# Patient Record
Sex: FEMALE | Race: WHITE | NOT HISPANIC OR LATINO | Employment: OTHER | URBAN - METROPOLITAN AREA
[De-identification: names, ages, dates, MRNs, and addresses within clinical notes are randomized per-mention and may not be internally consistent; named-entity substitution may affect disease eponyms.]

---

## 2017-01-12 ENCOUNTER — OFFICE VISIT - HEALTHEAST (OUTPATIENT)
Dept: FAMILY MEDICINE | Facility: CLINIC | Age: 72
End: 2017-01-12

## 2017-01-12 DIAGNOSIS — E55.9 VITAMIN D DEFICIENCY: ICD-10-CM

## 2017-01-12 DIAGNOSIS — G89.29 CHRONIC BILATERAL LOW BACK PAIN WITHOUT SCIATICA: ICD-10-CM

## 2017-01-12 DIAGNOSIS — M54.50 CHRONIC BILATERAL LOW BACK PAIN WITHOUT SCIATICA: ICD-10-CM

## 2017-01-12 DIAGNOSIS — E11.9 TYPE 2 DIABETES MELLITUS WITHOUT COMPLICATION (H): ICD-10-CM

## 2017-01-12 DIAGNOSIS — I10 ESSENTIAL HYPERTENSION WITH GOAL BLOOD PRESSURE LESS THAN 140/90: ICD-10-CM

## 2017-01-12 LAB — HBA1C MFR BLD: 7.4 % (ref 3.5–6)

## 2017-01-12 ASSESSMENT — MIFFLIN-ST. JEOR: SCORE: 1552.99

## 2017-02-06 ENCOUNTER — OFFICE VISIT - HEALTHEAST (OUTPATIENT)
Dept: FAMILY MEDICINE | Facility: CLINIC | Age: 72
End: 2017-02-06

## 2017-02-06 DIAGNOSIS — J01.00 ACUTE MAXILLARY SINUSITIS, RECURRENCE NOT SPECIFIED: ICD-10-CM

## 2017-02-06 DIAGNOSIS — M81.0 OSTEOPOROSIS: ICD-10-CM

## 2017-02-13 ENCOUNTER — COMMUNICATION - HEALTHEAST (OUTPATIENT)
Dept: ADMINISTRATIVE | Facility: CLINIC | Age: 72
End: 2017-02-13

## 2017-03-04 ENCOUNTER — COMMUNICATION - HEALTHEAST (OUTPATIENT)
Dept: FAMILY MEDICINE | Facility: CLINIC | Age: 72
End: 2017-03-04

## 2017-03-04 DIAGNOSIS — R19.7 DIARRHEA: ICD-10-CM

## 2017-03-21 ENCOUNTER — AMBULATORY - HEALTHEAST (OUTPATIENT)
Dept: ENDOCRINOLOGY | Facility: CLINIC | Age: 72
End: 2017-03-21

## 2017-03-21 DIAGNOSIS — M81.0 OSTEOPOROSIS: ICD-10-CM

## 2017-05-03 ENCOUNTER — COMMUNICATION - HEALTHEAST (OUTPATIENT)
Dept: FAMILY MEDICINE | Facility: CLINIC | Age: 72
End: 2017-05-03

## 2017-05-03 DIAGNOSIS — G60.9 HEREDITARY AND IDIOPATHIC PERIPHERAL NEUROPATHY: ICD-10-CM

## 2017-05-03 DIAGNOSIS — E78.5 HYPERLIPIDEMIA, UNSPECIFIED HYPERLIPIDEMIA TYPE: ICD-10-CM

## 2017-05-03 DIAGNOSIS — E11.9 DIABETES MELLITUS TYPE II, CONTROLLED, WITH NO COMPLICATIONS (H): ICD-10-CM

## 2017-05-23 ENCOUNTER — COMMUNICATION - HEALTHEAST (OUTPATIENT)
Dept: FAMILY MEDICINE | Facility: CLINIC | Age: 72
End: 2017-05-23

## 2017-05-23 DIAGNOSIS — I10 ESSENTIAL HYPERTENSION: ICD-10-CM

## 2017-07-13 ENCOUNTER — OFFICE VISIT - HEALTHEAST (OUTPATIENT)
Dept: FAMILY MEDICINE | Facility: CLINIC | Age: 72
End: 2017-07-13

## 2017-07-13 DIAGNOSIS — C18.9 MALIGNANT NEOPLASM OF COLON, UNSPECIFIED PART OF COLON (H): ICD-10-CM

## 2017-07-13 DIAGNOSIS — R30.0 BURNING WITH URINATION: ICD-10-CM

## 2017-07-13 DIAGNOSIS — N30.00 ACUTE CYSTITIS WITHOUT HEMATURIA: ICD-10-CM

## 2017-07-13 DIAGNOSIS — E11.9 TYPE 2 DIABETES MELLITUS (H): ICD-10-CM

## 2017-07-13 DIAGNOSIS — E11.9 DIABETES MELLITUS TYPE II, CONTROLLED, WITH NO COMPLICATIONS (H): ICD-10-CM

## 2017-07-13 DIAGNOSIS — E11.9 TYPE 2 DIABETES MELLITUS WITHOUT COMPLICATION (H): ICD-10-CM

## 2017-07-13 LAB
CEA SERPL-MCNC: 1.5 NG/ML (ref 0–3)
HBA1C MFR BLD: 9.2 % (ref 3.5–6)

## 2017-07-13 ASSESSMENT — MIFFLIN-ST. JEOR: SCORE: 1533.93

## 2017-08-04 ENCOUNTER — COMMUNICATION - HEALTHEAST (OUTPATIENT)
Dept: FAMILY MEDICINE | Facility: CLINIC | Age: 72
End: 2017-08-04

## 2017-08-04 DIAGNOSIS — E11.65 TYPE 2 DIABETES MELLITUS WITH HYPERGLYCEMIA, WITHOUT LONG-TERM CURRENT USE OF INSULIN (H): ICD-10-CM

## 2017-08-04 DIAGNOSIS — E11.9 DIABETES MELLITUS TYPE II, CONTROLLED, WITH NO COMPLICATIONS (H): ICD-10-CM

## 2017-09-15 ENCOUNTER — AMBULATORY - HEALTHEAST (OUTPATIENT)
Dept: ENDOCRINOLOGY | Facility: CLINIC | Age: 72
End: 2017-09-15

## 2017-09-15 DIAGNOSIS — M81.0 OSTEOPOROSIS: ICD-10-CM

## 2017-09-20 ENCOUNTER — RECORDS - HEALTHEAST (OUTPATIENT)
Dept: ADMINISTRATIVE | Facility: OTHER | Age: 72
End: 2017-09-20

## 2017-09-22 ENCOUNTER — RECORDS - HEALTHEAST (OUTPATIENT)
Dept: ADMINISTRATIVE | Facility: OTHER | Age: 72
End: 2017-09-22

## 2017-10-13 ENCOUNTER — OFFICE VISIT - HEALTHEAST (OUTPATIENT)
Dept: FAMILY MEDICINE | Facility: CLINIC | Age: 72
End: 2017-10-13

## 2017-10-13 DIAGNOSIS — E11.65 TYPE 2 DIABETES MELLITUS WITH HYPERGLYCEMIA, WITHOUT LONG-TERM CURRENT USE OF INSULIN (H): ICD-10-CM

## 2017-10-13 DIAGNOSIS — Z12.11 SCREEN FOR COLON CANCER: ICD-10-CM

## 2017-10-13 LAB — HBA1C MFR BLD: 7 % (ref 3.5–6)

## 2017-10-13 ASSESSMENT — MIFFLIN-ST. JEOR: SCORE: 1490.84

## 2017-11-22 ENCOUNTER — AMBULATORY - HEALTHEAST (OUTPATIENT)
Dept: SCHEDULING | Facility: CLINIC | Age: 72
End: 2017-11-22

## 2017-11-22 DIAGNOSIS — M81.0 OSTEOPOROSIS: ICD-10-CM

## 2017-11-30 ENCOUNTER — COMMUNICATION - HEALTHEAST (OUTPATIENT)
Dept: FAMILY MEDICINE | Facility: CLINIC | Age: 72
End: 2017-11-30

## 2017-11-30 DIAGNOSIS — E11.65 TYPE 2 DIABETES MELLITUS WITH HYPERGLYCEMIA, WITHOUT LONG-TERM CURRENT USE OF INSULIN (H): ICD-10-CM

## 2017-12-13 ENCOUNTER — COMMUNICATION - HEALTHEAST (OUTPATIENT)
Dept: FAMILY MEDICINE | Facility: CLINIC | Age: 72
End: 2017-12-13

## 2017-12-13 DIAGNOSIS — E11.65 TYPE 2 DIABETES MELLITUS WITH HYPERGLYCEMIA, WITHOUT LONG-TERM CURRENT USE OF INSULIN (H): ICD-10-CM

## 2017-12-18 ENCOUNTER — HOSPITAL ENCOUNTER (OUTPATIENT)
Dept: MAMMOGRAPHY | Facility: HOSPITAL | Age: 72
Discharge: HOME | End: 2017-12-18
Attending: FAMILY MEDICINE

## 2017-12-18 DIAGNOSIS — Z12.31 VISIT FOR SCREENING MAMMOGRAM: ICD-10-CM

## 2018-01-24 ENCOUNTER — COMMUNICATION - HEALTHEAST (OUTPATIENT)
Dept: FAMILY MEDICINE | Facility: CLINIC | Age: 73
End: 2018-01-24

## 2018-01-24 DIAGNOSIS — E78.5 HYPERLIPIDEMIA, UNSPECIFIED HYPERLIPIDEMIA TYPE: ICD-10-CM

## 2018-01-24 DIAGNOSIS — E11.9 DIABETES MELLITUS TYPE II, CONTROLLED, WITH NO COMPLICATIONS (H): ICD-10-CM

## 2018-01-25 ENCOUNTER — AMBULATORY - HEALTHEAST (OUTPATIENT)
Dept: FAMILY MEDICINE | Facility: CLINIC | Age: 73
End: 2018-01-25

## 2018-01-25 ENCOUNTER — AMBULATORY - HEALTHEAST (OUTPATIENT)
Dept: LAB | Facility: CLINIC | Age: 73
End: 2018-01-25

## 2018-01-25 ENCOUNTER — COMMUNICATION - HEALTHEAST (OUTPATIENT)
Dept: FAMILY MEDICINE | Facility: CLINIC | Age: 73
End: 2018-01-25

## 2018-01-25 ENCOUNTER — COMMUNICATION - HEALTHEAST (OUTPATIENT)
Dept: TELEHEALTH | Facility: CLINIC | Age: 73
End: 2018-01-25

## 2018-01-25 DIAGNOSIS — E11.65 TYPE 2 DIABETES MELLITUS WITH HYPERGLYCEMIA, WITHOUT LONG-TERM CURRENT USE OF INSULIN (H): ICD-10-CM

## 2018-01-25 DIAGNOSIS — C18.9 MALIGNANT NEOPLASM OF COLON, UNSPECIFIED PART OF COLON (H): ICD-10-CM

## 2018-01-25 LAB
CEA SERPL-MCNC: 1.6 NG/ML (ref 0–3)
HBA1C MFR BLD: 6.7 % (ref 3.5–6)

## 2018-02-12 ENCOUNTER — RECORDS - HEALTHEAST (OUTPATIENT)
Dept: ADMINISTRATIVE | Facility: OTHER | Age: 73
End: 2018-02-12

## 2018-02-20 ENCOUNTER — COMMUNICATION - HEALTHEAST (OUTPATIENT)
Dept: FAMILY MEDICINE | Facility: CLINIC | Age: 73
End: 2018-02-20

## 2018-02-21 ENCOUNTER — COMMUNICATION - HEALTHEAST (OUTPATIENT)
Dept: FAMILY MEDICINE | Facility: CLINIC | Age: 73
End: 2018-02-21

## 2018-02-21 DIAGNOSIS — R19.7 DIARRHEA: ICD-10-CM

## 2018-02-21 DIAGNOSIS — I10 ESSENTIAL HYPERTENSION: ICD-10-CM

## 2018-03-02 ENCOUNTER — RECORDS - HEALTHEAST (OUTPATIENT)
Dept: ADMINISTRATIVE | Facility: OTHER | Age: 73
End: 2018-03-02

## 2018-05-18 ENCOUNTER — AMBULATORY - HEALTHEAST (OUTPATIENT)
Dept: ENDOCRINOLOGY | Facility: CLINIC | Age: 73
End: 2018-05-18

## 2018-05-18 DIAGNOSIS — M81.0 OSTEOPOROSIS: ICD-10-CM

## 2018-07-09 ENCOUNTER — OFFICE VISIT - HEALTHEAST (OUTPATIENT)
Dept: FAMILY MEDICINE | Facility: CLINIC | Age: 73
End: 2018-07-09

## 2018-07-09 DIAGNOSIS — M70.62 TROCHANTERIC BURSITIS OF BOTH HIPS: ICD-10-CM

## 2018-07-09 DIAGNOSIS — M70.61 TROCHANTERIC BURSITIS OF BOTH HIPS: ICD-10-CM

## 2018-07-09 DIAGNOSIS — G60.9 HEREDITARY AND IDIOPATHIC PERIPHERAL NEUROPATHY: ICD-10-CM

## 2018-07-09 DIAGNOSIS — M81.0 OSTEOPOROSIS: ICD-10-CM

## 2018-07-09 DIAGNOSIS — E11.65 TYPE 2 DIABETES MELLITUS WITH HYPERGLYCEMIA, WITHOUT LONG-TERM CURRENT USE OF INSULIN (H): ICD-10-CM

## 2018-07-09 DIAGNOSIS — C18.9 MALIGNANT NEOPLASM OF COLON, UNSPECIFIED PART OF COLON (H): ICD-10-CM

## 2018-07-09 LAB
25(OH)D3 SERPL-MCNC: 26.2 NG/ML (ref 30–80)
25(OH)D3 SERPL-MCNC: 26.2 NG/ML (ref 30–80)
CALCIUM SERPL-MCNC: 9.9 MG/DL (ref 8.5–10.5)
CEA SERPL-MCNC: 1.3 NG/ML (ref 0–3)
HBA1C MFR BLD: 7.1 % (ref 3.5–6)

## 2018-07-09 ASSESSMENT — MIFFLIN-ST. JEOR: SCORE: 1542.55

## 2018-07-11 ENCOUNTER — COMMUNICATION - HEALTHEAST (OUTPATIENT)
Dept: FAMILY MEDICINE | Facility: CLINIC | Age: 73
End: 2018-07-11

## 2018-07-17 ENCOUNTER — OFFICE VISIT - HEALTHEAST (OUTPATIENT)
Dept: ENDOCRINOLOGY | Facility: CLINIC | Age: 73
End: 2018-07-17

## 2018-07-17 ENCOUNTER — RECORDS - HEALTHEAST (OUTPATIENT)
Dept: ADMINISTRATIVE | Facility: OTHER | Age: 73
End: 2018-07-17

## 2018-07-17 DIAGNOSIS — M81.0 OSTEOPOROSIS: ICD-10-CM

## 2018-07-17 ASSESSMENT — MIFFLIN-ST. JEOR: SCORE: 1540.97

## 2018-07-26 ENCOUNTER — COMMUNICATION - HEALTHEAST (OUTPATIENT)
Dept: FAMILY MEDICINE | Facility: CLINIC | Age: 73
End: 2018-07-26

## 2018-07-26 DIAGNOSIS — E11.9 DIABETES MELLITUS TYPE II, CONTROLLED, WITH NO COMPLICATIONS (H): ICD-10-CM

## 2018-08-13 ENCOUNTER — COMMUNICATION - HEALTHEAST (OUTPATIENT)
Dept: FAMILY MEDICINE | Facility: CLINIC | Age: 73
End: 2018-08-13

## 2018-08-15 ENCOUNTER — OFFICE VISIT - HEALTHEAST (OUTPATIENT)
Dept: FAMILY MEDICINE | Facility: CLINIC | Age: 73
End: 2018-08-15

## 2018-08-15 DIAGNOSIS — E66.01 SEVERE OBESITY (BMI 35.0-35.9 WITH COMORBIDITY) (H): ICD-10-CM

## 2018-08-15 DIAGNOSIS — C18.9 MALIGNANT NEOPLASM OF COLON, UNSPECIFIED PART OF COLON (H): ICD-10-CM

## 2018-08-15 DIAGNOSIS — R19.7 DIARRHEA OF PRESUMED INFECTIOUS ORIGIN: ICD-10-CM

## 2018-08-15 LAB
C DIFF TOX B STL QL: NEGATIVE
RIBOTYPE 027/NAP1/BI: NORMAL

## 2018-08-15 ASSESSMENT — MIFFLIN-ST. JEOR: SCORE: 1537.34

## 2018-08-16 ENCOUNTER — COMMUNICATION - HEALTHEAST (OUTPATIENT)
Dept: FAMILY MEDICINE | Facility: CLINIC | Age: 73
End: 2018-08-16

## 2018-08-16 LAB
O+P STL MICRO: NORMAL
SHIGA TOXIN 1: NEGATIVE
SHIGA TOXIN 2: NEGATIVE

## 2018-08-18 LAB — BACTERIA SPEC CULT: NORMAL

## 2018-08-23 ENCOUNTER — COMMUNICATION - HEALTHEAST (OUTPATIENT)
Dept: FAMILY MEDICINE | Facility: CLINIC | Age: 73
End: 2018-08-23

## 2018-08-23 DIAGNOSIS — K57.32 DIVERTICULITIS OF COLON: ICD-10-CM

## 2018-08-23 DIAGNOSIS — C18.9 MALIGNANT NEOPLASM OF COLON, UNSPECIFIED PART OF COLON (H): ICD-10-CM

## 2018-08-24 ENCOUNTER — COMMUNICATION - HEALTHEAST (OUTPATIENT)
Dept: FAMILY MEDICINE | Facility: CLINIC | Age: 73
End: 2018-08-24

## 2018-08-25 ENCOUNTER — COMMUNICATION - HEALTHEAST (OUTPATIENT)
Dept: FAMILY MEDICINE | Facility: CLINIC | Age: 73
End: 2018-08-25

## 2018-08-25 DIAGNOSIS — R19.7 DIARRHEA: ICD-10-CM

## 2018-08-27 ENCOUNTER — HOSPITAL ENCOUNTER (OUTPATIENT)
Dept: CT IMAGING | Facility: HOSPITAL | Age: 73
Discharge: HOME | End: 2018-08-27
Attending: FAMILY MEDICINE

## 2018-08-27 DIAGNOSIS — K57.32 DIVERTICULITIS OF COLON: ICD-10-CM

## 2018-08-27 DIAGNOSIS — C18.9 MALIGNANT NEOPLASM OF COLON, UNSPECIFIED PART OF COLON (H): ICD-10-CM

## 2018-08-27 LAB
CREAT BLD-MCNC: 0.7 MG/DL
POC GFR AMER AF HE - HISTORICAL: >60 ML/MIN/1.73M2
POC GFR NON AMER AF HE - HISTORICAL: >60 ML/MIN/1.73M2

## 2018-08-28 ENCOUNTER — COMMUNICATION - HEALTHEAST (OUTPATIENT)
Dept: FAMILY MEDICINE | Facility: CLINIC | Age: 73
End: 2018-08-28

## 2018-08-31 ENCOUNTER — OFFICE VISIT - HEALTHEAST (OUTPATIENT)
Dept: FAMILY MEDICINE | Facility: CLINIC | Age: 73
End: 2018-08-31

## 2018-08-31 DIAGNOSIS — K80.20 GALLSTONES: ICD-10-CM

## 2018-08-31 DIAGNOSIS — E11.65 TYPE 2 DIABETES MELLITUS WITH HYPERGLYCEMIA, WITHOUT LONG-TERM CURRENT USE OF INSULIN (H): ICD-10-CM

## 2018-08-31 DIAGNOSIS — C18.9 MALIGNANT NEOPLASM OF COLON, UNSPECIFIED PART OF COLON (H): ICD-10-CM

## 2018-08-31 ASSESSMENT — MIFFLIN-ST. JEOR: SCORE: 1523.73

## 2018-09-04 ENCOUNTER — RECORDS - HEALTHEAST (OUTPATIENT)
Dept: ADMINISTRATIVE | Facility: OTHER | Age: 73
End: 2018-09-04

## 2018-09-06 ENCOUNTER — OFFICE VISIT - HEALTHEAST (OUTPATIENT)
Dept: SURGERY | Facility: CLINIC | Age: 73
End: 2018-09-06

## 2018-09-06 DIAGNOSIS — C18.9 MALIGNANT NEOPLASM OF COLON (H): ICD-10-CM

## 2018-09-06 DIAGNOSIS — K81.9 CHOLECYSTITIS: ICD-10-CM

## 2018-09-06 ASSESSMENT — MIFFLIN-ST. JEOR: SCORE: 1535.07

## 2018-09-07 ENCOUNTER — COMMUNICATION - HEALTHEAST (OUTPATIENT)
Dept: FAMILY MEDICINE | Facility: CLINIC | Age: 73
End: 2018-09-07

## 2018-09-24 ENCOUNTER — OFFICE VISIT - HEALTHEAST (OUTPATIENT)
Dept: FAMILY MEDICINE | Facility: CLINIC | Age: 73
End: 2018-09-24

## 2018-09-24 DIAGNOSIS — E11.65 TYPE 2 DIABETES MELLITUS WITH HYPERGLYCEMIA, WITHOUT LONG-TERM CURRENT USE OF INSULIN (H): ICD-10-CM

## 2018-09-24 DIAGNOSIS — K80.20 GALLSTONES: ICD-10-CM

## 2018-09-24 DIAGNOSIS — Z01.818 PREOPERATIVE EXAMINATION: ICD-10-CM

## 2018-09-24 DIAGNOSIS — M81.0 OSTEOPOROSIS: ICD-10-CM

## 2018-09-24 LAB
ANION GAP SERPL CALCULATED.3IONS-SCNC: 11 MMOL/L (ref 5–18)
ATRIAL RATE - MUSE: 68 BPM
BUN SERPL-MCNC: 15 MG/DL (ref 8–28)
CALCIUM SERPL-MCNC: 10.6 MG/DL (ref 8.5–10.5)
CHLORIDE BLD-SCNC: 103 MMOL/L (ref 98–107)
CO2 SERPL-SCNC: 25 MMOL/L (ref 22–31)
CREAT SERPL-MCNC: 0.73 MG/DL (ref 0.6–1.1)
DIASTOLIC BLOOD PRESSURE - MUSE: 68 MMHG
GFR SERPL CREATININE-BSD FRML MDRD: >60 ML/MIN/1.73M2
GLUCOSE BLD-MCNC: 153 MG/DL (ref 70–125)
HGB BLD-MCNC: 13.6 G/DL (ref 12–16)
INTERPRETATION ECG - MUSE: NORMAL
P AXIS - MUSE: 46 DEGREES
POTASSIUM BLD-SCNC: 4.8 MMOL/L (ref 3.5–5)
PR INTERVAL - MUSE: 204 MS
QRS DURATION - MUSE: 84 MS
QT - MUSE: 396 MS
QTC - MUSE: 421 MS
R AXIS - MUSE: -16 DEGREES
SODIUM SERPL-SCNC: 139 MMOL/L (ref 136–145)
SYSTOLIC BLOOD PRESSURE - MUSE: 106 MMHG
T AXIS - MUSE: 46 DEGREES
VENTRICULAR RATE- MUSE: 68 BPM

## 2018-09-24 ASSESSMENT — MIFFLIN-ST. JEOR: SCORE: 1502.64

## 2018-09-25 LAB
25(OH)D3 SERPL-MCNC: 31 NG/ML (ref 30–80)
25(OH)D3 SERPL-MCNC: 31 NG/ML (ref 30–80)

## 2018-10-09 ASSESSMENT — MIFFLIN-ST. JEOR: SCORE: 1499.46

## 2018-10-10 ENCOUNTER — SURGERY - HEALTHEAST (OUTPATIENT)
Dept: SURGERY | Facility: HOSPITAL | Age: 73
End: 2018-10-10

## 2018-10-10 ENCOUNTER — ANESTHESIA - HEALTHEAST (OUTPATIENT)
Dept: SURGERY | Facility: HOSPITAL | Age: 73
End: 2018-10-10

## 2018-10-12 ENCOUNTER — COMMUNICATION - HEALTHEAST (OUTPATIENT)
Dept: ADMINISTRATIVE | Facility: CLINIC | Age: 73
End: 2018-10-12

## 2018-10-29 ENCOUNTER — COMMUNICATION - HEALTHEAST (OUTPATIENT)
Dept: FAMILY MEDICINE | Facility: CLINIC | Age: 73
End: 2018-10-29

## 2018-11-19 ENCOUNTER — COMMUNICATION - HEALTHEAST (OUTPATIENT)
Dept: FAMILY MEDICINE | Facility: CLINIC | Age: 73
End: 2018-11-19

## 2018-11-19 DIAGNOSIS — G60.9 HEREDITARY AND IDIOPATHIC PERIPHERAL NEUROPATHY: ICD-10-CM

## 2018-11-20 ENCOUNTER — AMBULATORY - HEALTHEAST (OUTPATIENT)
Dept: ENDOCRINOLOGY | Facility: CLINIC | Age: 73
End: 2018-11-20

## 2018-12-04 ENCOUNTER — COMMUNICATION - HEALTHEAST (OUTPATIENT)
Dept: FAMILY MEDICINE | Facility: CLINIC | Age: 73
End: 2018-12-04

## 2018-12-04 DIAGNOSIS — E11.65 TYPE 2 DIABETES MELLITUS WITH HYPERGLYCEMIA, WITHOUT LONG-TERM CURRENT USE OF INSULIN (H): ICD-10-CM

## 2018-12-07 ENCOUNTER — COMMUNICATION - HEALTHEAST (OUTPATIENT)
Dept: FAMILY MEDICINE | Facility: CLINIC | Age: 73
End: 2018-12-07

## 2018-12-07 DIAGNOSIS — E11.65 TYPE 2 DIABETES MELLITUS WITH HYPERGLYCEMIA, WITHOUT LONG-TERM CURRENT USE OF INSULIN (H): ICD-10-CM

## 2018-12-10 ENCOUNTER — COMMUNICATION - HEALTHEAST (OUTPATIENT)
Dept: FAMILY MEDICINE | Facility: CLINIC | Age: 73
End: 2018-12-10

## 2018-12-10 DIAGNOSIS — E11.65 TYPE 2 DIABETES MELLITUS WITH HYPERGLYCEMIA, WITHOUT LONG-TERM CURRENT USE OF INSULIN (H): ICD-10-CM

## 2019-01-04 ENCOUNTER — OFFICE VISIT - HEALTHEAST (OUTPATIENT)
Dept: FAMILY MEDICINE | Facility: CLINIC | Age: 74
End: 2019-01-04

## 2019-01-04 DIAGNOSIS — H25.9 AGE-RELATED CATARACT OF BOTH EYES, UNSPECIFIED AGE-RELATED CATARACT TYPE: ICD-10-CM

## 2019-01-04 DIAGNOSIS — I10 ESSENTIAL HYPERTENSION: ICD-10-CM

## 2019-01-04 DIAGNOSIS — Z01.818 PREOP EXAMINATION: ICD-10-CM

## 2019-01-04 DIAGNOSIS — C18.9 MALIGNANT NEOPLASM OF COLON, UNSPECIFIED PART OF COLON (H): ICD-10-CM

## 2019-01-04 DIAGNOSIS — E11.65 TYPE 2 DIABETES MELLITUS WITH HYPERGLYCEMIA, WITHOUT LONG-TERM CURRENT USE OF INSULIN (H): ICD-10-CM

## 2019-01-04 DIAGNOSIS — E66.01 SEVERE OBESITY (BMI 35.0-35.9 WITH COMORBIDITY) (H): ICD-10-CM

## 2019-01-04 LAB
ANION GAP SERPL CALCULATED.3IONS-SCNC: 10 MMOL/L (ref 5–18)
BUN SERPL-MCNC: 19 MG/DL (ref 8–28)
CALCIUM SERPL-MCNC: 9.6 MG/DL (ref 8.5–10.5)
CEA SERPL-MCNC: 1.3 NG/ML (ref 0–3)
CHLORIDE BLD-SCNC: 106 MMOL/L (ref 98–107)
CO2 SERPL-SCNC: 25 MMOL/L (ref 22–31)
CREAT SERPL-MCNC: 0.77 MG/DL (ref 0.6–1.1)
GFR SERPL CREATININE-BSD FRML MDRD: >60 ML/MIN/1.73M2
GLUCOSE BLD-MCNC: 203 MG/DL (ref 70–125)
HBA1C MFR BLD: 8.4 % (ref 3.5–6)
HGB BLD-MCNC: 13.7 G/DL (ref 12–16)
POTASSIUM BLD-SCNC: 4.7 MMOL/L (ref 3.5–5)
SODIUM SERPL-SCNC: 141 MMOL/L (ref 136–145)

## 2019-01-04 ASSESSMENT — MIFFLIN-ST. JEOR: SCORE: 1488.23

## 2019-01-21 ENCOUNTER — COMMUNICATION - HEALTHEAST (OUTPATIENT)
Dept: FAMILY MEDICINE | Facility: CLINIC | Age: 74
End: 2019-01-21

## 2019-01-21 DIAGNOSIS — E11.65 TYPE 2 DIABETES MELLITUS WITH HYPERGLYCEMIA, WITHOUT LONG-TERM CURRENT USE OF INSULIN (H): ICD-10-CM

## 2019-01-21 DIAGNOSIS — E78.5 HYPERLIPIDEMIA, UNSPECIFIED HYPERLIPIDEMIA TYPE: ICD-10-CM

## 2019-02-01 ENCOUNTER — AMBULATORY - HEALTHEAST (OUTPATIENT)
Dept: SURGERY | Facility: CLINIC | Age: 74
End: 2019-02-01

## 2019-02-01 ENCOUNTER — OFFICE VISIT - HEALTHEAST (OUTPATIENT)
Dept: SURGERY | Facility: CLINIC | Age: 74
End: 2019-02-01

## 2019-02-01 DIAGNOSIS — C18.9 COLON CANCER (H): ICD-10-CM

## 2019-02-01 DIAGNOSIS — Z86.0100 HISTORY OF COLONIC POLYPS: ICD-10-CM

## 2019-02-01 DIAGNOSIS — C18.4 MALIGNANT NEOPLASM OF TRANSVERSE COLON (H): ICD-10-CM

## 2019-02-01 ASSESSMENT — MIFFLIN-ST. JEOR: SCORE: 1516.81

## 2019-03-04 ENCOUNTER — COMMUNICATION - HEALTHEAST (OUTPATIENT)
Dept: FAMILY MEDICINE | Facility: CLINIC | Age: 74
End: 2019-03-04

## 2019-03-04 DIAGNOSIS — I10 ESSENTIAL HYPERTENSION: ICD-10-CM

## 2019-03-06 ENCOUNTER — AMBULATORY - HEALTHEAST (OUTPATIENT)
Dept: SURGERY | Facility: CLINIC | Age: 74
End: 2019-03-06

## 2019-03-06 ENCOUNTER — COMMUNICATION - HEALTHEAST (OUTPATIENT)
Dept: SURGERY | Facility: CLINIC | Age: 74
End: 2019-03-06

## 2019-03-13 ENCOUNTER — AMBULATORY - HEALTHEAST (OUTPATIENT)
Dept: ENDOCRINOLOGY | Facility: CLINIC | Age: 74
End: 2019-03-13

## 2019-03-13 DIAGNOSIS — M81.0 AGE-RELATED OSTEOPOROSIS WITHOUT CURRENT PATHOLOGICAL FRACTURE: ICD-10-CM

## 2019-03-13 DIAGNOSIS — E11.65 TYPE 2 DIABETES MELLITUS WITH HYPERGLYCEMIA, WITHOUT LONG-TERM CURRENT USE OF INSULIN (H): ICD-10-CM

## 2019-03-21 ASSESSMENT — MIFFLIN-ST. JEOR: SCORE: 1513.64

## 2019-03-25 ENCOUNTER — ANESTHESIA - HEALTHEAST (OUTPATIENT)
Dept: SURGERY | Facility: AMBULATORY SURGERY CENTER | Age: 74
End: 2019-03-25

## 2019-03-26 ENCOUNTER — SURGERY - HEALTHEAST (OUTPATIENT)
Dept: SURGERY | Facility: AMBULATORY SURGERY CENTER | Age: 74
End: 2019-03-26

## 2019-03-26 ASSESSMENT — MIFFLIN-ST. JEOR: SCORE: 1513.64

## 2019-04-01 ENCOUNTER — OFFICE VISIT - HEALTHEAST (OUTPATIENT)
Dept: SURGERY | Facility: CLINIC | Age: 74
End: 2019-04-01

## 2019-04-01 DIAGNOSIS — Z86.0100 HISTORY OF COLONIC POLYPS: ICD-10-CM

## 2019-04-05 ENCOUNTER — OFFICE VISIT - HEALTHEAST (OUTPATIENT)
Dept: FAMILY MEDICINE | Facility: CLINIC | Age: 74
End: 2019-04-05

## 2019-04-05 DIAGNOSIS — R35.0 URINARY FREQUENCY: ICD-10-CM

## 2019-04-05 DIAGNOSIS — M81.0 AGE-RELATED OSTEOPOROSIS WITHOUT CURRENT PATHOLOGICAL FRACTURE: ICD-10-CM

## 2019-04-05 DIAGNOSIS — E11.65 TYPE 2 DIABETES MELLITUS WITH HYPERGLYCEMIA, WITHOUT LONG-TERM CURRENT USE OF INSULIN (H): ICD-10-CM

## 2019-04-05 DIAGNOSIS — E55.9 VITAMIN D DEFICIENCY: ICD-10-CM

## 2019-04-05 DIAGNOSIS — E78.5 HYPERLIPIDEMIA, UNSPECIFIED HYPERLIPIDEMIA TYPE: ICD-10-CM

## 2019-04-05 DIAGNOSIS — I10 ESSENTIAL HYPERTENSION: ICD-10-CM

## 2019-04-05 LAB
ALBUMIN UR-MCNC: NEGATIVE MG/DL
APPEARANCE UR: CLEAR
BILIRUB UR QL STRIP: NEGATIVE
CALCIUM SERPL-MCNC: 10.1 MG/DL (ref 8.5–10.5)
COLOR UR AUTO: YELLOW
CREAT UR-MCNC: 99.2 MG/DL
GLUCOSE UR STRIP-MCNC: NEGATIVE MG/DL
HBA1C MFR BLD: 8.9 % (ref 3.5–6)
HGB UR QL STRIP: NEGATIVE
KETONES UR STRIP-MCNC: NEGATIVE MG/DL
LEUKOCYTE ESTERASE UR QL STRIP: ABNORMAL
MICROALBUMIN UR-MCNC: 1.25 MG/DL (ref 0–1.99)
MICROALBUMIN/CREAT UR: 12.6 MG/G
NITRATE UR QL: NEGATIVE
PH UR STRIP: 5.5 [PH] (ref 5–8)
SP GR UR STRIP: 1.02 (ref 1–1.03)
UROBILINOGEN UR STRIP-ACNC: ABNORMAL

## 2019-04-05 ASSESSMENT — MIFFLIN-ST. JEOR: SCORE: 1499.57

## 2019-04-07 LAB — BACTERIA SPEC CULT: ABNORMAL

## 2019-04-08 LAB
25(OH)D3 SERPL-MCNC: 26 NG/ML (ref 30–80)
25(OH)D3 SERPL-MCNC: 26 NG/ML (ref 30–80)
ANION GAP SERPL CALCULATED.3IONS-SCNC: 14 MMOL/L (ref 5–18)
BUN SERPL-MCNC: 15 MG/DL (ref 8–28)
CALCIUM SERPL-MCNC: 10.3 MG/DL (ref 8.5–10.5)
CHLORIDE BLD-SCNC: 104 MMOL/L (ref 98–107)
CHOLEST SERPL-MCNC: 194 MG/DL
CO2 SERPL-SCNC: 21 MMOL/L (ref 22–31)
CREAT SERPL-MCNC: 0.85 MG/DL (ref 0.6–1.1)
GFR SERPL CREATININE-BSD FRML MDRD: >60 ML/MIN/1.73M2
GLUCOSE BLD-MCNC: 236 MG/DL (ref 70–125)
HDLC SERPL-MCNC: 75 MG/DL
LDLC SERPL CALC-MCNC: 94 MG/DL
POTASSIUM BLD-SCNC: 4.7 MMOL/L (ref 3.5–5)
SODIUM SERPL-SCNC: 139 MMOL/L (ref 136–145)
TRIGL SERPL-MCNC: 124 MG/DL

## 2019-04-09 ENCOUNTER — AMBULATORY - HEALTHEAST (OUTPATIENT)
Dept: ENDOCRINOLOGY | Facility: CLINIC | Age: 74
End: 2019-04-09

## 2019-04-09 DIAGNOSIS — M81.0 OSTEOPOROSIS, UNSPECIFIED OSTEOPOROSIS TYPE, UNSPECIFIED PATHOLOGICAL FRACTURE PRESENCE: ICD-10-CM

## 2019-04-24 ENCOUNTER — COMMUNICATION - HEALTHEAST (OUTPATIENT)
Dept: SCHEDULING | Facility: CLINIC | Age: 74
End: 2019-04-24

## 2019-04-24 ENCOUNTER — OFFICE VISIT - HEALTHEAST (OUTPATIENT)
Dept: FAMILY MEDICINE | Facility: CLINIC | Age: 74
End: 2019-04-24

## 2019-04-24 DIAGNOSIS — N30.00 ACUTE CYSTITIS WITHOUT HEMATURIA: ICD-10-CM

## 2019-04-24 LAB
ALBUMIN UR-MCNC: NEGATIVE MG/DL
APPEARANCE UR: CLEAR
BACTERIA #/AREA URNS HPF: ABNORMAL HPF
BILIRUB UR QL STRIP: NEGATIVE
COLOR UR AUTO: YELLOW
GLUCOSE UR STRIP-MCNC: NEGATIVE MG/DL
HGB UR QL STRIP: NEGATIVE
KETONES UR STRIP-MCNC: NEGATIVE MG/DL
LEUKOCYTE ESTERASE UR QL STRIP: ABNORMAL
NITRATE UR QL: NEGATIVE
PH UR STRIP: 5.5 [PH] (ref 5–8)
RBC #/AREA URNS AUTO: ABNORMAL HPF
SP GR UR STRIP: <=1.005 (ref 1–1.03)
SQUAMOUS #/AREA URNS AUTO: ABNORMAL LPF
UROBILINOGEN UR STRIP-ACNC: ABNORMAL
WBC #/AREA URNS AUTO: ABNORMAL HPF

## 2019-04-25 LAB — BACTERIA SPEC CULT: NO GROWTH

## 2019-05-09 ENCOUNTER — COMMUNICATION - HEALTHEAST (OUTPATIENT)
Dept: FAMILY MEDICINE | Facility: CLINIC | Age: 74
End: 2019-05-09

## 2019-05-09 ENCOUNTER — OFFICE VISIT - HEALTHEAST (OUTPATIENT)
Dept: AUDIOLOGY | Facility: CLINIC | Age: 74
End: 2019-05-09

## 2019-05-09 DIAGNOSIS — H90.3 SENSORINEURAL HEARING LOSS, BILATERAL: ICD-10-CM

## 2019-05-09 DIAGNOSIS — E11.65 TYPE 2 DIABETES MELLITUS WITH HYPERGLYCEMIA, WITHOUT LONG-TERM CURRENT USE OF INSULIN (H): ICD-10-CM

## 2019-05-20 ENCOUNTER — AMBULATORY - HEALTHEAST (OUTPATIENT)
Dept: LAB | Facility: CLINIC | Age: 74
End: 2019-05-20

## 2019-05-20 DIAGNOSIS — M81.0 OSTEOPOROSIS, UNSPECIFIED OSTEOPOROSIS TYPE, UNSPECIFIED PATHOLOGICAL FRACTURE PRESENCE: ICD-10-CM

## 2019-05-21 LAB
25(OH)D3 SERPL-MCNC: 28.9 NG/ML (ref 30–80)
25(OH)D3 SERPL-MCNC: 28.9 NG/ML (ref 30–80)

## 2019-05-28 ENCOUNTER — OFFICE VISIT - HEALTHEAST (OUTPATIENT)
Dept: ENDOCRINOLOGY | Facility: CLINIC | Age: 74
End: 2019-05-28

## 2019-05-28 ENCOUNTER — COMMUNICATION - HEALTHEAST (OUTPATIENT)
Dept: TELEHEALTH | Facility: CLINIC | Age: 74
End: 2019-05-28

## 2019-05-28 ENCOUNTER — AMBULATORY - HEALTHEAST (OUTPATIENT)
Dept: ENDOCRINOLOGY | Facility: CLINIC | Age: 74
End: 2019-05-28

## 2019-05-28 ENCOUNTER — COMMUNICATION - HEALTHEAST (OUTPATIENT)
Dept: ENDOCRINOLOGY | Facility: CLINIC | Age: 74
End: 2019-05-28

## 2019-05-28 DIAGNOSIS — M81.0 OSTEOPOROSIS: ICD-10-CM

## 2019-05-28 DIAGNOSIS — E11.65 TYPE 2 DIABETES MELLITUS WITH HYPERGLYCEMIA, WITHOUT LONG-TERM CURRENT USE OF INSULIN (H): ICD-10-CM

## 2019-05-28 ASSESSMENT — MIFFLIN-ST. JEOR: SCORE: 1517.26

## 2019-06-06 ENCOUNTER — AMBULATORY - HEALTHEAST (OUTPATIENT)
Dept: ENDOCRINOLOGY | Facility: CLINIC | Age: 74
End: 2019-06-06

## 2019-06-27 ENCOUNTER — RECORDS - HEALTHEAST (OUTPATIENT)
Dept: ADMINISTRATIVE | Facility: OTHER | Age: 74
End: 2019-06-27

## 2019-07-08 ENCOUNTER — OFFICE VISIT - HEALTHEAST (OUTPATIENT)
Dept: FAMILY MEDICINE | Facility: CLINIC | Age: 74
End: 2019-07-08

## 2019-07-08 DIAGNOSIS — C18.4 MALIGNANT NEOPLASM OF TRANSVERSE COLON (H): ICD-10-CM

## 2019-07-08 DIAGNOSIS — G60.9 HEREDITARY AND IDIOPATHIC PERIPHERAL NEUROPATHY: ICD-10-CM

## 2019-07-08 DIAGNOSIS — E11.65 TYPE 2 DIABETES MELLITUS WITH HYPERGLYCEMIA, WITHOUT LONG-TERM CURRENT USE OF INSULIN (H): ICD-10-CM

## 2019-07-08 LAB
CEA SERPL-MCNC: 1.5 NG/ML (ref 0–3)
HBA1C MFR BLD: 7.1 % (ref 3.5–6)

## 2019-07-08 ASSESSMENT — MIFFLIN-ST. JEOR: SCORE: 1508.19

## 2019-07-10 ENCOUNTER — RECORDS - HEALTHEAST (OUTPATIENT)
Dept: HEALTH INFORMATION MANAGEMENT | Facility: CLINIC | Age: 74
End: 2019-07-10

## 2019-08-20 ENCOUNTER — COMMUNICATION - HEALTHEAST (OUTPATIENT)
Dept: FAMILY MEDICINE | Facility: CLINIC | Age: 74
End: 2019-08-20

## 2019-08-20 DIAGNOSIS — R19.7 DIARRHEA: ICD-10-CM

## 2019-09-10 ENCOUNTER — AMBULATORY - HEALTHEAST (OUTPATIENT)
Dept: SCHEDULING | Facility: CLINIC | Age: 74
End: 2019-09-10

## 2019-09-10 DIAGNOSIS — M81.0 OSTEOPOROSIS: ICD-10-CM

## 2019-10-07 ENCOUNTER — OFFICE VISIT - HEALTHEAST (OUTPATIENT)
Dept: FAMILY MEDICINE | Facility: CLINIC | Age: 74
End: 2019-10-07

## 2019-10-07 DIAGNOSIS — Z11.59 ENCOUNTER FOR HEPATITIS C SCREENING TEST FOR LOW RISK PATIENT: ICD-10-CM

## 2019-10-07 DIAGNOSIS — E11.65 TYPE 2 DIABETES MELLITUS WITH HYPERGLYCEMIA, WITHOUT LONG-TERM CURRENT USE OF INSULIN (H): ICD-10-CM

## 2019-10-07 DIAGNOSIS — Z00.00 ROUTINE GENERAL MEDICAL EXAMINATION AT A HEALTH CARE FACILITY: ICD-10-CM

## 2019-10-07 DIAGNOSIS — I10 ESSENTIAL HYPERTENSION: ICD-10-CM

## 2019-10-07 DIAGNOSIS — C18.4 MALIGNANT NEOPLASM OF TRANSVERSE COLON (H): ICD-10-CM

## 2019-10-07 DIAGNOSIS — Z12.31 VISIT FOR SCREENING MAMMOGRAM: ICD-10-CM

## 2019-10-07 LAB — HBA1C MFR BLD: 7.1 % (ref 3.5–6)

## 2019-10-07 ASSESSMENT — MIFFLIN-ST. JEOR: SCORE: 1521.8

## 2019-10-08 ENCOUNTER — COMMUNICATION - HEALTHEAST (OUTPATIENT)
Dept: FAMILY MEDICINE | Facility: CLINIC | Age: 74
End: 2019-10-08

## 2019-10-08 LAB — HCV AB SERPL QL IA: NEGATIVE

## 2019-10-10 ENCOUNTER — COMMUNICATION - HEALTHEAST (OUTPATIENT)
Dept: ENDOCRINOLOGY | Facility: CLINIC | Age: 74
End: 2019-10-10

## 2019-10-10 DIAGNOSIS — M81.0 OSTEOPOROSIS: ICD-10-CM

## 2019-10-13 ENCOUNTER — AMBULATORY - HEALTHEAST (OUTPATIENT)
Dept: ENDOCRINOLOGY | Facility: CLINIC | Age: 74
End: 2019-10-13

## 2019-10-13 DIAGNOSIS — M81.0 AGE-RELATED OSTEOPOROSIS WITHOUT CURRENT PATHOLOGICAL FRACTURE: ICD-10-CM

## 2019-11-18 ENCOUNTER — OFFICE VISIT - HEALTHEAST (OUTPATIENT)
Dept: FAMILY MEDICINE | Facility: CLINIC | Age: 74
End: 2019-11-18

## 2019-11-18 DIAGNOSIS — J40 SINOBRONCHITIS: ICD-10-CM

## 2019-11-18 DIAGNOSIS — I10 ESSENTIAL HYPERTENSION: ICD-10-CM

## 2019-11-18 DIAGNOSIS — J32.9 SINOBRONCHITIS: ICD-10-CM

## 2019-11-18 DIAGNOSIS — E11.65 TYPE 2 DIABETES MELLITUS WITH HYPERGLYCEMIA, WITHOUT LONG-TERM CURRENT USE OF INSULIN (H): ICD-10-CM

## 2019-11-20 ENCOUNTER — AMBULATORY - HEALTHEAST (OUTPATIENT)
Dept: ENDOCRINOLOGY | Facility: CLINIC | Age: 74
End: 2019-11-20

## 2019-11-20 DIAGNOSIS — M81.0 OSTEOPOROSIS: ICD-10-CM

## 2019-12-09 ENCOUNTER — HOSPITAL ENCOUNTER (OUTPATIENT)
Dept: MAMMOGRAPHY | Facility: CLINIC | Age: 74
Discharge: HOME | End: 2019-12-09
Attending: FAMILY MEDICINE

## 2019-12-09 DIAGNOSIS — Z12.31 VISIT FOR SCREENING MAMMOGRAM: ICD-10-CM

## 2019-12-11 ENCOUNTER — COMMUNICATION - HEALTHEAST (OUTPATIENT)
Dept: ENDOCRINOLOGY | Facility: CLINIC | Age: 74
End: 2019-12-11

## 2019-12-12 ENCOUNTER — AMBULATORY - HEALTHEAST (OUTPATIENT)
Dept: ENDOCRINOLOGY | Facility: CLINIC | Age: 74
End: 2019-12-12

## 2019-12-12 ENCOUNTER — COMMUNICATION - HEALTHEAST (OUTPATIENT)
Dept: ENDOCRINOLOGY | Facility: CLINIC | Age: 74
End: 2019-12-12

## 2019-12-21 ENCOUNTER — OFFICE VISIT - HEALTHEAST (OUTPATIENT)
Dept: FAMILY MEDICINE | Facility: CLINIC | Age: 74
End: 2019-12-21

## 2019-12-21 DIAGNOSIS — I10 ESSENTIAL HYPERTENSION: ICD-10-CM

## 2019-12-21 DIAGNOSIS — J06.9 VIRAL URI WITH COUGH: ICD-10-CM

## 2020-01-17 ENCOUNTER — COMMUNICATION - HEALTHEAST (OUTPATIENT)
Dept: FAMILY MEDICINE | Facility: CLINIC | Age: 75
End: 2020-01-17

## 2020-01-17 DIAGNOSIS — E78.5 HYPERLIPIDEMIA, UNSPECIFIED HYPERLIPIDEMIA TYPE: ICD-10-CM

## 2020-01-20 ENCOUNTER — OFFICE VISIT - HEALTHEAST (OUTPATIENT)
Dept: FAMILY MEDICINE | Facility: CLINIC | Age: 75
End: 2020-01-20

## 2020-01-20 DIAGNOSIS — M81.0 OSTEOPOROSIS: ICD-10-CM

## 2020-01-20 DIAGNOSIS — G60.9 HEREDITARY AND IDIOPATHIC PERIPHERAL NEUROPATHY: ICD-10-CM

## 2020-01-20 DIAGNOSIS — E78.5 HYPERLIPIDEMIA, UNSPECIFIED HYPERLIPIDEMIA TYPE: ICD-10-CM

## 2020-01-20 DIAGNOSIS — E11.65 TYPE 2 DIABETES MELLITUS WITH HYPERGLYCEMIA, WITHOUT LONG-TERM CURRENT USE OF INSULIN (H): ICD-10-CM

## 2020-01-20 DIAGNOSIS — E66.01 SEVERE OBESITY (BMI 35.0-35.9 WITH COMORBIDITY) (H): ICD-10-CM

## 2020-01-20 DIAGNOSIS — I10 ESSENTIAL HYPERTENSION: ICD-10-CM

## 2020-01-20 DIAGNOSIS — C18.9 MALIGNANT NEOPLASM OF COLON (H): ICD-10-CM

## 2020-01-20 LAB
ALT SERPL W P-5'-P-CCNC: 29 U/L (ref 0–45)
ANION GAP SERPL CALCULATED.3IONS-SCNC: 11 MMOL/L (ref 5–18)
BUN SERPL-MCNC: 13 MG/DL (ref 8–28)
CALCIUM SERPL-MCNC: 9.9 MG/DL (ref 8.5–10.5)
CEA SERPL-MCNC: 1.7 NG/ML (ref 0–3)
CHLORIDE BLD-SCNC: 103 MMOL/L (ref 98–107)
CHOLEST SERPL-MCNC: 168 MG/DL
CO2 SERPL-SCNC: 25 MMOL/L (ref 22–31)
CREAT SERPL-MCNC: 0.81 MG/DL (ref 0.6–1.1)
CREAT UR-MCNC: 123 MG/DL
ERYTHROCYTE [DISTWIDTH] IN BLOOD BY AUTOMATED COUNT: 11.3 % (ref 11–14.5)
FASTING STATUS PATIENT QL REPORTED: YES
GFR SERPL CREATININE-BSD FRML MDRD: >60 ML/MIN/1.73M2
GLUCOSE BLD-MCNC: 161 MG/DL (ref 70–125)
HBA1C MFR BLD: 7.1 % (ref 3.5–6)
HCT VFR BLD AUTO: 43.9 % (ref 35–47)
HDLC SERPL-MCNC: 67 MG/DL
HGB BLD-MCNC: 14.5 G/DL (ref 12–16)
LDLC SERPL CALC-MCNC: 79 MG/DL
MCH RBC QN AUTO: 29.5 PG (ref 27–34)
MCHC RBC AUTO-ENTMCNC: 32.9 G/DL (ref 32–36)
MCV RBC AUTO: 90 FL (ref 80–100)
MICROALBUMIN UR-MCNC: 4.6 MG/DL (ref 0–1.99)
MICROALBUMIN/CREAT UR: 37.4 MG/G
PLATELET # BLD AUTO: 208 THOU/UL (ref 140–440)
PMV BLD AUTO: 7.7 FL (ref 7–10)
POTASSIUM BLD-SCNC: 5 MMOL/L (ref 3.5–5)
RBC # BLD AUTO: 4.89 MILL/UL (ref 3.8–5.4)
SODIUM SERPL-SCNC: 139 MMOL/L (ref 136–145)
TRIGL SERPL-MCNC: 111 MG/DL
WBC: 5.4 THOU/UL (ref 4–11)

## 2020-01-20 ASSESSMENT — MIFFLIN-ST. JEOR: SCORE: 1510.01

## 2020-01-22 ENCOUNTER — COMMUNICATION - HEALTHEAST (OUTPATIENT)
Dept: FAMILY MEDICINE | Facility: CLINIC | Age: 75
End: 2020-01-22

## 2020-01-22 LAB — CALCIUM SERPL-MCNC: 9.7 MG/DL (ref 8.5–10.5)

## 2020-01-23 LAB
25(OH)D3 SERPL-MCNC: 36 NG/ML (ref 30–80)
25(OH)D3 SERPL-MCNC: 36 NG/ML (ref 30–80)

## 2020-01-24 ENCOUNTER — COMMUNICATION - HEALTHEAST (OUTPATIENT)
Dept: FAMILY MEDICINE | Facility: CLINIC | Age: 75
End: 2020-01-24

## 2020-01-24 DIAGNOSIS — C18.4 MALIGNANT NEOPLASM OF TRANSVERSE COLON (H): ICD-10-CM

## 2020-01-24 DIAGNOSIS — R30.0 DYSURIA: ICD-10-CM

## 2020-01-27 ENCOUNTER — COMMUNICATION - HEALTHEAST (OUTPATIENT)
Dept: FAMILY MEDICINE | Facility: CLINIC | Age: 75
End: 2020-01-27

## 2020-02-03 ENCOUNTER — COMMUNICATION - HEALTHEAST (OUTPATIENT)
Dept: FAMILY MEDICINE | Facility: CLINIC | Age: 75
End: 2020-02-03

## 2020-02-03 DIAGNOSIS — C18.4 MALIGNANT NEOPLASM OF TRANSVERSE COLON (H): ICD-10-CM

## 2020-02-17 ENCOUNTER — COMMUNICATION - HEALTHEAST (OUTPATIENT)
Dept: FAMILY MEDICINE | Facility: CLINIC | Age: 75
End: 2020-02-17

## 2020-02-17 DIAGNOSIS — I10 ESSENTIAL HYPERTENSION: ICD-10-CM

## 2020-02-25 ENCOUNTER — OFFICE VISIT - HEALTHEAST (OUTPATIENT)
Dept: ENDOCRINOLOGY | Facility: CLINIC | Age: 75
End: 2020-02-25

## 2020-02-25 DIAGNOSIS — M81.0 AGE-RELATED OSTEOPOROSIS WITHOUT CURRENT PATHOLOGICAL FRACTURE: ICD-10-CM

## 2020-02-25 DIAGNOSIS — E11.65 TYPE 2 DIABETES MELLITUS WITH HYPERGLYCEMIA, WITHOUT LONG-TERM CURRENT USE OF INSULIN (H): ICD-10-CM

## 2020-02-25 ASSESSMENT — MIFFLIN-ST. JEOR: SCORE: 1531.33

## 2020-03-09 ENCOUNTER — HOSPITAL ENCOUNTER (OUTPATIENT)
Dept: CARDIOLOGY | Facility: CLINIC | Age: 75
Discharge: HOME | End: 2020-03-09
Attending: INTERNAL MEDICINE

## 2020-03-09 DIAGNOSIS — R55 NEAR SYNCOPE: ICD-10-CM

## 2020-03-11 ENCOUNTER — OFFICE VISIT - HEALTHEAST (OUTPATIENT)
Dept: FAMILY MEDICINE | Facility: CLINIC | Age: 75
End: 2020-03-11

## 2020-03-11 DIAGNOSIS — M81.0 AGE-RELATED OSTEOPOROSIS WITHOUT CURRENT PATHOLOGICAL FRACTURE: ICD-10-CM

## 2020-03-11 DIAGNOSIS — R55 NEAR SYNCOPE: ICD-10-CM

## 2020-03-11 ASSESSMENT — MIFFLIN-ST. JEOR: SCORE: 1511.48

## 2020-04-01 ENCOUNTER — COMMUNICATION - HEALTHEAST (OUTPATIENT)
Dept: CARDIOLOGY | Facility: CLINIC | Age: 75
End: 2020-04-01

## 2020-05-03 ENCOUNTER — COMMUNICATION - HEALTHEAST (OUTPATIENT)
Dept: FAMILY MEDICINE | Facility: CLINIC | Age: 75
End: 2020-05-03

## 2020-05-03 DIAGNOSIS — E11.65 TYPE 2 DIABETES MELLITUS WITH HYPERGLYCEMIA, WITHOUT LONG-TERM CURRENT USE OF INSULIN (H): ICD-10-CM

## 2020-05-08 ENCOUNTER — COMMUNICATION - HEALTHEAST (OUTPATIENT)
Dept: FAMILY MEDICINE | Facility: CLINIC | Age: 75
End: 2020-05-08

## 2020-05-08 DIAGNOSIS — E11.65 TYPE 2 DIABETES MELLITUS WITH HYPERGLYCEMIA, WITHOUT LONG-TERM CURRENT USE OF INSULIN (H): ICD-10-CM

## 2020-07-06 ENCOUNTER — OFFICE VISIT - HEALTHEAST (OUTPATIENT)
Dept: FAMILY MEDICINE | Facility: CLINIC | Age: 75
End: 2020-07-06

## 2020-07-06 ENCOUNTER — COMMUNICATION - HEALTHEAST (OUTPATIENT)
Dept: FAMILY MEDICINE | Facility: CLINIC | Age: 75
End: 2020-07-06

## 2020-07-06 DIAGNOSIS — R55 NEAR SYNCOPE: ICD-10-CM

## 2020-07-06 DIAGNOSIS — E11.65 TYPE 2 DIABETES MELLITUS WITH HYPERGLYCEMIA, WITHOUT LONG-TERM CURRENT USE OF INSULIN (H): ICD-10-CM

## 2020-07-06 DIAGNOSIS — E11.42 TYPE 2 DIABETES MELLITUS WITH DIABETIC POLYNEUROPATHY, WITHOUT LONG-TERM CURRENT USE OF INSULIN (H): ICD-10-CM

## 2020-07-06 DIAGNOSIS — C18.4 MALIGNANT NEOPLASM OF TRANSVERSE COLON (H): ICD-10-CM

## 2020-07-06 LAB
CEA SERPL-MCNC: 1.6 NG/ML (ref 0–3)
HBA1C MFR BLD: 6.7 % (ref 3.5–6)

## 2020-07-06 ASSESSMENT — MIFFLIN-ST. JEOR: SCORE: 1501.5

## 2020-07-09 ENCOUNTER — AMBULATORY - HEALTHEAST (OUTPATIENT)
Dept: LAB | Facility: CLINIC | Age: 75
End: 2020-07-09

## 2020-07-09 DIAGNOSIS — R55 NEAR SYNCOPE: ICD-10-CM

## 2020-07-13 ENCOUNTER — COMMUNICATION - HEALTHEAST (OUTPATIENT)
Dept: FAMILY MEDICINE | Facility: CLINIC | Age: 75
End: 2020-07-13

## 2020-08-10 ENCOUNTER — COMMUNICATION - HEALTHEAST (OUTPATIENT)
Dept: FAMILY MEDICINE | Facility: CLINIC | Age: 75
End: 2020-08-10

## 2020-08-10 DIAGNOSIS — R19.7 DIARRHEA: ICD-10-CM

## 2020-08-11 ENCOUNTER — RECORDS - HEALTHEAST (OUTPATIENT)
Dept: ADMINISTRATIVE | Facility: OTHER | Age: 75
End: 2020-08-11

## 2020-08-11 LAB — RETINOPATHY: NEGATIVE

## 2020-08-22 ENCOUNTER — OFFICE VISIT - HEALTHEAST (OUTPATIENT)
Dept: FAMILY MEDICINE | Facility: CLINIC | Age: 75
End: 2020-08-22

## 2020-08-22 DIAGNOSIS — N30.01 ACUTE CYSTITIS WITH HEMATURIA: ICD-10-CM

## 2020-08-22 LAB
ALBUMIN UR-MCNC: NEGATIVE MG/DL
APPEARANCE UR: ABNORMAL
BACTERIA #/AREA URNS HPF: ABNORMAL HPF
BILIRUB UR QL STRIP: NEGATIVE
COLOR UR AUTO: YELLOW
GLUCOSE UR STRIP-MCNC: NEGATIVE MG/DL
HGB UR QL STRIP: ABNORMAL
KETONES UR STRIP-MCNC: NEGATIVE MG/DL
LEUKOCYTE ESTERASE UR QL STRIP: ABNORMAL
NITRATE UR QL: NEGATIVE
PH UR STRIP: 6 [PH] (ref 5–8)
RBC #/AREA URNS AUTO: ABNORMAL HPF
SP GR UR STRIP: 1.02 (ref 1–1.03)
SQUAMOUS #/AREA URNS AUTO: ABNORMAL LPF
UROBILINOGEN UR STRIP-ACNC: ABNORMAL
WBC #/AREA URNS AUTO: ABNORMAL HPF
WBC CLUMPS #/AREA URNS HPF: PRESENT /[HPF]

## 2020-08-24 LAB — BACTERIA SPEC CULT: ABNORMAL

## 2020-09-17 ENCOUNTER — COMMUNICATION - HEALTHEAST (OUTPATIENT)
Dept: FAMILY MEDICINE | Facility: CLINIC | Age: 75
End: 2020-09-17

## 2020-09-17 DIAGNOSIS — R30.0 DYSURIA: ICD-10-CM

## 2020-10-14 ENCOUNTER — COMMUNICATION - HEALTHEAST (OUTPATIENT)
Dept: FAMILY MEDICINE | Facility: CLINIC | Age: 75
End: 2020-10-14

## 2020-10-14 DIAGNOSIS — E78.5 HYPERLIPIDEMIA, UNSPECIFIED HYPERLIPIDEMIA TYPE: ICD-10-CM

## 2020-10-28 ENCOUNTER — RECORDS - HEALTHEAST (OUTPATIENT)
Dept: ADMINISTRATIVE | Facility: OTHER | Age: 75
End: 2020-10-28

## 2020-11-04 ENCOUNTER — RECORDS - HEALTHEAST (OUTPATIENT)
Dept: ADMINISTRATIVE | Facility: OTHER | Age: 75
End: 2020-11-04

## 2020-11-10 ENCOUNTER — RECORDS - HEALTHEAST (OUTPATIENT)
Dept: ADMINISTRATIVE | Facility: OTHER | Age: 75
End: 2020-11-10

## 2020-11-27 ENCOUNTER — AMBULATORY - HEALTHEAST (OUTPATIENT)
Dept: SCHEDULING | Facility: CLINIC | Age: 75
End: 2020-11-27

## 2020-11-27 DIAGNOSIS — M81.0 AGE-RELATED OSTEOPOROSIS WITHOUT CURRENT PATHOLOGICAL FRACTURE: ICD-10-CM

## 2020-12-22 ENCOUNTER — RECORDS - HEALTHEAST (OUTPATIENT)
Dept: ADMINISTRATIVE | Facility: OTHER | Age: 75
End: 2020-12-22

## 2020-12-31 ENCOUNTER — RECORDS - HEALTHEAST (OUTPATIENT)
Dept: ADMINISTRATIVE | Facility: OTHER | Age: 75
End: 2020-12-31

## 2021-01-08 ENCOUNTER — OFFICE VISIT - HEALTHEAST (OUTPATIENT)
Dept: FAMILY MEDICINE | Facility: CLINIC | Age: 76
End: 2021-01-08

## 2021-01-08 DIAGNOSIS — M54.41 CHRONIC BILATERAL LOW BACK PAIN WITH BILATERAL SCIATICA: ICD-10-CM

## 2021-01-08 DIAGNOSIS — G89.29 CHRONIC BILATERAL LOW BACK PAIN WITH BILATERAL SCIATICA: ICD-10-CM

## 2021-01-08 DIAGNOSIS — E11.42 TYPE 2 DIABETES MELLITUS WITH DIABETIC POLYNEUROPATHY, WITHOUT LONG-TERM CURRENT USE OF INSULIN (H): ICD-10-CM

## 2021-01-08 DIAGNOSIS — E78.5 HYPERLIPIDEMIA, UNSPECIFIED HYPERLIPIDEMIA TYPE: ICD-10-CM

## 2021-01-08 DIAGNOSIS — N30.00 ACUTE CYSTITIS WITHOUT HEMATURIA: ICD-10-CM

## 2021-01-08 DIAGNOSIS — I10 ESSENTIAL HYPERTENSION: ICD-10-CM

## 2021-01-08 DIAGNOSIS — C18.4 MALIGNANT NEOPLASM OF TRANSVERSE COLON (H): ICD-10-CM

## 2021-01-08 DIAGNOSIS — G60.9 HEREDITARY AND IDIOPATHIC PERIPHERAL NEUROPATHY: ICD-10-CM

## 2021-01-08 DIAGNOSIS — R35.0 FREQUENT URINATION: ICD-10-CM

## 2021-01-08 DIAGNOSIS — E66.01 SEVERE OBESITY (BMI 35.0-35.9 WITH COMORBIDITY) (H): ICD-10-CM

## 2021-01-08 DIAGNOSIS — M81.0 AGE-RELATED OSTEOPOROSIS WITHOUT CURRENT PATHOLOGICAL FRACTURE: ICD-10-CM

## 2021-01-08 DIAGNOSIS — M54.42 CHRONIC BILATERAL LOW BACK PAIN WITH BILATERAL SCIATICA: ICD-10-CM

## 2021-01-08 DIAGNOSIS — E55.9 VITAMIN D DEFICIENCY: ICD-10-CM

## 2021-01-08 DIAGNOSIS — Z13.220 LIPID SCREENING: ICD-10-CM

## 2021-01-08 DIAGNOSIS — Z00.00 ROUTINE GENERAL MEDICAL EXAMINATION AT A HEALTH CARE FACILITY: ICD-10-CM

## 2021-01-08 LAB
ALBUMIN UR-MCNC: NEGATIVE MG/DL
ALT SERPL W P-5'-P-CCNC: 28 U/L (ref 0–45)
ANION GAP SERPL CALCULATED.3IONS-SCNC: 12 MMOL/L (ref 5–18)
APPEARANCE UR: CLEAR
BACTERIA #/AREA URNS HPF: ABNORMAL HPF
BILIRUB UR QL STRIP: NEGATIVE
BUN SERPL-MCNC: 14 MG/DL (ref 8–28)
CALCIUM SERPL-MCNC: 10.1 MG/DL (ref 8.5–10.5)
CEA SERPL-MCNC: 1.6 NG/ML (ref 0–3)
CHLORIDE BLD-SCNC: 103 MMOL/L (ref 98–107)
CHOLEST SERPL-MCNC: 159 MG/DL
CO2 SERPL-SCNC: 24 MMOL/L (ref 22–31)
COLOR UR AUTO: YELLOW
CREAT SERPL-MCNC: 0.73 MG/DL (ref 0.6–1.1)
CREAT UR-MCNC: 125.6 MG/DL
ERYTHROCYTE [DISTWIDTH] IN BLOOD BY AUTOMATED COUNT: 11.8 % (ref 11–14.5)
FASTING STATUS PATIENT QL REPORTED: YES
GFR SERPL CREATININE-BSD FRML MDRD: >60 ML/MIN/1.73M2
GLUCOSE BLD-MCNC: 173 MG/DL (ref 70–125)
GLUCOSE UR STRIP-MCNC: NEGATIVE MG/DL
HBA1C MFR BLD: 7.2 %
HCT VFR BLD AUTO: 45.2 % (ref 35–47)
HDLC SERPL-MCNC: 71 MG/DL
HGB BLD-MCNC: 15.1 G/DL (ref 12–16)
HGB UR QL STRIP: NEGATIVE
HYALINE CASTS #/AREA URNS LPF: ABNORMAL LPF
KETONES UR STRIP-MCNC: NEGATIVE MG/DL
LDLC SERPL CALC-MCNC: 69 MG/DL
LEUKOCYTE ESTERASE UR QL STRIP: ABNORMAL
MCH RBC QN AUTO: 29.6 PG (ref 27–34)
MCHC RBC AUTO-ENTMCNC: 33.5 G/DL (ref 32–36)
MCV RBC AUTO: 88 FL (ref 80–100)
MICROALBUMIN UR-MCNC: 3.18 MG/DL (ref 0–1.99)
MICROALBUMIN/CREAT UR: 25.3 MG/G
NITRATE UR QL: NEGATIVE
PH UR STRIP: 5.5 [PH] (ref 5–8)
PLATELET # BLD AUTO: 248 THOU/UL (ref 140–440)
PMV BLD AUTO: 7.7 FL (ref 7–10)
POTASSIUM BLD-SCNC: 4.7 MMOL/L (ref 3.5–5)
RBC # BLD AUTO: 5.12 MILL/UL (ref 3.8–5.4)
RBC #/AREA URNS AUTO: ABNORMAL HPF
SODIUM SERPL-SCNC: 139 MMOL/L (ref 136–145)
SP GR UR STRIP: 1.02 (ref 1–1.03)
SQUAMOUS #/AREA URNS AUTO: ABNORMAL LPF
TRIGL SERPL-MCNC: 93 MG/DL
UROBILINOGEN UR STRIP-ACNC: ABNORMAL
WBC #/AREA URNS AUTO: ABNORMAL HPF
WBC: 5.9 THOU/UL (ref 4–11)

## 2021-01-08 ASSESSMENT — MIFFLIN-ST. JEOR: SCORE: 1485.62

## 2021-01-09 LAB — BACTERIA SPEC CULT: NO GROWTH

## 2021-01-11 ENCOUNTER — COMMUNICATION - HEALTHEAST (OUTPATIENT)
Dept: FAMILY MEDICINE | Facility: CLINIC | Age: 76
End: 2021-01-11

## 2021-01-11 DIAGNOSIS — M54.41 CHRONIC BILATERAL LOW BACK PAIN WITH BILATERAL SCIATICA: ICD-10-CM

## 2021-01-11 DIAGNOSIS — M54.42 CHRONIC BILATERAL LOW BACK PAIN WITH BILATERAL SCIATICA: ICD-10-CM

## 2021-01-11 DIAGNOSIS — G89.29 CHRONIC BILATERAL LOW BACK PAIN WITH BILATERAL SCIATICA: ICD-10-CM

## 2021-01-11 LAB — 25(OH)D3 SERPL-MCNC: 35.1 NG/ML (ref 30–80)

## 2021-01-20 ENCOUNTER — HOSPITAL ENCOUNTER (OUTPATIENT)
Dept: MRI IMAGING | Facility: HOSPITAL | Age: 76
Discharge: HOME | End: 2021-01-20
Attending: FAMILY MEDICINE

## 2021-01-20 ENCOUNTER — COMMUNICATION - HEALTHEAST (OUTPATIENT)
Dept: ENDOCRINOLOGY | Facility: CLINIC | Age: 76
End: 2021-01-20

## 2021-01-20 DIAGNOSIS — G89.29 CHRONIC BILATERAL LOW BACK PAIN WITH BILATERAL SCIATICA: ICD-10-CM

## 2021-01-20 DIAGNOSIS — M54.41 CHRONIC BILATERAL LOW BACK PAIN WITH BILATERAL SCIATICA: ICD-10-CM

## 2021-01-20 DIAGNOSIS — M54.42 CHRONIC BILATERAL LOW BACK PAIN WITH BILATERAL SCIATICA: ICD-10-CM

## 2021-01-22 ENCOUNTER — COMMUNICATION - HEALTHEAST (OUTPATIENT)
Dept: FAMILY MEDICINE | Facility: CLINIC | Age: 76
End: 2021-01-22

## 2021-01-22 DIAGNOSIS — G89.29 CHRONIC BILATERAL LOW BACK PAIN WITH BILATERAL SCIATICA: ICD-10-CM

## 2021-01-22 DIAGNOSIS — M54.41 CHRONIC BILATERAL LOW BACK PAIN WITH BILATERAL SCIATICA: ICD-10-CM

## 2021-01-22 DIAGNOSIS — M54.42 CHRONIC BILATERAL LOW BACK PAIN WITH BILATERAL SCIATICA: ICD-10-CM

## 2021-01-28 ENCOUNTER — HOSPITAL ENCOUNTER (OUTPATIENT)
Dept: MAMMOGRAPHY | Facility: CLINIC | Age: 76
Discharge: HOME | End: 2021-01-28
Attending: FAMILY MEDICINE

## 2021-01-28 DIAGNOSIS — Z12.31 VISIT FOR SCREENING MAMMOGRAM: ICD-10-CM

## 2021-01-29 ENCOUNTER — AMBULATORY - HEALTHEAST (OUTPATIENT)
Dept: FAMILY MEDICINE | Facility: CLINIC | Age: 76
End: 2021-01-29

## 2021-01-29 ENCOUNTER — HOSPITAL ENCOUNTER (OUTPATIENT)
Dept: MAMMOGRAPHY | Facility: CLINIC | Age: 76
Discharge: HOME | End: 2021-01-29
Attending: FAMILY MEDICINE

## 2021-01-29 DIAGNOSIS — R92.0 BREAST MICROCALCIFICATIONS: ICD-10-CM

## 2021-01-29 DIAGNOSIS — Z11.59 ENCOUNTER FOR SCREENING FOR OTHER VIRAL DISEASES: ICD-10-CM

## 2021-02-02 ENCOUNTER — HOSPITAL ENCOUNTER (OUTPATIENT)
Dept: RADIOLOGY | Facility: HOSPITAL | Age: 76
Discharge: HOME | End: 2021-02-02
Attending: PHYSICAL MEDICINE & REHABILITATION

## 2021-02-02 ENCOUNTER — HOSPITAL ENCOUNTER (OUTPATIENT)
Dept: PHYSICAL MEDICINE AND REHAB | Facility: CLINIC | Age: 76
Discharge: HOME | End: 2021-02-02
Attending: FAMILY MEDICINE

## 2021-02-02 DIAGNOSIS — R29.898 LEFT LEG WEAKNESS: ICD-10-CM

## 2021-02-02 DIAGNOSIS — M79.18 MYOFASCIAL PAIN: ICD-10-CM

## 2021-02-02 DIAGNOSIS — G47.9 SLEEP DIFFICULTIES: ICD-10-CM

## 2021-02-02 DIAGNOSIS — M54.16 LUMBAR RADICULAR PAIN: ICD-10-CM

## 2021-02-02 DIAGNOSIS — M48.062 SPINAL STENOSIS OF LUMBAR REGION WITH NEUROGENIC CLAUDICATION: ICD-10-CM

## 2021-02-02 DIAGNOSIS — M43.16 SPONDYLOLISTHESIS OF LUMBAR REGION: ICD-10-CM

## 2021-02-02 ASSESSMENT — MIFFLIN-ST. JEOR: SCORE: 1491.52

## 2021-02-03 ENCOUNTER — COMMUNICATION - HEALTHEAST (OUTPATIENT)
Dept: PHYSICAL MEDICINE AND REHAB | Facility: CLINIC | Age: 76
End: 2021-02-03

## 2021-02-04 DIAGNOSIS — Z11.59 SPECIAL SCREENING EXAMINATION FOR VIRAL DISEASE: Primary | ICD-10-CM

## 2021-02-06 ENCOUNTER — COMMUNICATION - HEALTHEAST (OUTPATIENT)
Dept: FAMILY MEDICINE | Facility: CLINIC | Age: 76
End: 2021-02-06

## 2021-02-06 DIAGNOSIS — I10 ESSENTIAL HYPERTENSION: ICD-10-CM

## 2021-02-08 ENCOUNTER — HOSPITAL ENCOUNTER (OUTPATIENT)
Dept: PHYSICAL MEDICINE AND REHAB | Facility: CLINIC | Age: 76
Discharge: HOME | End: 2021-02-08
Attending: PHYSICAL MEDICINE & REHABILITATION

## 2021-02-08 DIAGNOSIS — M48.062 SPINAL STENOSIS OF LUMBAR REGION WITH NEUROGENIC CLAUDICATION: ICD-10-CM

## 2021-02-08 DIAGNOSIS — R29.898 LEFT LEG WEAKNESS: ICD-10-CM

## 2021-02-08 DIAGNOSIS — Z11.59 SPECIAL SCREENING EXAMINATION FOR VIRAL DISEASE: ICD-10-CM

## 2021-02-08 DIAGNOSIS — M54.16 LUMBAR RADICULAR PAIN: ICD-10-CM

## 2021-02-08 LAB
SARS-COV-2 RNA RESP QL NAA+PROBE: NORMAL
SPECIMEN SOURCE: NORMAL

## 2021-02-08 PROCEDURE — U0005 INFEC AGEN DETEC AMPLI PROBE: HCPCS | Performed by: FAMILY MEDICINE

## 2021-02-08 PROCEDURE — U0003 INFECTIOUS AGENT DETECTION BY NUCLEIC ACID (DNA OR RNA); SEVERE ACUTE RESPIRATORY SYNDROME CORONAVIRUS 2 (SARS-COV-2) (CORONAVIRUS DISEASE [COVID-19]), AMPLIFIED PROBE TECHNIQUE, MAKING USE OF HIGH THROUGHPUT TECHNOLOGIES AS DESCRIBED BY CMS-2020-01-R: HCPCS | Performed by: FAMILY MEDICINE

## 2021-02-08 ASSESSMENT — MIFFLIN-ST. JEOR: SCORE: 1491.52

## 2021-02-09 ENCOUNTER — COMMUNICATION - HEALTHEAST (OUTPATIENT)
Dept: FAMILY MEDICINE | Facility: CLINIC | Age: 76
End: 2021-02-09

## 2021-02-09 LAB
LABORATORY COMMENT REPORT: NORMAL
SARS-COV-2 RNA RESP QL NAA+PROBE: NEGATIVE
SPECIMEN SOURCE: NORMAL

## 2021-02-12 ENCOUNTER — HOSPITAL ENCOUNTER (OUTPATIENT)
Dept: MAMMOGRAPHY | Facility: CLINIC | Age: 76
Discharge: HOME | End: 2021-02-12
Attending: FAMILY MEDICINE

## 2021-02-12 DIAGNOSIS — R92.0 BREAST MICROCALCIFICATIONS: ICD-10-CM

## 2021-02-15 ENCOUNTER — COMMUNICATION - HEALTHEAST (OUTPATIENT)
Dept: FAMILY MEDICINE | Facility: CLINIC | Age: 76
End: 2021-02-15

## 2021-02-15 ENCOUNTER — COMMUNICATION - HEALTHEAST (OUTPATIENT)
Dept: SURGERY | Facility: CLINIC | Age: 76
End: 2021-02-15

## 2021-02-15 DIAGNOSIS — G47.9 SLEEP DIFFICULTIES: ICD-10-CM

## 2021-02-15 DIAGNOSIS — M79.18 MYOFASCIAL PAIN: ICD-10-CM

## 2021-02-15 LAB
LAB AP CHARGES (HE HISTORICAL CONVERSION): NORMAL
PATH REPORT.COMMENTS IMP SPEC: NORMAL
PATH REPORT.COMMENTS IMP SPEC: NORMAL
PATH REPORT.FINAL DX SPEC: NORMAL
PATH REPORT.GROSS SPEC: NORMAL
PATH REPORT.MICROSCOPIC SPEC OTHER STN: NORMAL
PATH REPORT.RELEVANT HX SPEC: NORMAL
RESULT FLAG (HE HISTORICAL CONVERSION): NORMAL

## 2021-02-16 ENCOUNTER — HOSPITAL ENCOUNTER (OUTPATIENT)
Dept: PHYSICAL MEDICINE AND REHAB | Facility: CLINIC | Age: 76
Discharge: HOME | End: 2021-02-16
Attending: PHYSICAL MEDICINE & REHABILITATION

## 2021-02-16 DIAGNOSIS — E11.42 TYPE 2 DIABETES MELLITUS WITH DIABETIC POLYNEUROPATHY, WITHOUT LONG-TERM CURRENT USE OF INSULIN (H): ICD-10-CM

## 2021-02-16 DIAGNOSIS — M43.16 SPONDYLOLISTHESIS OF LUMBAR REGION: ICD-10-CM

## 2021-02-16 DIAGNOSIS — M54.16 LUMBAR RADICULAR PAIN: ICD-10-CM

## 2021-02-16 DIAGNOSIS — M48.062 SPINAL STENOSIS OF LUMBAR REGION WITH NEUROGENIC CLAUDICATION: ICD-10-CM

## 2021-02-24 ENCOUNTER — OFFICE VISIT - HEALTHEAST (OUTPATIENT)
Dept: ENDOCRINOLOGY | Facility: CLINIC | Age: 76
End: 2021-02-24

## 2021-02-24 ENCOUNTER — COMMUNICATION - HEALTHEAST (OUTPATIENT)
Dept: LAB | Facility: CLINIC | Age: 76
End: 2021-02-24

## 2021-02-24 DIAGNOSIS — M81.0 AGE-RELATED OSTEOPOROSIS WITHOUT CURRENT PATHOLOGICAL FRACTURE: ICD-10-CM

## 2021-02-24 DIAGNOSIS — E11.42 TYPE 2 DIABETES MELLITUS WITH DIABETIC POLYNEUROPATHY, WITHOUT LONG-TERM CURRENT USE OF INSULIN (H): ICD-10-CM

## 2021-02-25 ENCOUNTER — COMMUNICATION - HEALTHEAST (OUTPATIENT)
Dept: ENDOCRINOLOGY | Facility: CLINIC | Age: 76
End: 2021-02-25

## 2021-03-02 ENCOUNTER — OFFICE VISIT - HEALTHEAST (OUTPATIENT)
Dept: FAMILY MEDICINE | Facility: CLINIC | Age: 76
End: 2021-03-02

## 2021-03-02 ENCOUNTER — COMMUNICATION - HEALTHEAST (OUTPATIENT)
Dept: FAMILY MEDICINE | Facility: CLINIC | Age: 76
End: 2021-03-02

## 2021-03-02 DIAGNOSIS — R35.0 URINARY FREQUENCY: ICD-10-CM

## 2021-03-02 DIAGNOSIS — R30.0 DYSURIA: ICD-10-CM

## 2021-03-02 LAB
ALBUMIN UR-MCNC: NEGATIVE MG/DL
APPEARANCE UR: CLEAR
BACTERIA #/AREA URNS HPF: ABNORMAL HPF
BILIRUB UR QL STRIP: NEGATIVE
COLOR UR AUTO: YELLOW
GLUCOSE UR STRIP-MCNC: NEGATIVE MG/DL
HGB UR QL STRIP: NEGATIVE
KETONES UR STRIP-MCNC: NEGATIVE MG/DL
LEUKOCYTE ESTERASE UR QL STRIP: ABNORMAL
NITRATE UR QL: NEGATIVE
PH UR STRIP: 5.5 [PH] (ref 5–8)
RBC #/AREA URNS AUTO: ABNORMAL HPF
SP GR UR STRIP: 1.01 (ref 1–1.03)
SQUAMOUS #/AREA URNS AUTO: ABNORMAL LPF
UROBILINOGEN UR STRIP-ACNC: ABNORMAL
WBC #/AREA URNS AUTO: ABNORMAL HPF

## 2021-03-02 ASSESSMENT — MIFFLIN-ST. JEOR: SCORE: 1479.61

## 2021-03-03 ENCOUNTER — OFFICE VISIT - HEALTHEAST (OUTPATIENT)
Dept: NEUROSURGERY | Facility: CLINIC | Age: 76
End: 2021-03-03

## 2021-03-03 ENCOUNTER — HOSPITAL ENCOUNTER (OUTPATIENT)
Dept: ULTRASOUND IMAGING | Facility: HOSPITAL | Age: 76
Discharge: HOME | End: 2021-03-03
Attending: FAMILY MEDICINE

## 2021-03-03 ENCOUNTER — HOSPITAL ENCOUNTER (OUTPATIENT)
Dept: PHYSICAL MEDICINE AND REHAB | Facility: CLINIC | Age: 76
Discharge: HOME | End: 2021-03-03
Attending: PHYSICAL MEDICINE & REHABILITATION

## 2021-03-03 DIAGNOSIS — R35.0 URINARY FREQUENCY: ICD-10-CM

## 2021-03-03 DIAGNOSIS — M43.16 SPONDYLOLISTHESIS OF LUMBAR REGION: ICD-10-CM

## 2021-03-03 DIAGNOSIS — M79.18 MYOFASCIAL PAIN: ICD-10-CM

## 2021-03-03 DIAGNOSIS — M47.816 ARTHROPATHY OF LUMBAR FACET JOINT: ICD-10-CM

## 2021-03-03 DIAGNOSIS — M48.062 SPINAL STENOSIS OF LUMBAR REGION WITH NEUROGENIC CLAUDICATION: ICD-10-CM

## 2021-03-03 LAB — BACTERIA SPEC CULT: NO GROWTH

## 2021-03-03 ASSESSMENT — MIFFLIN-ST. JEOR: SCORE: 1477.91

## 2021-03-04 ENCOUNTER — COMMUNICATION - HEALTHEAST (OUTPATIENT)
Dept: NEUROSURGERY | Facility: CLINIC | Age: 76
End: 2021-03-04

## 2021-03-04 ENCOUNTER — SURGERY - HEALTHEAST (OUTPATIENT)
Dept: NEUROSURGERY | Facility: CLINIC | Age: 76
End: 2021-03-04

## 2021-03-04 DIAGNOSIS — R39.198 URINARY DYSFUNCTION: ICD-10-CM

## 2021-03-04 DIAGNOSIS — M48.062 SPINAL STENOSIS OF LUMBAR REGION WITH NEUROGENIC CLAUDICATION: ICD-10-CM

## 2021-03-05 ENCOUNTER — AMBULATORY - HEALTHEAST (OUTPATIENT)
Dept: NEUROSURGERY | Facility: CLINIC | Age: 76
End: 2021-03-05

## 2021-03-05 ENCOUNTER — RECORDS - HEALTHEAST (OUTPATIENT)
Dept: ADMINISTRATIVE | Facility: OTHER | Age: 76
End: 2021-03-05

## 2021-03-05 ENCOUNTER — COMMUNICATION - HEALTHEAST (OUTPATIENT)
Dept: NEUROSURGERY | Facility: CLINIC | Age: 76
End: 2021-03-05

## 2021-03-05 DIAGNOSIS — Z01.818 PRE-OP EXAM: ICD-10-CM

## 2021-03-06 ENCOUNTER — AMBULATORY - HEALTHEAST (OUTPATIENT)
Dept: FAMILY MEDICINE | Facility: CLINIC | Age: 76
End: 2021-03-06

## 2021-03-06 DIAGNOSIS — Z11.59 ENCOUNTER FOR SCREENING FOR OTHER VIRAL DISEASES: ICD-10-CM

## 2021-03-07 LAB
SARS-COV-2 PCR COMMENT: NORMAL
SARS-COV-2 RNA SPEC QL NAA+PROBE: NEGATIVE
SARS-COV-2 VIRUS SPECIMEN SOURCE: NORMAL

## 2021-03-08 ENCOUNTER — OFFICE VISIT - HEALTHEAST (OUTPATIENT)
Dept: FAMILY MEDICINE | Facility: CLINIC | Age: 76
End: 2021-03-08

## 2021-03-08 ENCOUNTER — COMMUNICATION - HEALTHEAST (OUTPATIENT)
Dept: SCHEDULING | Facility: CLINIC | Age: 76
End: 2021-03-08

## 2021-03-08 DIAGNOSIS — M54.42 CHRONIC BILATERAL LOW BACK PAIN WITH BILATERAL SCIATICA: ICD-10-CM

## 2021-03-08 DIAGNOSIS — R39.198 URINARY DYSFUNCTION: ICD-10-CM

## 2021-03-08 DIAGNOSIS — Z01.818 PREOPERATIVE EXAMINATION: ICD-10-CM

## 2021-03-08 DIAGNOSIS — E66.01 SEVERE OBESITY (BMI 35.0-35.9 WITH COMORBIDITY) (H): ICD-10-CM

## 2021-03-08 DIAGNOSIS — M54.41 CHRONIC BILATERAL LOW BACK PAIN WITH BILATERAL SCIATICA: ICD-10-CM

## 2021-03-08 DIAGNOSIS — Z01.818 PRE-OP EXAM: ICD-10-CM

## 2021-03-08 DIAGNOSIS — I10 ESSENTIAL HYPERTENSION: ICD-10-CM

## 2021-03-08 DIAGNOSIS — G89.29 CHRONIC BILATERAL LOW BACK PAIN WITH BILATERAL SCIATICA: ICD-10-CM

## 2021-03-08 DIAGNOSIS — Z23 NEED FOR VACCINATION: ICD-10-CM

## 2021-03-08 DIAGNOSIS — E11.42 TYPE 2 DIABETES MELLITUS WITH DIABETIC POLYNEUROPATHY, WITHOUT LONG-TERM CURRENT USE OF INSULIN (H): ICD-10-CM

## 2021-03-08 LAB
ANION GAP SERPL CALCULATED.3IONS-SCNC: 13 MMOL/L (ref 5–18)
APTT PPP: 29 SECONDS (ref 24–37)
ATRIAL RATE - MUSE: 77 BPM
BUN SERPL-MCNC: 15 MG/DL (ref 8–28)
CALCIUM SERPL-MCNC: 10.3 MG/DL (ref 8.5–10.5)
CHLORIDE BLD-SCNC: 105 MMOL/L (ref 98–107)
CLOSURE TME COLL+EPINEP BLD: 95 SEC (ref 1–180)
CO2 SERPL-SCNC: 23 MMOL/L (ref 22–31)
CREAT SERPL-MCNC: 0.71 MG/DL (ref 0.6–1.1)
DIASTOLIC BLOOD PRESSURE - MUSE: 66 MMHG
ERYTHROCYTE [DISTWIDTH] IN BLOOD BY AUTOMATED COUNT: 13.5 % (ref 11–14.5)
GFR SERPL CREATININE-BSD FRML MDRD: >60 ML/MIN/1.73M2
GLUCOSE BLD-MCNC: 102 MG/DL (ref 70–125)
HCT VFR BLD AUTO: 42.7 % (ref 35–47)
HGB BLD-MCNC: 14.2 G/DL (ref 12–16)
INR PPP: 0.94 (ref 0.9–1.1)
INTERPRETATION ECG - MUSE: NORMAL
MCH RBC QN AUTO: 28.7 PG (ref 27–34)
MCHC RBC AUTO-ENTMCNC: 33.3 G/DL (ref 32–36)
MCV RBC AUTO: 86 FL (ref 80–100)
P AXIS - MUSE: 39 DEGREES
PLATELET # BLD AUTO: 231 THOU/UL (ref 140–440)
PMV BLD AUTO: 9.4 FL (ref 7–10)
POTASSIUM BLD-SCNC: 4.8 MMOL/L (ref 3.5–5)
PR INTERVAL - MUSE: 198 MS
QRS DURATION - MUSE: 88 MS
QT - MUSE: 368 MS
QTC - MUSE: 416 MS
R AXIS - MUSE: -27 DEGREES
RBC # BLD AUTO: 4.95 MILL/UL (ref 3.8–5.4)
SODIUM SERPL-SCNC: 141 MMOL/L (ref 136–145)
SYSTOLIC BLOOD PRESSURE - MUSE: 116 MMHG
T AXIS - MUSE: 36 DEGREES
VENTRICULAR RATE- MUSE: 77 BPM
WBC: 7.9 THOU/UL (ref 4–11)

## 2021-03-08 ASSESSMENT — MIFFLIN-ST. JEOR: SCORE: 1475.42

## 2021-03-09 ENCOUNTER — COMMUNICATION - HEALTHEAST (OUTPATIENT)
Dept: NEUROSURGERY | Facility: CLINIC | Age: 76
End: 2021-03-09

## 2021-03-09 ENCOUNTER — ANESTHESIA - HEALTHEAST (OUTPATIENT)
Dept: SURGERY | Facility: HOSPITAL | Age: 76
End: 2021-03-09

## 2021-03-10 ENCOUNTER — SURGERY - HEALTHEAST (OUTPATIENT)
Dept: SURGERY | Facility: HOSPITAL | Age: 76
End: 2021-03-10

## 2021-03-10 ASSESSMENT — MIFFLIN-ST. JEOR
SCORE: 1474.51
SCORE: 1474.51

## 2021-03-11 ENCOUNTER — AMBULATORY - HEALTHEAST (OUTPATIENT)
Dept: OTHER | Facility: CLINIC | Age: 76
End: 2021-03-11

## 2021-03-12 ENCOUNTER — RECORDS - HEALTHEAST (OUTPATIENT)
Dept: HEALTH INFORMATION MANAGEMENT | Facility: CLINIC | Age: 76
End: 2021-03-12

## 2021-03-15 ENCOUNTER — COMMUNICATION - HEALTHEAST (OUTPATIENT)
Dept: NEUROSURGERY | Facility: CLINIC | Age: 76
End: 2021-03-15

## 2021-03-15 DIAGNOSIS — Z98.1 S/P LUMBAR FUSION: ICD-10-CM

## 2021-03-16 ENCOUNTER — COMMUNICATION - HEALTHEAST (OUTPATIENT)
Dept: NEUROSURGERY | Facility: CLINIC | Age: 76
End: 2021-03-16

## 2021-03-19 ENCOUNTER — AMBULATORY - HEALTHEAST (OUTPATIENT)
Dept: NEUROSURGERY | Facility: CLINIC | Age: 76
End: 2021-03-19

## 2021-03-19 ENCOUNTER — COMMUNICATION - HEALTHEAST (OUTPATIENT)
Dept: NEUROSURGERY | Facility: CLINIC | Age: 76
End: 2021-03-19

## 2021-03-22 ENCOUNTER — COMMUNICATION - HEALTHEAST (OUTPATIENT)
Dept: NEUROSURGERY | Facility: CLINIC | Age: 76
End: 2021-03-22

## 2021-03-22 ENCOUNTER — OFFICE VISIT - HEALTHEAST (OUTPATIENT)
Dept: FAMILY MEDICINE | Facility: CLINIC | Age: 76
End: 2021-03-22

## 2021-03-22 DIAGNOSIS — M48.062 SPINAL STENOSIS, LUMBAR REGION WITH NEUROGENIC CLAUDICATION: ICD-10-CM

## 2021-03-22 DIAGNOSIS — R39.9 UTI SYMPTOMS: ICD-10-CM

## 2021-03-22 LAB
ALBUMIN UR-MCNC: NEGATIVE G/DL
APPEARANCE UR: ABNORMAL
BACTERIA #/AREA URNS HPF: ABNORMAL /[HPF]
BILIRUB UR QL STRIP: NEGATIVE
COLOR UR AUTO: YELLOW
GLUCOSE UR STRIP-MCNC: NEGATIVE MG/DL
HGB UR QL STRIP: ABNORMAL
KETONES UR STRIP-MCNC: NEGATIVE MG/DL
LEUKOCYTE ESTERASE UR QL STRIP: ABNORMAL
NITRATE UR QL: POSITIVE
PH UR STRIP: 5 [PH] (ref 5–8)
RBC #/AREA URNS AUTO: ABNORMAL HPF
SP GR UR STRIP: 1.02 (ref 1–1.03)
SQUAMOUS #/AREA URNS AUTO: ABNORMAL LPF
UROBILINOGEN UR STRIP-ACNC: ABNORMAL
WBC #/AREA URNS AUTO: >100 HPF
WBC CLUMPS #/AREA URNS HPF: PRESENT /[HPF]

## 2021-03-22 ASSESSMENT — MIFFLIN-ST. JEOR: SCORE: 1493.56

## 2021-03-24 LAB — BACTERIA SPEC CULT: ABNORMAL

## 2021-04-21 ENCOUNTER — OFFICE VISIT - HEALTHEAST (OUTPATIENT)
Dept: NEUROSURGERY | Facility: CLINIC | Age: 76
End: 2021-04-21

## 2021-04-21 ENCOUNTER — HOSPITAL ENCOUNTER (OUTPATIENT)
Dept: RADIOLOGY | Facility: HOSPITAL | Age: 76
Discharge: HOME | End: 2021-04-21
Attending: SURGERY

## 2021-04-21 DIAGNOSIS — Z98.1 S/P LUMBAR FUSION: ICD-10-CM

## 2021-04-21 DIAGNOSIS — M48.062 SPINAL STENOSIS OF LUMBAR REGION WITH NEUROGENIC CLAUDICATION: ICD-10-CM

## 2021-04-21 ASSESSMENT — MIFFLIN-ST. JEOR: SCORE: 1492.65

## 2021-04-22 ENCOUNTER — OFFICE VISIT - HEALTHEAST (OUTPATIENT)
Dept: VASCULAR SURGERY | Facility: CLINIC | Age: 76
End: 2021-04-22

## 2021-04-22 DIAGNOSIS — E55.9 VITAMIN D DEFICIENCY: ICD-10-CM

## 2021-04-22 DIAGNOSIS — S21.209A WOUND OF BACK, UNSPECIFIED LATERALITY, INITIAL ENCOUNTER: ICD-10-CM

## 2021-04-22 DIAGNOSIS — T81.30XA SUPERFICIAL DEHISCENCE OF WOUND: ICD-10-CM

## 2021-04-22 DIAGNOSIS — E11.622 TYPE 2 DIABETES MELLITUS WITH OTHER SKIN ULCER, WITHOUT LONG-TERM CURRENT USE OF INSULIN (H): ICD-10-CM

## 2021-04-22 DIAGNOSIS — S21.209A BACK WOUND: ICD-10-CM

## 2021-04-23 ENCOUNTER — COMMUNICATION - HEALTHEAST (OUTPATIENT)
Dept: VASCULAR SURGERY | Facility: CLINIC | Age: 76
End: 2021-04-23

## 2021-04-24 ENCOUNTER — COMMUNICATION - HEALTHEAST (OUTPATIENT)
Dept: FAMILY MEDICINE | Facility: CLINIC | Age: 76
End: 2021-04-24

## 2021-04-24 DIAGNOSIS — E11.42 TYPE 2 DIABETES MELLITUS WITH DIABETIC POLYNEUROPATHY, WITHOUT LONG-TERM CURRENT USE OF INSULIN (H): ICD-10-CM

## 2021-04-28 ENCOUNTER — AMBULATORY - HEALTHEAST (OUTPATIENT)
Dept: NEUROSURGERY | Facility: CLINIC | Age: 76
End: 2021-04-28

## 2021-05-04 ENCOUNTER — COMMUNICATION - HEALTHEAST (OUTPATIENT)
Dept: FAMILY MEDICINE | Facility: CLINIC | Age: 76
End: 2021-05-04

## 2021-05-04 DIAGNOSIS — E11.65 TYPE 2 DIABETES MELLITUS WITH HYPERGLYCEMIA, WITHOUT LONG-TERM CURRENT USE OF INSULIN (H): ICD-10-CM

## 2021-05-04 DIAGNOSIS — E78.5 HYPERLIPIDEMIA, UNSPECIFIED HYPERLIPIDEMIA TYPE: ICD-10-CM

## 2021-05-10 ENCOUNTER — RECORDS - HEALTHEAST (OUTPATIENT)
Dept: ADMINISTRATIVE | Facility: OTHER | Age: 76
End: 2021-05-10

## 2021-05-13 ENCOUNTER — OFFICE VISIT - HEALTHEAST (OUTPATIENT)
Dept: VASCULAR SURGERY | Facility: CLINIC | Age: 76
End: 2021-05-13

## 2021-05-13 DIAGNOSIS — E55.9 VITAMIN D DEFICIENCY: ICD-10-CM

## 2021-05-13 DIAGNOSIS — T81.30XA SUPERFICIAL DEHISCENCE OF WOUND: ICD-10-CM

## 2021-05-13 DIAGNOSIS — S21.209D WOUND OF BACK, UNSPECIFIED LATERALITY, SUBSEQUENT ENCOUNTER: ICD-10-CM

## 2021-05-13 DIAGNOSIS — E11.622 TYPE 2 DIABETES MELLITUS WITH OTHER SKIN ULCER, WITHOUT LONG-TERM CURRENT USE OF INSULIN (H): ICD-10-CM

## 2021-05-19 ENCOUNTER — COMMUNICATION - HEALTHEAST (OUTPATIENT)
Dept: FAMILY MEDICINE | Facility: CLINIC | Age: 76
End: 2021-05-19
Payer: COMMERCIAL

## 2021-05-20 ENCOUNTER — RECORDS - HEALTHEAST (OUTPATIENT)
Dept: ADMINISTRATIVE | Facility: OTHER | Age: 76
End: 2021-05-20

## 2021-05-20 ENCOUNTER — COMMUNICATION - HEALTHEAST (OUTPATIENT)
Dept: FAMILY MEDICINE | Facility: CLINIC | Age: 76
End: 2021-05-20
Payer: COMMERCIAL

## 2021-05-20 ENCOUNTER — OFFICE VISIT - HEALTHEAST (OUTPATIENT)
Dept: FAMILY MEDICINE | Facility: CLINIC | Age: 76
End: 2021-05-20

## 2021-05-20 DIAGNOSIS — R19.7 DIARRHEA OF PRESUMED INFECTIOUS ORIGIN: ICD-10-CM

## 2021-05-20 LAB
ALBUMIN SERPL-MCNC: 3.8 G/DL (ref 3.5–5)
ALP SERPL-CCNC: 80 U/L (ref 45–120)
ALT SERPL W P-5'-P-CCNC: 27 U/L (ref 0–45)
ANION GAP SERPL CALCULATED.3IONS-SCNC: 13 MMOL/L (ref 5–18)
AST SERPL W P-5'-P-CCNC: 21 U/L (ref 0–40)
BASOPHILS # BLD AUTO: 0 THOU/UL (ref 0–0.2)
BASOPHILS NFR BLD AUTO: 1 % (ref 0–2)
BILIRUB SERPL-MCNC: 0.5 MG/DL (ref 0–1)
BUN SERPL-MCNC: 10 MG/DL (ref 8–28)
C DIFF TOX B STL QL: NEGATIVE
CALCIUM SERPL-MCNC: 10.1 MG/DL (ref 8.5–10.5)
CHLORIDE BLD-SCNC: 103 MMOL/L (ref 98–107)
CO2 SERPL-SCNC: 22 MMOL/L (ref 22–31)
CREAT SERPL-MCNC: 0.69 MG/DL (ref 0.6–1.1)
EOSINOPHIL # BLD AUTO: 0 THOU/UL (ref 0–0.4)
EOSINOPHIL NFR BLD AUTO: 1 % (ref 0–6)
ERYTHROCYTE [DISTWIDTH] IN BLOOD BY AUTOMATED COUNT: 13.4 % (ref 11–14.5)
GFR SERPL CREATININE-BSD FRML MDRD: >60 ML/MIN/1.73M2
GLUCOSE BLD-MCNC: 119 MG/DL (ref 70–125)
HCT VFR BLD AUTO: 38.3 % (ref 35–47)
HGB BLD-MCNC: 12.9 G/DL (ref 12–16)
IMM GRANULOCYTES # BLD: 0 THOU/UL
IMM GRANULOCYTES NFR BLD: 0 %
LYMPHOCYTES # BLD AUTO: 2.2 THOU/UL (ref 0.8–4.4)
LYMPHOCYTES NFR BLD AUTO: 35 % (ref 20–40)
MCH RBC QN AUTO: 29 PG (ref 27–34)
MCHC RBC AUTO-ENTMCNC: 33.7 G/DL (ref 32–36)
MCV RBC AUTO: 86 FL (ref 80–100)
MONOCYTES # BLD AUTO: 0.4 THOU/UL (ref 0–0.9)
MONOCYTES NFR BLD AUTO: 6 % (ref 2–10)
NEUTROPHILS # BLD AUTO: 3.6 THOU/UL (ref 2–7.7)
NEUTROPHILS NFR BLD AUTO: 58 % (ref 50–70)
PLATELET # BLD AUTO: 260 THOU/UL (ref 140–440)
PMV BLD AUTO: 9.4 FL (ref 7–10)
POTASSIUM BLD-SCNC: 3.9 MMOL/L (ref 3.5–5)
PROT SERPL-MCNC: 7.4 G/DL (ref 6–8)
RBC # BLD AUTO: 4.45 MILL/UL (ref 3.8–5.4)
RIBOTYPE 027/NAP1/BI: NORMAL
SARS-COV-2 PCR COMMENT: NORMAL
SARS-COV-2 RNA SPEC QL NAA+PROBE: NEGATIVE
SARS-COV-2 VIRUS SPECIMEN SOURCE: NORMAL
SODIUM SERPL-SCNC: 138 MMOL/L (ref 136–145)
WBC: 6.2 THOU/UL (ref 4–11)

## 2021-05-20 ASSESSMENT — MIFFLIN-ST. JEOR: SCORE: 1474.51

## 2021-05-21 ENCOUNTER — COMMUNICATION - HEALTHEAST (OUTPATIENT)
Dept: SCHEDULING | Facility: CLINIC | Age: 76
End: 2021-05-21

## 2021-05-24 ENCOUNTER — COMMUNICATION - HEALTHEAST (OUTPATIENT)
Dept: FAMILY MEDICINE | Facility: CLINIC | Age: 76
End: 2021-05-24

## 2021-05-24 DIAGNOSIS — R19.7 DIARRHEA, UNSPECIFIED TYPE: ICD-10-CM

## 2021-05-26 VITALS
HEIGHT: 64 IN | SYSTOLIC BLOOD PRESSURE: 118 MMHG | WEIGHT: 226.2 LBS | HEART RATE: 72 BPM | DIASTOLIC BLOOD PRESSURE: 64 MMHG | RESPIRATION RATE: 12 BRPM | BODY MASS INDEX: 38.62 KG/M2 | TEMPERATURE: 98.3 F

## 2021-05-27 ENCOUNTER — COMMUNICATION - HEALTHEAST (OUTPATIENT)
Dept: FAMILY MEDICINE | Facility: CLINIC | Age: 76
End: 2021-05-27

## 2021-05-27 VITALS
BODY MASS INDEX: 38.82 KG/M2 | TEMPERATURE: 98.4 F | DIASTOLIC BLOOD PRESSURE: 74 MMHG | WEIGHT: 227.4 LBS | RESPIRATION RATE: 12 BRPM | SYSTOLIC BLOOD PRESSURE: 126 MMHG | HEIGHT: 64 IN | HEART RATE: 68 BPM

## 2021-05-27 VITALS — HEART RATE: 92 BPM | DIASTOLIC BLOOD PRESSURE: 80 MMHG | SYSTOLIC BLOOD PRESSURE: 138 MMHG | OXYGEN SATURATION: 96 %

## 2021-05-27 VITALS
BODY MASS INDEX: 37.76 KG/M2 | RESPIRATION RATE: 12 BRPM | DIASTOLIC BLOOD PRESSURE: 66 MMHG | HEART RATE: 72 BPM | TEMPERATURE: 98.4 F | WEIGHT: 221.2 LBS | SYSTOLIC BLOOD PRESSURE: 116 MMHG | HEIGHT: 64 IN

## 2021-05-27 VITALS
RESPIRATION RATE: 16 BRPM | SYSTOLIC BLOOD PRESSURE: 135 MMHG | HEIGHT: 64 IN | WEIGHT: 225.2 LBS | TEMPERATURE: 97.1 F | HEART RATE: 87 BPM | DIASTOLIC BLOOD PRESSURE: 80 MMHG | BODY MASS INDEX: 38.45 KG/M2

## 2021-05-27 VITALS — WEIGHT: 221 LBS | BODY MASS INDEX: 38.53 KG/M2

## 2021-05-27 VITALS — HEIGHT: 64 IN

## 2021-05-27 NOTE — PROGRESS NOTES
Karol Becerra is feeling fine after her last colonoscopy.  I did remove 4 small polyps but all of those polyps came back as either hyperplastic polyps or lymphoid aggregate tissue.  I did not appreciate any development of the sessile polyp in the transverse colon.  Still given the patient's prior colon cancer and the dysplasia noted in a prior polyp I think we need to follow-up carefully.  I think that annual colonoscopies are not necessary at this point but I would recommend her next colonoscopy to be in 3 years or the year 2022.    Note after reviewing the pathology report I did recommend the patient could wait 5 years but have not seen the patient and reviewed the pertinent issues of this patient's care again I think it is important that we practice closer follow-up and I would like to do her next colonoscopy in 3 years the year 2022.    Thank you for consulting me on this very pleasant patient    [facility]  [phone]

## 2021-05-27 NOTE — PROGRESS NOTES
Assessment/Plan:     Problem List Items Addressed This Visit     Type 2 diabetes mellitus with hyperglycemia, without long-term current use of insulin (H) - Primary     Not fully controlled.  Goal of less than 8, and currently not below that goal.  Will reinstitute metformin.  Patient actually has a large supply at home and did tolerate a couple of days.  If blood sugars are not below 200 in 2 weeks, call and will set up with MTM to discuss other options.  Continue glipizide with metformin.         Relevant Medications    metFORMIN (GLUCOPHAGE) 500 MG tablet    glipiZIDE (GLUCOTROL) 10 MG tablet    Other Relevant Orders    Glycosylated Hemoglobin A1c (Completed)    Microalbumin, Random Urine    Hyperlipidemia     Tolerating Zocor 40 mg and taking every day.  Will check labs as per orders as per guidance.         Relevant Orders    Lipid Cascade    Essential hypertension     Controlled on losartan 50 mg daily and below goal of 140/90.  Continue current dosing.  Diet and exercise reviewed.         Relevant Orders    Basic Metabolic Panel    Osteoporosis     Continue to follow with nurse practitioner at endocrinology.         Vitamin D deficiency     Recheck levels and adjust supplementation as needed.         Relevant Orders    Vitamin D, Total (25-Hydroxy)      Other Visit Diagnoses     Urinary frequency        Treatment as per orders.  Infection precautions discussed.    Relevant Medications    ciprofloxacin HCl (CIPRO) 500 MG tablet    Other Relevant Orders    Urinalysis-UC if Indicated (Completed)    Culture, Urine        Return in about 3 months (around 7/5/2019) for Diabetes.    Subjective:   74 y.o. female presents for diabetes follow-up as well as pain with urination for the past 2 days.  Denies any fevers or chills, back pain nausea or vomiting.  Her blood sugars have continually been above 180 and even once or twice into the 300s despite dietary changes and glipizide.  She did have some metformin at home and  "actually tried for a couple of days, she did not get any of the GI side effects or diarrhea, and her blood sugars actually did come down below 160.       I have had an Advance Directives discussion with the patient.  The following high BMI interventions were performed this visit: encouragement to exercise and weight monitoring      Urinary Frequency    This is a new problem. The current episode started yesterday. The problem occurs every urination. The problem has been unchanged. The quality of the pain is described as burning. There has been no fever. There is no history of pyelonephritis. Associated symptoms include frequency and urgency. Pertinent negatives include no chills, discharge, flank pain, hematuria, hesitancy, nausea or vomiting. She has tried nothing for the symptoms.       Review of Systems   Constitutional: Negative for chills, fatigue and fever.   Eyes: Negative for visual disturbance.   Respiratory: Negative for chest tightness and shortness of breath.    Cardiovascular: Negative for chest pain, palpitations and leg swelling.   Gastrointestinal: Negative for constipation, diarrhea, nausea and vomiting.   Genitourinary: Positive for frequency and urgency. Negative for difficulty urinating, flank pain, hematuria and hesitancy.        History     Reviewed By Date/Time Sections Reviewed    Saul Zamora,  4/5/2019  8:24 AM Medical, Surgical, Tobacco, Alcohol, Drug Use, Sexual Activity, Family    Kalyan Chin Jr., [facility] 4/5/2019  8:11 AM Tobacco           Objective:     Vitals:    04/05/19 0807   BP: 114/68   Pulse: 68   Resp: 12   Temp: 98.3  F (36.8  C)   TempSrc: Oral   Weight: (!) 223 lb 14.4 oz (101.6 kg)   Height: 5' 4.25\" (1.632 m)     Physical Exam   Constitutional: She is oriented to person, place, and time. She appears well-developed and well-nourished.   HENT:   Head: Normocephalic and atraumatic.   Cardiovascular: Normal rate, regular rhythm, normal heart sounds and intact " distal pulses.   Pulmonary/Chest: Effort normal and breath sounds normal.   Neurological: She is alert and oriented to person, place, and time.   Skin: Capillary refill takes less than 2 seconds.   Psychiatric: She has a normal mood and affect.   Vitals reviewed.      This note has been dictated using voice recognition software. Any grammatical or context distortions are unintentional and inherent to the software

## 2021-05-27 NOTE — ANESTHESIA POSTPROCEDURE EVALUATION
Patient: Karol Mujica  COLONOSCOPY WITH BIOPSY  Anesthesia type: MAC    Patient location: Phase II Recovery  Last vitals:   Vitals:    03/26/19 0910   BP: (P) 134/62   Pulse:    Resp: (P) 16   Temp:    SpO2:      Post vital signs: stable  Level of consciousness: awake and responds to simple questions  Post-anesthesia pain: pain controlled  Post-anesthesia nausea and vomiting: no  Pulmonary: unassisted, return to baseline  Cardiovascular: stable and blood pressure at baseline  Hydration: adequate  Anesthetic events: no    QCDR Measures:  ASA# 11 - Mirian-op Cardiac Arrest: ASA11B - Patient did NOT experience unanticipated cardiac arrest  ASA# 12 - Mirian-op Mortality Rate: ASA12B - Patient did NOT die  ASA# 13 - PACU Re-Intubation Rate: NA - No ETT / LMA used for case  ASA# 10 - Composite Anes Safety: ASA10A - No serious adverse event    Additional Notes:

## 2021-05-27 NOTE — ANESTHESIA CARE TRANSFER NOTE
Last vitals:   Vitals:    03/26/19 0820   BP: (P) 126/66   Pulse: (P) 66   Resp: (P) 16   Temp: (P) 36.7  C (98  F)   SpO2: (P) 97%     Patient's level of consciousness is drowsy  Spontaneous respirations: yes  Maintains airway independently: yes  Dentition unchanged: yes  Oropharynx: oropharynx clear of all foreign objects    QCDR Measures:  ASA# 20 - Surgical Safety Checklist: WHO surgical safety checklist completed prior to induction    PQRS# 430 - Adult PONV Prevention: 4558F - Pt received => 2 anti-emetic agents (different classes) preop & intraop  ASA# 8 - Peds PONV Prevention: NA - Not pediatric patient, not GA or 2 or more risk factors NOT present  PQRS# 424 - Mirian-op Temp Management: 4559F - At least one body temp DOCUMENTED => 35.5C or 95.9F within required timeframe  PQRS# 426 - PACU Transfer Protocol: - Transfer of care checklist used  ASA# 14 - Acute Post-op Pain: ASA14B - Patient did NOT experience pain >= 7 out of 10

## 2021-05-27 NOTE — ANESTHESIA PREPROCEDURE EVALUATION
Anesthesia Evaluation      History of anesthetic complications (slow emergence)     Airway   Mallampati: III  Neck ROM: full   Pulmonary     breath sounds clear to auscultation                         Cardiovascular   Exercise tolerance: > or = 4 METS  (+) hypertension, , hypercholesterolemia,     Rhythm: regular  Rate: normal,         Neuro/Psych    (+) neuromuscular disease (peripheral neuropathy, bilateral feet),      Endo/Other    (+) diabetes mellitus type 2 poorly controlled, arthritis, obesity (BMI 38.6),      GI/Hepatic/Renal    (+)   bowel prep  (-) GERD, esophageal disease    Comments: Hx of diverticulosis and colon CA, s/f colonoscopy     Other findings: Hgb 13.4      Dental    (+) poor dentition                       Anesthesia Plan  Planned anesthetic: MAC  Plan for propofol gtt only and AVOID midazolam, fentanyl, ketamine to enhance recovery.  Discussed potential need to convert to GA w/ ETT vs LMA if not tolerating.  Explained that it is possible to remember certain aspects of the OR experience during MAC dependent on the depth of sedation and patient understands this.  Dexamethasone 4 and zofran 4 for PONV.    ASA 3     Anesthetic plan and risks discussed with: patient  Anesthesia plan special considerations: antiemetics,   Post-op plan: routine recovery

## 2021-05-28 NOTE — TELEPHONE ENCOUNTER
rx has been altered to reflect current does.  Did you want to switch to 1000mg pills to take 1 tab 2 x daily?

## 2021-05-28 NOTE — TELEPHONE ENCOUNTER
Medication Question or Clarification  Who is calling: Patient  What medication are you calling about? (include dose and sig)    Disp Refills Start End    metFORMIN (GLUCOPHAGE) 500 MG tablet 30 tablet 0 4/5/2019     Sig: Take 1 tab daily for 5 days, then 1 tab twice a day for 5 days, then 2 tabs morning, 1 tab evening for 5 days, then 2 tabs twice a day    Sent to pharmacy as: metFORMIN (GLUCOPHAGE) 500 MG tablet    E-Prescribing Status: Receipt confirmed by pharmacy (4/5/2019  8:41 AM CDT)      Who prescribed the medication?: Saul Zamora, DO   What is your question/concern?: Patient stated her blood sugars are mostly in the 160s now since starting this new dose of metformin. Patient stated she needs a new Rx for the 500 mg dose - take 2 tabs by mouth twice a day.  Pharmacy: [facility]  Okay to leave a detailed message?: Yes  [phone]  Site CMT - Please call the pharmacy to obtain any additional needed information.

## 2021-05-28 NOTE — TELEPHONE ENCOUNTER
RN triage   Call from pt   Pt states seen and treated for bladder infection 4/5 -- got better   Since yesterday having symptoms   Urgency - frequency - burning w/ U.O. -   Has back pain and bladder pressure --    no fever no blood   Reviewed home care advice   Per protocol = should be seen   Transferred to    Saira Hendrickson RN [facility] RN triage      Reason for Disposition    Side (flank) or lower back pain present    Protocols used: URINATION PAIN - FEMALE-A-OH

## 2021-05-28 NOTE — PROGRESS NOTES
Chief Complaint   Patient presents with     Urinary Tract Infection         HPI:   Karol Mujica is a 74 y.o. female c/o burning with urination for the last day. No blood in urine.  Has had right sided back pain since last night.  Had fever yesterday 99..9.  Poor appetite, but no nausea or vomiting.  No diarrhea.    Had UTI on 4/5/19.  Treated with cipro. Symptoms resolved.      ROS:  A 10 point comprehensive review of systems was negative except as noted.     Medications:  Current Outpatient Medications on File Prior to Visit   Medication Sig Dispense Refill     acetaminophen (TYLENOL) 500 MG tablet Take 1,000 mg by mouth every 6 (six) hours as needed for pain.       aspirin 81 MG EC tablet Take 81 mg by mouth daily.       blood glucose test (ONETOUCH VERIO) strips Use 1 each As Directed 2 (two) times a day. Dispense brand per patient's insurance at pharmacy discretion. 200 strip 3     blood-glucose meter Misc Use 1 Device As Directed daily. Type 2 DM, without long term insulin use 1 each 0     cholecalciferol, vitamin D3, 2,000 unit cap Take 2 tablets by mouth daily.       cyclobenzaprine (FLEXERIL) 10 MG tablet TAKE 1 TABLET BY MOUTH DAILY AT BEDTIME 90 tablet 1     gabapentin (NEURONTIN) 300 MG capsule Take 1 capsule (300 mg total) by mouth every evening. 90 capsule 3     glipiZIDE (GLUCOTROL) 10 MG tablet Take 1 tablet (10 mg total) by mouth 2 (two) times a day before meals. 180 tablet 3     loperamide (IMODIUM) 2 mg capsule Take 2 mg by mouth 4 (four) times a day as needed for diarrhea. Taking per Dr. Zamora's instructions       losartan (COZAAR) 50 MG tablet Take 1 tablet (50 mg total) by mouth daily. 90 tablet 3     metFORMIN (GLUCOPHAGE) 500 MG tablet Take 1 tab daily for 5 days, then 1 tab twice a day for 5 days, then 2 tabs morning, 1 tab evening for 5 days, then 2 tabs twice a day 30 tablet 0     multivitamin (MULTIVITAMIN) per tablet Take 1 tablet by mouth daily.       simvastatin (ZOCOR) 40 MG  tablet Take 1 tablet (40 mg total) by mouth at bedtime. 90 tablet 3     Current Facility-Administered Medications on File Prior to Visit   Medication Dose Route Frequency Provider Last Rate Last Dose     denosumab 60 mg (PROLIA 60 mg/ml)  60 mg Subcutaneous Q6 Months Kalia Hector MD   60 mg at 11/20/18 1242         Social History:  Social History     Tobacco Use     Smoking status: Never Smoker     Smokeless tobacco: Never Used   Substance Use Topics     Alcohol use: No         Physical Exam:   Vitals:    04/24/19 1146 04/24/19 1223   BP: 144/70 138/72   Pulse: 88    Resp: 12    Temp: 98.1  F (36.7  C)    TempSrc: Oral    Weight: (!) 226 lb (102.5 kg)        GEN:  NAD  LUNGS:  Clear to auscultation without wheezing.  Normal effort.  HEART:  RRR without murmur, rub or gallop   BACK:  No CVA tenderness    LABS:  Results for orders placed or performed in visit on 04/24/19   Urinalysis-UC if Indicated   Result Value Ref Range    Color, UA Yellow Colorless, Yellow, Straw, Light Yellow    Clarity, UA Clear Clear    Glucose, UA Negative Negative    Bilirubin, UA Negative Negative    Ketones, UA Negative Negative    Specific Gravity, UA <=1.005 1.005 - 1.030    Blood, UA Negative Negative    pH, UA 5.5 5.0 - 8.0    Protein, UA Negative Negative mg/dL    Urobilinogen, UA 0.2 E.U./dL 0.2 E.U./dL, 1.0 E.U./dL    Nitrite, UA Negative Negative    Leukocytes, UA Trace (!) Negative    Bacteria, UA None Seen None Seen hpf    RBC, UA None Seen None Seen, 0-2 hpf    WBC, UA 0-5 None Seen, 0-5 hpf    Squam Epithel, UA 0-5 None Seen, 0-5 lpf        Assessment/Plan:    1. Acute cystitis without hematuria  Urinalysis-UC if Indicated    Culture, Urine    sulfamethoxazole-trimethoprim (SEPTRA DS) 800-160 mg per tablet      Antibiotics as directed.  Good fluid intake.  Cranberry juice.  F/U if no improvement or worsening.           Yandel Case MD      4/24/2019    The following portions of the patient's history were reviewed  and updated as appropriate: allergies, current medications, past family history, past medical history, past social history, past surgical history and problem list.

## 2021-05-29 NOTE — PROGRESS NOTES
[facility]  ENDOCRINOLOGY    Osteoporosis Follow Up 5/29/2019    Karol Mujica, 1945, [id number]          Reason for visit      1. Osteoporosis    2. Type 2 diabetes mellitus with hyperglycemia, without long-term current use of insulin (H)        History     Karol Mujica is a very pleasant 74 y.o. old female who presents for follow up.   SUMMARY:  Massiel returns today in f/u for Osteoporosis. She reports that she has had no difficulties with the Prolia injections that she has been getting for 3 years. She is due for one today. She does have some concerns regarding cost, as she has about $300 out of pocket and she and her  are on a fixed income. She continues to walk regularly, and is looking forward to spending time in her yard this summer.  She is due for another Dexa Scan this year. Her last one of 12/17 indicated improvement in her T-scores 2/2 to the Prolia injections. Her current Calcium level is 10.3 and her Vit D level is slightly low at 28.0. She reports that she is taking 4000 IU daily.     Pt reports that she is taking Glipizide and Metformin for her DM control. She is taking the Glipizide with breakfast and before bed. She takes the Metformin with meals.       Risk Factors     The following high- risk conditions have been ruled out: celiac disease, eating disorders, gastric bypass, hyperparathyroidism, inflammatory bowel disease, hyperthyroidism, rheumatoid arthritis, lupus, chronic kidney disease.     Karol Mujica has the following risk factors: Age, Female gender and      She is not on high risk medications such as glucocorticoids, anti-coagulants, anti-convulsants, chemotherapy or levothyroxine.    Patient deniesHysterectomy, Oophrectomy and Breast cancer.        Past Medical History     Patient Active Problem List   Diagnosis     Type 2 diabetes mellitus with hyperglycemia, without long-term current use of insulin (H)     Hyperlipidemia     Peripheral Neuropathy      "Sciatica     Microalbuminuria     Colon Cancer     Tachycardia     Leukocytosis     Right-sided low back pain with right-sided sciatica     Essential hypertension     Osteoporosis     Vitamin D deficiency     Trochanteric bursitis of both hips     Severe obesity (BMI 35.0-35.9 with comorbidity) (H)     Gallstones     Postoperative pain     Age-related cataract of both eyes, unspecified age-related cataract type     History of colonic polyps       Family History       family history includes Breast cancer (age of onset: 81) in her mother; Cancer in her sister; Coronary artery disease in her father; Diabetes in her sister; Heart disease in her brother, sister, and sister; Kidney disease in her sister; Osteoporosis in her sister.    Social History      reports that she has never smoked. She has never used smokeless tobacco. She reports that she does not drink alcohol or use drugs.      Review of Systems     Patient denies current pain, limited mobility, fractures.   Remainder per HPI.      Vital Signs     /64 (Patient Site: Right Arm, Patient Position: Sitting, Cuff Size: Adult Regular)   Pulse 74   Ht 5' 4.25\" (1.632 m)   Wt (!) 227 lb 12.8 oz (103.3 kg)   LMP  (LMP Unknown)   BMI 38.80 kg/m      Physical Exam     GENERAL:  Normal, NIRD  EYES:  Pupils equal, round and reactive to light; no proptosis, lid lag or  periorbital edema.  THYROID:  Thyroid is normal.  No tenderness or bruit  NECK: No lymph nodes  MUSCULOSKELETAL: No joint abnormalities, FROM in all four extremities. No kyphosis. Muscle strength grossly normal without evidence of wasting.  HEART:  Regular rate and rhythm without murmur.  LUNGS:  Clear to auscultation.  ABDOMEN:Soft, non-tender, no masses or organomegaly  NEURO:  Patella Reflexes were normal.No tremors  SKIN:  No acanthosis nigricans or vitiligo        Assessment     1. Osteoporosis    2. Type 2 diabetes mellitus with hyperglycemia, without long-term current use of insulin (H)  "       Plan     We will work on the PA for her Prolia injections, which unfortunately, was not obtained before her appointment today.  I have also given her over to Meredith to help her get assistance with her co-pay. She will f/u 6 months after her next injection. Dexa Scan in December. I will see her in a year.    Instructed her to take her Glipizide before breakfast and dinner.      Total visit minutes:25  Time spent counseling and coordination of care:23    Lizeth Vazquez   Endocrinology  5/29/2019  6:46 AM      Current Medications     Outpatient Medications Prior to Visit   Medication Sig Dispense Refill     acetaminophen (TYLENOL) 500 MG tablet Take 1,000 mg by mouth every 6 (six) hours as needed for pain.       aspirin 81 MG EC tablet Take 81 mg by mouth daily.       blood glucose test (ONETOUCH VERIO) strips Use 1 each As Directed 2 (two) times a day. Dispense brand per patient's insurance at pharmacy discretion. 200 strip 3     blood-glucose meter Misc Use 1 Device As Directed daily. Type 2 DM, without long term insulin use 1 each 0     cholecalciferol, vitamin D3, 2,000 unit cap Take 2 tablets by mouth daily.       cyclobenzaprine (FLEXERIL) 10 MG tablet TAKE 1 TABLET BY MOUTH DAILY AT BEDTIME 90 tablet 1     gabapentin (NEURONTIN) 300 MG capsule Take 1 capsule (300 mg total) by mouth every evening. 90 capsule 3     glipiZIDE (GLUCOTROL) 10 MG tablet Take 1 tablet (10 mg total) by mouth 2 (two) times a day before meals. 180 tablet 3     loperamide (IMODIUM) 2 mg capsule Take 2 mg by mouth 4 (four) times a day as needed for diarrhea. Taking per Dr. Zamora's instructions       losartan (COZAAR) 50 MG tablet Take 1 tablet (50 mg total) by mouth daily. 90 tablet 3     metFORMIN (GLUCOPHAGE) 1000 MG tablet Take 1 tablet (1,000 mg total) by mouth 2 (two) times a day with meals. Take 2 tabs twice daily (Patient taking differently: Take 1,000 mg by mouth 2 (two) times a day with meals. Take 1 tab twice daily      )  180 tablet 3     multivitamin (MULTIVITAMIN) per tablet Take 1 tablet by mouth daily.       simvastatin (ZOCOR) 40 MG tablet Take 1 tablet (40 mg total) by mouth at bedtime. 90 tablet 3     Facility-Administered Medications Prior to Visit   Medication Dose Route Frequency Provider Last Rate Last Dose     denosumab 60 mg (PROLIA 60 mg/ml)  60 mg Subcutaneous Q6 Months Kalia Hector MD   60 mg at 11/20/18 1242         Lab Results     TSH   Date Value Ref Range Status   02/23/2016 1.88 0.30 - 5.00 uIU/mL Final     PTH   Date Value Ref Range Status   02/23/2016 41 10 - 86 pg/mL Final     Calcium   Date Value Ref Range Status   04/05/2019 10.1 8.5 - 10.5 mg/dL Final   04/05/2019 10.3 8.5 - 10.5 mg/dL Final           Imaging Results   Last DEXA scan:  Results for orders placed in visit on 11/22/17   DXA Bone Density Scan    Narrative 12/8/2017      RE: Karol Mujica  YOB: 1945        Dear Ty Melendrez,    Patient Profile:  72 y.o. female, postmenopausal, is here for the follow up bone density   test.   History of fractures - Yes;  Femur. Family history of osteoporosis - Yes;    mother.  Family history of hip fracture: None. Smoking history - No.   Osteoporosis treatment past -  No. Osteoporosis treatment current - Yes;    Prolia.  Chronic medical problems - Chronic low back problems, Diabetes   Mellitus, Hysterectomy and Oophorectomy. High risk medications -    Anti-seizure;  Yes, Currently( On Gabapentin).      Assessment:    1. The spine bone density L1-L2 with T-score -1.3.  2. Femoral bone density shows right total hip T-score -0.9.  3. Trabecular bone score indicates good trabecular bone architecture.      72 y.o. female with LOW BONE DENSITY as determined by bone density, but   with a diagnosis of osteoporosis based on history of femur fracture.  She   is currently receiving Prolia therapy.    Since the previous bone density dated  November 9, 2015, there has been a     16.1 % increase in  the bone density of the spine.  Additionally there has   been a 9.9 % increase in the right total hip.  This represents a positive   response to her current medications.    Recommendations:  Appropriate ongoing assessment of treatment is recommended with follow up   bone density scan in 2 years.      Bone densitometry was performed on your patient using our [employer] iDXA   densitometer. The results are summarized and a copy of the actual scans   are included for your review. In conformity with the International Society   of Clinical Densitometry's most recent position statement for DXA   interpretation (2015), the diagnosis will be made on the lowest measured   T-score of the lumbar spine, femoral neck, total proximal femur or 33%   radius. Note the change in terminology for diagnostic classification from   OSTEOPENIA to LOW BONE MASS. All trending for sequential exams will be   done using multiple vertebrae or the total proximal femur. Fracture risk   is based on the WHO Fracture Risk Assessment Tool (FRAX). If additional   information is needed or if you would like to discuss the results, please   do not hesitate to call me.       Thank you for referring this patient to [facility].   We are happy to be of service in support of you and your practice. If you   have any questions or suggestions to improve our service, please call me   at [phone].     Sincerely,     Elvia Pearce M.D. C.C.EDDIE.  Osteoporosis Services, [facility]

## 2021-05-29 NOTE — TELEPHONE ENCOUNTER
Patient returning called. In regards of the kevin, [employer]. They need a call from us, they need to confirm the DX in order to receive th kevin.    Please call: 1 [phone]    Patient's ID# [id number]

## 2021-05-29 NOTE — PROGRESS NOTES
"Prolia Injection Phone Screen      Screening questions have been asked 2-3 days prior to administration visit for Prolia. If any questions are answered with \"Yes,\" this phone encounter were will routed to ordering provider for further evaluation.     1.  When was the last injection?  11/20/18    2.  Has insurance for this injection been verified?  Yes    3.  Did you experience any new onset achiness or rashes that lasted for over a month with your previous Prolia injection?   No    4.  Do you have a fever over 101?F or a new deep cough that is unusual for you today? No    5.  Have you started any new medications in the last 6 months that you were told could affect your immune system? These may have been prescribed by oncologist, transplant, rheumatology, or dermatology.   No    6.  In the last 6 months have you have gastric bypass or parathyroid surgery?   No    7.  Do you plan dental work requiring drilling into the bone such as implants/extractions or oral surgery in the next 2-3 months?   No    8. Do you have new insurance since the last injection?    Patient informed if symptoms discussed above present prior to their administration appointment, they are to notify clinic immediately.     Ruby Helton        The following steps were completed to comply with the REMS program for Prolia:  1. Ordering provider has previously reviewed information in the Medication Guide and Patient Counseling Chart, including the serious risks of Prolia  and the symptoms of each risk and have been advised to seek prompt medical attention if they have signs or symptoms of any of the serious risks.  2. Provided each patient a copy of the Medication Guide and Patient Brochure.  See MAR for administration details.   Indication: Prolia  (denosumab) is a prescription medicine used to treat osteoporosis in patients who:   Are at high risk for fracture, meaning patients who have had a fracture related to osteoporosis, or who have multiple " risk factors for fracture; Cannot use another osteoporosis medicine or other osteoporosis medicines did not work well.   The timeline for early/late injections would be 4 weeks early and any time after the 6 month rogelio. If a patient receives their injection late, then the subsequent injection would be 6 months from the date that they actually received the injection    Have the screening questions been asked prior to this administration? Yes      After obtaining consent, and per orders of Lizeth Vazquez NP, injection of Prolia 60 mg given by Ruby Helton. Patient instructed to remain in clinic for 20 minutes afterwards, and to report any adverse reaction to me immediately.

## 2021-05-29 NOTE — TELEPHONE ENCOUNTER
PA needed for Prolia per [facility] as [employer] will not disclose information to a 3rd party.    PA submitted via CoverMyMeds.  Await response.

## 2021-05-30 VITALS — WEIGHT: 234.8 LBS | BODY MASS INDEX: 39.12 KG/M2 | HEIGHT: 65 IN

## 2021-05-30 NOTE — PROGRESS NOTES
Assessment/Plan:     Problem List Items Addressed This Visit     Type 2 diabetes mellitus with hyperglycemia, without long-term current use of insulin (H) - Primary     Improved and down to 7.1 for A1c.  Optimal goal is below 7.  Continue current diet and exercise regimen follow-up in 3 months for recheck.         Relevant Orders    Glycosylated Hemoglobin A1c (Completed)    Peripheral Neuropathy     Stable with occasional use of Flexeril at bedtime.  Will continue current treatment.         Relevant Medications    cyclobenzaprine (FLEXERIL) 10 MG tablet    Colon Cancer     Due for CEA check will get with current labs.         Relevant Orders    CEA (Carcinoembryonic Antigen)        Return in about 3 months (around 10/8/2019) for Diabetes.    Subjective:   74 y.o. female presents for diabetes, hypertension.  Overall doing well.  Her glucoses at home have been staying under 150.  Her energy is improved.  Her toe improved slightly with the injection but now switching her that help out with the arthritis of the toe. Denies any chest pain, shortness of breath, dyspnea exertion, palpitations, nausea or vomiting.  Denies any changes in vision or hearing, or urinary or bowel habits.       The following high BMI interventions were performed this visit: encouragement to exercise and weight monitoring      Review of Systems   Constitutional: Negative for chills, fatigue and fever.   Eyes: Negative for visual disturbance.   Respiratory: Negative for chest tightness and shortness of breath.    Cardiovascular: Negative for chest pain, palpitations and leg swelling.   Gastrointestinal: Negative for constipation, diarrhea, nausea and vomiting.   Genitourinary: Negative for difficulty urinating.        History     Reviewed By Date/Time Sections Reviewed    Kalyan Chin Jr., [facility] 7/8/2019  9:09 AM Tobacco    Noah, Saul Hare,  7/8/2019  9:07 AM Medical, Surgical, Tobacco, Alcohol, Drug Use, Sexual Activity, Family     "       Objective:     Vitals:    07/08/19 0907   BP: 110/64   Pulse: 64   Resp: 12   Temp: 98.4  F (36.9  C)   TempSrc: Oral   Weight: (!) 225 lb 12.8 oz (102.4 kg)   Height: 5' 4.25\" (1.632 m)     Physical Exam   Constitutional: She is oriented to person, place, and time. She appears well-developed and well-nourished.   HENT:   Head: Normocephalic and atraumatic.   Cardiovascular: Normal rate, regular rhythm, normal heart sounds and intact distal pulses.   Pulmonary/Chest: Effort normal and breath sounds normal.   Musculoskeletal: She exhibits no edema.   Neurological: She is alert and oriented to person, place, and time.   Skin: Capillary refill takes less than 2 seconds.   Psychiatric: She has a normal mood and affect.   Vitals reviewed.      This note has been dictated using voice recognition software. Any grammatical or context distortions are unintentional and inherent to the software      "

## 2021-05-31 ENCOUNTER — COMMUNICATION - HEALTHEAST (OUTPATIENT)
Dept: FAMILY MEDICINE | Facility: CLINIC | Age: 76
End: 2021-05-31
Payer: COMMERCIAL

## 2021-05-31 VITALS — BODY MASS INDEX: 36.84 KG/M2 | WEIGHT: 221.1 LBS | HEIGHT: 65 IN

## 2021-05-31 VITALS — HEIGHT: 65 IN | WEIGHT: 230.6 LBS | BODY MASS INDEX: 38.42 KG/M2

## 2021-05-31 NOTE — TELEPHONE ENCOUNTER
Refill Approved    Rx renewed per Medication Renewal Policy. Medication was last renewed on 8/25/18.    Nikia Zamudio, [facility] Triage/Med Refill 8/21/2019     Requested Prescriptions   Pending Prescriptions Disp Refills     gabapentin (NEURONTIN) 300 MG capsule [Pharmacy Med Name: GABAPENTIN 300 MG CAPSULE] 90 capsule 1     Sig: TAKE ONE CAPSULE BY MOUTH EVERY EVENING       Gabapentin/Levetiracetam/Tiagabine Refill Protocol  Passed - 8/20/2019  1:36 AM        Passed - PCP or prescribing provider visit in past 12 months or next 3 months     Last office visit with prescriber/PCP: 7/8/2019 Saul Zamora DO OR same dept: 7/8/2019 Saul Zamora DO OR same specialty: 7/8/2019 Saul Zamora DO  Last physical: 1/4/2019 Last MTM visit: Visit date not found   Next visit within 3 mo: Visit date not found  Next physical within 3 mo: Visit date not found  Prescriber OR PCP: Saul Zamora DO  Last diagnosis associated with med order: 1. Diarrhea  - gabapentin (NEURONTIN) 300 MG capsule [Pharmacy Med Name: GABAPENTIN 300 MG CAPSULE]; TAKE ONE CAPSULE BY MOUTH EVERY EVENING  Dispense: 90 capsule; Refill: 1    If protocol passes may refill for 12 months if within 3 months of last provider visit (or a total of 15 months).

## 2021-06-01 ENCOUNTER — COMMUNICATION - HEALTHEAST (OUTPATIENT)
Dept: FAMILY MEDICINE | Facility: CLINIC | Age: 76
End: 2021-06-01
Payer: COMMERCIAL

## 2021-06-01 VITALS — HEIGHT: 65 IN | BODY MASS INDEX: 38.39 KG/M2 | WEIGHT: 230.4 LBS

## 2021-06-01 VITALS — WEIGHT: 226.6 LBS | HEIGHT: 65 IN | BODY MASS INDEX: 37.75 KG/M2

## 2021-06-01 VITALS — WEIGHT: 229.1 LBS | HEIGHT: 65 IN | BODY MASS INDEX: 38.17 KG/M2

## 2021-06-01 VITALS — WEIGHT: 232.5 LBS | BODY MASS INDEX: 38.74 KG/M2 | HEIGHT: 65 IN

## 2021-06-01 VITALS — WEIGHT: 223.7 LBS | HEIGHT: 65 IN | BODY MASS INDEX: 37.27 KG/M2

## 2021-06-01 VITALS — HEIGHT: 65 IN | WEIGHT: 223 LBS | BODY MASS INDEX: 37.15 KG/M2

## 2021-06-01 VITALS — HEIGHT: 65 IN | BODY MASS INDEX: 38.25 KG/M2 | WEIGHT: 229.6 LBS

## 2021-06-02 ENCOUNTER — OFFICE VISIT - HEALTHEAST (OUTPATIENT)
Dept: NEUROSURGERY | Facility: CLINIC | Age: 76
End: 2021-06-02

## 2021-06-02 ENCOUNTER — COMMUNICATION - HEALTHEAST (OUTPATIENT)
Dept: FAMILY MEDICINE | Facility: CLINIC | Age: 76
End: 2021-06-02

## 2021-06-02 ENCOUNTER — OFFICE VISIT - HEALTHEAST (OUTPATIENT)
Dept: VASCULAR SURGERY | Facility: CLINIC | Age: 76
End: 2021-06-02

## 2021-06-02 ENCOUNTER — HOSPITAL ENCOUNTER (OUTPATIENT)
Dept: RADIOLOGY | Facility: HOSPITAL | Age: 76
Discharge: HOME | End: 2021-06-02
Attending: PHYSICIAN ASSISTANT
Payer: COMMERCIAL

## 2021-06-02 VITALS — WEIGHT: 226 LBS | BODY MASS INDEX: 38.49 KG/M2

## 2021-06-02 VITALS — WEIGHT: 227 LBS | HEIGHT: 64 IN | BODY MASS INDEX: 38.76 KG/M2

## 2021-06-02 VITALS — HEIGHT: 64 IN | BODY MASS INDEX: 38.89 KG/M2 | WEIGHT: 227.8 LBS

## 2021-06-02 VITALS — BODY MASS INDEX: 37.8 KG/M2 | HEIGHT: 64 IN | WEIGHT: 221.4 LBS

## 2021-06-02 VITALS — WEIGHT: 223.9 LBS | BODY MASS INDEX: 38.22 KG/M2 | HEIGHT: 64 IN

## 2021-06-02 VITALS — BODY MASS INDEX: 38.87 KG/M2 | WEIGHT: 227.7 LBS | HEIGHT: 64 IN

## 2021-06-02 DIAGNOSIS — T81.30XA SUPERFICIAL DEHISCENCE OF WOUND: ICD-10-CM

## 2021-06-02 DIAGNOSIS — Z98.1 S/P LUMBAR FUSION: ICD-10-CM

## 2021-06-02 DIAGNOSIS — M54.16 LUMBAR RADICULAR PAIN: ICD-10-CM

## 2021-06-02 DIAGNOSIS — M43.16 SPONDYLOLISTHESIS OF LUMBAR REGION: ICD-10-CM

## 2021-06-02 DIAGNOSIS — M48.062 SPINAL STENOSIS OF LUMBAR REGION WITH NEUROGENIC CLAUDICATION: ICD-10-CM

## 2021-06-02 DIAGNOSIS — S21.209D WOUND OF BACK, UNSPECIFIED LATERALITY, SUBSEQUENT ENCOUNTER: ICD-10-CM

## 2021-06-02 DIAGNOSIS — E11.622 TYPE 2 DIABETES MELLITUS WITH OTHER SKIN ULCER, WITHOUT LONG-TERM CURRENT USE OF INSULIN (H): ICD-10-CM

## 2021-06-02 ASSESSMENT — MIFFLIN-ST. JEOR: SCORE: 1469.97

## 2021-06-02 NOTE — TELEPHONE ENCOUNTER
Central PA team  [phone]  Pool: HE PA MED ([id number])          PA has been initiated.       PA form completed and faxed insurance via Cover My Meds     Key:   [id number] - PA Case ID: [id number]     Medication:    Cyclobenzaprine HCl 10MG tablets    Insurance:  [facility]        Response will be received via fax and may take up to 5-10 business days depending on plan

## 2021-06-02 NOTE — TELEPHONE ENCOUNTER
Prior Authorization Request  Who s requesting:  Pharmacy  Pharmacy Name and Location: [facility][id number]  Medication Name: cyclobenzaprine   Insurance Plan: [employer]  Insurance Member ID Number:  [id number]  Informed patient that prior authorizations can take up to 10 business days for response:   No  Okay to leave a detailed message: Ring/tagWALLETautortal code: [email or web address]

## 2021-06-02 NOTE — PROGRESS NOTES
Assessment and Plan:     Problem List Items Addressed This Visit     Type 2 diabetes mellitus with hyperglycemia, without long-term current use of insulin (H)     Stable. Given h/o colon cancer and through shared decision making, goal of 7.5 and optimal goal of less than 7.0. Patient holding below this shared primary goal but not below optimal goal.    Lab Results   Component Value Date    HGBA1C 7.1 (H) 10/07/2019      Continue current plan.         Relevant Orders    Glycosylated Hemoglobin A1c (Completed)    Colon Cancer     Last CEA 3 months ago. Currently at every 6 months as per oncology plan         Essential hypertension     Below goal of 130/80 on current regimen. Continue current plan           Other Visit Diagnoses     Visit for screening mammogram    -  Primary    Relevant Orders    Mammo Screening Bilateral    Encounter for hepatitis C screening test for low risk patient        Relevant Orders    Hepatitis C Antibody (Anti-HCV)    Routine general medical examination at a health care facility                The patient's current medical problems were reviewed.    I have had an Advance Directives discussion with the patient.  The following high BMI interventions were performed this visit: encouragement to exercise and weight monitoring  The following health maintenance schedule was reviewed with the patient and provided in printed form in the after visit summary:   Health Maintenance   Topic Date Due     HEPATITIS C SCREENING  1945     MEDICARE ANNUAL WELLNESS VISIT  10/28/2016     MAMMOGRAM  12/18/2019     PNEUMOCOCCAL IMMUNIZATION 65+ LOW/MEDIUM RISK (1 of 2 - PCV13) 11/07/2019 (Originally 3/9/2011)     DIABETES OPHTHALMOLOGY EXAM  11/07/2019 (Originally 9/4/2019)     INFLUENZA VACCINE RULE BASED (1) 11/07/2019 (Originally 8/1/2019)     ZOSTER VACCINES (1 of 2) 11/07/2019 (Originally 3/9/1996)     DXA SCAN  12/08/2019     DIABETES HEMOGLOBIN A1C  01/08/2020     DIABETES URINE MICROALBUMIN   04/05/2020     DIABETES FOLLOW-UP  04/07/2020     HYPERTENSION FOLLOW-UP  04/07/2020     FALL RISK ASSESSMENT  07/08/2020     DIABETES FOOT EXAM  10/07/2020     COLONOSCOPY  03/26/2022     ADVANCE CARE PLANNING  04/05/2024     TD 18+ HE  06/22/2025        Subjective:   Chief Complaint: Karol Mujica is an 74 y.o. female here for an Annual Wellness visit.   HPI:  Denies any chest pain, shortness of breath, dyspnea exertion, palpitations, nausea or vomiting.  Denies any changes in vision or hearing, or urinary or bowel habits.     Review of Systems:  Please see above.  The rest of the review of systems are negative for all systems.    Patient Care Team:  Saul Zamora DO as PCP - General  Demarcus Barriga MD as Physician (General Surgery)  Lizeth Vazquez NP as Nurse Practitioner (Nurse Practitioner)  Saul Zamora DO as Assigned PCP     Patient Active Problem List   Diagnosis     Type 2 diabetes mellitus with hyperglycemia, without long-term current use of insulin (H)     Hyperlipidemia     Peripheral Neuropathy     Sciatica     Microalbuminuria     Colon Cancer     Tachycardia     Leukocytosis     Right-sided low back pain with right-sided sciatica     Essential hypertension     Osteoporosis     Vitamin D deficiency     Trochanteric bursitis of both hips     Severe obesity (BMI 35.0-35.9 with comorbidity) (H)     Gallstones     Postoperative pain     Age-related cataract of both eyes, unspecified age-related cataract type     History of colonic polyps     Past Medical History:   Diagnosis Date     Anemia      Arthritis      Cataract      Cholelithiasis      Colon cancer (H) 2014     Diabetes mellitus (H)      Diverticular disease      GI bleed 2/15/2014     History of anesthesia complications     slow to wake up     History of transfusion      Hyperlipidemia      Hypertension      Osteoporosis      Peripheral neuropathy     feet     Red blood cell antibody positive      h/o blood  transfusion . known red cell antibodies to C and K per preop H&P      Past Surgical History:   Procedure Laterality Date     BREAST BIOPSY Left     Years ago     BREAST EXCISIONAL BIOPSY Left     Years ago      SECTION       COLONOSCOPY N/A 3/26/2019    Procedure: COLONOSCOPY WITH BIOPSY;  Surgeon: Barry Del Castillo MD;  Location: [facility];  Service: General     COLONOSCOPY W/ BIOPSIES  2014    3 polyps removed     EYE SURGERY       FEMUR SURGERY Left     for fracture     HYSTERECTOMY       LAPAROSCOPIC CHOLECYSTECTOMY N/A 10/10/2018    Procedure: CHOLECYSTECTOMY, LAPAROSCOPIC;  Surgeon: Demarcus Barriga MD;  Location: [facility] [place];  Service:      OOPHORECTOMY Bilateral      GA PART REMOVAL COLON W ANASTOMOSIS      Description: Right Hemicolectomy;  Proc Date: 2014;     GA REMOVE TONSILS/ADENOIDS,<13 Y/O  age 22    Description: Tonsillectomy With Adenoidectomy;  Recorded: 2013;     GA TOTAL KNEE ARTHROPLASTY Bilateral     Description: Total Knee Arthroplasty;  Recorded: 2014;  Comments: Bilateral     TONSILLECTOMY        Family History   Problem Relation Age of Onset     Coronary artery disease Father      Breast cancer Mother 81     Heart disease Sister      Heart disease Brother      Osteoporosis Sister      Cancer Sister         Bladder     Kidney disease Sister      Diabetes Sister      Heart disease Sister       Social History     Socioeconomic History     Marital status:      Spouse name: Vinay     Number of children: 2     Years of education: 16     Highest education level: Some college, no degree   Occupational History     Employer: RETIRED   Social Needs     Financial resource strain: Not hard at all     Food insecurity:     Worry: Never true     Inability: Never true     Transportation needs:     Medical: No     Non-medical: No   Tobacco Use     Smoking status: Never Smoker     Smokeless tobacco: Never Used   Substance and Sexual  Activity     Alcohol use: No     Drug use: No     Sexual activity: Not on file   Lifestyle     Physical activity:     Days per week: Not on file     Minutes per session: Not on file     Stress: Not at all   Relationships     Social connections:     Talks on phone: Not on file     Gets together: Not on file     Attends [employer] service: Not on file     Active member of club or organization: Not on file     Attends meetings of clubs or organizations: Not on file     Relationship status: Not on file     Intimate partner violence:     Fear of current or ex partner: No     Emotionally abused: No     Physically abused: No     Forced sexual activity: No   Other Topics Concern     Not on file   Social History Narrative     Not on file      Current Outpatient Medications   Medication Sig Dispense Refill     aspirin 81 MG EC tablet Take 81 mg by mouth daily.       blood glucose test (ONETOUCH VERIO) strips Use 1 each As Directed 2 (two) times a day. Dispense brand per patient's insurance at pharmacy discretion. 200 strip 3     blood-glucose meter Misc Use 1 Device As Directed daily. Type 2 DM, without long term insulin use 1 each 0     cholecalciferol, vitamin D3, 2,000 unit cap Take 2 tablets by mouth daily.       cyclobenzaprine (FLEXERIL) 10 MG tablet Take 1 tablet (10 mg total) by mouth at bedtime. 90 tablet 1     gabapentin (NEURONTIN) 300 MG capsule TAKE ONE CAPSULE BY MOUTH EVERY EVENING 90 capsule 3     glipiZIDE (GLUCOTROL) 10 MG tablet Take 1 tablet (10 mg total) by mouth 2 (two) times a day before meals. 180 tablet 3     losartan (COZAAR) 50 MG tablet Take 1 tablet (50 mg total) by mouth daily. 90 tablet 3     metFORMIN (GLUCOPHAGE) 1000 MG tablet Take 1 tablet (1,000 mg total) by mouth 2 (two) times a day with meals. Take 2 tabs twice daily 180 tablet 3     multivitamin (MULTIVITAMIN) per tablet Take 1 tablet by mouth daily.       simvastatin (ZOCOR) 40 MG tablet Take 1 tablet (40 mg total) by mouth at bedtime.  "90 tablet 3     Current Facility-Administered Medications   Medication Dose Route Frequency Provider Last Rate Last Dose     denosumab 60 mg (PROLIA 60 mg/ml)  60 mg Subcutaneous Q6 Months Kalia Hector MD   60 mg at 06/06/19 1443      Objective:   Vital Signs:   Visit Vitals  /64 (Patient Site: Left Arm, Patient Position: Sitting, Cuff Size: Adult Large)   Pulse 72   Temp 97.5  F (36.4  C) (Oral)   Resp 12   Ht 5' 4.25\" (1.632 m)   Wt (!) 228 lb 12.8 oz (103.8 kg)   LMP  (LMP Unknown)   Breastfeeding? No   BMI 38.97 kg/m         VisionScreening:  No exam data present     PHYSICAL EXAM  Physical Exam  Vitals signs and nursing note reviewed.   Constitutional:       Appearance: She is well-developed.   HENT:      Head: Normocephalic and atraumatic.      Right Ear: External ear normal.      Left Ear: External ear normal.      Nose: Nose normal.   Eyes:      Conjunctiva/sclera: Conjunctivae normal.      Pupils: Pupils are equal, round, and reactive to light.   Neck:      Musculoskeletal: Normal range of motion and neck supple.      Thyroid: No thyromegaly.   Cardiovascular:      Rate and Rhythm: Normal rate and regular rhythm.      Pulses:           Dorsalis pedis pulses are 3+ on the right side and 3+ on the left side.        Posterior tibial pulses are 3+ on the right side and 3+ on the left side.      Heart sounds: Normal heart sounds.   Pulmonary:      Effort: Pulmonary effort is normal.      Breath sounds: Normal breath sounds.   Abdominal:      General: Bowel sounds are normal.      Palpations: There is no mass.      Tenderness: There is no tenderness.   Musculoskeletal:      Right foot: Normal range of motion. No deformity.      Left foot: Normal range of motion. No deformity.   Feet:      Right foot:      Protective Sensation: 8 sites tested. 5 sites sensed.      Skin integrity: Skin integrity normal.      Toenail Condition: Right toenails are normal.      Left foot:      Protective Sensation: 8 " sites tested. 6 sites sensed.      Skin integrity: Skin integrity normal.      Toenail Condition: Left toenails are normal.   Skin:     General: Skin is warm and dry.   Neurological:      Mental Status: She is alert and oriented to person, place, and time.      Deep Tendon Reflexes: Reflexes are normal and symmetric.      Reflex Scores:       Bicep reflexes are 2+ on the right side and 2+ on the left side.       Patellar reflexes are 2+ on the right side and 2+ on the left side.       Achilles reflexes are 2+ on the right side and 2+ on the left side.         Assessment Results 10/7/2019   Activities of Daily Living No help needed   Instrumental Activities of Daily Living No help needed   Mini Cog Total Score 4   Some recent data might be hidden     A Mini-Cog score of 0-2 suggests the possibility of dementia, score of 3-5 suggests no dementia    Identified Health Risks:     The patient was provided with written information regarding signs of hearing loss.  She is at risk for falling and has been provided with information to reduce the risk of falling at home.  Patient's advanced directive (dated 3/7/2014)  was discussed and I am comfortable with the patient's wishes.

## 2021-06-03 VITALS — HEIGHT: 64 IN | BODY MASS INDEX: 38.55 KG/M2 | WEIGHT: 225.8 LBS

## 2021-06-03 VITALS
SYSTOLIC BLOOD PRESSURE: 134 MMHG | RESPIRATION RATE: 16 BRPM | TEMPERATURE: 98.6 F | DIASTOLIC BLOOD PRESSURE: 77 MMHG | OXYGEN SATURATION: 97 % | HEART RATE: 101 BPM

## 2021-06-03 VITALS
RESPIRATION RATE: 14 BRPM | SYSTOLIC BLOOD PRESSURE: 176 MMHG | TEMPERATURE: 98.9 F | BODY MASS INDEX: 38.92 KG/M2 | DIASTOLIC BLOOD PRESSURE: 81 MMHG | OXYGEN SATURATION: 96 % | WEIGHT: 228.5 LBS | HEART RATE: 102 BPM

## 2021-06-03 VITALS
DIASTOLIC BLOOD PRESSURE: 64 MMHG | TEMPERATURE: 97.5 F | HEIGHT: 64 IN | HEART RATE: 72 BPM | RESPIRATION RATE: 12 BRPM | SYSTOLIC BLOOD PRESSURE: 122 MMHG | WEIGHT: 228.8 LBS | BODY MASS INDEX: 39.06 KG/M2

## 2021-06-03 NOTE — PROGRESS NOTES
Chief Complaint   Patient presents with     Cough     x10d, R side of face hurt     Fever     x1d       HPI:  Karol Mujica is a 74 y.o. female with hx DM who complains of moderate cough, nasal congestion, and R sided facial pressure onset 10 days ago. She also noes subjective fever x 1 day. Symptoms are constant in duration. She is taking Robitussin for diabetics. Denies sore throat, HA, CP, SOB, abd pain, N/V/D, rash, or any other symptoms. Patient denies sick contacts.    Last A1c 7.1% on 10/7/19    Problem List:  2019-01: Age-related cataract of both eyes, unspecified age-related   cataract type  2018-10: Postoperative pain  2018-08: Gallstones  2018-08: Severe obesity (BMI 35.0-35.9 with comorbidity) (H)  2018-07: Trochanteric bursitis of both hips  2016-02: Vitamin D deficiency  2016-01: Essential hypertension  2016-01: Osteoporosis  2015-10: Right-sided low back pain with right-sided sciatica  2014-07: Anemia associated with acute blood loss  2014-07: Urinary retention  2014-07: Intertrochanteric fracture of left femur (H)  2014-07: Tachycardia  2014-07: Leukocytosis  Type 2 diabetes mellitus with hyperglycemia, without long-term   current use of insulin (H)  Hyperlipidemia  Peripheral Neuropathy  Sciatica  Microalbuminuria  Pain During Urination (Dysuria)  Colon Cancer  Urinary Tract Infection  Acute bronchitis  Diarrhea  Abdomen Tenderness Direct RUQ  History of colonic polyps      Past Medical History:   Diagnosis Date     Anemia      Arthritis      Cataract      Cholelithiasis      Colon cancer (H) 2014     Diabetes mellitus (H)      Diverticular disease      GI bleed 2/15/2014     History of anesthesia complications     slow to wake up     History of transfusion      Hyperlipidemia      Hypertension      Osteoporosis      Peripheral neuropathy     feet     Red blood cell antibody positive      h/o blood transfusion 2015. known red cell antibodies to C and K per preop H&P       Social History      Tobacco Use     Smoking status: Never Smoker     Smokeless tobacco: Never Used   Substance Use Topics     Alcohol use: No       Review of Systems   Constitutional: Positive for fever. Negative for chills.   HENT: Positive for congestion and sinus pressure.    Respiratory: Positive for cough. Negative for shortness of breath.    Cardiovascular: Negative for chest pain.   Gastrointestinal: Negative for abdominal pain, diarrhea, nausea and vomiting.   Skin: Negative for rash.   All other systems reviewed and are negative.      Vitals:    11/18/19 0933   BP: 134/77   Patient Site: Left Arm   Patient Position: Sitting   Cuff Size: Adult Large   Pulse: (!) 101   Resp: 16   Temp: 98.6  F (37  C)   TempSrc: Oral   SpO2: 97%       Physical Exam   Constitutional: She appears well-developed and well-nourished.  Non-toxic appearance.   HENT:   Head: Normocephalic and atraumatic.   Right Ear: Tympanic membrane, external ear and ear canal normal.   Left Ear: Tympanic membrane, external ear and ear canal normal.   Nose: Right sinus exhibits maxillary sinus tenderness.   Mouth/Throat: Uvula is midline, oropharynx is clear and moist and mucous membranes are normal.   Cardiovascular: Normal rate, regular rhythm and normal heart sounds.   Pulmonary/Chest: Effort normal and breath sounds normal.   Neurological: She is alert.   Skin: Skin is warm and dry.   Psychiatric: She has a normal mood and affect.       Labs:  No results found for this or any previous visit (from the past 72 hour(s)).    Radiology:    No results found.    Clinical Decision Making:    Lungs CTAB, afebrile here. R sided sinus tenderness, will treat with doxycycline.   DM well controlled, continue current management.  BP elevated today, pt has appt next month for Prolia injection, can have BP rechecked then.    At the end of the encounter, I discussed results, diagnosis, medications. Discussed red flags for immediate return to clinic/ER, as well as indications  for follow up if no improvement. Patient understood and agreed to plan. Patient was stable for discharge.    1. Sinobronchitis  doxycycline (MONODOX) 100 MG capsule   2. Type 2 diabetes mellitus with hyperglycemia, without long-term current use of insulin (H)     3. Essential hypertension           Return in about 24 days (around 12/12/2019) for BP recheck.    CHARLEEN Villatoro, [facility]

## 2021-06-04 NOTE — PROGRESS NOTES

## 2021-06-04 NOTE — PROGRESS NOTES
Subjective:   Karol Mujica is a 74 y.o. year old female who presents to urgent care today for the following health issues:    Chief Complaint   Patient presents with     Sinus Problem     R side face congestion and draining, ongoing coughing at night      Rhinitis   Congestion   Sinus pressure, mostly maxillary   Sore throat   3 days duration   No fevers   Lying down makes symptoms worse   Last time she was seen in November and developed a sinus infection and bronchitis, wanted to come in so it didn't develop into this   Tried Sudafed sinus and Vicks   Son is sick with sinus infections          PMHX:   PAST MEDICAL HISTORY:  Patient Active Problem List   Diagnosis     Type 2 diabetes mellitus with hyperglycemia, without long-term current use of insulin (H)     Hyperlipidemia     Peripheral Neuropathy     Sciatica     Microalbuminuria     Colon Cancer     Tachycardia     Right-sided low back pain with right-sided sciatica     Essential hypertension     Osteoporosis     Vitamin D deficiency     Trochanteric bursitis of both hips     Severe obesity (BMI 35.0-35.9 with comorbidity) (H)     Postoperative pain     Age-related cataract of both eyes, unspecified age-related cataract type     History of colonic polyps       CURRENT MEDICATIONS:  Current Outpatient Medications   Medication Sig Dispense Refill     aspirin 81 MG EC tablet Take 81 mg by mouth daily.       blood glucose test (ONETOUCH VERIO) strips Use 1 each As Directed 2 (two) times a day. Dispense brand per patient's insurance at pharmacy discretion. 200 strip 3     blood-glucose meter Misc Use 1 Device As Directed daily. Type 2 DM, without long term insulin use 1 each 0     cholecalciferol, vitamin D3, 2,000 unit cap Take 2 tablets by mouth daily.       cyclobenzaprine (FLEXERIL) 10 MG tablet Take 1 tablet (10 mg total) by mouth at bedtime. 90 tablet 1     gabapentin (NEURONTIN) 300 MG capsule TAKE ONE CAPSULE BY MOUTH EVERY EVENING 90 capsule 3      glipiZIDE (GLUCOTROL) 10 MG tablet Take 1 tablet (10 mg total) by mouth 2 (two) times a day before meals. 180 tablet 3     guaifenesin/DM/pseudoephedrine (TUSSIN CF ORAL) Take by mouth.       losartan (COZAAR) 50 MG tablet Take 1 tablet (50 mg total) by mouth daily. 90 tablet 3     metFORMIN (GLUCOPHAGE) 1000 MG tablet Take 1 tablet (1,000 mg total) by mouth 2 (two) times a day with meals. Take 2 tabs twice daily 180 tablet 3     multivitamin (MULTIVITAMIN) per tablet Take 1 tablet by mouth daily.       simvastatin (ZOCOR) 40 MG tablet Take 1 tablet (40 mg total) by mouth at bedtime. 90 tablet 3     Current Facility-Administered Medications   Medication Dose Route Frequency Provider Last Rate Last Dose     denosumab 60 mg (PROLIA 60 mg/ml)  60 mg Subcutaneous Q6 Months Kalia Hector MD   60 mg at 12/12/19 1434       ALLERGIES:  Allergies   Allergen Reactions     Venom-Honey Bee Anaphylaxis     Nitrofurantoin Hives     Nausea     Penicillins Hives              Objective:     Vitals:    12/21/19 1534   BP: 176/81   Pulse: (!) 102   Resp: 14   Temp: 98.9  F (37.2  C)   TempSrc: Oral   SpO2: 96%   Weight: (!) 228 lb 8 oz (103.6 kg)     Body mass index is 38.92 kg/m .    General appearance: Alert, cooperative, no distress, appears stated age  Head: Normocephalic, atraumatic, without obvious abnormality. No frontal sinus pressure, mild maxillary sinus pressure.   Eyes: Conjunctiva clear.  Nose: Clear rhinorrhea present   Throat: Lips, mucosa, tongue normal mucosa pink and moist. Post nasal drip and posterior pharyngeal erythema present   Neck: Supple, symmetric, trachea midline. Mild submandibular lymphadenopathy.   Lungs: Clear to auscultation bilaterally, no wheezing or crackles present.  Respirations unlabored  Heart: Regular rate and rhythm, normal S1 and S2, no murmur, rub or gallop.  Neurologic: Alert, appropriate mentation       Assessment and Plan:     1. Viral URI with cough  Symptoms most consistent  with viral URI.  Given duration of only 3 days, no indications at this point for antibiotic treatment.  Discussed these recommendations with patient and why we do not give antibiotics at this point given the vast majority are viral related.  Given patient's primary symptom of added nasal saline rinses and given instructions to patient.  Can also use Tylenol, increased fluids and rest for symptomatic management.  Recommended discontinuation of Sudafed as below given tachycardia and hypertension.  Discussed follow-up if needed if symptoms are persisting on additional 7 days (total 10-day course) for possible need for antibiotics.  Discussed other indications to return to clinic.    2. Essential hypertension  Hypertensive in clinic as well as tachycardic, likely secondary to Sudafed use prior to coming into clinic.  Patient reports home blood pressure readings in the 120s to 130s systolically prior to starting Sudafed.  Recommended continued home blood pressure readings at least 3 times weekly and if persistent running hypertensive, needs to follow-up with PCP sooner than previously scheduled.    Karol Londonofit and/or guardian engaged in the decision making process and verbalized understanding of the options discussed and agreed with the final plan.    Eliza Black, DO

## 2021-06-05 VITALS — HEIGHT: 64 IN | WEIGHT: 223 LBS | BODY MASS INDEX: 38.07 KG/M2

## 2021-06-05 VITALS — WEIGHT: 223 LBS | BODY MASS INDEX: 38.07 KG/M2 | HEIGHT: 64 IN

## 2021-06-05 NOTE — TELEPHONE ENCOUNTER
Who is calling:  Patient  Reason for Call:  Patient stated she is still waiting for Noah, Saul Hare, DO to call her back with an update on what was between him and the oncologist.  Date of last appointment with primary care: 1/20/20  Okay to leave a detailed message: Yes  [phone]

## 2021-06-05 NOTE — PROGRESS NOTES
Assessment/Plan:     Problem List Items Addressed This Visit     Type 2 diabetes mellitus with hyperglycemia, without long-term current use of insulin (H) - Primary     Shared decision goal of 7.5, optimal goal of less than 7 which she has achieved previously. Continue dietary changes (was ill during December, otherwise predictive under 7) and recheck in 3 months.    Lab Results   Component Value Date    HGBA1C 7.1 (H) 01/20/2020             Relevant Orders    Glycosylated Hemoglobin A1c (Completed)    Microalbumin, Random Urine    HM2(CBC w/o Differential) (Completed)    Hyperlipidemia     Last checked approximately a year ago.  On Zocor 40 mg daily.  Will recheck levels to ensure efficacy as well as compliance.         Relevant Orders    ALT (SGPT)    Lipid Cascade    Peripheral Neuropathy    Relevant Medications    cyclobenzaprine (FLEXERIL) 10 MG tablet    Colon Cancer     Due for six-month CEA will pull today.  Also given the previous history will check calcium level.         Relevant Orders    CEA (Carcinoembryonic Antigen)    Essential hypertension     Below goal of 130/80 on current regimen with losartan at 50 mg daily.  Will continue.         Relevant Orders    Basic Metabolic Panel    Osteoporosis     Will check calcium levels with treatment with Prolia as per osteoporosis specialist recommendation.     Given the bone density scan, patient will follow-up with osteoporosis specialist next month, however I would not recommend Fosamax due to her her GI history and issues and risk factors.  Will leave to osteoporosis specialist as to whether or not we should continue the Prolia or come off need to track vitamin D levels, encourage weightbearing exercise, and recheck in 2 years and restart Prolia if needed.         Severe obesity (BMI 35.0-35.9 with comorbidity) (H)     Diet and exercise reviewed.             Return in about 3 months (around 4/20/2020) for Diabetes, Hypertension.    Subjective:   74 y.o. female  "presents for diabetes, hypertension osteoporosis.  Overall doing well.  Home glucoses are less than 130 every morning for the past 2 weeks.  However during the month of December she was significantly ill which she thought was a viral illness and after 3 weeks of blood sugars being elevated and facial pain and cough she finally went to urgent care was treated for bronchitis/pneumonia.  Since the treatment her blood sugar levels have come back down.  She continues to work on her diet and has moved a large meal to noon and now just snacking in the evening for dinner.      I have had an Advance Directives discussion with the patient.  The following high BMI interventions were performed this visit: encouragement to exercise and weight monitoring      Review of Systems   Constitutional: Negative for chills, fatigue and fever.   Eyes: Negative for visual disturbance.   Respiratory: Negative for chest tightness and shortness of breath.    Cardiovascular: Negative for chest pain, palpitations and leg swelling.   Gastrointestinal: Negative for constipation, diarrhea, nausea and vomiting.   Genitourinary: Negative for difficulty urinating.        History     Reviewed By Date/Time Sections Reviewed    Saul Zamora,  1/20/2020  8:39 AM Medical, Surgical, Tobacco, Family, Socioeconomic    Elsy, Kalyan LUGO Jr., [facility] 1/20/2020  8:35 AM Tobacco           Objective:     Vitals:    01/20/20 0832   BP: 118/64   Pulse: 72   Resp: 12   Temp: 98.3  F (36.8  C)   TempSrc: Oral   Weight: (!) 226 lb 3.2 oz (102.6 kg)   Height: 5' 4.25\" (1.632 m)     Physical Exam  Vitals signs and nursing note reviewed.   Constitutional:       General: She is not in acute distress.     Appearance: Normal appearance.   HENT:      Head: Normocephalic and atraumatic.      Right Ear: Tympanic membrane, ear canal and external ear normal.      Left Ear: Tympanic membrane, ear canal and external ear normal.      Nose: Nose normal.      Mouth/Throat: "      Mouth: Mucous membranes are moist.      Pharynx: No posterior oropharyngeal erythema.   Eyes:      Extraocular Movements: Extraocular movements intact.      Conjunctiva/sclera: Conjunctivae normal.   Neck:      Musculoskeletal: Normal range of motion.      Vascular: No carotid bruit.   Cardiovascular:      Rate and Rhythm: Normal rate and regular rhythm.      Pulses: Normal pulses.      Heart sounds: Normal heart sounds.   Pulmonary:      Effort: Pulmonary effort is normal.      Breath sounds: Normal breath sounds.   Musculoskeletal:      Right lower leg: No edema.      Left lower leg: No edema.   Lymphadenopathy:      Cervical: No cervical adenopathy.   Skin:     Capillary Refill: Capillary refill takes less than 2 seconds.   Neurological:      Mental Status: She is alert and oriented to person, place, and time.   Psychiatric:         Mood and Affect: Mood normal.         This note has been dictated using voice recognition software. Any grammatical or context distortions are unintentional and inherent to the software

## 2021-06-05 NOTE — TELEPHONE ENCOUNTER
Problem List Items Addressed This Visit     Colon Cancer     Discussed with oncology. CEA levels are staying within a normal range. Will continue 6 month checks. Patient informed. Oncology consult cancelled.

## 2021-06-06 NOTE — PROGRESS NOTES
Hospital Follow-up Visit:    Assessment/Plan:     Problem List Items Addressed This Visit     Osteoporosis     Patient had questions about the Actonel.  She is uncomfortable starting a new medication.  Discussed with patient the options of going without and keeping up on calcium and vitamin D and weightbearing exercises and rechecking DEXA scan in 1 year or location and rechecking DEXA scan in 1 year.  Patient feels more comfortable not being on medication unless it is needed.  As such in a shared decision making model we decided to not use the Actonel.  Recheck DEXA scan in 1 year and determine at that time if oral medication is needed.         Near syncope - Primary     No known cause.  Currently undergoing Holter monitor testing.  Has had not had any return of symptoms.  Given the situation possibility of a vasovagal from diver reflex.  Since patient has had no symptoms since the one time and current testing is all negative I did clear back to driving.  Also discussed with patient that I do want her doing her normal activities and diet while she is on the monitor as that is what she plans to return to doing and I want to know how her symptoms are functioning with her normal activities and diet.                    Subjective:     Karol Mujica is a 75 y.o. female who presents for a hospital discharge follow up.      Hospital/Nursing Home/IP Rehab Facility: [facility]  Date of Admission: 3/6/20  Date of Discharge:3/7/20  Reason(s) for Admission:Near Syncope            Do you have any problems taking your medication regularly?  None       Have you had any changes in your medication since discharge? Yes, but pt is not taking new med pending PCP discussion.       Have you had any difficulty following your discharge or treatment plan?  No    Summary of hospitalization:  Hospital discharge summary reviewed  Diagnostic Tests/Treatments reviewed.  Follow up needed: Patient currently undergoing Holter monitor  "testing  Other Healthcare Providers Involved in Patient's Care: Patient Care Team:  Saul Zamora DO as PCP - General  Demarcus Barriga MD as Physician (General Surgery)  Lizeth Vazquez NP as Nurse Practitioner (Nurse Practitioner)  Saul Zamora DO as Assigned PCP      Update since discharge: {improved   Information from family, SNF, care coordination: Patient is doing well and has not had any other symptoms    Post Discharge Medication Reconciliation: discharge medications reconciled and changed, per note/orders (see AVS)  Plan of care communicated with: patient and significant other    Objective:     Vitals:    03/11/20 1001   BP: 126/74   Pulse: 68   Resp: 12   Temp: 98.4  F (36.9  C)   TempSrc: Oral   Weight: (!) 227 lb 6.4 oz (103.1 kg)   Height: 5' 4\" (1.626 m)         Physical Exam:  Physical Exam  Vitals signs reviewed.   Constitutional:       Appearance: She is well-developed.   HENT:      Head: Normocephalic and atraumatic.   Cardiovascular:      Rate and Rhythm: Normal rate and regular rhythm.      Heart sounds: Normal heart sounds.   Pulmonary:      Effort: Pulmonary effort is normal.      Breath sounds: Normal breath sounds.   Skin:     Capillary Refill: Capillary refill takes less than 2 seconds.   Neurological:      Mental Status: She is alert and oriented to person, place, and time.   Psychiatric:         Mood and Affect: Mood normal.            Coding guidelines for this visit:  Type of Medical   Decision Making Face-to-Face Visit       within 7 Days of discharge Face-to-Face Visit        within 14 days of discharge   Moderate Complexity [id number]   High Complexity [id number]       Electronically signed by Saul Zamora DO 03/11/20 10:05 AM   "

## 2021-06-06 NOTE — TELEPHONE ENCOUNTER
Refill Approved    Rx renewed per Medication Renewal Policy. Medication was last renewed on 3/5/19.    Nikia Zamudio, [facility] Triage/Med Refill 2/19/2020     Requested Prescriptions   Pending Prescriptions Disp Refills     losartan (COZAAR) 50 MG tablet [Pharmacy Med Name: LOSARTAN POTASSIUM 50 MG TAB] 90 tablet 3     Sig: TAKE 1 TABLET BY MOUTH EVERY DAY       Angiotensin Receptor Blocker Protocol Passed - 2/17/2020  1:46 AM        Passed - PCP or prescribing provider visit in past 12 months       Last office visit with prescriber/PCP: 1/20/2020 Saul Zamora DO OR same dept: 1/20/2020 Saul Zamora DO OR same specialty: 1/20/2020 Saul Zamora DO  Last physical: 1/4/2019 Last MTM visit: Visit date not found   Next visit within 3 mo: Visit date not found  Next physical within 3 mo: Visit date not found  Prescriber OR PCP: Saul Zamora DO  Last diagnosis associated with med order: 1. Essential hypertension  - losartan (COZAAR) 50 MG tablet [Pharmacy Med Name: LOSARTAN POTASSIUM 50 MG TAB]; TAKE 1 TABLET BY MOUTH EVERY DAY  Dispense: 90 tablet; Refill: 3    If protocol passes may refill for 12 months if within 3 months of last provider visit (or a total of 15 months).             Passed - Serum potassium within the past 12 months     Lab Results   Component Value Date    Potassium 5.0 01/20/2020             Passed - Blood pressure filed in past 12 months     BP Readings from Last 1 Encounters:   01/20/20 118/64             Passed - Serum creatinine within the past 12 months     Creatinine   Date Value Ref Range Status   01/20/2020 0.81 0.60 - 1.10 mg/dL Final

## 2021-06-07 ENCOUNTER — RECORDS - HEALTHEAST (OUTPATIENT)
Dept: ADMINISTRATIVE | Facility: OTHER | Age: 76
End: 2021-06-07

## 2021-06-07 NOTE — TELEPHONE ENCOUNTER
Patient informed test results normal, provider will review and if he has new orders or recommendations a return call will be made. Patient verbalized understanding and agrees with plan. Pt ahs upcoming OV 4/28 with [facility], and will ask for recommendation if necessary to keep OV. LUIS,Rn

## 2021-06-07 NOTE — TELEPHONE ENCOUNTER
----- Message from Samantha Martinez sent at 4/1/2020 12:41 PM CDT -----  Regarding: [facility] test question  Caller: Karol    Primary cardiologist: Dr. Evaristo Manzo     Detailed reason for call: Karol asking if her PAMs results are available and can you call her with the results or does she need to wait until her scheduled appointment w/[facility] on 4/28/2020? She is aware monitor results might not be available as of this message. Please call back either way to let her know what this outcome is.     Best phone number: [phone]     Best time to contact: Any    Ok to leave a detailed message? Y    Device? N

## 2021-06-08 NOTE — TELEPHONE ENCOUNTER
Refill Approved    Rx renewed per Medication Renewal Policy. Medication was last renewed on 1/2119.    Nikia Zamudio, [facility] Triage/Med Refill 5/11/2020     Requested Prescriptions   Pending Prescriptions Disp Refills     ONETOUCH VERIO strips [Pharmacy Med Name: ONE TOUCH VERIO TEST STRIP] 200 strip 3     Sig: USE 1 EACH AS DIRECTED 2 (TWO) TIMES A DAY.       Diabetic Supplies Refill Protocol Passed - 5/8/2020  9:49 AM        Passed - Visit with PCP or prescribing provider visit in last 6 months     Last office visit with prescriber/PCP: 3/11/2020 Saul Zamora DO OR same dept: 3/11/2020 Saul Zamora DO OR same specialty: 3/11/2020 Saul Zamora DO  Last physical: 1/4/2019 Last MTM visit: Visit date not found   Next visit within 3 mo: Visit date not found  Next physical within 3 mo: Visit date not found  Prescriber OR PCP: Saul Zamora DO  Last diagnosis associated with med order: 1. Type 2 diabetes mellitus with hyperglycemia, without long-term current use of insulin (H)  - ONETOUCH VERIO strips [Pharmacy Med Name: ONE TOUCH VERIO TEST STRIP]; USE 1 EACH AS DIRECTED 2 (TWO) TIMES A DAY.  Dispense: 200 strip; Refill: 3    If protocol passes may refill for 12 months if within 3 months of last provider visit (or a total of 15 months).             Passed - A1C in last 6 months     Hemoglobin A1c   Date Value Ref Range Status   01/20/2020 7.1 (H) 3.5 - 6.0 % Final

## 2021-06-08 NOTE — PROGRESS NOTES
Assessment:     1. Type 2 diabetes mellitus without complication  Glycosylated Hemoglobin A1c   2. Essential hypertension with goal blood pressure less than 140/90     3. Vitamin D deficiency  Vitamin D, Total (25-Hydroxy)   4. Chronic bilateral low back pain without sciatica            Plan:     Type 2 diabetes mellitus without complication  Stable with A1c at 7.4.  Patient was able to hold it steady over the holidays.  Encouraged to increase exercise and decrease sugar snacks.  She has been doing so for the past week and intends to continue doing so like she was doing preholiday.  Recheck in 6 months.    Essential hypertension  Trolled on current.  Will continue her current dosing.  Recheck in 6 months.  Sooner if warning signs and symptoms as discussed.    Tonic lower back pain  Continue home exercises.  If not fully resolved in the next 2 weeks, will look at sending back to physical therapy.      Subjective:       71 y.o. female presents for evaluation diabetes, hypertension and chronic lower back pain.  Overall doing well.  Denies any chest pain, shortness of breath, dyspnea exertion, palpitations, nausea or vomiting.  Her lower back has been a little worse over the past 3 months with continual standing and food prep and family visiting.  She has been sick in the home exercises and it is improving.  She is also still using a Flexeril at night to help and getting good relief.    The following portions of the patient's history were reviewed and updated as appropriate: allergies, current medications, past family history, past medical history, past social history, past surgical history and problem list.    Review of Systems  A 12 point comprehensive review of systems was negative except as noted.     History   Smoking Status     Never Smoker   Smokeless Tobacco     Never Used         Objective:        Visit Vitals     /68 (Patient Site: Left Arm, Patient Position: Sitting, Cuff Size: Adult Large)     Pulse 64  "    Temp 97.7  F (36.5  C) (Oral)     Resp 12     Ht 5' 4.5\" (1.638 m)     Wt (!) 234 lb 12.8 oz (106.5 kg)     LMP  (LMP Unknown)     Breastfeeding No     BMI 39.68 kg/m2     General appearance: alert, appears stated age and cooperative  Head: Normocephalic, without obvious abnormality, atraumatic  Lungs: clear to auscultation bilaterally  Heart: regular rate and rhythm, S1, S2 normal, no murmur, click, rub or gallop  Extremities: extremities normal, atraumatic, no cyanosis or edema  Pulses: 2+ and symmetric       This note has been dictated using voice recognition software. Any grammatical or context distortions are unintentional and inherent to the software  "

## 2021-06-08 NOTE — PROGRESS NOTES
Assessment/Plan:  Karol was seen today for cough and facial pain.    Diagnoses and all orders for this visit:    Acute maxillary sinusitis, recurrence not specified: 71-year-old woman with 5 days of symptoms that appear to be evolving towards sinus infection at this time, it is likely viral although given her worsening symptoms as well as her subjective fever, I did offer the patient antibiotics with instructions to fill and administer if her symptoms worsen over the next 24-48 hours.  She has a penicillin allergy.  Will start with doxycycline.  She will follow-up as needed.    Osteoporosis: I reviewed the patient's most recent osteoporosis clinic note.  It was recommended that she have a repeat DEXA scan in October - November 2016.  Order placed.  I will plan on forwarding to the osteoporosis clinic or to her primary care physician.  -     DXA Bone Density Scan; Future    Other orders  -     codeine-guaiFENesin (GUAIFENESIN AC)  mg/5 mL liquid; Take 5 mL by mouth 2 (two) times a day as needed for cough.  -     doxycycline (VIBRA-TABS) 100 MG tablet; Take 1 tablet (100 mg total) by mouth 2 (two) times a day for 10 days.    Ty Melendrez MD  _______________________________    Chief Complaint   Patient presents with     Cough     x 5 days      Facial Pain     Subjective: Karol Mujica is a 71 y.o. year old female who I have not seen in clinic before who presents with the following acute complaint(s):    cough:   - 5 days of symptoms   - cough and facial pressure, hurts to cough   - has to sit up and sleep.  Worse with laying down   - gagging cough, fits of coughing   - Tmax: 99.9 three days ago.  Chills and sweats   - sinuses hurt. Left neck hurts   - history of sinus infections.   - productive cough   - has been taking phenylephrine.      ROS: Complete review of systems obtained.  Pertinent items are listed above.     The following portions of the patient's history were reviewed and updated as  appropriate: allergies, current medications, past medical history and problem list.    Objective:    oral temperature is 98.2  F (36.8  C). Her blood pressure is 128/72 and her pulse is 82. Her respiration is 24 and oxygen saturation is 94%.   Gen.: No acute distress  HEENT: Tympanic membranes bilaterally are gray and glistening.  There is mild tenderness in the left side but no palpable lymph nodes of the lateral neck.  Mild submandibular lymphadenopathy.  Posterior pharynx without erythema or tonsillar exudate.  Maxillary sinus tenderness bilaterally.  Cardiac: Regular rate and rhythm, normal S1/S2, no murmurs or gallops  Respiratory: Clear to auscultation bilaterally.    This note has been dictated using voice recognition software. Any grammatical or context distortions are unintentional and inherent to the software

## 2021-06-09 NOTE — PROGRESS NOTES
Assessment/Plan:     Problem List Items Addressed This Visit     Type 2 diabetes mellitus with diabetic polyneuropathy, without long-term current use of insulin (H) - Primary     A1c under excellent control at 6.7.  Continue current efforts.  Continue exercise.  Follow-up in 6 months.         Relevant Medications    glipiZIDE (GLUCOTROL) 10 MG tablet    Other Relevant Orders    Glycosylated Hemoglobin A1c (Completed)    Colon Cancer     As per current plan continue checking CEA every 6 months.         Relevant Orders    CEA (Carcinoembryonic Antigen)    Near syncope     Due to the timing possibly was a COVID infection is nothing was found at the time.  Will do serology testing at this time.  Of note patient has had no incidents since that March incident         Relevant Orders    COVID-19 Virus (Coronavirus) Antibody & Titer Reflex        Return in about 6 months (around 1/6/2021) for Annual physical, Diabetes, Colon cancer.    Subjective:   75 y.o. female presents for diabetes and colon cancer follow-up.  Also was bit by cat yesterday.  Hand is actually healing very well.  2 small puncture marks that are superficial in nature with no erythema or redness.  Her blood sugars been doing well.  She admits that her diet may not be the best but they have been trying to get out and walk as much as possible.  They have been keeping safe during the pandemic by making her own meals as well as staying away from public places.  Though 2 of her relatives that she did visit in beginning of March came back positive for COVID later in March.  Also remember beginning of March she had an incident where she had near syncopal episode and ended up going to the emergency room.  All symptoms seem to have resolved and she was discharged home without incident.  She has had no near syncope episodes since that timeframe.      The following high BMI interventions were performed this visit: encouragement to exercise and weight  "monitoring      Review of Systems   Constitutional: Negative for chills, fatigue and fever.   Eyes: Negative for visual disturbance.   Respiratory: Negative for chest tightness and shortness of breath.    Cardiovascular: Negative for chest pain, palpitations and leg swelling.   Gastrointestinal: Negative for constipation, diarrhea, nausea and vomiting.   Genitourinary: Negative for difficulty urinating.        History     Reviewed By Date/Time Sections Reviewed    Eyadamy Saul Payam,  7/6/2020  3:38 PM Medical, Surgical, Tobacco, Family, Socioeconomic    Kalyan Chin Jr., [facility] 7/6/2020  3:36 PM Tobacco    Kalyan Chin Jr., [facility] 7/6/2020  3:33 PM Tobacco           Objective:     Vitals:    07/06/20 1529   BP: 135/80   Pulse: 87   Resp: 16   Temp: 97.1  F (36.2  C)   TempSrc: Oral   Weight: (!) 225 lb 3.2 oz (102.2 kg)   Height: 5' 4\" (1.626 m)     Physical Exam  Vitals signs reviewed.   Constitutional:       Appearance: Normal appearance.   HENT:      Head: Normocephalic and atraumatic.   Cardiovascular:      Rate and Rhythm: Normal rate and regular rhythm.      Pulses: Normal pulses.      Heart sounds: Normal heart sounds.   Pulmonary:      Effort: Pulmonary effort is normal.      Breath sounds: Normal breath sounds.   Musculoskeletal:      Right lower leg: No edema.      Left lower leg: No edema.   Skin:     Capillary Refill: Capillary refill takes less than 2 seconds.   Neurological:      Mental Status: She is alert and oriented to person, place, and time.   Psychiatric:         Mood and Affect: Mood normal.         Thought Content: Thought content normal.         This note has been dictated using voice recognition software. Any grammatical or context distortions are unintentional and inherent to the software      "

## 2021-06-10 NOTE — TELEPHONE ENCOUNTER
Refill Approved    Rx renewed per Medication Renewal Policy. Medication was last renewed on 8/21/2019.    Regina Vo, [facility] Triage/Med Refill 8/10/2020     Requested Prescriptions   Pending Prescriptions Disp Refills     gabapentin (NEURONTIN) 300 MG capsule [Pharmacy Med Name: GABAPENTIN 300 MG CAPSULE] 90 capsule 3     Sig: TAKE ONE CAPSULE BY MOUTH EVERY EVENING       Gabapentin/Levetiracetam/Tiagabine Refill Protocol  Passed - 8/10/2020 12:40 AM        Passed - PCP or prescribing provider visit in past 12 months or next 3 months     Last office visit with prescriber/PCP: 7/6/2020 Saul Zamora DO OR same dept: 3/11/2020 Saul Zamora DO OR same specialty: 7/6/2020 Saul Zamora DO  Last physical: 1/4/2019 Last MTM visit: Visit date not found   Next visit within 3 mo: Visit date not found  Next physical within 3 mo: Visit date not found  Prescriber OR PCP: Saul Zamora DO  Last diagnosis associated with med order: 1. Diarrhea  - gabapentin (NEURONTIN) 300 MG capsule [Pharmacy Med Name: GABAPENTIN 300 MG CAPSULE]; TAKE ONE CAPSULE BY MOUTH EVERY EVENING  Dispense: 90 capsule; Refill: 3    If protocol passes may refill for 12 months if within 3 months of last provider visit (or a total of 15 months).

## 2021-06-10 NOTE — PROGRESS NOTES
"  Assessment & Plan:       1. Acute cystitis with hematuria  Urinalysis-UC if Indicated    Culture, Urine    cephalexin (KEFLEX) 500 MG capsule      Medical Decision Making  Patient presents with acute onset dysuria.  Her urine analysis is consistent with a urinary tract infection.  No signs of pyelonephritis given no CVA tenderness and no fevers.  Will treat patient with oral antibiotics.  Recommend continue to drink plenty of clear fluids.  Discussed signs of worsening symptoms and when to follow-up with PCP if no symptom improvement.    Patient Instructions   Analysis of your urine showed signs of a urinary tract infection.     Take the prescribed antibiotics for the entire course, even if symptoms improve.  You should expect improvement to begin in 24 hours. In the meantime, continue to drink plenty of fluids.  Recommend daily use of a probiotic while taking prescribed medication (a common brand is Culturelle, yogurt with \"active cultures\" are also appropriate).    Reasons to come back for re-evaluation:  - Develop a fever, back or flank pain, or nausea and vomiting  - If symptoms have not completely improved after 72 hours  - Recurrent symptoms within a few weeks after treatment has concluded          Subjective:       Karol Mujica is a 75 y.o. female here for evaluation of dysuria.  Onset of symptoms was 2 days ago with no improvement since then.  Associated symptoms include increased urinary frequency, urgency, and suprapubic pressure.  Patient otherwise denies fevers, nausea, vomiting, and back pains.  No recent UTIs.    The following portions of the patient's history were reviewed and updated as appropriate: allergies, current medications and problem list.    Review of Systems  Pertinent items are noted in HPI.     Allergies  Allergies   Allergen Reactions     Venom-Honey Bee Anaphylaxis     Nitrofurantoin Hives     Nausea     Penicillins Hives       Family History   Problem Relation Age of Onset     " Coronary artery disease Father      Breast cancer Mother 81     Heart disease Sister      Heart disease Brother      Osteoporosis Sister      Cancer Sister         Bladder     Kidney disease Sister      Diabetes Sister      Heart disease Sister        Social History     Socioeconomic History     Marital status:      Spouse name: Vinay     Number of children: 2     Years of education: 16     Highest education level: Some college, no degree   Occupational History     Employer: RETIRED   Social Needs     Financial resource strain: Not hard at all     Food insecurity     Worry: Never true     Inability: Never true     Transportation needs     Medical: No     Non-medical: No   Tobacco Use     Smoking status: Never Smoker     Smokeless tobacco: Never Used   Substance and Sexual Activity     Alcohol use: No     Drug use: No     Sexual activity: None   Lifestyle     Physical activity     Days per week: None     Minutes per session: None     Stress: Not at all   Relationships     Social connections     Talks on phone: None     Gets together: None     Attends Restoration service: None     Active member of club or organization: None     Attends meetings of clubs or organizations: None     Relationship status: None     Intimate partner violence     Fear of current or ex partner: No     Emotionally abused: No     Physically abused: No     Forced sexual activity: No   Other Topics Concern     None   Social History Narrative     None         Objective:       /85   Pulse 76   Temp 98.2  F (36.8  C) (Oral)   Resp 16   LMP  (LMP Unknown)   SpO2 97%   General appearance: alert, appears stated age, cooperative, no distress and non-toxic  Back: No CVA tenderness     Lab Results    Recent Results (from the past 24 hour(s))   Urinalysis-UC if Indicated   Result Value Ref Range    Color, UA Yellow Colorless, Yellow, Straw, Light Yellow    Clarity, UA Slightly Cloudy (!) Clear    Glucose, UA Negative Negative    Bilirubin,  UA Negative Negative    Ketones, UA Negative Negative    Specific Gravity, UA 1.020 1.005 - 1.030    Blood, UA Trace (!) Negative    pH, UA 6.0 5.0 - 8.0    Protein, UA Negative Negative mg/dL    Urobilinogen, UA 0.2 E.U./dL 0.2 E.U./dL, 1.0 E.U./dL    Nitrite, UA Negative Negative    Leukocytes, UA Moderate (!) Negative    Bacteria, UA Moderate (!) None Seen hpf    RBC, UA 5-10 (!) None Seen, 0-2 hpf    WBC, UA 25-50 (!) None Seen, 0-5 hpf    Squam Epithel, UA 0-5 None Seen, 0-5 lpf    WBC Clumps Present (!) None Seen     I personally reviewed these results and discussed findings with the patient.

## 2021-06-11 ENCOUNTER — RECORDS - HEALTHEAST (OUTPATIENT)
Dept: ADMINISTRATIVE | Facility: OTHER | Age: 76
End: 2021-06-11

## 2021-06-11 NOTE — PROGRESS NOTES
Assessment:     1. Type 2 diabetes mellitus without complication     2. Type 2 diabetes mellitus     3. Diabetes mellitus type II, controlled, with no complications  blood glucose test (CONTOUR TEST STRIPS) strips    metFORMIN (GLUCOPHAGE) 500 MG tablet    Glycosylated Hemoglobin A1c    glipiZIDE (GLUCOTROL) 5 MG tablet   4. Burning with urination  Urinalysis-UC if Indicated    Culture, Urine   5. Malignant neoplasm of colon, unspecified part of colon  CEA (Carcinoembryonic Antigen)    CEA (Carcinoembryonic Antigen)   6. Acute cystitis without hematuria  ciprofloxacin HCl (CIPRO) 500 MG tablet       Return in about 3 months (around 10/13/2017) for Diabetes.    Plan:     Acute cystitis  Given the fact she is female over the age 65 we will treat for 7 days with Cipro 500 mg twice a day.  Side effects precautions discussed.  Warning signs and symptoms for return clinic discussed.    Type 2 diabetes mellitus without complication  Not fully controlled.  Discussed the options of moving over to injectables versus another oral agent.  Gallagher agreement of moving over to glipizide once a day.  Follow-up in 3 months for recheck.  Continue to monitor home glucoses.  Follow-up in 3 months for recheck, sooner if warning signs and symptoms as discussed.  Side effects precautions with the new medications discussed to include possibility of hypoglycemia and how to treat.    Colon Cancer  Patient is due for her six-month CEA, she has 18 more months at 6 months as per recommendations from oncology.  Will check with labs today.    The following high BMI interventions were performed this visit: encouragement to exercise and weight monitoring    Subjective:       72 y.o. female presents for evaluation diabetes, and also a 4 day history of burning with urination.  Denies any nausea or vomiting or back pain.  Denies any blood in her urine.  Denies any fevers or chills.  Her blood sugars have been slightly rising over the past week, and she  "thinks they have been seeing a bit higher overall for the past few months.  She did not bring with her glucometer today for review.  She has been watching her diet closely and admits that over the Fourth of July weekend she had a few more carbohydrates than normal.  But other than that holiday she has been sticking with her diet as best she can as well as walking daily.    The following portions of the patient's history were reviewed and updated as appropriate: allergies, current medications, past family history, past medical history, past social history, past surgical history and problem list.    Review of Systems  A 12 point comprehensive review of systems was negative except as noted.     History   Smoking Status     Never Smoker   Smokeless Tobacco     Never Used       Objective:      /60 (Patient Site: Left Arm, Patient Position: Sitting, Cuff Size: Adult Large)  Pulse 64  Temp 98.3  F (36.8  C) (Oral)   Resp 12  Ht 5' 4.5\" (1.638 m)  Wt (!) 230 lb 9.6 oz (104.6 kg)  LMP  (LMP Unknown)  Breastfeeding? No  BMI 38.97 kg/m2  General appearance: alert, appears stated age and cooperative  Head: Normocephalic, without obvious abnormality, atraumatic  Back: symmetric, no curvature. ROM normal. No CVA tenderness.  Lungs: clear to auscultation bilaterally  Heart: regular rate and rhythm, S1, S2 normal, no murmur, click, rub or gallop  Extremities: extremities normal, atraumatic, no cyanosis or edema  Pulses: 2+ and symmetric  Neurologic: Alert and oriented X 3, normal strength and tone. Normal symmetric reflexes. Normal coordination and gait       This note has been dictated using voice recognition software. Any grammatical or context distortions are unintentional and inherent to the software  "

## 2021-06-12 NOTE — TELEPHONE ENCOUNTER
Refill Approved    Rx renewed per Medication Renewal Policy. Medication was last renewed on 01/18/2020.  Last office visit was 07/06/2020 with PCP.    Poonam Zelaya, [facility] Triage/Med Refill 10/17/2020     Requested Prescriptions   Pending Prescriptions Disp Refills     simvastatin (ZOCOR) 40 MG tablet [Pharmacy Med Name: SIMVASTATIN 40 MG TABLET] 90 tablet 2     Sig: TAKE 1 TABLET BY MOUTH EVERYDAY AT BEDTIME       Statins Refill Protocol (Hmg CoA Reductase Inhibitors) Passed - 10/14/2020  1:48 AM        Passed - PCP or prescribing provider visit in past 12 months      Last office visit with prescriber/PCP: 7/6/2020 Saul Zamora DO OR same dept: 3/11/2020 Saul Zamora DO OR same specialty: 7/6/2020 Saul Zamora DO  Last physical: 1/4/2019 Last MTM visit: Visit date not found   Next visit within 3 mo: Visit date not found  Next physical within 3 mo: Visit date not found  Prescriber OR PCP: Saul Zamora DO  Last diagnosis associated with med order: 1. Hyperlipidemia, unspecified hyperlipidemia type  - simvastatin (ZOCOR) 40 MG tablet [Pharmacy Med Name: SIMVASTATIN 40 MG TABLET]; TAKE 1 TABLET BY MOUTH EVERYDAY AT BEDTIME  Dispense: 90 tablet; Refill: 2    If protocol passes may refill for 12 months if within 3 months of last provider visit (or a total of 15 months).

## 2021-06-13 NOTE — PROGRESS NOTES
"Assessment:     1. Type 2 diabetes mellitus with hyperglycemia, without long-term current use of insulin  Glycosylated Hemoglobin A1c   2. Screen for colon cancer  Ambulatory referral for Colonoscopy       Return in about 6 months (around 4/13/2018) for Diabetes.    Plan:       Type 2 diabetes mellitus with hyperglycemia, without long-term current use of insulin  If currently improved control.  A1c now down to 7 with her underlying goal being 8.  Will plan to continue current glipizide and metformin treatment.  Continue a ER be, aspirin and statin.  Patient did bring in her last eye exam which we will file in the chart.  Follow-up in 6 months for recheck.    The following high BMI interventions were performed this visit: encouragement to exercise and weight monitoring    Subjective:       72 y.o. female presents for evaluation diabetes.  3 months ago her A1c was at 9.2.  Glipizide was added to her metformin.  She has not noticed any side effects from the glipizide but she has noticed that her home blood sugar readings are less than 120 in the morning.  Denies any hypoglycemic episodes.  Denies any change in vision, chest pain, shortness breath, dyspnea exertion, palpitations, nausea or vomiting.  Denies any swelling of lower extremities.  She is also exercising more and watching her diet more closely.    The following portions of the patient's history were reviewed and updated as appropriate: allergies, current medications, past family history, past medical history, past social history, past surgical history and problem list.    Review of Systems  A 12 point comprehensive review of systems was negative except as noted.     History   Smoking Status     Never Smoker   Smokeless Tobacco     Never Used       Objective:      /60 (Patient Site: Left Arm, Patient Position: Sitting, Cuff Size: Adult Large)  Pulse 60  Temp 97.9  F (36.6  C) (Oral)   Resp 12  Ht 5' 4.5\" (1.638 m)  Wt 221 lb 1.6 oz (100.3 kg)  LMP  " (LMP Unknown)  Breastfeeding? No  BMI 37.37 kg/m2  General appearance: alert, appears stated age and cooperative  Head: Normocephalic, without obvious abnormality, atraumatic  Lungs: clear to auscultation bilaterally  Heart: regular rate and rhythm, S1, S2 normal, no murmur, click, rub or gallop  Extremities: extremities normal, atraumatic, no cyanosis or edema       This note has been dictated using voice recognition software. Any grammatical or context distortions are unintentional and inherent to the software

## 2021-06-14 NOTE — PROGRESS NOTES
Assessment/Plan:      Diagnoses and all orders for this visit:    Lumbar radicular pain  -     EMG - Clinic; Future; Expected date: 02/02/2021  -     gabapentin (NEURONTIN) 300 MG capsule; 1 cap at bedtime x 3 days, then may take 1 cap twice daily x 3 days, then may take 1 cap three times daily.  Dispense: 90 capsule; Refill: 2  -     XR Lumbar Spine Flex and Ext 2 or 3 VWS; Future; Expected date: 02/02/2021  -     OPS TFESI Lumbar Bilateral; Future; Expected date: 02/02/2021  -     Ambulatory referral to Neurosurgery    Spinal stenosis of lumbar region with neurogenic claudication  -     EMG - Clinic; Future; Expected date: 02/02/2021  -     gabapentin (NEURONTIN) 300 MG capsule; 1 cap at bedtime x 3 days, then may take 1 cap twice daily x 3 days, then may take 1 cap three times daily.  Dispense: 90 capsule; Refill: 2  -     OPS TFESI Lumbar Bilateral; Future; Expected date: 02/02/2021  -     Ambulatory referral to Neurosurgery    Spondylolisthesis of lumbar region  -     gabapentin (NEURONTIN) 300 MG capsule; 1 cap at bedtime x 3 days, then may take 1 cap twice daily x 3 days, then may take 1 cap three times daily.  Dispense: 90 capsule; Refill: 2  -     XR Lumbar Spine Flex and Ext 2 or 3 VWS; Future; Expected date: 02/02/2021  -     OPS TFESI Lumbar Bilateral; Future; Expected date: 02/02/2021  -     Ambulatory referral to Neurosurgery    Left leg weakness  -     EMG - Clinic; Future; Expected date: 02/02/2021  -     Ambulatory referral to Neurosurgery    Myofascial pain  -     baclofen (LIORESAL) 10 MG tablet; Take 0.5-1 tablets (5-10 mg total) by mouth 2 (two) times a day as needed (for muscle spasms).  Dispense: 30 tablet; Refill: 0    Sleep difficulties  -     baclofen (LIORESAL) 10 MG tablet; Take 0.5-1 tablets (5-10 mg total) by mouth 2 (two) times a day as needed (for muscle spasms).  Dispense: 30 tablet; Refill: 0        Assessment: Pleasant 75 y.o. female with history of colon cancer, diabetes mellitus,  hypertension, hyperlipidemia, peripheral polyneuropathy, osteoporosis with:    1.  Chronic lumbar spine pain worse after a fall in October 2020 with left lower extremity weakness and bilateral lower extremity radicular pain left greater than right with symptoms consistent with S1 radiculopathy in the left such as loss of left Achilles reflex as well as Trendelenburg/gluteal muscle weakness ankle plantar flexor weakness on the left.  She has severe spinal stenosis at L4-5 with a grade 1 spondylolisthesis is degenerative.    2.  Myofascial pain gluteal region lumbar spine.    3.  Poor sleep related to pain.      Discussion:    1 we discussed the diagnosis and treatment options.  We discussed conservative treatment options of injections along with further diagnostics medications.  She has completed therapy.  We also discussed surgical referral.    2.  Flexion-extension x-rays lumbar spine to evaluate for any instability of the L4-5 spondylolisthesis.    3.  EMG bilateral lower extremities to evaluate/confirm radiculopathy.  Start with the left and compare right if needed given her history of polyneuropathy.    4.  Due to her significant pain recommend bilateral L5-S1 transforaminal epidural steroid injection to target the L5 and S1 nerves which are being compressed in the lateral recess/related to central stenosis.  This can be with Dr. Alexandre.    5.  She is on gabapentin for polyneuropathy.  Increase gabapentin to 300 mg 3 times daily new prescription provided.  She can do this slowly.  Dosage chart provided.    6.  For poor sleep and myofascial pain will provide baclofen 5 to 10 mg twice daily as needed.    7.  She would like a surgical opinion given the severity of the stenosis and a referral was placed to neurosurgery.    8.  She can return to clinic at her earliest convenience for EMG.        It was our pleasure caring for your patient today, if there any questions or concerns please do not hesitate to contact  us.      Subjective:   Patient ID: Karol Mujica is a 75 y.o. female.    History of Present Illness:Patient presents at the request of Dr. Lyons for an evaluation of worsening low back pain and bilateral lower extremity pain and left leg weakness.  She has had chronic low back pain and has had an injection done approximately 5 years ago for lumbar stenosis.  The injection was quite helpful for 1 year and the pain is slowly worsened.  In October of last year she fell down the stairs hitting her right hip and had increased low back pain and right gluteal and hip pain.  Was seen at [facility] orthopedics underwent greater trochanteric bursa injection and physical therapy.  The right hip pain slowly improved but continues to have back pain and gluteal pain bilaterally and now left leg weakness and poor balance.  Her back pain is worse with any walking or changing position after prolonged sitting or standing.  She does fine with sitting but moving is an issue for her.  When laying down in bed she does get left leg pain down to the calf also feels left leg weakness with walking.  She has bilateral gluteal pain right equal to left.  No paresthesias of significance in the legs from the back other than some numbness and tingling in the feet related to polyneuropathy..  Has had a new MRI.  She rates her pain a 9/10 at worst 7/10 today 4/10 at best.      Imaging: MRI report and images were personally reviewed and discussed with the patient.  A plastic model was utilized during the discussion.  MRI of theLumbar spine personally reviewed and discussed with the patient.  Relatively mild degenerative disc disease throughout the lumbar spine.  At L4-5 there is severe facet arthropathy with grade 1 spondylolisthesis.  This results in severe spinal stenosis and severe lateral recess stenosis with mild to moderate left foraminal stenosis.         Review of Systems: Complains of some chronic issues with diarrhea after her colon  "cancer.  She has joint pain muscle pain muscle fatigue and \"sciatica \".  She has poor balance and has fallen.  The balance is related to left leg weakness.  Denies fever, headache, change in vision, chest pain, shortness of breath, abdominal pain, nausea, vomiting, bowel or bladder incontinence, skin issues.  Remainder of 12 point review systems negative unless listed above.    Past Medical History:   Diagnosis Date     Anemia      Arthritis      Cataract      Cholelithiasis      Colon cancer (H) 2014     Diabetes mellitus (H)      Diverticular disease      GI bleed 2/15/2014     History of anesthesia complications     slow to wake up     History of transfusion      Hyperlipidemia      Hypertension      Osteoporosis      Peripheral neuropathy     feet     Red blood cell antibody positive      h/o blood transfusion 2015. known red cell antibodies to C and K per preop H&P       Family History   Problem Relation Age of Onset     Coronary artery disease Father      Breast cancer Mother 81     Heart disease Sister      Heart disease Brother      Kidney cancer Brother      Lung cancer Brother      Osteoporosis Sister      Heart disease Sister      Cancer Sister         Bladder     Kidney disease Sister      Diabetes Sister      Heart disease Sister          Social History     Socioeconomic History     Marital status:      Spouse name: Vinay     Number of children: 2     Years of education: 16     Highest education level: Some college, no degree   Occupational History     Employer: RETIRED   Social Needs     Financial resource strain: Not hard at all     Food insecurity     Worry: Never true     Inability: Never true     Transportation needs     Medical: No     Non-medical: No   Tobacco Use     Smoking status: Never Smoker     Smokeless tobacco: Never Used   Substance and Sexual Activity     Alcohol use: No     Frequency: Never     Binge frequency: Never     Drug use: No     Sexual activity: None   Lifestyle     " Physical activity     Days per week: None     Minutes per session: None     Stress: Not at all   Relationships     Social connections     Talks on phone: None     Gets together: None     Attends [employer] service: None     Active member of club or organization: None     Attends meetings of clubs or organizations: None     Relationship status: None     Intimate partner violence     Fear of current or ex partner: No     Emotionally abused: No     Physically abused: No     Forced sexual activity: No   Other Topics Concern     None   Social History Narrative     None     Social history: .  Retired .  No tobacco or alcohol.  The following portions of the patient's history were reviewed and updated as appropriate: allergies, current medications, past family history, past medical history, past social history, past surgical history and problem list.      WHO 5: 15    YVETTE Score: 42      Objective:   Physical Exam:    Vitals:    02/02/21 0948   BP: 153/86   Pulse: 87     Body mass index is 38.28 kg/m .      General:  Well-appearing female in no acute distress.  Overweight, pleasant, cooperative, and interactive throughout the examination and interview.  CV: No lower extremity edema on inspection or paltation.  Lymphatics: No cervical lymphadenopathy palpated. Eyes: sclera clear. Skin: No rashes or lesions seen over the head/neck, hairline, arms, legs, trunk.  Respirations unlabored.  MSK: Gait is antalgic left with Trendelenburg left..  Difficulty heel toe walk due to balance..  Negative Romberg.  Spine: normal AP curves of the C, T, and L spine.  Moderate to severely reduced lumbar flexion finger to floor testing with pain.  Palpation: Tenderness to palpation over bilateral gluteal tissues and sciatic notch.  Extremities: Full range of motion of the elbows, and wrists with no effusions or tenderness to palpation.   Full range of motion of the hips, knees, and ankles from seated with no  effusions or tenderness to palpation.   No hypermobility of the upper or lower extremities.  Neurologic exam: Mental status: Patient is alert and oriented with normal affect.  Attention, knowledge, memory, and language are intact.  Normal coordination throughout the examination.  Reflexes are 2+ and symmetric biceps, triceps, brachioradialis, patellar, and 1+ right, 0 left Achilles with down-going toes and Negative Jed's.  Sensation is mildly decreased to light touch lateral malleolus left leg, intact to light touch throughout the remainder of upper and lower extremities bilaterally.  Manual muscle testing reveals 5 out of 5 in the hip flexors, knee flexors/extensors , ankle  dorsiflexors, and EHL.  Appears to have 4/5 strength left 5/5 right ankle plantar flexors.  Upper extremities: Grossly normal strength . Normal muscle bulk and tone in the arms and legs.    Negative seated  straight leg raise bilaterally.

## 2021-06-14 NOTE — TELEPHONE ENCOUNTER
----- Message from Osmar Varela, DO sent at 2/3/2021 11:08 AM CST -----  Please call the patient let her know that I reviewed x-ray of her lumbar spine.  There is no instability in flexion or extension.  I recommend she continue with Dr. Burleson plan

## 2021-06-14 NOTE — TELEPHONE ENCOUNTER
Left message to call back for: yue  Information to relay to patient: see below information and relay

## 2021-06-14 NOTE — TELEPHONE ENCOUNTER
She will need a . Valium 5 mg, take 30 minutes prior to MRI (basically on arrival to radiology). Sent to pharmacy. Please have her  today and hold until day of MRI.

## 2021-06-14 NOTE — PROGRESS NOTES
Assessment and Plan:     Patient has been advised of split billing requirements and indicates understanding: Yes  Problem List Items Addressed This Visit     Type 2 diabetes mellitus with diabetic polyneuropathy, without long-term current use of insulin (H)     Below initial goal of 7.5 but not below ideal goal of 7.0.  Most likely due to recent stressful events and thus will continue therapy same and recheck in 3 to 6 months.         Relevant Orders    Basic Metabolic Panel    Glycosylated Hemoglobin A1c (Completed)    Microalbumin, Random Urine    Lipid Cascade    Hyperlipidemia     On simvastatin 40 mg.  We will recheck lipid profile as per guidance as well as ALT.         Relevant Orders    ALT (SGPT)    Peripheral Neuropathy     Symptoms controlled with gabapentin.  We will plan to continue.         Relevant Medications    cyclobenzaprine (FLEXERIL) 10 MG tablet    Colon Cancer     Current plan of CEA every 6 months.  Has been stable.  Next colonoscopy due in 2022.         Relevant Orders    ALT (SGPT)    HM2(CBC w/o Differential) (Completed)    CEA (Carcinoembryonic Antigen)    Essential hypertension     Below goal of 130/80.  Diet and exercise reviewed.  Continue current dosing of losartan 50 mg daily.         Relevant Orders    Basic Metabolic Panel    HM2(CBC w/o Differential) (Completed)    Osteoporosis     Continue current therapy.         Vitamin D deficiency     With underlying bowel resection, osteoporosis, will recheck vitamin D level and adjust supplementation as needed.         Relevant Orders    Vitamin D, Total (25-Hydroxy)    Severe obesity (BMI 35.0-35.9 with comorbidity) (H)     Diet and exercise reviewed.         Chronic bilateral low back pain with bilateral sciatica     Given weakness in the left leg despite physical therapy for the past 6 weeks will move to MRI for further evaluation.  Based on previous some worried that the foraminal narrowing at L4-L5 on the left has worsened.          Relevant Orders    MR Lumbar Spine Without Contrast      Other Visit Diagnoses     Routine general medical examination at a health care facility    -  Primary    Lipid screening        Relevant Orders    Lipid Cascade    Frequent urination        Relevant Orders    Urinalysis-UC if Indicated (Completed)    Culture, Urine    Acute cystitis without hematuria        Allergy to nitrofurantoin.  Will use ciprofloxacin as per orders for 5 days.  Urine culture and sensitivities ordered.    Relevant Medications    ciprofloxacin HCl (CIPRO) 500 MG tablet            The patient's current medical problems were reviewed.    I have had an Advance Directives discussion with the patient.  The following high BMI interventions were performed this visit: encouragement to exercise and weight monitoring  The following health maintenance schedule was reviewed with the patient and provided in printed form in the after visit summary:   Health Maintenance   Topic Date Due     INFLUENZA VACCINE RULE BASED (1) 08/01/2020     A1C  01/06/2021     LIPID  01/20/2021     MICROALBUMIN  01/20/2021     Pneumococcal Vaccine: Pediatrics (0 to 5 Years) and At-Risk Patients (6 to 64 Years) (1 of 3 - PCV13) 02/08/2021 (Originally 3/9/1951)     Pneumococcal Vaccine: 65+ Years (1 of 1 - PPSV23) 02/08/2021 (Originally 3/9/2011)     DIABETIC EYE EXAM  02/08/2021 (Originally 8/7/2020)     ZOSTER VACCINES (1 of 2) 02/08/2021 (Originally 3/9/1996)     BMP  03/07/2021     HYPERTENSION FOLLOW-UP  07/08/2021     MAMMOGRAM  12/09/2021     MEDICARE ANNUAL WELLNESS VISIT  01/08/2022     DIABETIC FOOT EXAM  01/08/2022     FALL RISK ASSESSMENT  01/08/2022     ADVANCE CARE PLANNING  01/20/2025     TD 18+ HE  06/22/2025     COLORECTAL CANCER SCREENING  03/26/2029     DEXA SCAN  12/09/2034     HEPATITIS C SCREENING  Completed        Subjective:   Chief Complaint: Karol Mujica is an 75 y.o. female here for an Annual Wellness visit.   HPI:  Denies any chest pain,  shortness of breath, dyspnea exertion, palpitations, nausea or vomiting.  Denies any changes in vision or hearing, or urinary or bowel habits.     Has had pain with urination and difficulty urinating for the past 2 days.  These are similar symptoms that she has had previously with UTI.    Of note patient refuses flu vaccine today.    Lower back pain.  Started with right-sided lower back pain after fall.  She has been going to physical therapy for the past 6 weeks and right side has improved.  But left side is weak.  She has been doing physical therapy on that side as well.  Does get radiation of the back of both legs with left worse than right especially when changing position.  No loss of bowel or bladder.  No saddle paresthesia.  Previous MRI was reviewed that did show some foraminal narrowing along L4/5 on the left that would be consistent with current weakness.    Review of Systems:  Please see above.  The rest of the review of systems are negative for all systems.    Patient Care Team:  Saul Zamora DO as PCP - General  Demarcus Barriga MD as Physician (General Surgery)  Lizeth Vazquez NP as Nurse Practitioner (Nurse Practitioner)  Saul Zamora DO as Assigned PCP  Per Martinez MD as Physician (Orthopedic Surgery)     Patient Active Problem List   Diagnosis     Type 2 diabetes mellitus with diabetic polyneuropathy, without long-term current use of insulin (H)     Hyperlipidemia     Peripheral Neuropathy     Sciatica     Microalbuminuria     Colon Cancer     Right-sided low back pain with right-sided sciatica     Essential hypertension     Osteoporosis     Vitamin D deficiency     Trochanteric bursitis of both hips     Severe obesity (BMI 35.0-35.9 with comorbidity) (H)     Age-related cataract of both eyes, unspecified age-related cataract type     History of colonic polyps     Near syncope     Past Medical History:   Diagnosis Date     Anemia      Arthritis      Cataract       Cholelithiasis      Colon cancer (H)      Diabetes mellitus (H)      Diverticular disease      GI bleed 2/15/2014     History of anesthesia complications     slow to wake up     History of transfusion      Hyperlipidemia      Hypertension      Osteoporosis      Peripheral neuropathy     feet     Red blood cell antibody positive      h/o blood transfusion . known red cell antibodies to C and K per preop H&P      Past Surgical History:   Procedure Laterality Date     BREAST BIOPSY Left     Years ago     BREAST EXCISIONAL BIOPSY Left     Years ago      SECTION  1981     COLONOSCOPY N/A 3/26/2019    Procedure: COLONOSCOPY WITH BIOPSY;  Surgeon: Barry Del Castillo MD;  Location: [facility];  Service: General     COLONOSCOPY W/ BIOPSIES  2014    3 polyps removed     EYE SURGERY       FEMUR SURGERY Left     for fracture     HYSTERECTOMY       LAPAROSCOPIC CHOLECYSTECTOMY N/A 10/10/2018    Procedure: CHOLECYSTECTOMY, LAPAROSCOPIC;  Surgeon: Demarcus Barriga MD;  Location: [facility];  Service:      OOPHORECTOMY Bilateral      HI PART REMOVAL COLON W ANASTOMOSIS      Description: Right Hemicolectomy;  Proc Date: 2014;     HI REMOVE TONSILS/ADENOIDS,<11 Y/O  age 22    Description: Tonsillectomy With Adenoidectomy;  Recorded: 2013;     HI TOTAL KNEE ARTHROPLASTY Bilateral     Description: Total Knee Arthroplasty;  Recorded: 2014;  Comments: Bilateral     TONSILLECTOMY        Family History   Problem Relation Age of Onset     Coronary artery disease Father      Breast cancer Mother 81     Heart disease Sister      Heart disease Brother      Kidney cancer Brother      Lung cancer Brother      Osteoporosis Sister      Heart disease Sister      Cancer Sister         Bladder     Kidney disease Sister      Diabetes Sister      Heart disease Sister       Social History     Socioeconomic History     Marital status:      Spouse name: Vinay     Number of children:  2     Years of education: 16     Highest education level: Some college, no degree   Occupational History     Employer: RETIRED   Social Needs     Financial resource strain: Not hard at all     Food insecurity     Worry: Never true     Inability: Never true     Transportation needs     Medical: No     Non-medical: No   Tobacco Use     Smoking status: Never Smoker     Smokeless tobacco: Never Used   Substance and Sexual Activity     Alcohol use: No     Frequency: Never     Binge frequency: Never     Drug use: No     Sexual activity: Not on file   Lifestyle     Physical activity     Days per week: Not on file     Minutes per session: Not on file     Stress: Not at all   Relationships     Social connections     Talks on phone: Not on file     Gets together: Not on file     Attends Jewish service: Not on file     Active member of club or organization: Not on file     Attends meetings of clubs or organizations: Not on file     Relationship status: Not on file     Intimate partner violence     Fear of current or ex partner: No     Emotionally abused: No     Physically abused: No     Forced sexual activity: No   Other Topics Concern     Not on file   Social History Narrative     Not on file      Current Outpatient Medications   Medication Sig Dispense Refill     aspirin 81 MG EC tablet Take 81 mg by mouth daily.       blood-glucose meter Misc Use 1 Device As Directed daily. Type 2 DM, without long term insulin use 1 each 0     cholecalciferol, vitamin D3, 2,000 unit cap Take 2 tablets by mouth every evening.        cyclobenzaprine (FLEXERIL) 10 MG tablet Take 1 tablet (10 mg total) by mouth at bedtime. (Patient taking differently: Take 10 mg by mouth at bedtime as needed. ) 90 tablet 1     gabapentin (NEURONTIN) 300 MG capsule TAKE ONE CAPSULE BY MOUTH EVERY EVENING 90 capsule 3     glipiZIDE (GLUCOTROL) 10 MG tablet Take 1 tablet (10 mg total) by mouth 2 (two) times a day before meals. 180 tablet 3     ibuprofen  "(ADVIL,MOTRIN) 200 MG tablet Take 400 mg by mouth at bedtime. And as needed for back pain       losartan (COZAAR) 50 MG tablet TAKE 1 TABLET BY MOUTH EVERY DAY 90 tablet 3     metFORMIN (GLUCOPHAGE) 1000 MG tablet TAKE 1 TABLET (1,000 MG TOTAL) BY MOUTH 2 (TWO) TIMES A DAY WITH MEALS. 180 tablet 3     multivitamin (MULTIVITAMIN) per tablet Take 1 tablet by mouth daily.       ONETOUCH VERIO strips USE 1 EACH AS DIRECTED 2 (TWO) TIMES A DAY. 200 strip 3     simvastatin (ZOCOR) 40 MG tablet TAKE 1 TABLET BY MOUTH EVERYDAY AT BEDTIME 90 tablet 2     Current Facility-Administered Medications   Medication Dose Route Frequency Provider Last Rate Last Admin     denosumab 60 mg (PROLIA 60 mg/ml)  60 mg Subcutaneous Q6 Months Kalia Hector MD   60 mg at 12/12/19 1434      Objective:   Vital Signs:   Visit Vitals  /78 (Patient Site: Left Arm, Patient Position: Sitting, Cuff Size: Adult Large)   Pulse 64   Temp 98.5  F (36.9  C) (Oral)   Resp 12   Ht 5' 4\" (1.626 m)   Wt 221 lb 11.2 oz (100.6 kg)   LMP  (LMP Unknown)   Breastfeeding No   BMI 38.05 kg/m           VisionScreening:  No exam data present     PHYSICAL EXAM  Physical Exam  Vitals signs and nursing note reviewed.   Constitutional:       General: She is not in acute distress.     Appearance: Normal appearance. She is well-developed.   HENT:      Head: Normocephalic and atraumatic.      Right Ear: Tympanic membrane, ear canal and external ear normal.      Left Ear: Tympanic membrane, ear canal and external ear normal.      Mouth/Throat:      Mouth: Mucous membranes are moist.      Pharynx: Oropharynx is clear. No oropharyngeal exudate or posterior oropharyngeal erythema.   Eyes:      Extraocular Movements: Extraocular movements intact.      Conjunctiva/sclera: Conjunctivae normal.   Neck:      Musculoskeletal: Normal range of motion and neck supple.      Thyroid: No thyromegaly.   Cardiovascular:      Rate and Rhythm: Normal rate and regular rhythm.      " Heart sounds: Normal heart sounds. No murmur. No friction rub. No gallop.    Pulmonary:      Effort: Pulmonary effort is normal.      Breath sounds: Normal breath sounds. No wheezing, rhonchi or rales.   Musculoskeletal:      Right lower leg: No edema.      Left lower leg: No edema.   Skin:     General: Skin is warm and dry.      Capillary Refill: Capillary refill takes less than 2 seconds.   Neurological:      Mental Status: She is alert and oriented to person, place, and time.      Deep Tendon Reflexes: Reflexes are normal and symmetric.      Reflex Scores:       Bicep reflexes are 2+ on the right side and 2+ on the left side.       Patellar reflexes are 2+ on the right side and 2+ on the left side.       Achilles reflexes are 2+ on the right side and 2+ on the left side.  Psychiatric:         Mood and Affect: Mood normal.          Assessment Results 1/8/2021   Activities of Daily Living No help needed   Instrumental Activities of Daily Living No help needed   Get Up and Go Score 12 seconds or more   Mini Cog Total Score 4   Some recent data might be hidden     A Mini-Cog score of 0-2 suggests the possibility of dementia, score of 3-5 suggests no dementia    Identified Health Risks:     The patient was provided with written information regarding signs of hearing loss.  She is at risk for falling and has been provided with information to reduce the risk of falling at home.  Patient's advanced directive was discussed and I am comfortable with the patient's wishes.    Does not get the flu vaccine.  We discussed the rationale as to why she should get the flu vaccine to include protection of family members, decrease in risk of ICU or death from influenza.

## 2021-06-14 NOTE — PATIENT INSTRUCTIONS - HE
1. Xrays of your low back    2. EMG with Dr Burleson    3. Neurosurgery evaluation    4. Baclofen (muscle relaxant medication) has been prescribed today. Please take 1/2-1 tab twice daily as needed. This medication may cause drowsiness. Please do not work or drive while taking this medication until you know how it effects you. If it does make you drowsy, you should only take it before bedtime or at times that you do not have to work/drive.        5. Prescribed Gabapentin today, 300 mg tablets, to be titrated up to1 tablets 3 times a day as tolerated for your nerve pain. Please follow Gabapentin dosing chart below.    Gabapentin 300mg Dosing Chart    DATE  MORNING AFTERNOON BEDTIME    Day 1 0 0 1    Day 2 0 0 1    Day 3 0 0 1    Day 4 1 0 1    Day 5 1 0 1    Day 6 1 0 1    Day 7 1 1 1    Day 8 1 1 1    Day 9 1 1 1                                                                                                                                   Continue medication, taking 3 capsules three times daily    Please call if you have any questions regarding how to take your medication  Clinic Phone # [phone]       6. A lumbar epidural has been ordered today. Please schedule this injection at least   2 weeks from now to allow time for insurance prior authorization. On the day of your injection, you cannot be sick or taking antibiotics. If you become sick and are prescribed, please call the clinic so your injection can be rescheduled for once you have completed your antibiotics. You will need to bring a  with you for your injection. If you have any questions or concerns prior to your injection, please do not hesitate to call the nurse navigation line at [phone].    [facility] Injection Requirements      A  is required for all fluoroscopically-guided injections.    Injection appointments may be cancelled if there are signs/symptoms of an active infection or if the patient is  being actively treated with antibiotics for a diagnosed infection.    Patients may have their steroid injection cancelled if they have had another steroid injection within 2 weeks.    Diabetic patients will have their blood glucose levels checked the day of their injection and the appointment will be rescheduled if the blood glucose level is 300 or higher.    Patients with allergies to cortisone, local anesthetics, iodine, or contrast dye should contact the [facility] Center to further discuss these considerations.    Patients scheduled for medial branch block diagnostic injections should refrain from taking pain medication the day of the procedure.  The medial branch block injection appointment will be rescheduled if the patient's pain rating is not 5/10 or greater at the time of the procedure.    Patients taking warfarin/Coumadin will have their INR checked the day of the procedure and the procedure may be rescheduled if the INR is greater than 3.0.    Please contact the [facility] Center (#[phone]) if you are taking any prescription blood-thinning medications (warfarin, Plavix, Lovenox, Eliquis, Brilinta, Effient, etc.) as special dosing adjustments may need to be made depending on the type of injection you are scheduled to receive.    It is recommended that you delay having your steroid injection if you have received a flu shot or shingles vaccine within 2 weeks.

## 2021-06-14 NOTE — TELEPHONE ENCOUNTER
Phone call to patient to review results and covering provider's recommendations. Results given and explained. Discussed moving forward with treatment plan as scheduled. Stated understanding and appreciation for call.

## 2021-06-15 NOTE — TELEPHONE ENCOUNTER
Patient stated she was told she was to get home care after surgery. No one has called to schedule. She is okay if she doesn't receive home care but is curious is she's supposed to be getting it.    Please call patient at [phone].

## 2021-06-15 NOTE — PROGRESS NOTES
Patient was seen at [place] [address] for the fitting and delivery of an [place] Quickdraw orthosis (Corset). Patient was ordered to stay flat in bed at the time of my arrival.  Patient would like to trial the orthosis tomorrow when she plans to get out of bed.  Corset was adjusted to XL size and she was asked to have staff contact the office if she has fitting issues.

## 2021-06-15 NOTE — ANESTHESIA CARE TRANSFER NOTE
Last vitals:   Vitals:    03/10/21 1340   BP: 92/54   Pulse: 78   Resp: 27   Temp: (!) 35.8  C (96.5  F)   SpO2: 99%     Patient's level of consciousness is drowsy  Spontaneous respirations: yes  Maintains airway independently: yes  Dentition unchanged: yes  Oropharynx: oropharynx clear of all foreign objects  Patient breathing spontaneously, smooth emergence, no coughing.  Extubated to facemask 8L.  VSS.  QCDR Measures:  ASA# 20 - Surgical Safety Checklist: WHO surgical safety checklist completed prior to induction    PQRS# 430 - Adult PONV Prevention: 4558F - Pt received => 2 anti-emetic agents (different classes) preop & intraop  ASA# 8 - Peds PONV Prevention: NA - Not pediatric patient, not GA or 2 or more risk factors NOT present  PQRS# 424 - Mirian-op Temp Management: 4559F - At least one body temp DOCUMENTED => 35.5C or 95.9F within required timeframe  PQRS# 426 - PACU Transfer Protocol: - Transfer of care checklist used  ASA# 14 - Acute Post-op Pain: ASA14B - Patient did NOT experience pain >= 7 out of 10

## 2021-06-15 NOTE — PROGRESS NOTES
Assessment/Plan:      Problem List Items Addressed This Visit     None      Visit Diagnoses     Dysuria    -  Primary    Relevant Orders    Urinalysis-UC if Indicated (Completed)    Culture, Urine    Urinary frequency        Relevant Orders    US Bladder With Pre and Post Void Residual      UA does not definitively show UTI. UC is pending. I am concerned that her bladder feels distended despite having emptied it recently. Ultrasound ordered for evaluation.    Patient Instructions   1. We will notify you of urine culture results  2. Please proceed with ultrasound as scheduled.      Subjective:   Karol Mujica is a 75 y.o. female who presents today with complaints of frequent urination and discomfort since Sunday. No fever. Symptoms are worsening. No vaginal discharge or rash. No fever. The patient had similar symptoms in January. She was treated for a UTI but urine culture came back as negative. She does feel like she is having some trouble emptying her bladder. She has a remote history of hysterectomy and bilateral oophorectomy for benign reasons.    Patient Active Problem List   Diagnosis     Type 2 diabetes mellitus with diabetic polyneuropathy, without long-term current use of insulin (H)     Hyperlipidemia     Peripheral Neuropathy     Sciatica     Microalbuminuria     Colon Cancer     Essential hypertension     Osteoporosis     Vitamin D deficiency     Trochanteric bursitis of both hips     Severe obesity (BMI 35.0-35.9 with comorbidity) (H)     Age-related cataract of both eyes, unspecified age-related cataract type     History of colonic polyps     Near syncope     Chronic bilateral low back pain with bilateral sciatica      Past Medical History:   Diagnosis Date     Anemia      Arthritis      Cataract      Cholelithiasis      Colon cancer (H) 2014     Diabetes mellitus (H)      Diverticular disease      GI bleed 2/15/2014     History of anesthesia complications     slow to wake up     History of  "transfusion      Hyperlipidemia      Hypertension      Osteoporosis      Peripheral neuropathy     feet     Red blood cell antibody positive      h/o blood transfusion . known red cell antibodies to C and K per preop H&P     Past Surgical History:   Procedure Laterality Date     BREAST BIOPSY Left     Years ago     BREAST EXCISIONAL BIOPSY Left     Years ago      SECTION  1981     COLONOSCOPY N/A 3/26/2019    Procedure: COLONOSCOPY WITH BIOPSY;  Surgeon: Barry Del Castillo MD;  Location: [facility];  Service: General     COLONOSCOPY W/ BIOPSIES  2014    3 polyps removed     EYE SURGERY       FEMUR SURGERY Left     for fracture     HYSTERECTOMY       LAPAROSCOPIC CHOLECYSTECTOMY N/A 10/10/2018    Procedure: CHOLECYSTECTOMY, LAPAROSCOPIC;  Surgeon: Demarcus Barriga MD;  Location: [facility];  Service:      MA STEREOTACTIC BREAST BIOPSY VACUUM L Left 2021     OOPHORECTOMY Bilateral      RI PART REMOVAL COLON W ANASTOMOSIS      Description: Right Hemicolectomy;  Proc Date: 2014;     RI REMOVE TONSILS/ADENOIDS,<13 Y/O  age 22    Description: Tonsillectomy With Adenoidectomy;  Recorded: 2013;     RI TOTAL KNEE ARTHROPLASTY Bilateral     Description: Total Knee Arthroplasty;  Recorded: 2014;  Comments: Bilateral     TONSILLECTOMY         Review of Systems      Objective:     Vitals:    21 1202   BP: 132/70   Pulse: 72   Resp: 16   Temp: 98.6  F (37  C)   TempSrc: Oral   Weight: 220 lb 6 oz (100 kg)   Height: 5' 4\" (1.626 m)       Physical Exam  Cardiovascular:      Rate and Rhythm: Normal rate and regular rhythm.   Pulmonary:      Effort: Pulmonary effort is normal.      Breath sounds: Normal breath sounds. No wheezing or rhonchi.   Abdominal:      General: Abdomen is flat. Bowel sounds are normal.      Comments: Patient is having suprapubic discomfort with palpation. Bladder also feels distended despite having recently emptied it.        Results for " orders placed or performed in visit on 03/02/21   Urinalysis-UC if Indicated   Result Value Ref Range    Color, UA Yellow Colorless, Yellow, Straw, Light Yellow    Clarity, UA Clear Clear    Glucose, UA Negative Negative    Bilirubin, UA Negative Negative    Ketones, UA Negative Negative    Specific Gravity, UA 1.015 1.005 - 1.030    Blood, UA Negative Negative    pH, UA 5.5 5.0 - 8.0    Protein, UA Negative Negative mg/dL    Urobilinogen, UA 0.2 E.U./dL 0.2 E.U./dL, 1.0 E.U./dL    Nitrite, UA Negative Negative    Leukocytes, UA Trace (!) Negative    Bacteria, UA Few (!) None Seen hpf    RBC, UA 0-2 None Seen, 0-2 hpf    WBC, UA 0-5 None Seen, 0-5 hpf    Squam Epithel, UA 0-5 None Seen, 0-5 lpf

## 2021-06-15 NOTE — PROGRESS NOTES
NEUROSURGERY CONSULTATION NOTE    3/3/2021     Karol Mujica is a 75 y.o. female who is sent to us in consultation by Sadiq Burleson   for evaluation of lumbar stenosis.         CONSULTATION ASSESSMENT AND PLAN:     76 yo female who presents with claudicating low back and leg pain, numbness and weakness as well as urinary urgency with difficulty fully emptying.  Bladder PVR showed 9% residual after emptying.  MRI lumbar spine shows severe spinal canal stenosis with crowding of the cauda equina at L4-L5.  She also has moderate edema in her bilateral facet joints which is increased since the prior MRI.  Her spinal listhesis at this level as well has progressed from previous by 1.5 mm.  Given these findings recommend lumbar 4-5 decompression and instrumented fusion.  Risks and benefits discussed in detail with both patient and  and they agreed to proceed.    I spent more than 45 minutes in this apt, examining the pt, reviewing the scans, reviewing notes from chart, discussing treatment options with risks and benefits and coordinating care.     Jia Chen     HPI:  76 yo female who presents with claudicating low back and leg pain, numbness and weakness. Pain and numbness located in posterior legs. Symptoms claudicating in nature worsened with standing and walking. Urinary urgency and also does not feel she fully is emptying. Symptoms began in January with recent worsening. PVR and bladder ultrasound today.  No bowel dysfunction.     Prior Spine Surgery:no    Past Medical History:   Diagnosis Date     Anemia      Arthritis      Cataract      Cholelithiasis      Colon cancer (H) 2014     Diabetes mellitus (H)      Diverticular disease      GI bleed 2/15/2014     History of anesthesia complications     slow to wake up     History of transfusion      Hyperlipidemia      Hypertension      Osteoporosis      Peripheral neuropathy     feet     Red blood cell antibody positive      h/o blood transfusion  . known red cell antibodies to C and K per preop H&P     Past Surgical History:   Procedure Laterality Date     BREAST BIOPSY Left     Years ago     BREAST EXCISIONAL BIOPSY Left     Years ago      SECTION       COLONOSCOPY N/A 3/26/2019    Procedure: COLONOSCOPY WITH BIOPSY;  Surgeon: Barry Del Castillo MD;  Location: [facility];  Service: General     COLONOSCOPY W/ BIOPSIES  2014    3 polyps removed     EYE SURGERY       FEMUR SURGERY Left     for fracture     HYSTERECTOMY       LAPAROSCOPIC CHOLECYSTECTOMY N/A 10/10/2018    Procedure: CHOLECYSTECTOMY, LAPAROSCOPIC;  Surgeon: Demarcus Barriga MD;  Location[facility];  Service:      MA STEREOTACTIC BREAST BIOPSY VACUUM L Left 2021     OOPHORECTOMY Bilateral      MS PART REMOVAL COLON W ANASTOMOSIS      Description: Right Hemicolectomy;  Proc Date: 2014;     MS REMOVE TONSILS/ADENOIDS,<12 Y/O  age 22    Description: Tonsillectomy With Adenoidectomy;  Recorded: 2013;     MS TOTAL KNEE ARTHROPLASTY Bilateral     Description: Total Knee Arthroplasty;  Recorded: 2014;  Comments: Bilateral     TONSILLECTOMY         REVIEW OF SYSTEMS:  ROS reviewed with pt as documented on pt health form of 3/3/2021.         MEDICATIONS:  Current Outpatient Medications   Medication Sig Dispense Refill     aspirin 81 MG EC tablet Take 81 mg by mouth daily.       baclofen (LIORESAL) 10 MG tablet Take 0.5-1 tablets (5-10 mg total) by mouth 2 (two) times a day as needed (for muscle spasms). 30 tablet 0     blood-glucose meter Misc Use 1 Device As Directed daily. Type 2 DM, without long term insulin use 1 each 0     cholecalciferol, vitamin D3, 2,000 unit cap Take 2 tablets by mouth every evening.        gabapentin (NEURONTIN) 300 MG capsule 1 cap at bedtime x 3 days, then may take 1 cap twice daily x 3 days, then may take 1 cap three times daily. (Patient taking differently: 1 cap in the morning and 1 at night.) 90  capsule 2     glipiZIDE (GLUCOTROL) 10 MG tablet Take 1 tablet (10 mg total) by mouth 2 (two) times a day before meals. 180 tablet 3     ibuprofen (ADVIL,MOTRIN) 200 MG tablet Take 400 mg by mouth at bedtime. And as needed for back pain       losartan (COZAAR) 50 MG tablet Take 1 tablet (50 mg total) by mouth daily. 90 tablet 3     metFORMIN (GLUCOPHAGE) 1000 MG tablet TAKE 1 TABLET (1,000 MG TOTAL) BY MOUTH 2 (TWO) TIMES A DAY WITH MEALS. 180 tablet 3     multivitamin (MULTIVITAMIN) per tablet Take 1 tablet by mouth daily.       ONETOUCH VERIO strips USE 1 EACH AS DIRECTED 2 (TWO) TIMES A DAY. 200 strip 3     simvastatin (ZOCOR) 40 MG tablet TAKE 1 TABLET BY MOUTH EVERYDAY AT BEDTIME 90 tablet 2     Current Facility-Administered Medications   Medication Dose Route Frequency Provider Last Rate Last Admin     denosumab 60 mg (PROLIA 60 mg/ml)  60 mg Subcutaneous Q6 Months Kalia Hector MD   60 mg at 12/12/19 1434         ALLERGIES/SENSITIVITIES:     Allergies   Allergen Reactions     Venom-Honey Bee Anaphylaxis     Nitrofurantoin Hives     Nausea     Penicillins Hives       PERTINENT SOCIAL HISTORY:   Social History     Socioeconomic History     Marital status:      Spouse name: Vinay     Number of children: 2     Years of education: 16     Highest education level: Some college, no degree   Occupational History     Employer: RETIRED   Social Needs     Financial resource strain: Not hard at all     Food insecurity     Worry: Never true     Inability: Never true     Transportation needs     Medical: No     Non-medical: No   Tobacco Use     Smoking status: Never Smoker     Smokeless tobacco: Never Used   Substance and Sexual Activity     Alcohol use: No     Frequency: Never     Binge frequency: Never     Drug use: No     Sexual activity: None   Lifestyle     Physical activity     Days per week: None     Minutes per session: None     Stress: Not at all   Relationships     Social connections     Talks on  "phone: None     Gets together: None     Attends Christian service: None     Active member of club or organization: None     Attends meetings of clubs or organizations: None     Relationship status: None     Intimate partner violence     Fear of current or ex partner: No     Emotionally abused: No     Physically abused: No     Forced sexual activity: No   Other Topics Concern     None   Social History Narrative     None         FAMILY HISTORY:  Family History   Problem Relation Age of Onset     Coronary artery disease Father      Breast cancer Mother 81     Heart disease Sister      Heart disease Brother      Kidney cancer Brother      Lung cancer Brother      Osteoporosis Sister      Heart disease Sister      Cancer Sister         Bladder     Kidney disease Sister      Diabetes Sister      Heart disease Sister         PHYSICAL EXAM:   Constitutional: /86   Pulse 80   Resp 16   Ht 5' 4\" (1.626 m)   Wt 220 lb (99.8 kg)   LMP  (LMP Unknown)   SpO2 97%   BMI 37.76 kg/m       Mental Status: A & O in no acute distress.  Affect is appropriate.  Speech is fluent.  Recent and remote memory are intact.  Attention span and concentration are normal.     Motor:  Normal bulk and tone all muscle groups of upper and lower extremities.     Sensory: Sensation intact bilaterally to light touch.      Coordination:  Antalgic gait with shopping cart sign     Reflexes; supinator, biceps, triceps, knee/ ankle jerk intact- diminished throughout. no hoffmans/ no    babinski/ clonus.    IMAGING: I personally reviewed all radiographic images      Cc:   Saul Zamora,   [address] [facility] [place] [address]  "

## 2021-06-15 NOTE — PATIENT INSTRUCTIONS - HE
Continue plan of care discussed at last appointment including increasing gabapentin, lumbar epidural, and neurosurgical evaluation.    Thank you for choosing the [facility] for your EMG testing.    The ordering provider will receive your final EMG results within the next few days.  Please follow up with your provider for the results and further treatment recommendations.

## 2021-06-15 NOTE — PROGRESS NOTES
Patient presents at the request of Dr. Sadiq Burleson for bilateral lower extremity EMG.  She has bilateral lower extremity numbness and tingling in the toes but also down the back of the legs to the knees.  She has low back pain.  Left lower extremity weakness.  On exam she has 2+ bilateral patellar reflexes with trace Achilles reflexes today.  Sensation is decreased to light touch bilateral lower extremities and normal strength in ankle plantar flexors ankle dorsiflexors and EHL today.    EMG/NCS  results: Please see scanned full report.    Comment NCS: Normal study  1.  Normal nerve conduction studies bilateral lower extremities.  This includes normal bilateral sural SNAPs, peroneal and tibial CMAP's and symmetric tibial H reflexes.      Comment EMG: Normal study  1.  Normal needle EMG bilateral lower extremities.    Interpretation: Normal study    1. There is no electrodiagnostic evidence of lumbosacral radiculopathy, lumbosacral plexopathy,  focal neuropathy, or peripheral polyneuropathy in the bilateral lower extremities.      Note: She does have a longstanding history of diabetes mellitus and may have a small fiber neuropathy which is not diagnosable on EMG.  She will continue with plan of increasing gabapentin to 300 mg 3 times daily, lumbar epidural, and seeing neurosurgery.    The testing was completed in its entirety by the physician.      It was our pleasure caring for your patient today, if there any questions or concerns please do not hesitate to contact us.

## 2021-06-15 NOTE — TELEPHONE ENCOUNTER
Called patient, discussed surgery, post-op course, expectations, follow up plan.    Reviewed H&P from 03/08/2021 - cleared for surgery  Labs, EKG - WNL    MRI done on 01/20/2021 - in [place]    To OR as planned.     Check in - 0530    Nothing to eat or drink after midnight the night before surgery.     Reviewed with patient: Arrive 2.5 -3 hours prior to scheduled surgery.    Bring all pertinent films to hospital the day of surgery.     Continue to refrain from NSAIDS (Ibuprofen, Aleve, Naprosyn), ASA, Over the counter herbal medications or supplements, anti-coagulants and blood thinners.     Patient confirmed they have help/assistance in place at home upon discharge    Instructions: using a washcloth and a bottle of provided Hibiclens, wash your body, avoiding your face and genitals. Preferably, shower the night before surgery and the morning of surgery using a half a bottle each time for your whole body shower.        Patient was informed that we will provide up to 1 week prescription of pain medication for post-operative pain.      Instructed patient about the following: After your surgery, if you will be staying in-patient, a nursing provider team will be monitoring you closely throughout your stay and communicate your health status to your surgeon and other providers.  You will be seen by Advanced Practice Providers (e.g., nurse practitioners, clinic nurse specialist, and physician assistants) who will check on you regularly to assess the status of your surgery.     Betty Pozo RN, CNRN

## 2021-06-15 NOTE — TELEPHONE ENCOUNTER
Refill Request  Did you contact pharmacy: Yes  Medication name:   Requested Prescriptions     Pending Prescriptions Disp Refills     baclofen (LIORESAL) 10 MG tablet 30 tablet 0     Sig: Take 0.5-1 tablets (5-10 mg total) by mouth 2 (two) times a day as needed (for muscle spasms).     Who prescribed the medication: Dr. Zamora  Requested Pharmacy: [facility]  Is patient out of medication: No.  2 days left  Patient notified refills processed in 3 business days:  no  Okay to leave a detailed message: no

## 2021-06-15 NOTE — TELEPHONE ENCOUNTER
PATIENT NAME:  Karol Mujica  YOB: 1945  MRN: [id number]  SURGEON: Dr. Chen  DATE of SURGERY: 03/10/2021  PROCEDURE: L4-L5 fusion      FOLLOW-UP PLAN:  Wound Check: 7-10 days  Staples/Sutures Out : n/a  Post Op Visit: 6 weeks  Post-op Provider: NP on Dr. Chen clinic day  DIAGNOSTICS:  XR  DISPOSITION:  Home 03/14/2021      ADDITIONAL INSTRUCTIONS FOR MEDICAL STAFF:      Betty Pozo RN, CNRN

## 2021-06-15 NOTE — PATIENT INSTRUCTIONS - HE
Provided complete information about approaching surgery.  Discussed discharge planning and expected outcomes after surgery as well as follow-ups and restrictions.  Emphasized on stop taking ASA, NSAID's, vitamins and /or OTC herbal supplements within 10 days and any anticoagulant meds within 7 days prior to surgery.  Reminded patient to bring all pertinent films to hospital the day of surgery.    NPO after midnight, Please arrive 2.5 hours prior to scheduled surgery.    Using a washcloth and a bottle of provided Hibiclens, wash your body, avoiding your face and genitals. Preferably, shower the night before surgery and the morning of surgery using a half a bottle each time for your whole body shower. If you are unable to take a shower in the morning of surgery, please discuss your options with the nurse at your readiness visit.     Provided written pamphlets about surgery and Hibiclens bottle.  Answered all questions.  The  will call patient with all pre-op orders and instructions.  Patient to complete all required diagnostic tests prior to surgery.  If test are not completed this will cancel the surgery; contact the clinic nurses at [phone] if unable to complete test.

## 2021-06-15 NOTE — PROGRESS NOTES
Neurosurgery consultation was requested by: Dr. Burleson   Pain: Low back   Radicular Pain is present: Bilateral legs   Lhermitte sign: No  Motor complaints: Weakness in both legs   Sensory complaints: Numbness and tingling in all toes   Gait and balance issues: Balance/unsteady concerns   Bowel or bladder issues: Bladder concerns - has U/S today   Duration of SX is: worse over the past 1 year   The symptoms are worse with: Standing/Walking   The symptoms are better with: Laying/Sitting   Injury: No - Fall in October 2020   Severity is: Chronic   Patient has tried the following conservative measures: Injections, PT   Dalia Peguero,CMA,11:16 AM     Modified Oswestry Low back Pain Disability Questionnaire    1. PAIN INTENSITY  3- Pain medication provides me with moderate relief from pain  2. PERSONAL CARE  1- I can take care of myself normally, but it increases my pain.   3. LIFTING  4- I can only lift very light weights  4. WALKING  3- Pain prevents me from walking more than   mile.  5. SITTING  2- Pain prevents me from sitting more than 1 hour.  6. STANDING  3- Pain prevents me from standing for more than 30 minutes  7. SLEEPING  2- Even when I take medication, I sleep less than 6 hours.  8. SOCIAL LIFE  2- Pain prevents me from participating in more energetic activities (e.g., sports, dancing).  9. TRAVELING  3- My pain restricts my travel over 1 hour.  10. EMPLOYMENT/HOMEMAKING  2- I can perform most of my homemaking/job activities, but pain prevents me from performing more physically stressful activities (e.g. lifting, vacuuming).    SCORE:25x2=50%

## 2021-06-15 NOTE — ANESTHESIA PREPROCEDURE EVALUATION
Anesthesia Evaluation      Patient summary reviewed   No history of anesthetic complications     Airway   Mallampati: II  Neck ROM: full   Pulmonary - negative ROS and normal exam    breath sounds clear to auscultation                         Cardiovascular - normal exam  (+) hypertension, , hypercholesterolemia,     Rhythm: regular  Rate: normal,         Neuro/Psych    (+) neuromuscular disease,      Endo/Other    (+) diabetes mellitus type 2, obesity,      GI/Hepatic/Renal      Comments: Colon cancer s/p resection           Dental - normal exam                        Anesthesia Plan  Planned anesthetic: general endotracheal  Gen ETT  2 IV  Magnesium infusion, ketamine  Decadron and Zofran     ASA 2   Induction: intravenous   Anesthetic plan and risks discussed with: patient  Anesthesia plan special considerations: antiemetics, IV therapy two IVs,   Post-op plan: routine recovery

## 2021-06-15 NOTE — PROGRESS NOTES
"Karol Mujica is a 75 y.o. female who is being evaluated via a billable telephone visit.      What phone number would you like to be contacted at? [phone]  How would you like to obtain your AVS? AVS Preference: [place].       Reason for visit      1. Type 2 diabetes mellitus with diabetic polyneuropathy, without long-term current use of insulin (H)    2. Age-related osteoporosis without current pathological fracture        History     Karol Mujica is a very pleasant 75 y.o. old female who presents for follow up.   SUMMARY:    Karol is contacted today in f/u for DM 2 and Osteoporosis. Her current A1c is 7.2 and up from her last at 6.7. She has been having some significant pain in her back from bone spurs. She has had a couple of Steroid injections to treat this. She states that she will be meeting with a Surgeon soon. She remains on Glipizide and Metformin for her management, and they usually do the job for her. She also reports that she recently underwent a Breast Bx and it was benign.     Karol was prescribed Risedronate last year as protocol for after care when Prolia injections are discontinued. She reports that she was told by her PCP not to take it, and to \"just wait for a year, and see what things look like then.\" She has just had a \"change of therapy\" Dexa Scan, which showed: Since the previous bone density dated  December 9, 2019, there has been a   -9.4% change in the bone density of the spine.  Additionally there has been a -9.6% change in the right total hip. This is unfortunate.  Her current Vit D level is 35.1 and Calcium level is 10.1.        Risk Factors     The following high- risk conditions have been ruled out: celiac disease, eating disorders, gastric bypass, hyperparathyroidism, inflammatory bowel disease, hyperthyroidism, rheumatoid arthritis, lupus, chronic kidney disease.     Karol Mujica has the following risk factors: Age, Female gender and      She is not on high " risk medications such as glucocorticoids, anti-coagulants, anti-convulsants, chemotherapy or levothyroxine.    Patient deniesHysterectomy, Oophrectomy and Breast cancer.      Past Medical History     Patient Active Problem List   Diagnosis     Type 2 diabetes mellitus with diabetic polyneuropathy, without long-term current use of insulin (H)     Hyperlipidemia     Peripheral Neuropathy     Sciatica     Microalbuminuria     Colon Cancer     Essential hypertension     Osteoporosis     Vitamin D deficiency     Trochanteric bursitis of both hips     Severe obesity (BMI 35.0-35.9 with comorbidity) (H)     Age-related cataract of both eyes, unspecified age-related cataract type     History of colonic polyps     Near syncope     Chronic bilateral low back pain with bilateral sciatica       Family History       family history includes Breast cancer (age of onset: 81) in her mother; Cancer in her sister; Coronary artery disease in her father; Diabetes in her sister; Heart disease in her brother, sister, sister, and sister; Kidney cancer in her brother; Kidney disease in her sister; Lung cancer in her brother; Osteoporosis in her sister.    Social History      reports that she has never smoked. She has never used smokeless tobacco. She reports that she does not drink alcohol or use drugs.      Review of Systems     Patient denies current pain, limited mobility, fractures.   Remainder per HPI.      Vital Signs     LMP  (LMP Unknown)     Physical Exam         Assessment     1. Type 2 diabetes mellitus with diabetic polyneuropathy, without long-term current use of insulin (H)    2. Age-related osteoporosis without current pathological fracture        Plan       Her DM 2 will likely be better controlled when her pain is taken care of. No changes to her medication regimen today.    I have reached out to her PCP regarding the Risedronate. Pt is aware.     F/u in 1 year.         Lizeth Vazquez   Endocrinology  2/26/2021  12:49  PM            Current Medications     Outpatient Medications Prior to Visit   Medication Sig Dispense Refill     aspirin 81 MG EC tablet Take 81 mg by mouth daily.       baclofen (LIORESAL) 10 MG tablet Take 0.5-1 tablets (5-10 mg total) by mouth 2 (two) times a day as needed (for muscle spasms). 30 tablet 0     blood-glucose meter Misc Use 1 Device As Directed daily. Type 2 DM, without long term insulin use 1 each 0     cholecalciferol, vitamin D3, 2,000 unit cap Take 2 tablets by mouth every evening.        gabapentin (NEURONTIN) 300 MG capsule 1 cap at bedtime x 3 days, then may take 1 cap twice daily x 3 days, then may take 1 cap three times daily. 90 capsule 2     glipiZIDE (GLUCOTROL) 10 MG tablet Take 1 tablet (10 mg total) by mouth 2 (two) times a day before meals. 180 tablet 3     ibuprofen (ADVIL,MOTRIN) 200 MG tablet Take 400 mg by mouth at bedtime. And as needed for back pain       losartan (COZAAR) 50 MG tablet Take 1 tablet (50 mg total) by mouth daily. 90 tablet 3     metFORMIN (GLUCOPHAGE) 1000 MG tablet TAKE 1 TABLET (1,000 MG TOTAL) BY MOUTH 2 (TWO) TIMES A DAY WITH MEALS. 180 tablet 3     multivitamin (MULTIVITAMIN) per tablet Take 1 tablet by mouth daily.       ONETOUCH VERIO strips USE 1 EACH AS DIRECTED 2 (TWO) TIMES A DAY. 200 strip 3     simvastatin (ZOCOR) 40 MG tablet TAKE 1 TABLET BY MOUTH EVERYDAY AT BEDTIME 90 tablet 2     cyclobenzaprine (FLEXERIL) 10 MG tablet Take 1 tablet (10 mg total) by mouth at bedtime as needed. 90 tablet 1     Facility-Administered Medications Prior to Visit   Medication Dose Route Frequency Provider Last Rate Last Admin     denosumab 60 mg (PROLIA 60 mg/ml)  60 mg Subcutaneous Q6 Months Kalia Hector MD   60 mg at 12/12/19 1434         Lab Results     TSH   Date Value Ref Range Status   02/23/2016 1.88 0.30 - 5.00 uIU/mL Final     PTH   Date Value Ref Range Status   02/23/2016 41 10 - 86 pg/mL Final     Calcium   Date Value Ref Range Status   01/08/2021  "10.1 8.5 - 10.5 mg/dL Final           Imaging Results   Last DEXA scan:  Results for orders placed in visit on 11/27/20   DXA Bone Density Scan    Narrative 1/18/2021      RE: Karol Mujica  YOB: 1945        Dear Lizeth Vazquez,    Patient Profile:  75 y.o. female, postmenopausal, is here for the follow up bone density   test.   History of fractures - Yes;  Femur. Family history of osteoporosis - Yes;    mother and sibling.  Family history of hip fracture: None. Smoking history   - No. Osteoporosis treatment past -  Yes;  Prolia (Off 1 year).   Osteoporosis treatment current - No.  Chronic medical problems - Chronic   low back problems, Diabetes Mellitus, Hysterectomy and Oophorectomy. High   risk medications -  None.      Assessment:    1. The spine bone density L1-L4 with T-score -1.3.  Focal sclerotic   changes are noted in the lumbar vertebrae, which may artifactually elevate   the bone mineral density.  2. Femoral bone density shows right total hip T- score -1.3.  3. Trabecular bone score indicates good trabecular bone architecture.      75 y.o. female with LOW BONE DENSITY (OSTEOPENIA) and MODERATE fracture   risk, adjusted for the TBS, with major osteoporotic fracture risk 10.1%   and hip fracture risk 0.9%.     Since the previous bone density dated  December 9, 2019, there has been a     -9.4% change in the bone density of the spine.  Additionally there has   been a -9.6% change in the right total hip.                              Recommendations:  A short course of an antiresorptive agent is recommended to \"seal\" the   gains achieved by Prolia therapy and prevent further bone loss is   recommended with follow up bone density scan in 1 to 2 years.      Bone densitometry was performed on your patient using our Stock[employer]   densitometer. The results are summarized and a copy of the actual scans   are included for your review. In conformity with the International Society   of Clinical " Densitometry's most recent position statement for DXA   interpretation (2015), the diagnosis will be made on the lowest measured   T-score of the lumbar spine, femoral neck, total proximal femur or 33%   radius. Note the change in terminology for diagnostic classification from   OSTEOPENIA to LOW BONE MASS. All trending for sequential exams will be   done using multiple vertebrae or the total proximal femur. Fracture risk   is based on the WHO Fracture Risk Assessment Tool (FRAX). If additional   information is needed or if you would like to discuss the results, please   do not hesitate to call me.       Thank you for referring this patient to [facility].   We are happy to be of service in support of you and your practice. If you   have any questions or suggestions to improve our service, please call me   at [phone].     Sincerely,     Elvia Pearce M.D. C.C.D.  Osteoporosis Services, [facility]       Phone call duration: 20 minutes      Dexa Scan: 1/18/21  Last Prolia Injection: 12/12/2019    Labs 1/18/21:  Vitamin D: 35.1  Calcium: 10.1  A1C: 7.2  LDL: 69  CREATININE: 0.73  Microalbumin: 1/08/21

## 2021-06-15 NOTE — PROGRESS NOTES
[facility]  [address]  [facility] [address]  Dept: [phone]  Dept Fax: [phone]  Primary Provider: Artur Smith,   Pre-op Performing Provider: ARTUR SMITH    PREOPERATIVE EVALUATION:  Today's date: 3/8/2021    Karol Mujica is a 75 y.o. female who presents for a preoperative evaluation.    Surgical Information:  Surgery/Procedure: BILATERAL LUMBAR 4-LUMBAR 5 LAMINECTOMIES, MEDIAL FACETECTOMY, FORAMINOTOMIES; LUMBAR 4-LUMBAR 5 POSTERIOR INSTRUMENTED FUSION AND ARTHRODESIS, USE OF ALLOGRAFT, USE OF AUTOGRAFT, USE OF STEALTH NAVIGATION  Surgery Location: [facility]  Surgeon: Dr. Jia Chen  Surgery Date: 3/10/21  Time of Surgery: 8am  Where patient plans to recover: At home with family  Fax number for surgical facility: Note does not need to be faxed, will be available electronically in Epic.    Type of Anesthesia Anticipated: General    Assessment & Plan     The proposed surgical procedure is considered HIGH risk.    Problem List Items Addressed This Visit     Type 2 diabetes mellitus with diabetic polyneuropathy, without long-term current use of insulin (H)     Has been controlled with last A1c of 7.2.  Will hold glipizide and Metformin morning of surgery.  If diet is use may want hold Metformin for 2 to 3 days post surgery.  Also recommend diabetic diet while in hospital.         Essential hypertension     Controlled with current use of losartan 50 mg daily.  We will have her hold day of surgery.         Severe obesity (BMI 35.0-35.9 with comorbidity) (H)    Chronic bilateral low back pain with bilateral sciatica    Urinary dysfunction     Neurosurgery notes reviewed.  Several dysfunction his most likely secondary to the nerve compression.           Other Visit Diagnoses     Preoperative examination    -  Primary    Relevant Orders    Electrocardiogram Perform and Read (Completed)    Need for vaccination        Relevant Orders     Pneumococcal conjugate vaccine 13-valent 6wks-17yrs; >50yrs (Completed)    Pre-op exam                 Risks and Recommendations:  The patient has the following additional risks and recommendations for perioperative complications:   - Consult Hospitalist / IM to assist with post-op medical management  Diabetes:  - Patient is not on insulin therapy: regular NPO guidelines can be followed.     Medication Instructions:   - ACE/ARB: HOLD due to exceptional risk of hypotension during surgery.    - metformin: HOLD day of surgery.   - sulfonylurea (e.g. glyburide, glipizide): HOLD day of surgery   - ibuprofen (Advil, Motrin) and Aspirin: HOLD 10 days before surgery.     RECOMMENDATION:  APPROVAL GIVEN to proceed with proposed procedure pending review of diagnostic evaluation.      Subjective     HPI related to upcoming procedure: Lower back pain with radiation with nerve compression including weakness, numbness and urinary urgency and poor bladder emptying. Failure of conservative management including PT, injections, behavioral adaptations. Neurosurgery notes reviewed.     Preop Questions 3/8/2021   Have you ever had a heart attack or stroke? No   Have you ever had surgery on your heart or blood vessels, such as a stent placement, a coronary artery bypass, or surgery on an artery in your head, neck, heart, or legs? No   Do you have chest pain with activity? No   Do you have a history of  heart failure? No   Do you currently have a cold, bronchitis or symptoms of other infection? No   Do you have a cough, shortness of breath, or wheezing? No   Do you or anyone in your family have previous history of blood clots? No   Do you or does anyone in your family have a serious bleeding problem such as prolonged bleeding following surgeries or cuts? No   Have you ever had problems with anemia or been told to take iron pills? No   Have you had any abnormal blood loss such as black, tarry or bloody stools, or abnormal vaginal bleeding?  No   Have you ever had a blood transfusion? YES - 2014 Colon resection. Of note: Red Cell Antibodies C and K noted.   Have you ever had a transfusion reaction? No   Are you willing to have a blood transfusion if it is medically needed before, during, or after your surgery? Yes   Have you or any of your relatives ever had problems with anesthesia? No   Do you have sleep apnea, excessive snoring or daytime drowsiness? No   Do you have any artifical heart valves or other implanted medical devices like a pacemaker, defibrillator, or continuous glucose monitor? No   Do you have artificial joints? YES - left femur/hip/ b/l knees   Are you allergic to latex? No     Health Care Directive:  Patient has a Health Care Directive on file.     Preoperative Review of :    reviewed - controlled substances reflected in medication list.    See problem list for active medical problems.  Problems all longstanding and stable, except as noted/documented.  See ROS for pertinent symptoms related to these conditions.      Review of Systems   Constitutional: Negative for chills, fatigue and fever.   Eyes: Negative for visual disturbance.   Respiratory: Negative for chest tightness and shortness of breath.    Cardiovascular: Negative for chest pain, palpitations and leg swelling.   Gastrointestinal: Negative for constipation, diarrhea, nausea and vomiting.   Genitourinary: Negative for difficulty urinating.         Patient Active Problem List    Diagnosis Date Noted     Spinal stenosis of lumbar region with neurogenic claudication 03/04/2021     Urinary dysfunction 03/04/2021     Chronic bilateral low back pain with bilateral sciatica 01/08/2021     Near syncope 03/05/2020     History of colonic polyps      Age-related cataract of both eyes, unspecified age-related cataract type 01/04/2019     Severe obesity (BMI 35.0-35.9 with comorbidity) (H) 08/15/2018     Trochanteric bursitis of both hips 07/09/2018     Vitamin D deficiency  2016     Essential hypertension 2016     Osteoporosis 2016     Type 2 diabetes mellitus with diabetic polyneuropathy, without long-term current use of insulin (H)      Hyperlipidemia      Peripheral Neuropathy      Sciatica      Microalbuminuria      Colon Cancer      Past Medical History:   Diagnosis Date     Anemia      Arthritis      Cataract      Cholelithiasis      Colon cancer (H)      Diabetes mellitus (H)      Diverticular disease      GI bleed 2/15/2014     History of anesthesia complications     slow to wake up     History of transfusion      Hyperlipidemia      Hypertension      Osteoporosis      Peripheral neuropathy     feet     Red blood cell antibody positive      h/o blood transfusion . known red cell antibodies to C and K per preop H&P     Past Surgical History:   Procedure Laterality Date     APPENDECTOMY       BREAST BIOPSY Left     Years ago     BREAST EXCISIONAL BIOPSY Left     Years ago      SECTION        SECTION, CLASSIC       COLON SURGERY       COLONOSCOPY N/A 3/26/2019    Procedure: COLONOSCOPY WITH BIOPSY;  Surgeon: Barry Del Castillo MD;  [facility]: [facility];  Service: General     COLONOSCOPY W/ BIOPSIES  2014    3 polyps removed     EYE SURGERY       FEMUR SURGERY Left     for fracture     FRACTURE SURGERY       HYSTERECTOMY       JOINT REPLACEMENT       LAPAROSCOPIC CHOLECYSTECTOMY N/A 10/10/2018    Procedure: CHOLECYSTECTOMY, LAPAROSCOPIC;  Surgeon: Demarcus Barriga MD;  Location: [facility];  Service:      MA STEREOTACTIC BREAST BIOPSY VACUUM L Left 2021     OOPHORECTOMY Bilateral      NJ PART REMOVAL COLON W ANASTOMOSIS      Description: Right Hemicolectomy;  Proc Date: 2014;     [place] REMOVE TONSILS/ADENOIDS,<11 Y/O  age 22    Description: Tonsillectomy With Adenoidectomy;  Recorded: 2013;     [place] TOTAL KNEE ARTHROPLASTY Bilateral     Description: Total Knee Arthroplasty;  Recorded:  08/22/2014;  Comments: Bilateral     TONSILLECTOMY       TUBAL LIGATION  1981     Current Outpatient Medications   Medication Sig Dispense Refill     aspirin 81 MG EC tablet Take 81 mg by mouth daily.       baclofen (LIORESAL) 10 MG tablet Take 0.5-1 tablets (5-10 mg total) by mouth 2 (two) times a day as needed (for muscle spasms). 30 tablet 0     blood-glucose meter Misc Use 1 Device As Directed daily. Type 2 DM, without long term insulin use 1 each 0     cholecalciferol, vitamin D3, 2,000 unit cap Take 2 tablets by mouth every evening.        gabapentin (NEURONTIN) 300 MG capsule 1 cap at bedtime x 3 days, then may take 1 cap twice daily x 3 days, then may take 1 cap three times daily. (Patient taking differently: 1 cap in the morning and 1 at night.) 90 capsule 2     glipiZIDE (GLUCOTROL) 10 MG tablet Take 1 tablet (10 mg total) by mouth 2 (two) times a day before meals. 180 tablet 3     ibuprofen (ADVIL,MOTRIN) 200 MG tablet Take 400 mg by mouth at bedtime. And as needed for back pain       losartan (COZAAR) 50 MG tablet Take 1 tablet (50 mg total) by mouth daily. 90 tablet 3     metFORMIN (GLUCOPHAGE) 1000 MG tablet TAKE 1 TABLET (1,000 MG TOTAL) BY MOUTH 2 (TWO) TIMES A DAY WITH MEALS. 180 tablet 3     multivitamin (MULTIVITAMIN) per tablet Take 1 tablet by mouth daily.       ONETOUCH VERIO strips USE 1 EACH AS DIRECTED 2 (TWO) TIMES A DAY. 200 strip 3     simvastatin (ZOCOR) 40 MG tablet TAKE 1 TABLET BY MOUTH EVERYDAY AT BEDTIME 90 tablet 2     Current Facility-Administered Medications   Medication Dose Route Frequency Provider Last Rate Last Admin     denosumab 60 mg (PROLIA 60 mg/ml)  60 mg Subcutaneous Q6 Months Kalia Hector MD   60 mg at 12/12/19 1434       Allergies   Allergen Reactions     Venom-Honey Bee Anaphylaxis     Nitrofurantoin Hives     Nausea     Penicillins Hives       Social History     Tobacco Use     Smoking status: Never Smoker     Smokeless tobacco: Never Used   Substance Use  "Topics     Alcohol use: No     Frequency: Never     Binge frequency: Never        Social History     Substance and Sexual Activity   Drug Use No        Objective     /66 (Patient Site: Left Arm, Patient Position: Sitting, Cuff Size: Adult Large)   Pulse 72   Temp 98.4  F (36.9  C) (Oral)   Resp 12   Ht 5' 3.5\" (1.613 m)   Wt 221 lb 3.2 oz (100.3 kg)   LMP  (LMP Unknown)   Breastfeeding No   BMI 38.57 kg/m    Physical Exam   Constitutional: She is oriented to person, place, and time. She appears well-developed and well-nourished.   HENT:   Head: Normocephalic and atraumatic.   Right Ear: External ear normal.   Left Ear: External ear normal.   Nose: Nose normal.   Mouth/Throat: Oropharynx is clear and moist.   Eyes: Pupils are equal, round, and reactive to light. Conjunctivae and EOM are normal. Right eye exhibits no discharge. Left eye exhibits no discharge.   Neck: Normal range of motion. No thyromegaly present.   Cardiovascular: Normal rate, regular rhythm, normal heart sounds and intact distal pulses.   No murmur heard.  Pulmonary/Chest: Effort normal and breath sounds normal.   Musculoskeletal: Normal range of motion.   Lymphadenopathy:     She has no cervical adenopathy.   Neurological: She is alert and oriented to person, place, and time. She has normal strength and normal reflexes. No cranial nerve deficit or sensory deficit.   Reflex Scores:       Patellar reflexes are 2+ on the right side and 2+ on the left side.  Skin: Skin is warm and dry. No rash noted.   Psychiatric: She has a normal mood and affect.   Nursing note and vitals reviewed.        Recent Labs   Lab Test 01/08/21  0835 03/07/20  0722   HGB 15.1 13.7    212    140   K 4.7 4.4   CREATININE 0.73 0.72        PRE-OP Diagnostics:   Labs pending at this time. Results will be reviewed when available.  EKG required for age and planned surgery and not completed in the last 90 days.    REVISED CARDIAC RISK INDEX (RCRI)   The " patient has the following serious cardiovascular risks for perioperative complications:   - High risk surgery (>5% cardiac complication risk) = 1 point    RCRI INTERPRETATION: 1 point: Class II (low risk - 0.9% complication rate)         Signed Electronically by: Saul Zamora DO    Copy of this evaluation report is provided to requesting physician.    Preop [facility] Preop Guidelines    Revised Cardiac Risk Index

## 2021-06-15 NOTE — TELEPHONE ENCOUNTER
Refill Approved    Rx renewed per Medication Renewal Policy. Medication was last renewed on 2/19/20, last OV 1/8/21.    Shannan Frias, [facility] Triage/Med Refill 2/6/2021     Requested Prescriptions   Pending Prescriptions Disp Refills     losartan (COZAAR) 50 MG tablet [Pharmacy Med Name: LOSARTAN POTASSIUM 50 MG TAB] 90 tablet 3     Sig: TAKE 1 TABLET BY MOUTH EVERY DAY       Angiotensin Receptor Blocker Protocol Passed - 2/6/2021 12:23 AM        Passed - PCP or prescribing provider visit in past 12 months       Last office visit with prescriber/PCP: 7/6/2020 Saul Zamora DO OR same dept: 3/11/2020 Saul Zamora DO OR same specialty: 7/6/2020 Saul Zamora DO  Last physical: 1/8/2021 Last MTM visit: Visit date not found   Next visit within 3 mo: Visit date not found  Next physical within 3 mo: Visit date not found  Prescriber OR PCP: Saul Zamora DO  Last diagnosis associated with med order: 1. Essential hypertension  - losartan (COZAAR) 50 MG tablet [Pharmacy Med Name: LOSARTAN POTASSIUM 50 MG TAB]; TAKE 1 TABLET BY MOUTH EVERY DAY  Dispense: 90 tablet; Refill: 3    If protocol passes may refill for 12 months if within 3 months of last provider visit (or a total of 15 months).             Passed - Serum potassium within the past 12 months     Lab Results   Component Value Date    Potassium 4.7 01/08/2021             Passed - Blood pressure filed in past 12 months     BP Readings from Last 1 Encounters:   02/02/21 153/86             Passed - Serum creatinine within the past 12 months     Creatinine   Date Value Ref Range Status   01/08/2021 0.73 0.60 - 1.10 mg/dL Final

## 2021-06-15 NOTE — TELEPHONE ENCOUNTER
Called patient with the results of her benign breast biopsy.  Reviewed follow up plan to follow up with her primary md and resume screening mammograms.

## 2021-06-15 NOTE — TELEPHONE ENCOUNTER
Returned call to patient and assessed her post-op recovery. Pt reported that she is doing very well at home. She has the help of her , daughter-in-law, and granddaughter. She has minimal pain and no concerns requiring a home PT/OT or RN wound evaluation.      Pt does not believe she needs home care at this time. Writer reinforced activity restrictions and encouraged pt to call the clinic if any questions or concerns arise.     Pt verbalized agreement and understanding.     Akosua Maldonado RN

## 2021-06-15 NOTE — ANESTHESIA POSTPROCEDURE EVALUATION
Patient: Karol Mujica  Procedure(s):  BILATERAL LUMBAR 4-LUMBAR 5 LAMINECTOMIES, MEDIAL FACETECTOMY, FORAMINOTOMIES; LUMBAR 4-LUMBAR 5 POSTERIOR INSTRUMENTED FUSION AND ARTHRODESIS, USE OF ALLOGRAFT, USE OF AUTOGRAFT, USE OF STEALTH NAVIGATION  Anesthesia type: general    Patient location: PACU  Last vitals:   Vitals Value Taken Time   /64 03/10/21 1520   Temp 36.3  C (97.4  F) 03/10/21 1440   Pulse 66 03/10/21 1524   Resp 16 03/10/21 1515   SpO2 96 % 03/10/21 1524   Vitals shown include unvalidated device data.  Post vital signs: stable  Level of consciousness: awake and responds to simple questions  Post-anesthesia pain: pain controlled  Post-anesthesia nausea and vomiting: no  Pulmonary: unassisted, return to baseline  Cardiovascular: stable and blood pressure at baseline  Hydration: adequate  Anesthetic events: no    QCDR Measures:  ASA# 11 - Mirian-op Cardiac Arrest: ASA11B - Patient did NOT experience unanticipated cardiac arrest  ASA# 12 - Mirian-op Mortality Rate: ASA12B - Patient did NOT die  ASA# 13 - PACU Re-Intubation Rate: ASA13B - Patient did NOT require a new airway mgmt  ASA# 10 - Composite Anes Safety: ASA10A - No serious adverse event    Additional Notes:

## 2021-06-15 NOTE — PATIENT INSTRUCTIONS - HE

## 2021-06-15 NOTE — TELEPHONE ENCOUNTER
ORDER FROM: Dr. Chen    PRE AUTHORIZATION: PA Approved. Ok to schedule    METHOD OF PATIENT CONTACT: Spoke to Karol on the phone. Best number to reach: [phone]    PROCEDURE: Bilateral lumbar 4-lumbar 5 laminectomies, medial facetectomy, foraminotomies; lumbar 4-lumbar 5 posterior instrumented fusion and arthrodesis, use of allograft, use of autograft, use of stealth navigation    SURGICAL DATE: 3/10/21 @ 8:00 AM - [facility]    READINESS VISIT: PLEASE CALL    PCP,CLINIC, PHONE #: Dr. Saul Zamora, [facility], [phone]    PRE-OP PHYSICAL: 3/8/21 @ 1:00 PM with Dr. Zamora    COVID-19 TESTING: 3/6/21 @ 12:50 PM at the [facility] site    FILM INFO: XRAY LUMBAR: 02/02/21 @ [facility]          MRI LUMBAR: 1/20/21 @ [facility]    SURGERY CONFIRMATION LETTER: E-mailed to patient on 3/4/21

## 2021-06-15 NOTE — PATIENT INSTRUCTIONS - HE
Please follow up two weeks post procedure with Dr Burleson to evaluate your plan of care.    Please be advised that your blood glucose levels may be increased for the next 5-7 days as a result of the steroid you received with your injection today.  It is recommended that you monitor your blood glucose levels closely over the next week and direct any additional questions regarding your blood glucose management to your primary doctor.       DISCHARGE INSTRUCTIONS    During office hours (8:00 a.m.- 4:00 p.m.) questions or concerns may be answered  by calling [facility] Navigation Nurses at  [phone].  Messages received after hours will be returned the following business day.      In the case of an emergency, please dial 911 or seek assistance at the nearest Emergency Room/Urgent Care facility.     All Patients:    ? You may experience an increase in your symptoms for the first 2 days (It may take anywhere between 2 days- 2 weeks for the steroid to have maximum effect).    ? You may use ice on the injection site, as frequently as 20 minutes each hour if needed.    ? You may take your pain medicine.    ? You may continue taking your regular medication after your injection. If you have had a Medial Branch Block you may resume pain medication once your pain diary is completed.    ? You may shower. No swimming, tub bath or hot tub for 48 hours.  You may remove your bandaid/bandage as soon as you are home.    ? You may resume light activities, as tolerated.    ? Resume your usual diet as tolerated.    ? It is strongly advised that you do not drive for 1-3 hours post injection.    ? If you have had oral sedation:  Do not drive for 8 hours post injection.      ? If you have had IV sedation:  Do not drive for 24 hours post injection.  Do not operate hazardous machinery or make important personal/business decisions for 24 hours.      POSSIBLE STEROID SIDE EFFECTS (If steroid/cortisone was used for your procedure)    -If you  experience these symptoms, it should only last for a short period      Swelling of the legs                Skin redness (flushing)       Mouth (oral) irritation     Blood sugar (glucose) levels              Sweats                      Mood changes    Headache    Sleeplessness    Weakened immune system for up to 14 days, which could increase the risk of lyn the COVID-19 virus and/or experiencing more severe symptoms of the disease, if exposed.    Decreased effectiveness of the flu vaccine if given within 2 weeks of the steroid.         POSSIBLE PROCEDURE SIDE EFFECTS  -Call the Spine Center if you are concerned    Increased Pain             Increased numbness/tingling        Nausea/Vomiting            Bruising/bleeding at site        Redness or swelling                                                Difficulty walking        Weakness             Fever greater than 100.5    *In the event of a severe headache after an epidural steroid injection that is relieved by lying down, please call the [facility] to speak with a clinical staff member*

## 2021-06-15 NOTE — PROGRESS NOTES
Assessment/Plan:      Diagnoses and all orders for this visit:    Spinal stenosis of lumbar region with neurogenic claudication    Spondylolisthesis of lumbar region    Arthropathy of lumbar facet joint    Myofascial pain        Assessment: Pleasant 75 y.o. female  with history of colon cancer, diabetes mellitus, hypertension, hyperlipidemia, peripheral polyneuropathy, osteoporosis with:     1.  Chronic lumbar spine pain worse after a fall in October 2020 with left lower extremity weakness and bilateral lower extremity radicular pain left greater than right with symptoms consistent with S1 radiculopathy on the left such as loss of left Achilles reflex as well as Trendelenburg/gluteal muscle weakness ankle plantar flexor weakness.  She has severe spinal stenosis at L4-5 with a grade 1 spondylolisthesis is degenerative.  Increased left lower extremity pain and no change in the right lower extremity pain after bilateral L5-S1 transforaminal epidural steroid injection.  Her symptoms have returned to baseline.     2.    Lumbar spine pain partially related to facet arthropathy which is significant bilaterally at L4-5 as well as L5-S1 left greater than right.      3. Myofascial pain gluteal region lumbar spine.     4.  Poor sleep related to pain.       Discussion:    1. *We discussed the results/response to lumbar epidural which was not helpful.  We discussed that she is seeing neurosurgery later today we discussed options such as changing her gabapentin medication/increasing that versus lumbar facet injections to treat back pain but leg pain and back pain are fairly similar in severity.    2.  I recommend that she see neurosurgery today for evaluation as planned.    3.  Continue gabapentin 300 mg twice daily.  She is having some drowsiness from this during the day and will not increase this to 3 times a day at this time.    4.  We can consider lumbar facet injections versus medial branch blocks to treat sudden low back  pain or remaining back pain if she has any after surgical intervention if it is deemed appropriate.    5.  Follow-up with me as needed      It was our pleasure caring for your patient today, if there any questions or concerns please do not hesitate to contact us.      Subjective:   Patient ID: Karol Mujica is a 75 y.o. female.    History of Present Illness: Patient presents for follow-up of low back pain and left greater than right lower extremity pain.  This is after bilateral L5-S1 transforaminal epidural steroid injection with Dr. Alexandre.  Injection was not helpful at all.  The right injection was done without incident but when she did the left side she had severe pain down the leg.  This has since returned to baseline.  It was reproduction and actually worsening of her baseline symptoms.  Pain is an 8/10 at worst 7/10 today 6/10 at best.  Worse with any walking and standing better with lying down or sitting.  Her back pain and leg pain are fairly equal.      Imaging: MRI report and images were personally reviewed and discussed with the patient.    MRI of the lumbar spine from January 2021 is personally reviewed.  This shows severe spinal stenosis with crowding of the cauda equina at L4-5.  There is a grade 1 spondylolisthesis at that level is degenerative.  Moderate edema of the facet joints at L4-5.  She also has facet arthropathy at L5-S1 left appears worse than right.    Review of Systems: Pertinent positives: Feels weakness in the legs and back.  Does have balance issues.  Pertinent negatives:  .  No bowel or bladder incontinence.  No urinary retention.  No fevers, unintentional weight loss, balance changes, headaches, difficulty swallowing, or coordination difficulties.  All others reviewed are negative.           Past Medical History:   Diagnosis Date     Anemia      Arthritis      Cataract      Cholelithiasis      Colon cancer (H) 2014     Diabetes mellitus (H)      Diverticular disease      GI bleed  2/15/2014     History of anesthesia complications     slow to wake up     History of transfusion      Hyperlipidemia      Hypertension      Osteoporosis      Peripheral neuropathy     feet     Red blood cell antibody positive      h/o blood transfusion 2015. known red cell antibodies to C and K per preop H&P       The following portions of the patient's history were reviewed and updated as appropriate: allergies, current medications, past family history, past medical history, past social history, past surgical history and problem list.           Objective:   Physical Exam:    Vitals:    03/03/21 1023   BP: 146/67   Pulse: 79     There is no height or weight on file to calculate BMI.      General: Alert and oriented with normal affect. Attention, knowledge, memory, and language are intact. No acute distress.   Eyes: Sclerae are clear.  Respirations: Unlabored. CV: No lower extremity edema.  Skin: No rashes seen.     Sensation is intact to light touch throughout the   lower extremities.  Reflexes are  2+ patellar and trace right, 0 left Achilles      Manual muscle testing reveals:  Right /Left out of 5      5/5 ankle plantar flexors-from seated  5/5 ankle dorsiflexors-from seated  5/5   ankle evertors-from seated

## 2021-06-16 ENCOUNTER — OFFICE VISIT - HEALTHEAST (OUTPATIENT)
Dept: VASCULAR SURGERY | Facility: CLINIC | Age: 76
End: 2021-06-16

## 2021-06-16 DIAGNOSIS — S21.209D WOUND OF BACK, UNSPECIFIED LATERALITY, SUBSEQUENT ENCOUNTER: ICD-10-CM

## 2021-06-16 DIAGNOSIS — T81.30XA SUPERFICIAL DEHISCENCE OF WOUND: ICD-10-CM

## 2021-06-16 NOTE — PROGRESS NOTES
Patient presents for incision nurse visit check.     DOS: 3/10/21  Procedure: LUMBAR 4-LUMBAR 5 POSTERIOR INSTRUMENTED FUSION  Surgeon: Gustavo    Today comes in for wound check. Is accompanied by her . Pt reports pre-op claudicating pain and bilateral LE numbness and tingling is resolved. Low back pain is improved, pt described as an aching stiffness. Patient is pleased with the outcome of the surgery. No motor, gait, balance, or sensory disturbances. Patient is walking frequently without difficulty, using a front wheel walker for stability. Wearing an Aspen brace for support when up and walking. Activity restrictions reinforced at this time as outlined the After Visit Summary.     Taking oxycodone four times a day and baclofen two times a day for pain with adequate relief. Discussed beginning to wean oxycodone by lengthening time between doses. Pt is using ice/heat for muscle stiffness/soreness.     Surgical wound WNL - CDI, no signs of infection or skin breakdown.  Incision well-healed: good skin approximation, no redness or visible/palpable edema, no tenderness to palpation.  PT. AF, denies fever, chills or sweats.     Suture removal - sutures intact removed without difficulty. Wound prepped with Betadine before and after removal.  Surrounding skin has no signs of breakdown. Steri-strips applied to wound.  Verbal instructions regarding incision care are given.  Pt. advised to call us if any s/s of infection noted - all discussed in detail.     Of note: patient reporting continued urinary urgency with an increase of urgency noted beginning 24 hours ago. This was alleviated by taking Cranberry tablets. Discussed with care team; called pt and informed her to contact her PCP for a UTI work up. Pt will call to update us about her UTI symptoms, pain management, and wound assessment on Monday.     All of patient's questions addressed today. {He/She) was instructed to call with any additional questions/concerns.      Akosua Maldonado, RN

## 2021-06-16 NOTE — TELEPHONE ENCOUNTER
Telephone Encounter by Meredith Antoine at 5/28/2019 12:58 PM     Author: Meredith Antoine Service: -- Author Type: Financial Resource Guide    Filed: 5/28/2019  1:13 PM Encounter Date: 5/28/2019 Status: Signed    : Meredith Antoine (Financial Resource Guide)       Medication: Prolia  Qty: 1ml every 6 months  Primary Insurance: [employer] Part D  Copay: $240.00  Michael: [employer] Michael  Annual Amount Granted: $1,000.00  Effective Dates: 04/28/2019 - 04/27/2020

## 2021-06-16 NOTE — TELEPHONE ENCOUNTER
Telephone Encounter by Meredith Antoine at 5/28/2019 11:54 AM     Author: Meredith Antoine Service: -- Author Type: Financial Resource Guide    Filed: 5/28/2019 11:59 AM Encounter Date: 5/28/2019 Status: Signed    : Meredith Antoine (Financial Resource Guide)       Medication: Prolia  Qty: 1ml  Insurance: [facility] Part D  Copay: $240.00  Assistance Available: [facility] Michael - awaiting to get income info from patient   Additional Information: PA expires on 09/12/2019

## 2021-06-16 NOTE — TELEPHONE ENCOUNTER
Telephone Encounter by Tg Tucker at 10/10/2019  9:19 AM     Author: Tg Tucker Service: -- Author Type: --    Filed: 10/10/2019  9:20 AM Encounter Date: 10/8/2019 Status: Signed    : Tg Tucker APPROVED:    Approval start date:09/08/2019  Approval end date:10/07/2020    Pharmacy has been notified of approval and will contact patient when medication is ready for pickup.

## 2021-06-16 NOTE — TELEPHONE ENCOUNTER
Pt called hoping to get a question answered regarding wound care at home. Please call her back [phone].

## 2021-06-16 NOTE — TELEPHONE ENCOUNTER
Telephone Encounter by Meredith Antoine at 12/12/2019  2:25 PM     Author: Meredith Antoine Service: -- Author Type: Financial Resource Guide    Filed: 12/12/2019  2:30 PM Encounter Date: 12/12/2019 Status: Signed    : Meredith Antoine (Financial Resource Guide)       Medication: Prolia 60mg/ml  Qty: 1ml for 6 months  Effective Dates: 11/12/2019 - 12/11/2022  Pharmacy: [id number]  Copay Amount: $0 when COB'd with [employer] that is on file  Copay Assistance: not eligible with Part D plans  Patient Notified? Yes

## 2021-06-16 NOTE — PROGRESS NOTES
"  Assessment & Plan   Problem List Items Addressed This Visit     Spinal stenosis, lumbar region with neurogenic claudication     Oxycodone use is down to only 3 tabs in a day.  She also had reduced her muscle relaxant.  Discussed going back on muscle relaxant 3 times a day and continue to wean off oxycodone.         Relevant Medications    baclofen (LIORESAL) 10 MG tablet    oxyCODONE (ROXICODONE) 10 mg immediate release tablet      Other Visit Diagnoses     UTI symptoms    -  Primary    UA presumptively positive.  Will send for urine culture.  Given recent instrumentation will go for 10 days of treatment    Relevant Medications    sulfamethoxazole-trimethoprim (BACTRIM DS) 800-160 mg per tablet    Other Relevant Orders    Urinalysis-UC if Indicated (Completed)    Culture, Urine           Return for recheck in 5-7 days if not improving.  Subjective   Karol Mujica is 76 y.o. and presents to clinic today for the following health issues   Possible urinary tract infection.  She did recently have surgery which included a Glez catheter.  Starting Friday started having increased frequency.  That is worsened over the weekend.  Also now burning with urination.  No fevers or chills, nausea or vomiting.  She has not noticed any blood in her urine.  She is slowly weaning down her pain medications.    Wound Check       Review of Systems   Constitutional: Negative for chills and fever.   Gastrointestinal: Negative for diarrhea, nausea and vomiting.   Genitourinary: Positive for dysuria and frequency. Negative for difficulty urinating, enuresis and flank pain.         Objective    /76 (Patient Site: Right Arm, Patient Position: Sitting, Cuff Size: Adult Large)   Pulse 88   Temp 98.2  F (36.8  C) (Oral)   Resp 12   Ht 5' 3.5\" (1.613 m)   Wt (!) 225 lb 3.2 oz (102.2 kg)   LMP  (LMP Unknown)   Breastfeeding No   BMI 39.27 kg/m    Body mass index is 39.27 kg/m .  Physical Exam   Constitutional: She is oriented to " person, place, and time. She appears well-developed and well-nourished.   HENT:   Head: Normocephalic and atraumatic.   Eyes: Conjunctivae and EOM are normal.   Cardiovascular: Normal rate and regular rhythm.   Pulmonary/Chest: Effort normal and breath sounds normal.   Abdominal: There is no CVA tenderness.   Neurological: She is alert and oriented to person, place, and time.   Psychiatric: She has a normal mood and affect. Her behavior is normal.   Nursing note reviewed.

## 2021-06-16 NOTE — TELEPHONE ENCOUNTER
Refill Approved    Rx renewed per Medication Renewal Policy. Medication was last renewed on 7/6/20, last OV 3/22/21.    Shannan Frias, [facility] Triage/Med Refill 4/24/2021     Requested Prescriptions   Pending Prescriptions Disp Refills     glipiZIDE (GLUCOTROL) 10 MG tablet [Pharmacy Med Name: GLIPIZIDE 10 MG TABLET] 180 tablet 3     Sig: TAKE 1 TABLET (10 MG TOTAL) BY MOUTH 2 (TWO) TIMES A DAY BEFORE MEALS.       Oral Hypoglycemics Refill Protocol Passed - 4/24/2021 12:21 AM        Passed - Visit with PCP or prescribing provider visit in last 6 months       Last office visit with prescriber/PCP: 3/22/2021 OR same dept: 7/6/2020 Saul Zamora DO OR same specialty: 3/22/2021 Saul Zamora DO Last physical: 3/8/2021 Last MTM visit: Visit date not found         Next appt within 3 mo: Visit date not found  Next physical within 3 mo: Visit date not found  Prescriber OR PCP: Saul Zamora DO  Last diagnosis associated with med order: 1. Type 2 diabetes mellitus with diabetic polyneuropathy, without long-term current use of insulin (H)  - glipiZIDE (GLUCOTROL) 10 MG tablet [Pharmacy Med Name: GLIPIZIDE 10 MG TABLET]; Take 1 tablet (10 mg total) by mouth 2 (two) times a day before meals.  Dispense: 180 tablet; Refill: 3     If protocol passes may refill for 12 months if within 3 months of last provider visit (or a total of 15 months).           Passed - A1C in last 6 months     Hemoglobin A1c   Date Value Ref Range Status   01/08/2021 7.2 (H) <=5.6 % Final               Passed - Microalbumin in last year      Microalbumin, Random Urine   Date Value Ref Range Status   01/08/2021 3.18 (H) 0.00 - 1.99 mg/dL Final                  Passed - Blood pressure in last year     BP Readings from Last 1 Encounters:   04/22/21 156/78             Passed - Serum creatinine in last year     Creatinine   Date Value Ref Range Status   03/11/2021 0.66 0.60 - 1.10 mg/dL Final

## 2021-06-16 NOTE — TELEPHONE ENCOUNTER
Pt states her AVS states to cleanse with saline, but pt was told to keep wound dry.     Informed pt that typically with dressing changes we do use some type of wound cleanser or saline to clean wound. Instructed pt to then dab wound dry before placing dressing.     Informed saline should come with ordered supplies.    Informed pt to call vascular clinic if they receive supplies and no saline.     No further questions

## 2021-06-16 NOTE — PROGRESS NOTES
NEUROSURGERY FOLLOW UP EVALUATION: 4/21/21  The patient's attending neurosurgeon is Dr. Chen     Assessment:   Postoperative L4-L5 laminectomy and posterior instrumented fusion on March 10, 2021     Plan:   Patient has been advised to contact wound care for follow up of her lumbar incision. Follow up with our office in 1 week for further evaluation and wound check. She has been advised to begin to eean from brace. Remove the brace for 1-2 hours a day, increasing each day by 1-2 hour increments.  If at any time during the wean you experience excessive fatigue, back pain or spasm, back down to the previous level for a day or so, then resume schedule.  Once out of brace for a full 8 hours, discontinue altogether or wear only as needed for comfort.   Increase activity as tolerated with physical therapy. Follow up otherwise will be tentatively in 6 weeks with repeat lumbar xray     HPI:   Mrs. Mujica is a pleasant 76 year old right handed female, who presents today postoperative L4-L5 laminectomy and posterior instrumented fusion on March 10, 2021 by Dr. Chen.  Presently she states that she is doing very well and is pleased with her progress since surgery. She will note intermittent low back tightness but states that her previous bilateral lower extremity pain has completely resolved. She notes that her incision seems to catch on her clothing frequently and notes small amount of drainage last week that had since resolved. She denies any fevers or chills.        Exam:   Heart Rate:  [80] 80  Resp:  [16] 16  BP: (130)/(84) 130/84  SpO2:  [98 %] 98 %    Alert and oriented x3, speech normal  PERRL, EOMI, face symmetric, tongue midline, shoulder shrug equal   CN II-XII intact  Coordination: no pronator drift, finger to nose smooth and accurate bilaterally   Motor Strength:   Deltoids: 5/5 right, 5/5 left  Biceps: 5/5 right, 5/5 left   Triceps: 5/5 right, 5/5 left   Wrist extensors:5/5 right, 5/5 left  Finger flexion:  5/5 right, 5/5 left   Finger abduction:5/5 right, 5/5 left   Hand : 5/5 right, 5/5 left   Hip flexors: 5/5 right, 5/5 left   Quadriceps: 5/5 right, 5/5 left  Ankle dorsiflexion: 5/5 right, 5/5 left    Ankle plantar flexion:5/5 right, 5/5 left   Extensor hallicus longus: 5/5 right, 5/5 left   Bulk and tone: normal  Reflexes: No pathological reflexes  Gait: ambulates with walker   Incision: noted scabbing and granulation tissue of whole length of incision without surrounding erythema, induration, or drainage     Incision has been cleansed and debrided by Dr. Chen     Imaging:   The images were personally reviewed and noted good hardware alignment       Yuliya Red PA-C  [facility]   O: [phone]

## 2021-06-16 NOTE — PATIENT INSTRUCTIONS - HE
"    Important Instructions                     How to care for your wound    Cleanse wound with saline sent to you with your supplies or a wound wash found at the pharmacy.      Pat dry the wound with 4\"x4\" gauze      Cut to fit the Xeroform and place in or on the wound.        Cover the wound with Primapore +pad      Change dressing: every day      Do not get your ulcer wet when you shower.  You can cover it with a cast protector. Cast protectors are available for purchase at [facility]. You may also purchase a cast protector at your local pharmacy.      Good nutrition is important for wound healing.  I recommend increasing your protein.  You can do this through your diet, nutritional supplements, and/or protein powder.    It is ok to continue current wound care treatment/products for the next 2-3 days until new wound care supplies are ordered and arrive. If longer than this please contact our office.    Please call the [facility] before substituting any product    Call our clinic nurse at [phone] if there is an increase in drainage, pain, redness, or the wound size increases.  This maybe a sign of infection and require attention prior to the next appointment        "

## 2021-06-16 NOTE — PROGRESS NOTES
Patient is here for a 6 week post op. She states that she is doing well with just some soreness at times. She mentions that her incision had some minimal drainage but has since improved. No fevers or any other symptoms.  Dalia Peguero,[facility],10:19 AM

## 2021-06-16 NOTE — TELEPHONE ENCOUNTER
"Called to see how Karol is doing today. \"I feel much better now\", she said. She saw her PCP and had started on abx for UTI. Verbalized no concerns regarding appearance of her wound. Reported improved back pain, no radicular pain.  F/u on 04/21/2021.  Betty Pozo RN, CNRN    "

## 2021-06-16 NOTE — PATIENT INSTRUCTIONS - HE
WOUND CARE:  Keep a dressing on the wound.  No lotions, soaps, powders, ointments, creams, or patches on incision until the wound is well healed.    Change the dressing daily, more frequently if necessary to keep the wound dry.  If you notice increased or persistent drainage from your wound, contact our office.  You may use an extra large band-aid or 4x4 and tape.  Both can be purchased at your pharmacy.    Wash your hands before changing the dressing or touching the wound. If someone is helping you change the dressing, ensure that the person washes his/her hands.  Good handwashing can decrease the risk of infection.  If you are unable to see the wound, have someone check the wound daily for redness, swelling or drainage.  A small amount of drainage is normal.       Two days after surgery begin showering. Wash your wound daily. Keep your wound covered in the shower.  Your incision is covered with steri strips. They will come off naturally. Do not pick or pull them off.     Do not submerge the wound under water. No baths or swimming for 6 weeks.     If you develop redness, swelling, drainage, or temp 101 or greater, please call our office at [phone].    * No lifting, pushing or pulling greater than 5-10 pound (this is about a gallon of milk) for the first 6 weeks after surgery .  *No repetitive bending, twisting, or jarring activities for 6 weeks.  *No overhead work  *No aerobic or strenuous activity  *No activities with increased risk of falls  *You may move about your home as tolerated  *You may walk up and down stairs as tolerated  *You may increase your activity slowly over the next 6 weeks    WALKING PROGRAM: As you can tolerate, walk daily-start with 5-10 minutes of continuous walking. This is in addition to the walking that you do as part of your daily activities. Increase the time that you walk by 5 minutes every couple of days. Do not exceed 30-45 minutes of continuous walking until seen in follow-up.  Walking is the best exercise after surgery.  **Listen to your body, if you find that you are more painful or fatigued, you may need to proceed more slowly.    **Do not smoke or expose yourself to second hand smoke. Cigarette smoke can delay healing and cause complications.     DRIVING:  We recommend that you do not drive while taking medications for pain or muscle spasms. Always read and follow the advice on your prescription bottle. If you have questions, speak with your pharmacist.  We recommend that you do not drive while wearing a brace, as it could limit your range of motion.    WORK: If you plan to return to work before you 4-6 weeks appointment, call and discuss with one of the nurses in the neurosurgery office.

## 2021-06-17 NOTE — TELEPHONE ENCOUNTER
Spoke with Karol and clarified wound care orders. Let her know she is not to get the wound wet in bath or shower but she should cleanse each dressing change with normal saline and pat dry gently with gauze. She was confused about that.    Also, supplies were ordered for Karol today from [facility] as she states she has not received anything. Instructed to call back with further questions.

## 2021-06-17 NOTE — PATIENT INSTRUCTIONS - HE
Patient Instructions by Saul Zamora DO at 10/7/2019  8:00 AM     Author: Saul Zamora DO Service: -- Author Type: Physician    Filed: 10/7/2019  8:26 AM Encounter Date: 10/7/2019 Status: Signed    : Saul Zamora DO (Physician)         Patient Education   Signs of Hearing Loss  Hearing loss is a problem shared by many people. In fact, it is one of the most common health conditions, particularly as people age. Most people over age 65 have some hearing loss, and by age 80, almost everyone does. Because hearing loss usually occurs slowly over the years, you may not realize your hearing ability has gotten worse.       Have your hearing checked  Contact your [facility] care provider if you:    Have to strain to hear normal conversation.    Have to watch other peoples faces very carefully to follow what theyre saying.    Need to ask people to repeat what theyve said.    Often misunderstand what people are saying.    Turn the volume of the television or radio up so high that others complain.    Feel that people are mumbling when theyre talking to you.    Find that the effort to hear leaves you feeling tired and irritated.    Notice, when using the phone, that you hear better with 1 ear than the other.    5116-7924 [employer] [address] [place] [address], [place] [id number]. All rights reserved. This information is not intended as a substitute for professional medical care. Always follow your healthcare professional's instructions.         Patient Education   Preventing Falls in the Home  As you get older, falls are more likely. Thats because your reaction time slows. Your muscles and joints may also get stiffer, making them less flexible. Illness, medications, and vision changes can also affect your balance. A fall could leave you unable to live on your own. To make your home safer, follow these tips:    Floors    Put nonskid pads under area rugs.    Remove throw  rugs.    Replace worn floor coverings.    Tack carpets firmly to each step on carpeted stairs. Put nonskid strips on the edges of uncarpeted stairs.    Keep floors and stairs free of clutter and cords.    Arrange furniture so there are clear pathways.    Clean up any spills right away.    Bathrooms    Install grab bars in the tub or shower.    Apply nonskid strips or put a nonskid rubber mat in the tub or shower.    Sit on a bath chair to bathe.    Use bathmats with nonskid backing.    Lighting    Keep a flashlight in each room.    Put a nightlight along the pathway between the bedroom and the bathroom.    2409-1287 [employer] [address] [place] [address], [place] [address]. All rights reserved. This information is not intended as a substitute for professional medical care. Always follow your healthcare professional's instructions.           Advance Directive  Patients advance directive was discussed and I am comfortable with the patients wishes.  Patient Education   Personalized Prevention Plan  You are due for the preventive services outlined below.  Your care team is available to assist you in scheduling these services.  If you have already completed any of these items, please share that information with your care team to update in your medical record.  Health Maintenance   Topic Date Due   ? HEPATITIS C SCREENING  1945   ? MEDICARE ANNUAL WELLNESS VISIT  10/28/2016   ? MAMMOGRAM  12/18/2019   ? PNEUMOCOCCAL IMMUNIZATION 65+ LOW/MEDIUM RISK (1 of 2 - PCV13) 11/07/2019 (Originally 3/9/2011)   ? DIABETES OPHTHALMOLOGY EXAM  11/07/2019 (Originally 9/4/2019)   ? INFLUENZA VACCINE RULE BASED (1) 11/07/2019 (Originally 8/1/2019)   ? ZOSTER VACCINES (1 of 2) 11/07/2019 (Originally 3/9/1996)   ? DXA SCAN  12/08/2019   ? DIABETES HEMOGLOBIN A1C  01/08/2020   ? DIABETES URINE MICROALBUMIN  04/05/2020   ? DIABETES FOLLOW-UP  04/07/2020   ? HYPERTENSION FOLLOW-UP  04/07/2020   ? FALL RISK ASSESSMENT   07/08/2020   ? DIABETES FOOT EXAM  10/07/2020   ? COLONOSCOPY  03/26/2022   ? ADVANCE CARE PLANNING  04/05/2024   ? TD 18+ HE  06/22/2025

## 2021-06-17 NOTE — PATIENT INSTRUCTIONS - HE
I recommend probiotics:    Probiotics are pills containing healthy bacteria that rebalance the natural bacterial ana maria in your gut.  They are sold over the counter.  Look for a product with at least 20 billion CFUs (colony forming units) in a serving and containing both lactobacillus and bifidus species.  Keep refrigerated and take in between meals with cool water.

## 2021-06-17 NOTE — TELEPHONE ENCOUNTER
Refill Approved    Rx renewed per Medication Renewal Policy. Medication was last renewed on 5/5/20, last OV 3/22/21 .    Shannan Frias, [facility] Triage/Med Refill 5/4/2021     Requested Prescriptions   Pending Prescriptions Disp Refills     metFORMIN (GLUCOPHAGE) 1000 MG tablet [Pharmacy Med Name: METFORMIN HCL 1,000 MG TABLET] 180 tablet 3     Sig: TAKE 1 TABLET (1,000 MG TOTAL) BY MOUTH 2 (TWO) TIMES A DAY WITH MEALS.       Metformin Refill Protocol Passed - 5/4/2021  1:06 AM        Passed - Blood pressure in last 12 months     BP Readings from Last 1 Encounters:   04/22/21 156/78             Passed - LFT or AST or ALT in last 12 months     Albumin   Date Value Ref Range Status   03/07/2020 3.8 3.5 - 5.0 g/dL Final     Bilirubin, Total   Date Value Ref Range Status   03/07/2020 0.5 0.0 - 1.0 mg/dL Final     Alkaline Phosphatase   Date Value Ref Range Status   03/07/2020 44 (L) 45 - 120 U/L Final     AST   Date Value Ref Range Status   03/07/2020 31 0 - 40 U/L Final     ALT   Date Value Ref Range Status   01/08/2021 28 0 - 45 U/L Final     Protein, Total   Date Value Ref Range Status   03/07/2020 7.1 6.0 - 8.0 g/dL Final                Passed - GFR or Serum Creatinine in last 6 months     GFR MDRD Non Af Amer   Date Value Ref Range Status   03/11/2021 >60 >60 mL/min/1.73m2 Final     GFR MDRD Af Amer   Date Value Ref Range Status   03/11/2021 >60 >60 mL/min/1.73m2 Final             Passed - Visit with PCP or prescribing provider visit in last 6 months or next 3 months     Last office visit with prescriber/PCP: 3/22/2021 OR same dept: 3/22/2021 Saul Zamora DO OR same specialty: 3/22/2021 Saul Zamora DO Last physical: 3/8/2021 Last MTM visit: Visit date not found         Next appt within 3 mo: Visit date not found  Next physical within 3 mo: Visit date not found  Prescriber OR PCP: Saul Zamora DO  Last diagnosis associated with med order: 1. Type 2 diabetes  mellitus with hyperglycemia, without long-term current use of insulin (H)  - metFORMIN (GLUCOPHAGE) 1000 MG tablet [Pharmacy Med Name: METFORMIN HCL 1,000 MG TABLET]; TAKE 1 TABLET (1,000 MG TOTAL) BY MOUTH 2 (TWO) TIMES A DAY WITH MEALS.  Dispense: 180 tablet; Refill: 3    2. Hyperlipidemia, unspecified hyperlipidemia type  - simvastatin (ZOCOR) 40 MG tablet [Pharmacy Med Name: SIMVASTATIN 40 MG TABLET]; TAKE 1 TABLET BY MOUTH EVERYDAY AT BEDTIME  Dispense: 90 tablet; Refill: 2     If protocol passes may refill for 12 months if within 3 months of last provider visit (or a total of 15 months).           Passed - A1C in last 6 months     Hemoglobin A1c   Date Value Ref Range Status   01/08/2021 7.2 (H) <=5.6 % Final               Passed - Microalbumin in last year      Microalbumin, Random Urine   Date Value Ref Range Status   01/08/2021 3.18 (H) 0.00 - 1.99 mg/dL Final                     simvastatin (ZOCOR) 40 MG tablet [Pharmacy Med Name: SIMVASTATIN 40 MG TABLET] 90 tablet 2     Sig: TAKE 1 TABLET BY MOUTH EVERYDAY AT BEDTIME       Statins Refill Protocol (Hmg CoA Reductase Inhibitors) Passed - 5/4/2021  1:06 AM        Passed - PCP or prescribing provider visit in past 12 months      Last office visit with prescriber/PCP: 3/22/2021 Saul Zamora DO OR same dept: 3/22/2021 Saul Zamora DO OR same specialty: 3/22/2021 Saul Zamora DO  Last physical: 3/8/2021 Last MTM visit: Visit date not found   Next visit within 3 mo: Visit date not found  Next physical within 3 mo: Visit date not found  Prescriber OR PCP: Saul Zamora DO  Last diagnosis associated with med order: 1. Type 2 diabetes mellitus with hyperglycemia, without long-term current use of insulin (H)  - metFORMIN (GLUCOPHAGE) 1000 MG tablet [Pharmacy Med Name: METFORMIN HCL 1,000 MG TABLET]; TAKE 1 TABLET (1,000 MG TOTAL) BY MOUTH 2 (TWO) TIMES A DAY WITH MEALS.  Dispense: 180 tablet; Refill: 3    2.  Hyperlipidemia, unspecified hyperlipidemia type  - simvastatin (ZOCOR) 40 MG tablet [Pharmacy Med Name: SIMVASTATIN 40 MG TABLET]; TAKE 1 TABLET BY MOUTH EVERYDAY AT BEDTIME  Dispense: 90 tablet; Refill: 2    If protocol passes may refill for 12 months if within 3 months of last provider visit (or a total of 15 months).

## 2021-06-17 NOTE — PROGRESS NOTES
"SUBJECTIVE: Karol Mujica is a 76 y.o. female with:  Chief Complaint   Patient presents with     Diarrhea     Diarrhea x 1 week    She has h/o loose stools due to colon cancer surgery.  In last week she has had persistent diarrhea- watery and several bowel movements daily.  No blood.  She has cramping / gas.  Lot of fecal urgency.  No nausea / vomiting.  No fevers or muscle aches.  No recent antibiotics.  No obvious contaminated foods.  Ate at a buffet about 8 days ago.  No one in family is ill.  She has been taking Immodium but not helping that much.  Also taking some pedialyte.      She had back surgery March 10 and is doing physical therapy.  She had surface wound infection and had packing and new bandage.  Wound is healing now.  She was treated for UTI with Bactrim in March.    She has diabetes mellitus type 2 and blood sugars are under 200.    Patient Active Problem List   Diagnosis     Type 2 diabetes mellitus with diabetic polyneuropathy, without long-term current use of insulin (H)     HLD (hyperlipidemia)     Peripheral neuropathy     Sciatica     Microalbuminuria     Colon Cancer     Benign essential hypertension     Osteoporosis     Vitamin D deficiency     Trochanteric bursitis of both hips     Severe obesity (BMI 35.0-35.9 with comorbidity) (H)     Age-related cataract of both eyes, unspecified age-related cataract type     History of colonic polyps     Near syncope     Chronic bilateral low back pain with bilateral sciatica     Urinary dysfunction     Spinal stenosis, lumbar region with neurogenic claudication      OBJECTIVE: /80 (Patient Site: Left Arm, Patient Position: Sitting, Cuff Size: Adult Regular)   Pulse 92   Ht 5' 3.5\" (1.613 m)   Wt 221 lb (100.2 kg)   LMP  (LMP Unknown)   SpO2 96%   BMI 38.53 kg/m   no distress  Lungs: Clear to auscultation.  No retractions or tachypnea.  CV: RRR. S1 and S2 normal.  No murmurs, rubs or gallops.  Abdomen: Soft. Active bowel sounds. NT. ND. No " HSM or masses.  Back: 2 cm open wound that is clean with serous drainage.    Karol was seen today for diarrhea.    Diagnoses and all orders for this visit:    Diarrhea of presumed infectious origin  -     Symptomatic COVID-19 Virus (CORONAVIRUS) PCR  -     C. difficile Toxigenic by PCR; Future  -     Enteric Pathogen and Virus Panel PCR; Future  -     HM1(CBC and Differential)  -     Comprehensive Metabolic Panel  -     C. difficile Toxigenic by PCR  -     Enteric Pathogen and Virus Panel PCR  -     SARS-CoV-2 (COVID-19)-PCR       Patient Instructions   I recommend probiotics:    Probiotics are pills containing healthy bacteria that rebalance the natural bacterial ana maria in your gut.  They are sold over the counter.  Look for a product with at least 20 billion CFUs (colony forming units) in a serving and containing both lactobacillus and bifidus species.  Keep refrigerated and take in between meals with cool water.       Will contact pt with results.  COVID unlikely but she has not been vaccinated and has new diarrhea.    Eliana Hammonds

## 2021-06-17 NOTE — PROGRESS NOTES
Pt presents today for a wound check and is accompanied by her . She is s/p lumbar fusion with Dr. Chen on 3/10/21. She is being followed by the WOC team.     Today her incision is CDI. She has been following the wound care instructions as outlined by the WOC team. The lumbar wound is approximately 5-6 cm long and is healing by secondary intention. The wound bed is healthy and pink. There is no drainage or discharge.     The old dressing was removed and the wound was irrigated with saline, patted dry with sterile gauze, a piece of Xeroform dressing was cut to size with sterile suture scissors and applied to her incision, followed by a primapore dressing.     Instructed pt to reach out to the WOC team for questions or concerns regarding supply delivery or wound care instructions.     Dr. Chen did view the pt's wound as well and voiced satisfaction with its appearance and healing.     Pt will follow up with us as needed and will be seen again at her 6 week follow up appointment.     Akosua Maldonado RN

## 2021-06-17 NOTE — PATIENT INSTRUCTIONS - HE
Please continue to follow the wound care instructions provided to you by the [facility]. Continue to follow up with them at your scheduled appointments. Please call our office if you have any questions or concerns. We will see you again at your 6 week post-op appointment with x-rays prior.     Thank you!

## 2021-06-17 NOTE — PATIENT INSTRUCTIONS - HE
"    Important Instructions                     How to care for your wound    Cleanse wound with saline sent to you with your supplies or a wound wash found at the pharmacy.      Pat dry the wound with 4\"x4\" gauze      Cut to fit the Xeroform and place in or on the wound.        Cover the wound with Primapore +pad      Change dressing: every day      Do not get your ulcer wet when you shower.  You can cover it with a cast protector. Cast protectors are available for purchase at [facility]. You may also purchase a cast protector at your local pharmacy.    It is ok to continue current wound care treatment/products for the next 2-3 days until new wound care supplies are ordered and arrive. If longer than this please contact our office.    Please call the [facility] before substituting any product    Call our clinic nurse at [phone] if there is an increase in drainage, pain, redness, or the wound size increases.  This maybe a sign of infection and require attention prior to the next appointment        "

## 2021-06-17 NOTE — TELEPHONE ENCOUNTER
Patient is calling  Stating she needs to speak with Samanta only regarding the wound care instructions. She states the information given to her didn't match what she was told at the apt

## 2021-06-18 NOTE — PATIENT INSTRUCTIONS - HE
Patient Instructions by Saul Zamora DO at 1/8/2021  8:20 AM     Author: Saul Zamora DO Service: -- Author Type: Physician    Filed: 1/8/2021  8:58 AM Encounter Date: 1/8/2021 Status: Signed    : Saul Zamora DO (Physician)         Patient Education   Signs of Hearing Loss  Hearing loss is a problem shared by many people. In fact, it is one of the most common health conditions, particularly as people age. Most people over age 65 have some hearing loss, and by age 80, almost everyone does. Because hearing loss usually occurs slowly over the years, you may not realize your hearing ability has gotten worse.       Have your hearing checked  Contact your [facility] care provider if you:    Have to strain to hear normal conversation.    Have to watch other peoples faces very carefully to follow what theyre saying.    Need to ask people to repeat what theyve said.    Often misunderstand what people are saying.    Turn the volume of the television or radio up so high that others complain.    Feel that people are mumbling when theyre talking to you.    Find that the effort to hear leaves you feeling tired and irritated.    Notice, when using the phone, that you hear better with 1 ear than the other.    3001-6243 The [employer]. [address] [place] [address]. All rights reserved. This information is not intended as a substitute for professional medical care. Always follow your healthcare professional's instructions.         Patient Education   Preventing Falls in the Home  As you get older, falls are more likely. Thats because your reaction time slows. Your muscles and joints may also get stiffer, making them less flexible. Illness, medications, and vision changes can also affect your balance. A fall could leave you unable to live on your own. To make your home safer, follow these tips:    Floors    Put nonskid pads under area rugs.    Remove throw  rugs.    Replace worn floor coverings.    Tack carpets firmly to each step on carpeted stairs. Put nonskid strips on the edges of uncarpeted stairs.    Keep floors and stairs free of clutter and cords.    Arrange furniture so there are clear pathways.    Clean up any spills right away.    Bathrooms    Install grab bars in the tub or shower.    Apply nonskid strips or put a nonskid rubber mat in the tub or shower.    Sit on a bath chair to bathe.    Use bathmats with nonskid backing.    Lighting    Keep a flashlight in each room.    Put a nightlight along the pathway between the bedroom and the bathroom.    3718-9878 [employer]. [address] [place] [address], [place] [address]. All rights reserved. This information is not intended as a substitute for professional medical care. Always follow your healthcare professional's instructions.           Advance Directive  Patients advance directive was discussed and I am comfortable with the patients wishes.  Patient Education   Personalized Prevention Plan  You are due for the preventive services outlined below.  Your care team is available to assist you in scheduling these services.  If you have already completed any of these items, please share that information with your care team to update in your medical record.  Health Maintenance   Topic Date Due   ? INFLUENZA VACCINE RULE BASED (1) 08/01/2020   ? A1C  01/06/2021   ? LIPID  01/20/2021   ? MICROALBUMIN  01/20/2021   ? Pneumococcal Vaccine: Pediatrics (0 to 5 Years) and At-Risk Patients (6 to 64 Years) (1 of 3 - PCV13) 02/08/2021 (Originally 3/9/1951)   ? Pneumococcal Vaccine: 65+ Years (1 of 1 - PPSV23) 02/08/2021 (Originally 3/9/2011)   ? DIABETIC EYE EXAM  02/08/2021 (Originally 8/7/2020)   ? ZOSTER VACCINES (1 of 2) 02/08/2021 (Originally 3/9/1996)   ? BMP  03/07/2021   ? HYPERTENSION FOLLOW-UP  07/08/2021   ? MAMMOGRAM  12/09/2021   ? MEDICARE ANNUAL WELLNESS VISIT  01/08/2022   ? DIABETIC FOOT EXAM   01/08/2022   ? FALL RISK ASSESSMENT  01/08/2022   ? ADVANCE CARE PLANNING  01/20/2025   ? TD 18+ HE  06/22/2025   ? COLORECTAL CANCER SCREENING  03/26/2029   ? DEXA SCAN  12/09/2034   ? HEPATITIS C SCREENING  Completed

## 2021-06-18 NOTE — LETTER
Letter by Poly Lozoya CMA at      Author: Poly Lozoya CMA Service: -- Author Type: --    Filed:  Encounter Date: 3/6/2019 Status: (Other)        Karol Mujica   [address] [place] [id number]

## 2021-06-19 NOTE — PROGRESS NOTES
Assessment/Plan:     Problem List Items Addressed This Visit     Colon Cancer     Given Colon cancer history, will start presumptive treatment after stool collection instead of waiting for results.         Severe obesity (BMI 35.0-35.9 with comorbidity) (H)     Weight is stable. Continue DM diet plan           Other Visit Diagnoses     Diarrhea of presumed infectious origin    -  Primary    Worried for possible C-Dif though most likely diverticulitis. Will start presumptive treatment for diverticulitis after stool collection.    Relevant Medications    metroNIDAZOLE (FLAGYL) 500 MG tablet    ciprofloxacin HCl (CIPRO) 500 MG tablet    Other Relevant Orders    C. difficile Toxigenic by PCR    Ova and Parasite, Stool    Culture, Stool          Return for recheck in 10-14 days if not improving..      The following high BMI interventions were performed this visit: prescribed dietary intake    Subjective:       73 y.o. female presents for evaluation diarrhea for the past 1.5 months.  Has been worsening over the past 2 weeks.  Now every time she eats she gets a loose to watery bowel movements.  No blood.  No melena.  She gets cramping in the lower area that improves with bowel movement.  She has not been feeling fevers and chills but she has been noticing her temperature is going up and down.  She has tried various diets as well as Imodium with no improvement.  She is even tried adding fiber with no improvement.  Her friend was hospitalized and recently  of rectal cancer and she notes that she was on isolation precautions but she does not know for what and that was about 2 months ago.  She did visit her in the hospital often.  She did use the care that they had outside of the room to include a gown and mask and gloves.      History     Reviewed By Date/Time Sections Reviewed    Saul Zamora DO 8/15/2018  9:39 AM Medical, Surgical, Tobacco, Alcohol, Drug Use, Sexual Activity, Family    Kalyan Chin Jr.,  "[facility] 8/15/2018  8:53 AM Tobacco          Review of Systems  Pertinent items are noted in HPI.       Objective:     Vitals:    08/15/18 0849   BP: 112/68   Pulse: 64   Resp: 12   Temp: 98  F (36.7  C)   TempSrc: Oral   Weight: (!) 229 lb 9.6 oz (104.1 kg)   Height: 5' 5\" (1.651 m)       Physical Exam   Constitutional: She is oriented to person, place, and time. She appears well-developed and well-nourished.   HENT:   Head: Normocephalic and atraumatic.   Cardiovascular: Normal rate, regular rhythm, normal heart sounds and intact distal pulses.    Pulmonary/Chest: Effort normal and breath sounds normal.   Abdominal: Soft.   I will tenderness palpation in the left lower quadrant.  Hyperactive bowel sounds.  No rebound or guarding.   Neurological: She is alert and oriented to person, place, and time.   Psychiatric: She has a normal mood and affect.   Vitals reviewed.          This note has been dictated using voice recognition software. Any grammatical or context distortions are unintentional and inherent to the software  "

## 2021-06-19 NOTE — PROGRESS NOTES
[facility]  ENDOCRINOLOGY    Osteoporosis Follow Up 7/18/2018    Karol Mujica, 1945, [id number]          Reason for visit      1. Osteoporosis        History     Karol Mujica is a very pleasant 73 y.o. old female who presents for follow up.   SUMMARY:  Karol is here today for Osteoporosis.  She is a patient of Dr Saul Zamora, who sent her to Dr Thompson initially, for treatment.  Subsequently, she has been receiving Prolia injections every 6 months by Dr Hector's orders, but has not yet been seen in our department. She reports that she has had no problems with the injections.  Her last Dexa Scan was done in December of 2017.  It showed significant improvement in her scores since her last scan. Her Vit D level is 26.2 and a little low. She is currently taking 2000 IU daily.  Calcium level is fine at 9.9.          Risk Factors     The following high- risk conditions have been ruled out: celiac disease, eating disorders, gastric bypass, hyperparathyroidism, inflammatory bowel disease, hyperthyroidism, rheumatoid arthritis, lupus, chronic kidney disease.    Karol Mujica has the following risk factors: Age, Female gender and     She is not on high risk medications such as glucocorticoids, anti-coagulants, anti-convulsants, chemotherapy or levothyroxine.    Patient deniesHysterectomy, Oophrectomy and Breast cancer.      Past Medical History     Patient Active Problem List   Diagnosis     Type 2 diabetes mellitus with hyperglycemia, without long-term current use of insulin (H)     Hyperlipidemia     Peripheral Neuropathy     Sciatica     Microalbuminuria     Pain During Urination (Dysuria)     Colon Cancer     Tachycardia     Leukocytosis     Right-sided low back pain with right-sided sciatica     Essential hypertension     Osteoporosis     Vitamin D deficiency     Trochanteric bursitis of both hips       Family History       family history includes Breast cancer (age of onset: 81) in her  "mother; Coronary artery disease in her father.    Social History      reports that she has never smoked. She has never used smokeless tobacco. She reports that she does not use illicit drugs.      Review of Systems     Patient denies current pain, limited mobility, fractures.   Remainder per HPI.      Vital Signs     /70  Ht 5' 5\" (1.651 m)  Wt (!) 230 lb 6.4 oz (104.5 kg)  LMP  (LMP Unknown)  BMI 38.34 kg/m2    Physical Exam     GENERAL:  Normal, NIRD  EYES:  Pupils equal, round and reactive to light; no proptosis, lid lag or  periorbital edema.  THYROID:  Thyroid is normal.  No tenderness or bruit  NECK: No lymph nodes  MUSCULOSKELETAL: No joint abnormalities, FROM in all four extremities. No kyphosis. Muscle strength grossly normal without evidence of wasting.  HEART:  Regular rate and rhythm without murmur.  LUNGS:  Clear to auscultation.  ABDOMEN:Soft, non-tender, no masses or organomegaly  NEURO:  Patella Reflexes were normal.No tremors  SKIN:  No acanthosis nigricans or vitiligo        Assessment     1. Osteoporosis        Plan       Karol is not yet due for another Prolia, as she just had one on 5/18/18.  Appointments made for her next one, as well as her f/u with me in 1 year. She will get her next Dexa Scan done in December of 2019.  All questions answered to her satisfaction.    Total visit minutes:25  Time spent counseling and coordination of care:23    Lizeth DAVIS Endocrinology  7/18/2018  7:07 AM        Current Medications     Outpatient Medications Prior to Visit   Medication Sig Dispense Refill     aspirin 81 MG EC tablet Take 81 mg by mouth daily.       blood glucose test strips Use 1 each As Directed daily. Dispense brand per patient's insurance at pharmacy discretion.Type 2 DM, without long term insulin use 100 strip 3     blood-glucose meter Misc Use 1 Device As Directed daily. Type 2 DM, without long term insulin use 1 each 0     cholecalciferol, vitamin D3, 2,000 unit cap " Take 1 tablet by mouth daily.       gabapentin (NEURONTIN) 300 MG capsule TAKE 1 CAPSULE BY MOUTH EVERY EVENING 90 capsule 1     glipiZIDE (GLUCOTROL) 5 MG tablet Take 1 tablet (5 mg total) by mouth daily. 90 tablet 3     losartan (COZAAR) 50 MG tablet TAKE 1 TABLET BY MOUTH DAILY 90 tablet 3     metFORMIN (GLUCOPHAGE) 500 MG tablet TAKE 2 TABLETS BY MOUTH TWICE A DAY WITH MEALS 360 tablet 3     multivitamin (MULTIVITAMIN) per tablet Take 1 tablet by mouth daily.       simvastatin (ZOCOR) 40 MG tablet TAKE 1 TABLET BY MOUTH DAILY AT BEDTIME 90 tablet 3     cyclobenzaprine (FLEXERIL) 10 MG tablet TAKE 1 TABLET BY MOUTH DAILY AT BEDTIME 90 tablet 1     loperamide (IMODIUM) 2 mg capsule Take 2 mg by mouth 4 (four) times a day as needed for diarrhea. Taking per Dr. Zamora's instructions       Facility-Administered Medications Prior to Visit   Medication Dose Route Frequency Provider Last Rate Last Dose     denosumab 60 mg (PROLIA 60 mg/ml)  60 mg Subcutaneous Q6 Months Kalia Hector MD   60 mg at 05/18/18 1023         Lab Results     TSH   Date Value Ref Range Status   02/23/2016 1.88 0.30 - 5.00 uIU/mL Final     PTH   Date Value Ref Range Status   02/23/2016 41 10 - 86 pg/mL Final     Calcium   Date Value Ref Range Status   07/09/2018 9.9 8.5 - 10.5 mg/dL Final           Imaging Results   Last DEXA scan:  Results for orders placed in visit on 11/22/17   DXA Bone Density Scan    Narrative 12/8/2017      RE: Karol Mujica  YOB: 1945        Dear Ty Melendrez,    Patient Profile:  72 y.o. female, postmenopausal, is here for the follow up bone density   test.   History of fractures - Yes;  Femur. Family history of osteoporosis - Yes;    mother.  Family history of hip fracture: None. Smoking history - No.   Osteoporosis treatment past -  No. Osteoporosis treatment current - Yes;    Prolia.  Chronic medical problems - Chronic low back problems, Diabetes   Mellitus, Hysterectomy and Oophorectomy. High  risk medications -    Anti-seizure;  Yes, Currently( On Gabapentin).      Assessment:    1. The spine bone density L1-L2 with T-score -1.3.  2. Femoral bone density shows right total hip T-score -0.9.  3. Trabecular bone score indicates good trabecular bone architecture.      72 y.o. female with LOW BONE DENSITY as determined by bone density, but   with a diagnosis of osteoporosis based on history of femur fracture.  She   is currently receiving Prolia therapy.    Since the previous bone density dated  November 9, 2015, there has been a     16.1 % increase in the bone density of the spine.  Additionally there has   been a 9.9 % increase in the right total hip.  This represents a positive   response to her current medications.    Recommendations:  Appropriate ongoing assessment of treatment is recommended with follow up   bone density scan in 2 years.      Bone densitometry was performed on your patient using our [email or web address]   densitometer. The results are summarized and a copy of the actual scans   are included for your review. In conformity with the International Society   of Clinical Densitometry's most recent position statement for DXA   interpretation (2015), the diagnosis will be made on the lowest measured   T-score of the lumbar spine, femoral neck, total proximal femur or 33%   radius. Note the change in terminology for diagnostic classification from   OSTEOPENIA to LOW BONE MASS. All trending for sequential exams will be   done using multiple vertebrae or the total proximal femur. Fracture risk   is based on the WHO Fracture Risk Assessment Tool (FRAX). If additional   information is needed or if you would like to discuss the results, please   do not hesitate to call me.       Thank you for referring this patient to [facility].   We are happy to be of service in support of you and your practice. If you   have any questions or suggestions to improve our service, please call me   at  [phone].     Sincerely,     Elvia Pearce M.D. RICHICRUFINO.  Osteoporosis Services, [facility]

## 2021-06-19 NOTE — PROGRESS NOTES
Assessment/Plan:     Problem List Items Addressed This Visit     Type 2 diabetes mellitus with hyperglycemia, without long-term current use of insulin (H) - Primary     A1c of 7.1 today.  Patient is planning to increase her exercise, and watch her food intake instead of changing current medications.  Follow-up in 3-6 months for recheck.  Of note, patient is currently on aspirin, statin as well as ARB.         Relevant Orders    Glycosylated Hemoglobin A1c (Completed)    Peripheral Neuropathy    Colon Cancer     Normal bowel movements.  Will check CEA as per plan.  Follow-up with colorectal surgeons in 1 month as scheduled.         Osteoporosis     Tolerating Prolia therapy well.  Continue with the endocrinology clinic as per plan.  Will get labs as per plan today.         Trochanteric bursitis of both hips     Home exercise discussed and demonstrated.  Will start physical therapy.  If not improved in the next 6-8 weeks, then return to clinic for further evaluation.         Relevant Orders    Ambulatory referral to PT/OT          Return in about 6 months (around 1/9/2019) for Diabetes.      I have had an Advance Directives discussion with the patient.  The following high BMI interventions were performed this visit: encouragement to exercise and weight monitoring    Subjective:       73 y.o. female presents for evaluation overall doing well.  She admits she has not been exercising as much.  She is still riding her stationary bicycle but she is no longer on her routine of walking.  She is working in the garden some.  She also admits she has not been watching her diet as closely and having a few more sweets and candies that she has previously.  Denies any changes in vision.  She couple readings where her glucose has been down to 79, but most the time they have been staying around 130-150.  She is eating 3 meals a day.  She is taking her medications daily and has not missed any.  She denies any chest pain, shortness of  "breath, dyspnea exertion, palpitations, nausea or vomiting.  She has been having some pain in her right hip.  She mainly notes in the morning when she first stands up or when laying on her right side.  Also when she first gets up she feels a pulling along the right side of her hip into her lower back that improves as she walks.  If she stays standing for too long it starts to hurt and she needs to sit to relieve the discomfort or start walking.  No swelling of the lower extremities.  Does not recall any trauma to the area.    History     Reviewed By Date/Time Sections Reviewed    Saul Zamora, DO 7/9/2018  9:00 AM Medical, Surgical, Tobacco, Alcohol, Drug Use, Sexual Activity, Family    Kalyan Chin Jr., CMA 7/9/2018  8:22 AM Tobacco          Review of Systems  Pertinent items are noted in HPI.       Objective:     Vitals:    07/09/18 0819   BP: 122/74   Pulse: 68   Resp: 12   Temp: 97.6  F (36.4  C)   TempSrc: Oral   Weight: (!) 232 lb 8 oz (105.5 kg)   Height: 5' 4.5\" (1.638 m)       /74 (Patient Site: Left Arm, Patient Position: Sitting, Cuff Size: Adult Large)  Pulse 68  Temp 97.6  F (36.4  C) (Oral)   Resp 12  Ht 5' 4.5\" (1.638 m)  Wt (!) 232 lb 8 oz (105.5 kg)  LMP  (LMP Unknown)  Breastfeeding? No  BMI 39.29 kg/m2  General appearance: alert, appears stated age and cooperative  Head: Normocephalic, without obvious abnormality, atraumatic  Lungs: clear to auscultation bilaterally  Heart: regular rate and rhythm, S1, S2 normal, no murmur, click, rub or gallop  Extremities: extremities normal, atraumatic, no cyanosis or edema and Right hip tender to palpation over the trochanteric bursa.  Full active range of motion.  Neurovascularly intact distally.  Pulses: 2+ and symmetric  Neurologic: Reflexes: 2/4 in the patellar region bilaterally    This note has been dictated using voice recognition software. Any grammatical or context distortions are unintentional and inherent to the " software

## 2021-06-20 NOTE — PROGRESS NOTES
Assessment/Plan:     Problem List Items Addressed This Visit     Type 2 diabetes mellitus with hyperglycemia, without long-term current use of insulin (H)     With continuing diarrhea, will hold metformin.  With holding of metformin will increase the glipizide to 5 mg twice a day from the current 5 mg once a day.  If diarrhea does not clear within a week of this medication change, step back down to the glipizide 5 mg once a day and restart the Metformin at 1000 mg twice a day.         Relevant Medications    glipiZIDE (GLUCOTROL) 5 MG tablet    Colon Cancer - Primary     Patient would like to transfer her care from colorectal surgeons over to [facility] general surgery.  With the finding of gallstones on the recent CT, as well as a symptomatic portion going along with those, will refer to [facility] general surgeons for further evaluation of the gallstones as well as to establish care for her history of colon cancer.         Relevant Orders    Ambulatory referral to General Surgery    Gallstones     Referral to surgeons for further evaluation.  Discussed reducing fat in diet until further evaluation by surgeons.         Relevant Orders    Ambulatory referral to General Surgery            Return in about 3 months (around 11/30/2018).    The following high BMI interventions were performed this visit: encouragement to exercise and weight monitoring    Subjective:       73 y.o. female presents for evaluation continuing diarrhea.  Patient is finding that within a half hour of eating she has gaseous bloating and the need to have a bowel movement that is usually watery to loose in nature.  She does have a history of colon cancer with personal resection of the ascending colon.  However she was not having any diarrhea after that.  However recently she did have a colonoscopy and with the prep, she had looser stools, then they had to do the prep again and she has had diarrhea since that timeframe.  No blood in the stools.  No  "melena.  Imodium does not seem to slow down.  She has been noticing some bloating.  Once a while she also notices a sharp pain in the right upper quadrant.  In the morning whenever she eats vance within a half hour she has a loose bowel movement and again that increased feeling of fullness in the right upper quadrant and epigastric area.  No nausea or vomiting.  Recent CT of the abdomen showed no signs of acute gastritis or diverticulitis, however it did show gallstones.    Of note, patient's colorectal surgeon recently moved from this side of the [facility] to the other side and is no longer practicing out of her former clinic.  With this change, patient would like to transfer her primary surgeon for her colon cancer to the [facility] surgical clinic.      History     Reviewed By Date/Time Sections Reviewed    Kalyan Chin Jr., FARHAT 8/31/2018 11:19 AM Tobacco          Review of Systems  Pertinent items are noted in HPI.       Objective:     Vitals:    08/31/18 1118   BP: 114/68   Pulse: 64   Resp: 12   Temp: 98.5  F (36.9  C)   TempSrc: Oral   Weight: (!) 226 lb 9.6 oz (102.8 kg)   Height: 5' 5\" (1.651 m)       Physical Exam   Constitutional: She is oriented to person, place, and time. She appears well-developed and well-nourished.   HENT:   Head: Normocephalic and atraumatic.   Cardiovascular: Normal rate, regular rhythm, normal heart sounds and intact distal pulses.    Pulmonary/Chest: Effort normal and breath sounds normal.   Abdominal: Soft. Bowel sounds are normal.   Positive tenderness to palpation in the right upper quadrant.  Otherwise no other tenderness to palpation.   Neurological: She is alert and oriented to person, place, and time.   Psychiatric: She has a normal mood and affect.   Vitals reviewed.          This note has been dictated using voice recognition software. Any grammatical or context distortions are unintentional and inherent to the software  "

## 2021-06-20 NOTE — LETTER
Letter by Severson, Tammie F, LPN at      Author: Severson, Tammie F, LPN Service: -- Author Type: --    Filed:  Encounter Date: 7/13/2020 Status: (Other)       7/13/2020        Karol Mujica  [address] [facility] [address]    COVID-19 Antibody Screen   Date Value Ref Range Status   07/09/2020 Negative  Final     Comment:     No COVID-19 antibodies detected.  Patients within 10 days of symptom onset for  COVID-19 may not produce sufficient levels of detectable antibodies.  Immunocompromised COVID-19 patients may take longer to develop antibodies.     COVID-19 IgG Titer   Date Value Ref Range Status   07/09/2020 Not Applicable  Final     Comment:     Qualitative screen for total antibodies to COVID-19 (SARS-CoV-2) with  semi-quantitative measurement of IgG COVID-19 antibodies by endpoint titer.  COVID-19 antibodies may be elevated due to a past or current infection.  Negative results do not rule out COVID-19 infection.  Results from antibody  testing should not be used as the sole basis to diagnose or exclude SARS-CoV-2  infection or to inform infection status.  COVID-19 PCR test should be ordered  if current infection is suspected.  False positive results may occur in rare  cases due to cross-reacting antibodies.  This test was developed and its performance characteristics determined by the  [facility] ([facility]),  which is regulated under CLIA as qualified to perform high-complexity testing.  This test has not been reviewed by the FDA.  Testing performed by [facility], [facility], [address], [address], [place] [address]       No results found for: [id number]    You have tested NEGATIVE for COVID-19 antibodies. This suggests you have not had or been exposed to COVID-19. But it does not mean that for sure.    The test finds antibodies in most people 10 days after they get sick. For some people, it  takes longer than 10 days for antibodies to show up. Others may never show antibodies against COVID-19, especially if they have weak immune systems.    If you have COVID-19 symptoms now, please stay home and away from others.     Your current symptoms may or may not be COVID-19.     What is antibody testing?  This is a kind of blood test. We take a small sample of your blood, and then test it for something called antibodies.   Your body makes antibodies to fight infection. If your blood has antibodies for a certain germ, it means youve been infected with that germ in the past.   Sometimes, antibodies stay in your body for years after youve had the infection. They can be there even if the germ didnt make you sick. They are a sign that your body fought off the infection.  Will this test find antibodies in everyone whos had COVID-19?  No. The test finds antibodies in most people 10 days after they get sick. For some people, it takes longer than 10 days for antibodies to show up. Others may never show antibodies against COVID-19, especially if they have weak immune systems.  What are the signs of COVID-19?  Signs of COVID-19 can appear from 2 to 14 days (up to 2 weeks) after youre infected. Some people have no symptoms or only mild symptoms. Others get very sick. The most common symptoms are:      Cough    Shortness of breath or trouble breathing    Or at least 2 of these symptoms:      Fever    Chills    Repeated shaking with chills    Muscle pain    Headache    Sore throat    Losing your sense of taste or smell    You may have other symptoms. Please contact your doctor or clinic for any symptoms that worry you.    Where can I get more information?     To learn the [facility] guidelines for staying home, please visit the [facility] website at [email or web address]    To learn more about COVID-19 and how to care for yourself at home, please visit the CDC  website at https://www.cdc.gov/coronavirus/2019-ncov/about/steps-when-sick.html    For more options for care at [facility], please visit our website at [email or web address]    [place] [facility] ([facility] COVID-19 Hotline:  [phone]

## 2021-06-20 NOTE — PROGRESS NOTES
Preoperative Exam    Scheduled Procedure: Cholecystectomy, Laparoscopic  Surgery Date:  10/10/18  Surgery Location: [facility], fax [phone]    Surgeon:  Dr. Demarcus Barriga    Assessment/Plan:     Problem List Items Addressed This Visit     Type 2 diabetes mellitus with hyperglycemia, without long-term current use of insulin (H)    Osteoporosis    Gallstones    Relevant Orders    Hemoglobin (Completed)    Basic Metabolic Panel (Completed)      Other Visit Diagnoses     Preoperative examination    -  Primary    Relevant Orders    Electrocardiogram Perform and Read (Completed)            Surgical Procedure Risk: Intermediate (reported cardiac risk generally 1-5%)  Have you had prior anesthesia?: Yes  Have you or any family members had a previous anesthesia reaction:  No  Do you or any family members have a history of a clotting or bleeding disorder?: No  Cardiac Risk Assessment: no increased risk for major cardiac complications    Patient approved for surgery with general or local anesthesia.    Please Note:  History of blood tranfusion in 2015. Known Red Cell Antibodies to C and K.    Functional Status: Independent  Patient plans to recover at home with family.     Subjective:      Karol Mujica is a 73 y.o. female who presents for a preoperative consultation.  Patient has been having recurrent right upper quadrant pain and some epigastric distress as well as diarrhea.  She does have a history of colon cancer as well.  Given the recurrent nature, radiological studies were checked and patient noted to have gallstones.  Given the recurrent nature of pain as well as diarrhea, was determined that best course of action is planned surgical intervention.  Patient denies any fevers, chills, chest pain, shortness breath, dyspnea exertion, palpitations, nausea or vomiting.  She does get recurrent right upper quadrant pain as well as reflexive type symptoms as well as looser stools within 30 minutes of eating a  fatty meal.    All other systems reviewed and are negative, other than those listed in the HPI.    Pertinent History  Do you have difficulty breathing or chest pain after walking up a flight of stairs: No  History of obstructive sleep apnea: No  Steroid use in the last 6 months: No  Frequent Aspirin/NSAID use: Yes: Daily 81 mg, will hold7 days prior  Prior Blood Transfusion: Yes: 2015. See Notes above.  Prior Blood Transfusion Reaction: No  If for some reason prior to, during or after the procedure, if it is medically indicated, would you be willing to have a blood transfusion?:  There is no transfusion refusal.    Current Outpatient Prescriptions   Medication Sig Dispense Refill     aspirin 81 MG EC tablet Take 81 mg by mouth daily.       blood glucose test strips Use 1 each As Directed daily. Dispense brand per patient's insurance at pharmacy discretion.Type 2 DM, without long term insulin use 100 strip 3     blood-glucose meter Misc Use 1 Device As Directed daily. Type 2 DM, without long term insulin use 1 each 0     cholecalciferol, vitamin D3, 2,000 unit cap Take 2 tablets by mouth daily.       cyclobenzaprine (FLEXERIL) 10 MG tablet TAKE 1 TABLET BY MOUTH DAILY AT BEDTIME 90 tablet 1     gabapentin (NEURONTIN) 300 MG capsule Take 1 capsule (300 mg total) by mouth every evening. 90 capsule 3     glipiZIDE (GLUCOTROL) 5 MG tablet Take 1 tablet (5 mg total) by mouth 2 (two) times a day before meals. 180 tablet 3     loperamide (IMODIUM) 2 mg capsule Take 2 mg by mouth 4 (four) times a day as needed for diarrhea. Taking per Dr. Zamora's instructions       losartan (COZAAR) 50 MG tablet TAKE 1 TABLET BY MOUTH DAILY 90 tablet 3     multivitamin (MULTIVITAMIN) per tablet Take 1 tablet by mouth daily.       simvastatin (ZOCOR) 40 MG tablet TAKE 1 TABLET BY MOUTH DAILY AT BEDTIME 90 tablet 3     Current Facility-Administered Medications   Medication Dose Route Frequency Provider Last Rate Last Dose     denosumab 60 mg  (PROLIA 60 mg/ml)  60 mg Subcutaneous Q6 Months Kalia Hector MD   60 mg at 18 1023        Allergies   Allergen Reactions     Penicillins Hives     Venom-Honey Bee        Patient Active Problem List   Diagnosis     Type 2 diabetes mellitus with hyperglycemia, without long-term current use of insulin (H)     Hyperlipidemia     Peripheral Neuropathy     Sciatica     Microalbuminuria     Colon Cancer     Tachycardia     Leukocytosis     Right-sided low back pain with right-sided sciatica     Essential hypertension     Osteoporosis     Vitamin D deficiency     Trochanteric bursitis of both hips     Severe obesity (BMI 35.0-35.9 with comorbidity) (H)     Gallstones       Past Medical History:   Diagnosis Date     Arthritis      Cataract      Colon cancer (H)      Diabetes mellitus (H)      Diverticular disease      GI bleed 2/15/2014     Hyperlipidemia      Osteoporosis      Peripheral neuropathy (H)        Past Surgical History:   Procedure Laterality Date     BREAST BIOPSY Left     Years ago     BREAST EXCISIONAL BIOPSY Left     Years ago      SECTION       COLONOSCOPY W/ BIOPSIES  2014    3 polyps removed     FEMUR SURGERY Left      HYSTERECTOMY  2000     OOPHORECTOMY Bilateral      NY PART REMOVAL COLON W ANASTOMOSIS      Description: Right Hemicolectomy;  Proc Date: 2014;     NY REMOVE TONSILS/ADENOIDS,<11 Y/O  age 22    Description: Tonsillectomy With Adenoidectomy;  Recorded: 2013;     NY TOTAL ABDOM HYSTERECTOMY      Description: Total Abdominal Hysterectomy;  Recorded: 2013;     NY TOTAL KNEE ARTHROPLASTY Bilateral right , left     Description: Total Knee Arthroplasty;  Recorded: 2013;  Comments: Bilateral     NY TOTAL KNEE ARTHROPLASTY      Description: Total Knee Arthroplasty;  Recorded: 2014;  Comments: Bilateral       Social History     Social History     Marital status:      Spouse name: Vinay     Number of children:  "2     Years of education: 16     Occupational History      Retired     Social History Main Topics     Smoking status: Never Smoker     Smokeless tobacco: Never Used     Alcohol use No     Drug use: No     Sexual activity: Not on file     Other Topics Concern     Not on file     Social History Narrative       Patient Care Team:  Saul Zamora DO as PCP - General  Demarcus Barriga MD as Physician (General Surgery)  Lizeth Vazquez NP as Nurse Practitioner (Nurse Practitioner)      Objective:     Vitals:    09/24/18 1039   BP: 106/68   Pulse: 64   Resp: 12   Temp: 98.4  F (36.9  C)   TempSrc: Oral   Weight: (!) 223 lb 11.2 oz (101.5 kg)   Height: 5' 4.5\" (1.638 m)         Physical Exam:  Physical Exam   Constitutional: She is oriented to person, place, and time. She appears well-developed and well-nourished.   HENT:   Head: Normocephalic and atraumatic.   Right Ear: External ear normal.   Left Ear: External ear normal.   Nose: Nose normal.   Mouth/Throat: Oropharynx is clear and moist.   Eyes: Conjunctivae and EOM are normal. Pupils are equal, round, and reactive to light.   Neck: Normal range of motion. Neck supple. No thyromegaly present.   Cardiovascular: Normal rate, regular rhythm, normal heart sounds and intact distal pulses.    Pulmonary/Chest: Effort normal and breath sounds normal.   Neurological: She is alert and oriented to person, place, and time. She has normal reflexes.   Reflex Scores:       Bicep reflexes are 2+ on the right side and 2+ on the left side.       Patellar reflexes are 2+ on the right side and 2+ on the left side.       Achilles reflexes are 2+ on the right side and 2+ on the left side.  Skin: Skin is warm and dry.   Psychiatric: She has a normal mood and affect.   Nursing note and vitals reviewed.        Patient Instructions     Hold all supplements, aspirin and NSAIDs for 7 days prior to surgery.  Follow your surgeon's direction on when to stop eating and drinking prior to " surgery.  Your surgeon will be managing your pain after your surgery.    Remove all jewelry and metal piercings before your surgery.   Remove nail polish from fingers before surgery.  Hold all other medications the day of surgery.      Labs:  Recent Results (from the past 48 hour(s))   Electrocardiogram Perform and Read    Collection Time: 09/24/18 11:00 AM   Result Value Ref Range    SYSTOLIC BLOOD PRESSURE 106 mmHg    DIASTOLIC BLOOD PRESSURE 68 mmHg    VENTRICULAR RATE 68 BPM    ATRIAL RATE 68 BPM    P-R INTERVAL 204 ms    QRS DURATION 84 ms    Q-T INTERVAL 396 ms    QTC CALCULATION (BEZET) 421 ms    P Axis 46 degrees    R AXIS -16 degrees    T AXIS 46 degrees    MUSE DIAGNOSIS       Normal sinus rhythm  Low voltage QRS  Cannot rule out Anterior infarct (cited on or before 15-FEB-2014)  Abnormal ECG  When compared with ECG of 08-JUN-2015 10:31,  No significant change was found  Confirmed by DEBORA KAPLAN MD LOC:JN ([id number]) on 9/24/2018 4:16:12 PM     Hemoglobin    Collection Time: 09/24/18 11:56 AM   Result Value Ref Range    Hemoglobin 13.6 12.0 - 16.0 g/dL   Basic Metabolic Panel    Collection Time: 09/24/18 11:56 AM   Result Value Ref Range    Sodium 139 136 - 145 mmol/L    Potassium 4.8 3.5 - 5.0 mmol/L    Chloride 103 98 - 107 mmol/L    CO2 25 22 - 31 mmol/L    Anion Gap, Calculation 11 5 - 18 mmol/L    Glucose 153 (H) 70 - 125 mg/dL    Calcium 10.6 (H) 8.5 - 10.5 mg/dL    BUN 15 8 - 28 mg/dL    Creatinine 0.73 0.60 - 1.10 mg/dL    GFR MDRD Af Amer >60 >60 mL/min/1.73m2    GFR MDRD Non Af Amer >60 >60 mL/min/1.73m2        Immunization History   Administered Date(s) Administered     Tdap 06/22/2015           Electronically signed by Saul Zamora DO 09/25/18 10:47 AM

## 2021-06-20 NOTE — PROGRESS NOTES
HPI: I am consulted by Saul Zamora DO in this 73 y.o. female who has been experiencing some problems with abdominal pain.  She has noted this for about 3 months.  It is in the right upper quadrant and radiates to her back.  It is associated with gas and bloating, and is improved when she cuts down her fat intake no nausea no vomiting, no fevers or chills.  She has had diarrhea over the last 5 months and dates this back to a colonoscopy, and she feels that her gut ana maria has been altered since that procedure.  No jaundice, melena or hematochezia.  Of note, the patient had a right hemicolectomy in 2014 for colon cancer.  I do not have that operative report.  She has been followed since, and her last colonoscopy was negative.  She had a CT scan at that time and it too was negative for metastatic disease.  Her CEA level has been normal.    Allergies   Allergen Reactions     Penicillins Hives     Venom-Honey Bee          Current Outpatient Prescriptions:      aspirin 81 MG EC tablet, Take 81 mg by mouth daily., Disp: , Rfl:      blood glucose test strips, Use 1 each As Directed daily. Dispense brand per patient's insurance at pharmacy discretion.Type 2 DM, without long term insulin use, Disp: 100 strip, Rfl: 3     blood-glucose meter Misc, Use 1 Device As Directed daily. Type 2 DM, without long term insulin use, Disp: 1 each, Rfl: 0     cholecalciferol, vitamin D3, 2,000 unit cap, Take 2 tablets by mouth daily., Disp: , Rfl:      cyclobenzaprine (FLEXERIL) 10 MG tablet, TAKE 1 TABLET BY MOUTH DAILY AT BEDTIME, Disp: 90 tablet, Rfl: 1     gabapentin (NEURONTIN) 300 MG capsule, Take 1 capsule (300 mg total) by mouth every evening., Disp: 90 capsule, Rfl: 3     glipiZIDE (GLUCOTROL) 5 MG tablet, Take 1 tablet (5 mg total) by mouth 2 (two) times a day before meals., Disp: 180 tablet, Rfl: 3     loperamide (IMODIUM) 2 mg capsule, Take 2 mg by mouth 4 (four) times a day as needed for diarrhea. Taking per   [facility]'s instructions, Disp: , Rfl:      losartan (COZAAR) 50 MG tablet, TAKE 1 TABLET BY MOUTH DAILY, Disp: 90 tablet, Rfl: 3     metFORMIN (GLUCOPHAGE) 500 MG tablet, TAKE 2 TABLETS BY MOUTH TWICE A DAY WITH MEALS, Disp: 360 tablet, Rfl: 3     multivitamin (MULTIVITAMIN) per tablet, Take 1 tablet by mouth daily., Disp: , Rfl:      simvastatin (ZOCOR) 40 MG tablet, TAKE 1 TABLET BY MOUTH DAILY AT BEDTIME, Disp: 90 tablet, Rfl: 3    Current Facility-Administered Medications:      denosumab 60 mg (PROLIA 60 mg/ml), 60 mg, Subcutaneous, Q6 Months, Kalia Hector MD, 60 mg at 18 1023    Past Medical History:   Diagnosis Date     Arthritis      Cataract      Colon cancer (H)      Diabetes mellitus (H)      Diverticular disease      GI bleed 2/15/2014     Hyperlipidemia      Osteoporosis      Peripheral neuropathy (H)        Past Surgical History:   Procedure Laterality Date     BREAST BIOPSY Left     Years ago     BREAST EXCISIONAL BIOPSY Left     Years ago      SECTION       COLONOSCOPY W/ BIOPSIES  2014    3 polyps removed     HYSTERECTOMY       OOPHORECTOMY Bilateral      AL PART REMOVAL COLON W ANASTOMOSIS      Description: Right Hemicolectomy;  Proc Date: 2014;     AL REMOVE TONSILS/ADENOIDS,<13 Y/O  age 22    Description: Tonsillectomy With Adenoidectomy;  Recorded: 2013;     AL TOTAL ABDOM HYSTERECTOMY      Description: Total Abdominal Hysterectomy;  Recorded: 2013;     AL TOTAL KNEE ARTHROPLASTY Bilateral right , left     Description: Total Knee Arthroplasty;  Recorded: 2013;  Comments: Bilateral     AL TOTAL KNEE ARTHROPLASTY      Description: Total Knee Arthroplasty;  Recorded: 2014;  Comments: Bilateral       Social History     Social History     Marital status:      Spouse name: N/A     Number of children: N/A     Years of education: N/A     Occupational History     Not on file.     Social History Main Topics      "Smoking status: Never Smoker     Smokeless tobacco: Never Used     Alcohol use Not on file     Drug use: No     Sexual activity: Not on file     Other Topics Concern     Not on file     Social History Narrative       Review of Systems - Negative except as noted in the HPI    /74 (Patient Site: Right Arm, Patient Position: Sitting, Cuff Size: Adult Large)  Pulse 70  Ht 5' 5\" (1.651 m)  Wt (!) 229 lb 1.6 oz (103.9 kg)  LMP  (LMP Unknown)  SpO2 97%  Breastfeeding? No  BMI 38.12 kg/m2  Body mass index is 38.12 kg/(m^2).    EXAM:  GENERAL: This is a morbidly obese female in no distress.  SKIN: Grossly intact  EYES: No icterus  CARDIAC: RRR w/out murmur  CHEST/LUNG: Clear  ABDOMEN: Soft, tender RUQ without peritonism, B/S present, Coelho's sign is equivocal  BACK: No costovertebral tenderness  NEURO: Alert and oriented  PSYCHIATRIC: Affect pleasant        IMAGES:   Ct Abdomen Pelvis With Oral With Iv Contrast    Result Date: 8/27/2018  CT ABDOMEN PELVIS W ORAL W IV CONTRAST 8/27/2018 1:04 PM     INDICATION: Diverticulitis and history of colon cancer TECHNIQUE: CT abdomen and pelvis. Multiplanar reformation images (MPR). Dose reduction techniques were used. IV CONTRAST: Iohexol (Omni) 100 mL COMPARISON: 08/20/2014 CT FINDINGS: LUNG BASES: Negative. ABDOMEN: Postop change ileal ascending colostomy. Nothing for local tumor recurrence. Mild diffuse hepatic steatosis. Liver is otherwise normal. Benign cysts right kidney unchanged. Gallstones. Normal pancreas, spleen, and adrenal glands. No retroperitoneal lymphadenopathy. Tiny umbilical hernia containing fat only and unchanged. PELVIS: Moderate diverticulosis sigmoid colon but nothing for acute diverticulitis. Bowel loops otherwise normal.. MUSCULOSKELETAL: Negative.     CONCLUSION: 1.  Nothing for acute diverticulitis although moderate diverticulosis sigmoid colon. 2.  Postop ileal ascending colostomy. 3.  Nothing for tumor recurrence. 4.  Gallstones. NOTE: " ABNORMAL REPORT THE DICTATION ABOVE DESCRIBES AN ABNORMALITY FOR WHICH FOLLOW-UP IS NEEDED.       Assessment/Plan: Pt with signs and symptoms consistent with symptomatic cholelithiasis. I have recommended a laparoscopic cholecystectomy. I discussed with her that we might not be able to do this laparoscopically. I went over some of the risks of surgery including but not limited to bleeding, infection and bile duct injury.  Will get this scheduled.  As regards her colon cancer, [facility] and Dr. Zamora has requested that we take over that management.  At this point, I think she needs an annual CEA level and since she had a colonic polyp removed at her last colonoscopy, she probably needs a colonoscopy in a years time.    Demarcus Barriga MD  [facility] Department of Surgery

## 2021-06-20 NOTE — ANESTHESIA CARE TRANSFER NOTE
Last vitals:   Vitals:    10/10/18 1059   BP: (!) 152/93   Pulse: 81   Resp: 16   Temp: 36.5  C (97.7  F)   SpO2: 100%     Patient's level of consciousness is drowsy  Spontaneous respirations: yes  Maintains airway independently: yes  Dentition unchanged: yes  Oropharynx: oropharynx clear of all foreign objects    QCDR Measures:  ASA# 20 - Surgical Safety Checklist: WHO surgical safety checklist completed prior to induction  PQRS# 430 - Adult PONV Prevention: 4558F - Pt received => 2 anti-emetic agents (different classes) preop & intraop  ASA# 8 - Peds PONV Prevention: NA - Not pediatric patient, not GA or 2 or more risk factors NOT present  PQRS# 424 - Mirian-op Temp Management: 4559F - At least one body temp DOCUMENTED => 35.5C or 95.9F within required timeframe  PQRS# 426 - PACU Transfer Protocol: - Transfer of care checklist used  ASA# 14 - Acute Post-op Pain: ASA14B - Patient did NOT experience pain >= 7 out of 10

## 2021-06-20 NOTE — ANESTHESIA PREPROCEDURE EVALUATION
Anesthesia Evaluation      Patient summary reviewed   History of anesthetic complications     Airway   Mallampati: II   Pulmonary - negative ROS and normal exam                          Cardiovascular - normal exam  Exercise tolerance: > or = 4 METS  (+) hypertension well controlled, , hypercholesterolemia,     ECG reviewed (poor ant r wave progression)  Rhythm: regular  Rate: normal,         Neuro/Psych - negative ROS   (+) neuromuscular disease,      Endo/Other    (+) diabetes mellitus (A1c 7.1) type 2 well controlled, obesity (BMI 37.7), pregnant     GI/Hepatic/Renal - negative ROS     Comments: History of GIB, but does take ECASA. Last dose 10/9/18     Other findings:  Type 2 diabetes mellitus with hyperglycemia, without long-term current use of insulin (H)    Hyperlipidemia    Peripheral Neuropathy    Sciatica    Microalbuminuria    Colon Cancer    Tachycardia    Leukocytosis    Right-sided low back pain with right-sided sciatica    Essential hypertension    Osteoporosis    Vitamin D deficiency    Trochanteric bursitis of both hips    Severe obesity (BMI 35.0-35.9 with comorbidity) (H)          Dental - normal exam                        Anesthesia Plan  Planned anesthetic: general endotracheal  Soft bite block  Scopolamine  Discussed TAP block for POP incase they need to open  Zofran/decadron  Tylenol/oxy for POP    ASA 3   Induction: intravenous   Anesthetic plan and risks discussed with: patient and spouse    Post-op plan: routine recovery

## 2021-06-20 NOTE — LETTER
Letter by Lizeth Vazquez NP at      Author: Lizeth Vazquez NP Service: -- Author Type: --    Filed:  Encounter Date: 12/11/2019 Status: Signed         Karol Mujica  [id number] [facility] [address]             December 11, 2019         Dear Ms. Mujica,    Below are the results from your recent visit:    Resulted Orders   DXA Bone Density Scan    Narrative    12/9/2019      RE: Karol Mujica  YOB: 1945        Dear Lizeth Vazquez,    Patient Profile:  74 y.o. female, postmenopausal, is here for the follow up bone density   test.   History of fractures - Yes;  Femur. Family history of osteoporosis - Yes;    sibling.  Family history of hip fracture: None. Smoking history - No.   Osteoporosis treatment past -  No. Osteoporosis treatment current - Yes;    Prolia.  Chronic medical problems - Chronic low back problems, Diabetes   Mellitus, Hysterectomy, Malignancy and Oophorectomy. High risk medications   -  None.      Assessment:    1. The spine bone density L1-L4 with T-score -0.8.  2. Femoral bone density shows right  femoral neck T- score -0.7.  3. Trabecular bone score indicates good trabecular bone architecture.      74 y.o. female with NORMAL BONE DENSITY on bone densitometry, with   previous diagnosis of OSTEOPOROSIS based on history of femur fracture.    The risk of major osteoporotic fracture is 10.9% and hip fracture risk is   1.2%.  This is considered moderate.    Since the previous bone density dated  December 8, 2017, there has been no   statistically significant % change in the bone density of the spine.    However, since the baseline bone density in 2015, there has been a +14.4%   change.  Additionally there has been a +4.5 % change in the right total   hip since 2017.  There has been a 14.8% increase since the baseline in   2015..    Recommendations:  Appropriate calcium, vitamin D supplements, along with balance and weight   bearing exercise recommended.  The transition from  Prolia to an   antiresorptive agent (to seal gains achieved with Prolia), can be   considered with follow up bone density scan in 2 years.      Bone densitometry was performed on your patient using our [employer] iDXA   densitometer. The results are summarized and a copy of the actual scans   are included for your review. In conformity with the International Society   of Clinical Densitometry's most recent position statement for DXA   interpretation (2015), the diagnosis will be made on the lowest measured   T-score of the lumbar spine, femoral neck, total proximal femur or 33%   radius. Note the change in terminology for diagnostic classification from   OSTEOPENIA to LOW BONE MASS. All trending for sequential exams will be   done using multiple vertebrae or the total proximal femur. Fracture risk   is based on the WHO Fracture Risk Assessment Tool (FRAX). If additional   information is needed or if you would like to discuss the results, please   do not hesitate to call me.       Thank you for referring this patient to [facility].   We are happy to be of service in support of you and your practice. If you   have any questions or suggestions to improve our service, please call me   at [phone].     Sincerely,     Elvia Pearce M.D. C.C.EDDIE.  Osteoporosis Services, [facility]     The test results show that your current treatment is working. We can consider going off of the Prolia injections and transitioning to either the Monthly pill or possibly the once a year Infusion. We can discuss this at your appointment in February! We recommend that you repeat the above test(s) in 2 years.    Please call with questions or contact us using [email or web address].    Sincerely,        Electronically signed by Lizeth Vazquez NP

## 2021-06-20 NOTE — ANESTHESIA POSTPROCEDURE EVALUATION
Patient: Karol Mujica  CHOLECYSTECTOMY, LAPAROSCOPIC  Anesthesia type: general    Patient location: PACU  Last vitals:   Vitals:    10/10/18 1150   BP: 135/65   Pulse: 78   Resp: 14   Temp: (!) 35.8  C (96.5  F)   SpO2: 96%     Post vital signs: stable  Level of consciousness: awake and responds to simple questions  Post-anesthesia pain: pain controlled  Post-anesthesia nausea and vomiting: no  Pulmonary: unassisted, return to baseline  Cardiovascular: stable and blood pressure at baseline  Hydration: adequate  Anesthetic events: no    QCDR Measures:  ASA# 11 - Mirian-op Cardiac Arrest: ASA11B - Patient did NOT experience unanticipated cardiac arrest  ASA# 12 - Mirian-op Mortality Rate: ASA12B - Patient did NOT die  ASA# 13 - PACU Re-Intubation Rate: ASA13B - Patient did NOT require a new airway mgmt  ASA# 10 - Composite Anes Safety: ASA10A - No serious adverse event    Additional Notes:

## 2021-06-21 NOTE — LETTER
Letter by Lizeth Vazquez NP at      Author: Lizeth Vazquez NP Service: -- Author Type: --    Filed:  Encounter Date: 2/25/2021 Status: (Other)         Saul Zamora DO  [address]  [facility] [address]                                  February 25, 2021    Patient: Karol Mujica   MR Number: [id number]   YOB: 1945   Date of Visit: 2/25/2021     Dear Dr. Zamora, :    Thank you for referring Karol Mujica to me for evaluation. Below are the relevant portions of my assessment and plan of care.  At her f/u appointment with me today, she informed me that the Risedronate that I had prescribed last year per post Prolia protocol was discontinued. The Dexa Scan that was scheduled to see how she was responding to therapy (Risedronate) shows that without it, she had significant BMD loss. I am communicating with you regarding this because she needs to be on a Bisphosphonate to keep from losing further BMD. Please reconsider having her on this medication.     DXA Bone Density Scan (Order [id number])  Imaging  Date: 11/27/2020 Department: [facility] Centralized Scheduling Released By: Swati Kirby Authorizing: Lizeth Vazquez NP   Study Result    1/18/2021        RE: Karol Mujica  YOB: 1945           Dear Lizeth Vazquez,     Patient Profile:  75 y.o. female, postmenopausal, is here for the follow up bone density test.   History of fractures - Yes;  Femur. Family history of osteoporosis - Yes;  mother and sibling.  Family history of hip fracture: None. Smoking history - No. Osteoporosis treatment past -  Yes;  Prolia (Off 1 year). Osteoporosis treatment current - No.  Chronic medical problems - Chronic low back problems, Diabetes Mellitus, Hysterectomy and Oophorectomy. High risk medications -  None.        Assessment:     1. The spine bone density L1-L4 with T-score -1.3.  Focal sclerotic changes are noted in the lumbar vertebrae, which may artifactually  "elevate the bone mineral density.  2. Femoral bone density shows right total hip T- score -1.3.  3. Trabecular bone score indicates good trabecular bone architecture.        75 y.o. female with LOW BONE DENSITY (OSTEOPENIA) and MODERATE fracture risk, adjusted for the TBS, with major osteoporotic fracture risk 10.1% and hip fracture risk 0.9%.      Since the previous bone density dated  December 9, 2019, there has been a   -9.4% change in the bone density of the spine.  Additionally there has been a -9.6% change in the right total hip.                               Recommendations:  A short course of an antiresorptive agent is recommended to \"seal\" the gains achieved by Prolia therapy and prevent further bone loss is recommended with follow up bone density scan in 1 to 2 years.        Bone densitometry was performed on your patient using our [employer] densitometer. The results are summarized and a copy of the actual scans are included for your review. In conformity with the International Society of Clinical Densitometry's most recent position statement for DXA interpretation (2015), the diagnosis will be made on the lowest measured T-score of the lumbar spine, femoral neck, total proximal femur or 33% radius. Note the change in terminology for diagnostic classification from OSTEOPENIA to LOW BONE MASS. All trending for sequential exams will be done using multiple vertebrae or the total proximal femur. Fracture risk is based on the WHO Fracture Risk Assessment Tool (FRAX). If additional information is needed or if you would like to discuss the results, please do not hesitate to call me.         Thank you for referring this patient to [facility]. We are happy to be of service in support of you and your practice. If you have any questions or suggestions to improve our service, please call me at [phone].      Sincerely,      Elvia Pearce M.D. CAlineCRUFINO.  Osteoporosis Services, [facility] " Clinics             If you have questions, please do not hesitate to call me. I look forward to following Karol along with you.    Sincerely,        Lizeth Vazquez, NP          CC  No Recipients

## 2021-06-21 NOTE — LETTER
"Letter by Lizeth Vazquez NP at      Author: Lizeth Vazquez NP Service: -- Author Type: --    Filed:  Encounter Date: 1/20/2021 Status: (Other)         Karol Mujica  [address] [facility] [address]             January 20, 2021         Dear Ms. Mujica,    Below are the results from your recent visit:    Resulted Orders   DXA Bone Density Scan    Narrative    1/18/2021      RE: Karol Mujica  YOB: 1945        Dear Lizeth Vazquez,    Patient Profile:  75 y.o. female, postmenopausal, is here for the follow up bone density   test.   History of fractures - Yes;  Femur. Family history of osteoporosis - Yes;    mother and sibling.  Family history of hip fracture: None. Smoking history   - No. Osteoporosis treatment past -  Yes;  Prolia (Off 1 year).   Osteoporosis treatment current - No.  Chronic medical problems - Chronic   low back problems, Diabetes Mellitus, Hysterectomy and Oophorectomy. High   risk medications -  None.      Assessment:    1. The spine bone density L1-L4 with T-score -1.3.  Focal sclerotic   changes are noted in the lumbar vertebrae, which may artifactually elevate   the bone mineral density.  2. Femoral bone density shows right total hip T- score -1.3.  3. Trabecular bone score indicates good trabecular bone architecture.      75 y.o. female with LOW BONE DENSITY (OSTEOPENIA) and MODERATE fracture   risk, adjusted for the TBS, with major osteoporotic fracture risk 10.1%   and hip fracture risk 0.9%.     Since the previous bone density dated  December 9, 2019, there has been a     -9.4% change in the bone density of the spine.  Additionally there has   been a -9.6% change in the right total hip.                              Recommendations:  A short course of an antiresorptive agent is recommended to \"seal\" the   gains achieved by Prolia therapy and prevent further bone loss is   recommended with follow up bone density scan in 1 to 2 years.      Bone densitometry was performed " on your patient using our [employer] iDXA   densitometer. The results are summarized and a copy of the actual scans   are included for your review. In conformity with the International Society   of Clinical Densitometry's most recent position statement for DXA   interpretation (2015), the diagnosis will be made on the lowest measured   T-score of the lumbar spine, femoral neck, total proximal femur or 33%   radius. Note the change in terminology for diagnostic classification from   OSTEOPENIA to LOW BONE MASS. All trending for sequential exams will be   done using multiple vertebrae or the total proximal femur. Fracture risk   is based on the WHO Fracture Risk Assessment Tool (FRAX). If additional   information is needed or if you would like to discuss the results, please   do not hesitate to call me.       Thank you for referring this patient to [facility].   We are happy to be of service in support of you and your practice. If you   have any questions or suggestions to improve our service, please call me   at [phone].     Sincerely,     Elvia Pearce M.D. C.C.D.  Osteoporosis Services, [facility]     We will discuss this and the plan going forward at your appointment next month.       Please call with questions or contact us using [employer].    Sincerely,        Electronically signed by Lizeth Vazquez NP

## 2021-06-21 NOTE — LETTER
Letter by Samanta Mccoy CNP at      Author: Samanta Mccoy CNP Service: -- Author Type: --    Filed:  Encounter Date: 2021 Status: (Other)       2021    [facility]  Fax: [phone] Wound Dressing Rx and Order Form  Customer Service: [phone] Order Status: New Order   Verbal: Janell VERGARA CMA     Patient Info:  Name: Karol Mujica  : 1945  Address:   [address] [place] [address]  Phone: [phone]    Insurance Info:  Primary: Payor: ARE / Plan: ARE MEDICARE / Product Type: MEDICARE ADVANTAGE /    Secondary: N/A N/A  [id number] - ([facility])    Physician Info:   Name:  Samanta Mccoy CNP   Dept Address/Phones:   [address] [place] [address], [facility] [place] [address] [address] [phone]  Fax: [phone]    Wound info:  Encounter Diagnoses   Name Primary?   ? Wound of back, unspecified laterality, subsequent encounter    ? Superficial dehiscence of wound Yes   ? Type 2 diabetes mellitus with other skin ulcer, without long-term current use of insulin (H)    ? Vitamin D deficiency      VASC Wound 21 lumbar spine (Active)   Pre Size Length 2.7 21 1300   Pre Size Width 0.3 21 1300   Pre Size Depth 0.3 21 1300   Pre Total Sq cm 1.35 21 1300   Post Size Length 5 21 1400   Post Size Width 0.5 21 1400   Post Size Depth 0.5 21 1400   Post Total Sq cm 2.5 21 1400   Description epithelial bridge 21 1300       Incision 10/10/18 Abdomen (Active)       Incision 03/10/21 Surgical Back (Active)     Drainage: Light  Thickness:  Full  Duration of Need: 30  Days Supply: 30  Start Date: 21  Starter Kit: Ancillary Kit (saline, gloves, gauze)  Qualifying wound/Debridement Yes      Dressing Type Brand Size Number of pieces Frequency of change    Primary  Primapore 10cm x 20cm 30 Daily       Note: If total out of pocket is more than $50.00 please contact the patient before processing  order.     OK to forward to covered supplier.    Electronically Signed Physician: Samanta Mccoy CNP Date: 5/13/2021

## 2021-06-21 NOTE — LETTER
Letter by Jia Chen MD at      Author: Jia Chen MD Service: -- Author Type: --    Filed:  Encounter Date: 3/4/2021 Status: (Other)       Dear Ms. Mujica,    This letter will help in preparation for your upcoming surgery. Please contact us with any additional questions you may have regarding your surgery. Contact information for your surgery scheduler:   Brianna Chen and Gerald: [phone] ~ Tanya Mackey and Nam: [phone] ~ Tom    You are scheduled for: Lumbar Laminectomies and Lumbar Posterior Instrumented Fusion.   With: Dr. Jia Chen  Date/Time: Wednesday, March 10, 2021 at 8:00 am  (time subject to change)  Location: [place] [address]    Check in at the Welcome Desk inside the main doors of the hospital. An escort will take you to the surgery waiting area. A nurse from [facility] (surgery admit unit) at the hospital will call you with your exact arrival time to the hospital - typically one-and-a-half to two-and-a-half hours prior to your scheduled surgery time.     In the event of an emergency surgery case, there may be an adjustment to your start time for surgery.     PREPARING FOR YOUR SURGERY    *Pre-op Physical: Monday, March 8, 2021 at 1:00 pm with Dr. Zamora at the [facility]. Please have your LABS completed at this appointment as well. Orders have been placed with your primary care provider.     *Please discuss the necessity of receiving a pneumococcal vaccine prior to surgery at your pre-op physical. Recommended for all patients over the age of 65, or based on certain medical conditions.     *After the pre-op physical is complete, please have your clinic fax the visit note to your surgery scheduler at [phone].    *Pre-op Lab Work: Monday, March 8, 2021 at your Pre-op physical appointment.     *COVID-19 Testing: Saturday, March 6, 2021 at 12:50 pm at the [facility]  [facility], [address] [place] [address].     *Readiness Visit: Dr. Chen's nurse will call you prior to your procedure to go over your Pre-op physical and lab results, give Pre-surgery instructions and also answer any additional questions you may have about your upcoming surgery.     *Ensure that you have completed your pre-op physical, along with any other necessary tests/appointments (listed above), prior to your Readiness Visit.           ADDITIONAL INFORMATION REGARDING YOUR SURGERY    Medications    Bring a list ALL of your medications, including any over-the-counter vitamins and herbal supplements to your Readiness Visit, and on the day of surgery.    DO NOT bring your medications with you the day of surgery.    Please see attached third sheet for more details on medications/vitamins/herbal supplements that should be discontinued prior to your surgery date.     If you are unsure if you should discontinue a medication/ vitamin/herbal supplement, please call our office and discuss with a nurse.    Continue taking your medications/vitamins/herbal supplements unless they are on the attached list.     Failure to follow the instructions regarding medications/vitamins/herbal supplements will result in cancellation of your surgery.    Day BEFORE Surgery    DO NOT shave near your surgical site. This can cause irritation of the skin    Using a washcloth and provided bottle of Hibiclens, shower the night BEFORE surgery, using a half bottle of Hibiclens to wash your body, avoiding face and genitals. The morning OF surgery, shower and use the second half of the bottle to wash your body, avoiding face and genitals. If you are unable to take a shower the morning of surgery, please discuss your options with the nurse at your readiness visit.     NOTHING  to eat after 11:00 p.m. the night prior to your procedure    CLEAR LIQUIDS: May have the following liquids up to two (2) hours before your  arrival time at the hospital: water, plain black coffee (no cream or milk), plain black tea or plain green tea (no cream or milk), Gatorade or Propel Water.    SMOKING: Stop smoking as far before surgery as possible, or as directed by your surgeon. NO tobacco products of any kind (cigarettes, e-cigarettes, chewing tobacco) beginning at 11:00 p.m. the night prior to your procedure.     ALCOHOL: You should stop drinking alcohol beginning at 11:00 p.m. the night prior to your procedure    Contact our office if you have symptoms of illness such as a fever of 101 or greater, chills, cough, sore throat, or if you develop a rash or any open sore    Day OF Surgery    If youve been instructed to take a medication(s) on the morning of surgery, please take with a very small sip of water.    Wear loose & comfortable clothing and flat shoes, Leave jewelry/valuables at home. If you wear contact lenses, remove them at home and wear glasses. Remove any body piercings. Remove nail polish.     Planning for Discharge    Start planning for your care after discharge as soon as you receive this letter.    If you have not made arrangements to have someone take you home and stay with you for the first 48 hours after discharge, your surgery will be cancelled.        PRE-OPERATIVE MEDICATION INSTRUCTIONS  Review this information with your primary care physician prior to discontinuing any of the medications listed below.  Notify your primary care physician that you have been instructed to discontinue these medications.    TEN (10) Days Prior to Surgery, STOP the Following Medications   [place]  Anacin  Aspirin  Excedrin  Pepto Bismol    **Before taking ANY over-the-counter medications, check the label for Aspirin   Non-steroidal   Anti-inflammatory Medications (NSAIDS)    Celebrex  Diclofenac (Cataflam)  Etodolac (Iodine)  Fenoprofen (Nalfon)  Ibuprofen (Advil, Motrin, Nuprin)  Indomethacin (Indocin)  Ketoprofen  Ketorolac  (Toradol)  Melaxicam (Mobic)  Naproxen (AnaProx, Aleve, Naprosyn)  Relafen (Nabumetone)   Herbal Supplements (this is a partial list of herbals to be discontinued)    Rolando Joshi  Ephedra  Feverfew  Fish Oil  Flaxseed Oil  Garlic  Kellen  Gingko  Ginseng  Goldenseal  Imitrex (Sumatriptan)  Kava  Krill Oil  Licorice  Multi Vitamins  [facility]  Valerian  Vitamin E  Yohimbe   CHECK WITH YOUR PRESCRIBING DOCTOR BEFORE STOPPING ANY BLOOD THINNERS (approximately 7 days prior to surgery)  (Coumadin, Plavix, Platel, Aggrenox, Effient (Prasugrel), Ticlid), Xarelto, and Pradaxa      ALWAYS CHECK WITH YOUR PRESCRIBING DOCTOR REGARDING THE MEDICATIONS LISTED BELOW; RECOMMENDED STOP TIME IS ALSO LISTED      If you are taking Lovenox, discontinue 24 HOURS prior to surgery    If you are taking weight loss medication, discontinue 7 days prior to surgery    If you are taking Metformin or Simvastatin, check with your primary care physician (or whoever has prescribed you this medication) regarding when to discontinue prior to surgery

## 2021-06-21 NOTE — LETTER
Letter by Samanta Mccoy CNP at      Author: Samanta Mccoy CNP Service: -- Author Type: --    Filed:  Encounter Date: 2021 Status: (Other)       2021    [facility]   Fax: [phone] Wound Dressing Rx and Order Form  Customer Service: [phone] Order Status: New Order   Verbal: Briseida GUERRERO RN             Patient Info:  Name: Karol Mujica  : 1945  Address:   [address] [place] [address]  Phone: [phone]      Insurance Info:  Primary: Payor: UCARE / Plan: [facility] MEDICARE / Product Type: MEDICARE ADVANTAGE /    Secondary: N/A N/A  [id number] - ([facility])      Physician Info:   Name:  Samanta Mccoy CNP   Dept Address/Phones:   [address] [place] [address], [address]  [facility] [address] [phone]  Fax: [phone]    Lymphedema circumferential measurements (in cm):  No data recorded  No data recorded  No data recorded  No data recorded  No data recorded  No data recorded  No data recorded  No data recorded  No data recorded  No data recorded  No data recorded  No data recorded  No data recorded  No data recorded  No data recorded  No data recorded    Wound info:  Encounter Diagnoses   Name Primary?   ? Back wound Yes   ? Superficial dehiscence of wound    ? Type 2 diabetes mellitus with other skin ulcer, without long-term current use of insulin (H)    ? Vitamin D deficiency    ? Wound of back, unspecified laterality, initial encounter      VASC Wound 21 lumbar spine (Active)   Pre Size Length 2.7 21 1400   Pre Size Width 0.5 21 1400   Pre Size Depth 0.3 21 1400   Pre Total Sq cm 1.35 21 1400   Post Size Length 5 21 1400   Post Size Width 0.5 21 1400   Post Size Depth 0.5 21 1400   Post Total Sq cm 2.5 21 1400       Incision 10/10/18 Abdomen (Active)       Incision 03/10/21 Surgical Back (Active)     Drainage: Moderate  Thickness:  Full  Duration of Need:  "30  Days Supply: 30  Start Date: 4/26/21  Starter Kit: Ancillary Kit (saline, gloves, gauze)  Qualifying wound/Debridement Yes      Dressing Type Brand Size Number of pieces Frequency of change   Primary Xeroform  5 x 9\" 8 Daily                                           Secondary Primapore +pad Mckenna and nephew 15 cm x 8 cm 30 or max allowed. Daily    Normal saline bullets  5 ml 30 Daily    4 x 4\" Dry gauze  4 x 4\" 1 loaf                    Tape                          Note: If total out of pocket is more than $50.00 please contact the patient before processing order.     OK to forward to covered supplier.     Electronically Signed Physician: Samanta Mccoy CNP Date: 4/26/2021       "

## 2021-06-22 NOTE — PATIENT INSTRUCTIONS - HE
Hold all supplements, aspirin and NSAIDs for 7 days prior to surgery.  Follow your surgeon's direction on when to stop eating and drinking prior to surgery.  Remove all jewelry and metal piercings before your surgery.   Remove nail polish from fingers before surgery.  Follow your eye doctors schedule for the eye drops.   Hold losartan the morning of surgery. Take all other medications as usual.

## 2021-06-22 NOTE — PROGRESS NOTES
Preoperative Exam    Scheduled Procedure: Cataract Surgery  Surgery Date:  1/9/19 and 1/23/19  Surgery Location: [facility] (FAX:  [phone])    Surgeon:  Dr. George Ozuna    Assessment/Plan:     Problem List Items Addressed This Visit     Type 2 diabetes mellitus with hyperglycemia, without long-term current use of insulin (H)     Goal of less than 8 given age and history. Above goal. Increase glipizide to 10 mg twice a day. Follow up in 3 months for recheck.         Relevant Medications    glipiZIDE (GLUCOTROL) 10 MG tablet    Other Relevant Orders    Glycosylated Hemoglobin A1c (Completed)    Colon Cancer     Due for 6 month CEA check. Previous at 1.3. Does not affect plans for cataract procedure, but will get today with other lab draws.         Relevant Orders    CEA (Carcinoembryonic Antigen)    Ambulatory referral to General Surgery    Essential hypertension    Relevant Orders    Basic Metabolic Panel    Age-related cataract of both eyes, unspecified age-related cataract type     Now affecting actiivities of daily living and causing increased fall potential. Medically cleared for planned surgery/procedures.         Relevant Medications    ILEVRO 0.3 % DrpS    ketorolac (ACULAR) 0.5 % ophthalmic solution    Other Relevant Orders    Hemoglobin (Completed)      Other Visit Diagnoses     Preop examination    -  Primary            Surgical Procedure Risk: Low (reported cardiac risk generally < 1%)  Have you had prior anesthesia?: Yes  Have you or any family members had a previous anesthesia reaction:  No  Do you or any family members have a history of a clotting or bleeding disorder?: No  Cardiac Risk Assessment: no increased risk for major cardiac complications with planned surgery    Patient approved for surgery with general or local anesthesia.      Functional Status: Independent  Patient plans to recover at home with family.     Subjective:      Karol Mujica is a 73 y.o. female who presents for a  preoperative consultation.  Worsening of cataracts over the past 3 years.  Now at a point that make it difficult to read and also to the point for possible tripping hazards especially in the low light scenarios.  As such it is recommended for planned procedure to correct this for maximum medical benefit.  Ophthalmology notes were reviewed.    All other systems reviewed and are negative, other than those listed in the HPI.    Pertinent History  Do you have difficulty breathing or chest pain after walking up a flight of stairs: No  History of obstructive sleep apnea: No  Steroid use in the last 6 months: No  Frequent Aspirin/NSAID use: Yes: daily aspirin. Follow eye surgeons recommendation as to whether or not to hold  Prior Blood Transfusion: Yes: 2014. Secondary to colon cancer. No problems since surgery in 2014 with anemia  Prior Blood Transfusion Reaction: No  If for some reason prior to, during or after the procedure, if it is medically indicated, would you be willing to have a blood transfusion?:  There is no transfusion refusal.    Current Outpatient Medications   Medication Sig Dispense Refill     aspirin 81 MG EC tablet Take 81 mg by mouth daily.       blood glucose test (ONETOUCH VERIO) strips Use 1 each As Directed 2 (two) times a day Dispense brand per patient's insurance at pharmacy discretion.. 200 strip 3     blood-glucose meter Misc Use 1 Device As Directed daily. Type 2 DM, without long term insulin use 1 each 0     cholecalciferol, vitamin D3, 2,000 unit cap Take 2 tablets by mouth daily.       cyclobenzaprine (FLEXERIL) 10 MG tablet TAKE 1 TABLET BY MOUTH DAILY AT BEDTIME 90 tablet 1     gabapentin (NEURONTIN) 300 MG capsule Take 1 capsule (300 mg total) by mouth every evening. 90 capsule 3     glipiZIDE (GLUCOTROL) 10 MG tablet Take 1 tablet (10 mg total) by mouth 2 (two) times a day before meals. 180 tablet 3     ILEVRO 0.3 % DrpS Administer 1 drop to the right eye at bedtime.  0     ketorolac  (ACULAR) 0.5 % ophthalmic solution Administer 1 drop to the right eye at bedtime.  0     losartan (COZAAR) 50 MG tablet TAKE 1 TABLET BY MOUTH DAILY 90 tablet 3     multivitamin (MULTIVITAMIN) per tablet Take 1 tablet by mouth daily.       simvastatin (ZOCOR) 40 MG tablet TAKE 1 TABLET BY MOUTH DAILY AT BEDTIME 90 tablet 3     acetaminophen (TYLENOL) 500 MG tablet Take 1,000 mg by mouth every 6 (six) hours as needed for pain.       loperamide (IMODIUM) 2 mg capsule Take 2 mg by mouth 4 (four) times a day as needed for diarrhea. Taking per Dr. Zamora's instructions       Current Facility-Administered Medications   Medication Dose Route Frequency Provider Last Rate Last Dose     denosumab 60 mg (PROLIA 60 mg/ml)  60 mg Subcutaneous Q6 Months Kalia Hector MD   60 mg at 11/20/18 1242        Allergies   Allergen Reactions     Venom-Honey Bee Anaphylaxis     Nitrofurantoin Hives     Nausea     Penicillins Hives       Patient Active Problem List   Diagnosis     Type 2 diabetes mellitus with hyperglycemia, without long-term current use of insulin (H)     Hyperlipidemia     Peripheral Neuropathy     Sciatica     Microalbuminuria     Colon Cancer     Tachycardia     Leukocytosis     Right-sided low back pain with right-sided sciatica     Essential hypertension     Osteoporosis     Vitamin D deficiency     Trochanteric bursitis of both hips     Severe obesity (BMI 35.0-35.9 with comorbidity) (H)     Gallstones     Postoperative pain     Age-related cataract of both eyes, unspecified age-related cataract type       Past Medical History:   Diagnosis Date     Arthritis      Cataract      Cholelithiasis      Colon cancer (H) 2014     Diabetes mellitus (H)      Diverticular disease      GI bleed 2/15/2014     History of anesthesia complications     slow to wake up     History of transfusion      Hyperlipidemia      Hypertension      Osteoporosis      Peripheral neuropathy     feet     Red blood cell antibody positive       h/o blood transfusion . known red cell antibodies to C and K per preop H&P       Past Surgical History:   Procedure Laterality Date     BREAST BIOPSY Left     Years ago     BREAST EXCISIONAL BIOPSY Left     Years ago      SECTION       COLONOSCOPY W/ BIOPSIES  2014    3 polyps removed     FEMUR SURGERY Left     for fracture     HYSTERECTOMY  2000     LAPAROSCOPIC CHOLECYSTECTOMY N/A 10/10/2018    Procedure: CHOLECYSTECTOMY, LAPAROSCOPIC;  Surgeon: Demarcus Barriga MD;  Location: [facility] [place];  Service:      OOPHORECTOMY Bilateral      ME PART REMOVAL COLON W ANASTOMOSIS      Description: Right Hemicolectomy;  Proc Date: 2014;     ME REMOVE TONSILS/ADENOIDS,<13 Y/O  age 22    Description: Tonsillectomy With Adenoidectomy;  Recorded: 2013;     ME TOTAL KNEE ARTHROPLASTY Bilateral     Description: Total Knee Arthroplasty;  Recorded: 2014;  Comments: Bilateral     TONSILLECTOMY         Social History     Socioeconomic History     Marital status:      Spouse name: Vinay     Number of children: 2     Years of education: 16     Highest education level: Some college, no degree   Social Needs     Financial resource strain: Not hard at all     Food insecurity - worry: Never true     Food insecurity - inability: Never true     Transportation needs - medical: No     Transportation needs - non-medical: No   Occupational History     Employer: RETIRED   Tobacco Use     Smoking status: Never Smoker     Smokeless tobacco: Never Used   Substance and Sexual Activity     Alcohol use: No     Drug use: No     Sexual activity: Not on file   Other Topics Concern     Not on file   Social History Narrative     Not on file       Patient Care Team:  Saul Zamora DO as PCP - General  Demarcus Barriga MD as Physician (General Surgery)  Lizeth Vazquez NP as Nurse Practitioner (Nurse Practitioner)      Objective:     Vitals:    19 0753   BP: 124/62   Pulse: 80  "  Resp: 12   Temp: 98.3  F (36.8  C)   TempSrc: Oral   Weight: 221 lb 6.4 oz (100.4 kg)   Height: 5' 4.25\" (1.632 m)         Physical Exam:  Physical Exam   Constitutional: She is oriented to person, place, and time. She appears well-developed and well-nourished.   HENT:   Head: Normocephalic and atraumatic.   Right Ear: External ear normal.   Left Ear: External ear normal.   Nose: Nose normal.   Mouth/Throat: Oropharynx is clear and moist.   Eyes: Conjunctivae and EOM are normal. Pupils are equal, round, and reactive to light.   Neck: Normal range of motion. Neck supple. No thyromegaly present.   Cardiovascular: Normal rate, regular rhythm, normal heart sounds and intact distal pulses.   Pulmonary/Chest: Effort normal and breath sounds normal.   Abdominal: Bowel sounds are normal. She exhibits no mass. There is no tenderness.   Musculoskeletal: She exhibits no edema.   Neurological: She is alert and oriented to person, place, and time. She has normal reflexes.   Reflex Scores:       Bicep reflexes are 2+ on the right side and 2+ on the left side.       Patellar reflexes are 2+ on the right side and 2+ on the left side.       Achilles reflexes are 2+ on the right side and 2+ on the left side.  Skin: Skin is warm and dry.   Psychiatric: She has a normal mood and affect.   Nursing note and vitals reviewed.        Patient Instructions     Hold all supplements, aspirin and NSAIDs for 7 days prior to surgery.  Follow your surgeon's direction on when to stop eating and drinking prior to surgery.  Remove all jewelry and metal piercings before your surgery.   Remove nail polish from fingers before surgery.  Follow your eye doctors schedule for the eye drops.   Hold losartan the morning of surgery. Take all other medications as usual.    Most Recent EKG     Units 09/24/18  1100   VENTRATE BPM 68   ATRIALRATE BPM 68   QRSDURATION ms 84   QTINTERVAL ms 396   QTCCALC ms 421   P Axis degrees 46   RAXIS degrees -16   TAXIS " degrees 46   MUSEDX  Normal sinus rhythm  Low voltage QRS  Cannot rule out Anterior infarct (cited on or before 15-FEB-2014)  Abnormal ECG  When compared with ECG of 08-JUN-2015 10:31,  No significant change was found  Confirmed by DEBORA KAPLAN MD LOC:[facility] ([id number]) on 9/24/2018 4:16:12 PM         Labs:  Recent Results (from the past 120 hour(s))   Glycosylated Hemoglobin A1c    Collection Time: 01/04/19  8:07 AM   Result Value Ref Range    Hemoglobin A1c 8.4 (H) 3.5 - 6.0 %   Basic Metabolic Panel    Collection Time: 01/04/19  8:07 AM   Result Value Ref Range    Sodium 141 136 - 145 mmol/L    Potassium 4.7 3.5 - 5.0 mmol/L    Chloride 106 98 - 107 mmol/L    CO2 25 22 - 31 mmol/L    Anion Gap, Calculation 10 5 - 18 mmol/L    Glucose 203 (H) 70 - 125 mg/dL    Calcium 9.6 8.5 - 10.5 mg/dL    BUN 19 8 - 28 mg/dL    Creatinine 0.77 0.60 - 1.10 mg/dL    GFR MDRD Af Amer >60 >60 mL/min/1.73m2    GFR MDRD Non Af Amer >60 >60 mL/min/1.73m2   Hemoglobin    Collection Time: 01/04/19  8:07 AM   Result Value Ref Range    Hemoglobin 13.7 12.0 - 16.0 g/dL       Immunization History   Administered Date(s) Administered     Td, Adult, Absorbed 10/13/2003     Tdap 06/22/2015           Electronically signed by Saul Zamora DO 01/04/19 7:57 AM

## 2021-06-23 NOTE — TELEPHONE ENCOUNTER
Who is calling:  patient  Reason for Call:  Patient has changed pharmacy.  Patient is out of the medication and has been requesting at the pharmacy for some time.  Please Expedite  Date of last appointment with primary care:  n/a  Has the patient been recently seen:  No  Okay to leave a detailed message: No

## 2021-06-23 NOTE — PROGRESS NOTES
GENERAL SURGICAL CONSULTATION    I was requested by Saul Zamora DO to consult on this pt to evaluate them for colon cancer screening.    HPI:  This is a 73 y.o. female here today status post a colon cancer in the Transverse colon removed by Dr. Leigh Oscar of the colorectal group in .  She has been having annual colonoscopies since her surgery.  Last year she had a colonoscopy in Feb where she was found to have a sessile polyp with dysplasia.  This polyp was removed with the cold snare system.  The patient was then brought back to have this region evaluated again once the pathology was assessed but no additional polyps or abnormal tissue was noted with biopsies on that follow-up colonoscopy that was done within the month.  Patient's had a hard time doing 2 bowel preps in a short period and is wondering what our recommendations would be for her next colonoscopy.  I discussed this with her and I recommend she has her follow-up colonoscopy in 1 year.  This would mean that she gets a colonoscopy in 2019.    Allergies:Venom-honey bee; Nitrofurantoin; and Penicillins    Past Medical History:   Diagnosis Date     Arthritis      Cataract      Cholelithiasis      Colon cancer (H)      Diabetes mellitus (H)      Diverticular disease      GI bleed 2/15/2014     History of anesthesia complications     slow to wake up     History of transfusion      Hyperlipidemia      Hypertension      Osteoporosis      Peripheral neuropathy     feet     Red blood cell antibody positive      h/o blood transfusion . known red cell antibodies to C and K per preop H&P       Past Surgical History:   Procedure Laterality Date     BREAST BIOPSY Left     Years ago     BREAST EXCISIONAL BIOPSY Left     Years ago      SECTION  1981     COLONOSCOPY W/ BIOPSIES  2014    3 polyps removed     FEMUR SURGERY Left     for fracture     HYSTERECTOMY       LAPAROSCOPIC CHOLECYSTECTOMY N/A 10/10/2018     Procedure: CHOLECYSTECTOMY, LAPAROSCOPIC;  Surgeon: Demarcus Barriga MD;  Location[facility];  Service:      OOPHORECTOMY Bilateral 2000     [place] PART REMOVAL COLON W ANASTOMOSIS      Description: Right Hemicolectomy;  Proc Date: 02/16/2014;     [place] REMOVE TONSILS/ADENOIDS,<11 Y/O  age 22    Description: Tonsillectomy With Adenoidectomy;  Recorded: 08/07/2013;     [place] TOTAL KNEE ARTHROPLASTY Bilateral     Description: Total Knee Arthroplasty;  Recorded: 08/22/2014;  Comments: Bilateral     TONSILLECTOMY         CURRENT MEDS:  Current Outpatient Medications   Medication Sig Dispense Refill     acetaminophen (TYLENOL) 500 MG tablet Take 1,000 mg by mouth every 6 (six) hours as needed for pain.       aspirin 81 MG EC tablet Take 81 mg by mouth daily.       blood glucose test (ONETOUCH VERIO) strips Use 1 each As Directed 2 (two) times a day. Dispense brand per patient's insurance at pharmacy discretion. 200 strip 3     blood-glucose meter Misc Use 1 Device As Directed daily. Type 2 DM, without long term insulin use 1 each 0     cholecalciferol, vitamin D3, 2,000 unit cap Take 2 tablets by mouth daily.       cyclobenzaprine (FLEXERIL) 10 MG tablet TAKE 1 TABLET BY MOUTH DAILY AT BEDTIME 90 tablet 1     gabapentin (NEURONTIN) 300 MG capsule Take 1 capsule (300 mg total) by mouth every evening. 90 capsule 3     glipiZIDE (GLUCOTROL) 10 MG tablet Take 1 tablet (10 mg total) by mouth 2 (two) times a day before meals. 180 tablet 3     ILEVRO 0.3 % DrpS Administer 1 drop to the right eye at bedtime.  0     ketorolac (ACULAR) 0.5 % ophthalmic solution Administer 1 drop to the right eye at bedtime.  0     loperamide (IMODIUM) 2 mg capsule Take 2 mg by mouth 4 (four) times a day as needed for diarrhea. Taking per Dr. Zamora's instructions       losartan (COZAAR) 50 MG tablet TAKE 1 TABLET BY MOUTH DAILY 90 tablet 3     multivitamin (MULTIVITAMIN) per tablet Take 1 tablet by mouth daily.       simvastatin (ZOCOR) 40 MG  "tablet Take 1 tablet (40 mg total) by mouth at bedtime. 90 tablet 3     Current Facility-Administered Medications   Medication Dose Route Frequency Provider Last Rate Last Dose     denosumab 60 mg (PROLIA 60 mg/ml)  60 mg Subcutaneous Q6 Months Kalia Hector MD   60 mg at 11/20/18 1242       Family History   Problem Relation Age of Onset     Coronary artery disease Father      Breast cancer Mother 81     Heart disease Sister      Heart disease Brother      Osteoporosis Sister      Cancer Sister         Bladder     Kidney disease Sister      Diabetes Sister      Heart disease Sister      Family history is not pertinent to this patients Chief Complaint.     reports that  has never smoked. she has never used smokeless tobacco. She reports that she does not drink alcohol or use drugs.    Review of Systems -   10 point Review of systems is negative except for; as mentioned above in HPI and PMHx    /82 (Patient Site: Right Arm, Patient Position: Sitting, Cuff Size: Adult Large)   Pulse 88   Ht 5' 4.25\" (1.632 m)   Wt (!) 227 lb 11.2 oz (103.3 kg)   LMP  (LMP Unknown)   SpO2 96%   Breastfeeding? No   BMI 38.78 kg/m    Body mass index is 38.78 kg/m .    EXAM:  GENERAL: Well developed female  HEENT: EOMI, Anicteric Sclera, Moist Mucous Membranes,  In Mouth the pt does not have redness or bleeding gums  CARDIOVASCULAR: RRR w/out murmur   CHEST/LUNG: Clear to Auscultation  ABDOMEN:  Non tender to palpation, +BS  MUSCULOSKELETAL:  No deformities with good range of motion in all extremities  NEURO: She is ambulatory with good strength in both legs.  HEME/LYMPH: No Cervical Adenopathy or tenderness.     IMAGES:  None    Assessment/Plan:  History of colon cancer status post right hemicolectomy and recently had a polyp removed with dysplasia.  The follow-up colonoscopy was negative but this patient needs to have another colonoscopy a year after that endoscopy with a dysplastic polyp was removed.  I recommend " she has a colonoscopy in March 2019.  I will help the patient arrange that procedure.    Patient tells me she has troubles with Gatorade and is interested in a different type of prep rather than the MiraLAX or movie prep.  I would write her prescription for GoLYTELY to see if that is better tolerated by her.      Barry Del Castillo MD  [facility]  [phone]

## 2021-06-23 NOTE — TELEPHONE ENCOUNTER
Refill NOT Needed -> merely re-transmittal to alternate pharmacy.  Done now.  Medication was last renewed on 12/10/2018.  Last OV 1/4/2019.    Teressa Wang, [facility] Triage/Med Refill 1/21/2019     Requested Prescriptions   Pending Prescriptions Disp Refills     blood glucose test (ONETOUCH VERIO) strips 200 strip 3     Sig: Use 1 each As Directed 2 (two) times a day. Dispense brand per patient's insurance at pharmacy discretion.    Diabetic Supplies Refill Protocol Passed - 1/21/2019  9:09 AM       Passed - Visit with PCP or prescribing provider visit in last 6 months    Last office visit with prescriber/PCP: 8/31/2018 Saul Zamora DO OR same dept: 8/31/2018 Saul Zamora DO OR same specialty: 8/31/2018 Saul Zamora DO  Last physical: 1/4/2019 Last MTM visit: Visit date not found   Next visit within 3 mo: Visit date not found  Next physical within 3 mo: Visit date not found  Prescriber OR PCP: Saul Zamora DO  Last diagnosis associated with med order: 1. Type 2 diabetes mellitus with hyperglycemia, without long-term current use of insulin (H)  - blood glucose test (ONETOUCH VERIO) strips; Use 1 each As Directed 2 (two) times a day. Dispense brand per patient's insurance at pharmacy discretion.  Dispense: 200 strip; Refill: 3    2. Hyperlipidemia, unspecified hyperlipidemia type  - simvastatin (ZOCOR) 40 MG tablet; Take 1 tablet (40 mg total) by mouth at bedtime.  Dispense: 90 tablet; Refill: 3    If protocol passes may refill for 12 months if within 3 months of last provider visit (or a total of 15 months).            Passed - A1C in last 6 months    Hemoglobin A1c   Date Value Ref Range Status   01/04/2019 8.4 (H) 3.5 - 6.0 % Final               simvastatin (ZOCOR) 40 MG tablet 90 tablet 3     Sig: Take 1 tablet (40 mg total) by mouth at bedtime.    Statins Refill Protocol (Hmg CoA Reductase Inhibitors) Passed - 1/21/2019  9:09 AM       Passed -  PCP or prescribing provider visit in past 12 months     Last office visit with prescriber/PCP: 8/31/2018 Saul Zamora DO OR same dept: 8/31/2018 Saul Zamora DO OR same specialty: 8/31/2018 Saul Zamora DO  Last physical: 1/4/2019 Last MTM visit: Visit date not found   Next visit within 3 mo: Visit date not found  Next physical within 3 mo: Visit date not found  Prescriber OR PCP: Saul Zamora DO  Last diagnosis associated with med order: 1. Type 2 diabetes mellitus with hyperglycemia, without long-term current use of insulin (H)  - blood glucose test (ONETOUCH VERIO) strips; Use 1 each As Directed 2 (two) times a day. Dispense brand per patient's insurance at pharmacy discretion.  Dispense: 200 strip; Refill: 3    2. Hyperlipidemia, unspecified hyperlipidemia type  - simvastatin (ZOCOR) 40 MG tablet; Take 1 tablet (40 mg total) by mouth at bedtime.  Dispense: 90 tablet; Refill: 3    If protocol passes may refill for 12 months if within 3 months of last provider visit (or a total of 15 months).

## 2021-06-24 NOTE — TELEPHONE ENCOUNTER
Refill Approved    Rx renewed per Medication Renewal Policy. Medication was last renewed on 2/21/2018 with 3 refills.   Last office visit: 1/4/2019 with PCP Dr ANEL Riggs, [facility] Triage/Med Refill 3/5/2019     Requested Prescriptions   Pending Prescriptions Disp Refills     losartan (COZAAR) 50 MG tablet 90 tablet 3     Sig: Take 1 tablet (50 mg total) by mouth daily.    Angiotensin Receptor Blocker Protocol Passed - 3/4/2019  3:51 PM       Passed - PCP or prescribing provider visit in past 12 months      Last office visit with prescriber/PCP: 8/31/2018 Saul Zamora DO OR same dept: 8/31/2018 Saul Zamora DO OR same specialty: 8/31/2018 Saul Zamora DO  Last physical: 1/4/2019 Last MTM visit: Visit date not found   Next visit within 3 mo: Visit date not found  Next physical within 3 mo: Visit date not found  Prescriber OR PCP: Saul Zamora DO  Last diagnosis associated with med order: 1. Essential hypertension  - losartan (COZAAR) 50 MG tablet; Take 1 tablet (50 mg total) by mouth daily.  Dispense: 90 tablet; Refill: 3    If protocol passes may refill for 12 months if within 3 months of last provider visit (or a total of 15 months).            Passed - Serum potassium within the past 12 months    Lab Results   Component Value Date    Potassium 4.7 01/04/2019            Passed - Blood pressure filed in past 12 months    BP Readings from Last 1 Encounters:   02/01/19 172/82            Passed - Serum creatinine within the past 12 months    Creatinine   Date Value Ref Range Status   01/04/2019 0.77 0.60 - 1.10 mg/dL Final

## 2021-06-25 NOTE — PROGRESS NOTES
NEUROSURGERY FOLLOW UP EVALUATION:    ASSESSMENT  Karol Mujica is a 76 y.o. female, who presents today status post BILATERAL LUMBAR 4-LUMBAR 5 LAMINECTOMIES, MEDIAL FACETECTOMY, FORAMINOTOMIES; LUMBAR 4-LUMBAR 5 POSTERIOR INSTRUMENTED FUSION AND ARTHRODESIS 3/10/2021 with Dr Chen. XR today reviewed with patient and Dr Chen and looks good.     Today she reports feeling quite well. Walking better. Leg symptoms resolved. Back feels better. Has therapy today and will see if they think she needs any more. Also has wound appt today and hopes those appointments will be complete. Wound looks healthy and is healing but does continue to have small open area, packed with new gauze. She has been taking ibuprofen and baclofen at bedtime. Recommend she not take NSAIDS until after 6 months post op.     PLAN:    1. Continue as you have been. Slowly increase your activity. Slowly add weight back. 5 lbs per week until back to normal. Stay below 50 lbs until fully fused.   2. Continue physical therapy - length of time per the therapist.   3. DO NOT TAKE  NSAIDs  for 6 months after fusion surgery, as these medications may delay bone healing  NSAIDS include such drugs as: Aspirin, Ibuprofen, Motrin, Advil, Aleve, Naproxen     4. Return to clinic in 3 months for your 6 month follow up    HPI:  Patient presented with claudicating low back and leg pain, numbness and weakness as well as urinary urgency with difficulty emptying bladder. PVR prior to surgery considered normal. MRI with severe spinal canal stenosis and crowding of the cauda equina L4-5. Moderate edema facet joints. Spinal listhesis L4-5 progressed to 1.5 mm.     PHYSICAL EXAM:  Temp:  [98.2  F (36.8  C)] 98.2  F (36.8  C)  Heart Rate:  [72-80] 80  Resp:  [18] 18  BP: (136-148)/(74-84) 136/74  SpO2:  [98 %] 98 %    Alert and oriented x3, speech normal  Arm strength: 5/5  Leg strength: 5/5   Bulk and tone: normal  Reflexes: no clonus   Coordination/Gait:  steady  Incision: small area open, packed with new gauze. Healing well.      IMAGING:  The imaging was personally reviewed by me, patient and Dr Chen.     ANDREW Saha  [facility]surgery  O: [phone]

## 2021-06-25 NOTE — TELEPHONE ENCOUNTER
Reason for Call: patient calling to speak to a provider about her GI symptoms. She has referral to [facility], but cant get in until 6/7 to see them.  States she is having severe diareha.    Detailed comments: please call her back to discuss her symptoms, and options.    Phone Number Patient can be reached at: Home number on file [phone] (home)    Best Time: any    Can we leave a detailed message on this number?: Yes    Call taken on 5/27/2021 at 3:59 PM by Alliosn Rodriguez

## 2021-06-25 NOTE — TELEPHONE ENCOUNTER
RN cannot approve Refill Request    RN can NOT refill this medication med is not covered by policy/route to provider. Last office visit: 3/22/2021 Saul Zamora DO Last Physical: 3/8/2021 Last MTM visit: Visit date not found Last visit same specialty: 5/20/2021 Eliana Hammonds MD.  Next visit within 3 mo: Visit date not found  Next physical within 3 mo: Visit date not found      Kiana Arambula, [facility] Triage/Med Refill 6/3/2021    Requested Prescriptions   Pending Prescriptions Disp Refills     gabapentin (NEURONTIN) 300 MG capsule [Pharmacy Med Name: GABAPENTIN 300 MG CAPSULE] 90 capsule 3     Sig: TAKE ONE CAPSULE BY MOUTH EVERY EVENING       Gabapentin/Levetiracetam/Tiagabine Refill Protocol  Passed - 6/2/2021  8:58 AM        Passed - PCP or prescribing provider visit in past 12 months or next 3 months     Last office visit with prescriber/PCP: 3/22/2021 Saul Zamora DO OR same dept: 5/20/2021 Eliana Hammonds MD OR same specialty: 5/20/2021 Eliana Hammonds MD  Last physical: 3/8/2021 Last MTM visit: Visit date not found   Next visit within 3 mo: Visit date not found  Next physical within 3 mo: Visit date not found  Prescriber OR PCP: Saul Zamora DO  Last diagnosis associated with med order: 1. Lumbar radicular pain  - gabapentin (NEURONTIN) 300 MG capsule [Pharmacy Med Name: GABAPENTIN 300 MG CAPSULE]; TAKE ONE CAPSULE BY MOUTH EVERY EVENING  Dispense: 90 capsule; Refill: 3    2. Spinal stenosis of lumbar region with neurogenic claudication  - gabapentin (NEURONTIN) 300 MG capsule [Pharmacy Med Name: GABAPENTIN 300 MG CAPSULE]; TAKE ONE CAPSULE BY MOUTH EVERY EVENING  Dispense: 90 capsule; Refill: 3    3. Spondylolisthesis of lumbar region  - gabapentin (NEURONTIN) 300 MG capsule [Pharmacy Med Name: GABAPENTIN 300 MG CAPSULE]; TAKE ONE CAPSULE BY MOUTH EVERY EVENING  Dispense: 90 capsule; Refill: 3    If protocol passes may refill  for 12 months if within 3 months of last provider visit (or a total of 15 months).

## 2021-06-25 NOTE — PROGRESS NOTES
Patient is here for a 6 week post op. She states that she is doing well, with little to no pain.   Dalia Peguero,FARHAT,10:59 AM

## 2021-06-25 NOTE — TELEPHONE ENCOUNTER
She is doing bone broth / rice / probiotics 30 billion CFU daily. Lab work all normal.  No obvious pathogen.  She is still having diarrhea- 6x a day.  Has appointment with [facility] but not til [place].  Wonders what else she can do.    I recommend a trial of following supplement:     SBI Protect by [employer]- one scoop or capsule daily - can purchase at [facility]  Can expand diet to BRAT/ cooked vegetables / lean meats.    Update me after starting supplement    Eliana Hammonds

## 2021-06-25 NOTE — TELEPHONE ENCOUNTER
Called patient and she was told that Dr. Hammonds would call her back.    Unable to get into GI sooner and has had 6 episodes of diarrhea so far today. Was given a diet by Dr. Hammonds and has been using and diarrhea not getting any better.

## 2021-06-26 NOTE — PATIENT INSTRUCTIONS - HE
"    Important Instructions                     How to care for your wound    Cleanse wound with saline sent to you with your supplies or a wound wash found at the pharmacy.      Pat dry the wound with 4\"x4\" gauze      Apply hydrogel to Primapore +pad or band-aid.      Cover the wound with Primapore +pad      Change dressing: every day      Do not get your ulcer wet when you shower.  You can cover it with a cast protector. Cast protectors are available for purchase at [facility] [employer]. You may also purchase a cast protector at your local pharmacy.    It is ok to continue current wound care treatment/products for the next 2-3 days until new wound care supplies are ordered and arrive. If longer than this please contact our office.    Please call the [facility] before substituting any product    Call our clinic nurse at [phone] if there is an increase in drainage, pain, redness, or the wound size increases.  This maybe a sign of infection and require attention prior to the next appointment              "

## 2021-06-26 NOTE — PATIENT INSTRUCTIONS - HE
1. Continue as you have been. Slowly increase your activity. Slowly add weight back. 5 lbs per week until back to normal. Stay below 50 lbs until fully fused.   2. Continue physical therapy - length of time per the therapist.   3. DO NOT TAKE  NSAIDs  for 6 months after fusion surgery, as these medications may delay bone healing  NSAIDS include such drugs as: Aspirin, Ibuprofen, Motrin, Advil, Aleve, Naproxen     4. Return to clinic in 6 weeks with XR

## 2021-06-26 NOTE — PATIENT INSTRUCTIONS - HE
May leave healed wound open to air.     Call our clinic nurse at [phone] if there is an increase in drainage, pain, redness, or the wound size increases.  This maybe a sign of infection and require attention prior to the next appointment

## 2021-06-27 NOTE — PROGRESS NOTES
Progress Notes by Sara Martines AuD at 5/9/2019  4:00 PM     Author: Sara Martines AuD Service: -- Author Type: Audiologist    Filed: 5/9/2019  5:11 PM Encounter Date: 5/9/2019 Status: Signed    : Sara Martines AuD (Audiologist)       Audiology Report:    Referring Provider:  self referral    History:  Karol Mujica is seen today for comprehensive hearing evaluation. She's accompanied by her , Vinay.  She has a history of decreased hearing over the last year. Massiel reports increased difficulty understanding the  at [employer] and her friends when visiting in small conversations.  She denies a history of otalgia, dizziness, tinnitus, aural fullness and history of noise exposure.    Results:         Plan:  Results are discussed in detail.  She should return for retesting in 1-2 years.  The patient is a candidate for hearing aids, and should consider pending medical clearance. The patient is counseled on listening strategies. The patient is counseled on hearing protection. Provided Massiel with copy of her audiogram per request.     Please see audiogram under media and audiogram in the patients chart.     Skinny Wilde, [facility]  [place] Licensed Audiologist #[id number]

## 2021-06-28 ENCOUNTER — COMMUNICATION - HEALTHEAST (OUTPATIENT)
Dept: FAMILY MEDICINE | Facility: CLINIC | Age: 76
End: 2021-06-28

## 2021-06-28 DIAGNOSIS — E11.42 TYPE 2 DIABETES MELLITUS WITH DIABETIC POLYNEUROPATHY, WITHOUT LONG-TERM CURRENT USE OF INSULIN (H): ICD-10-CM

## 2021-06-30 ENCOUNTER — AMBULATORY - HEALTHEAST (OUTPATIENT)
Dept: LAB | Facility: CLINIC | Age: 76
End: 2021-06-30

## 2021-06-30 DIAGNOSIS — C18.4 MALIGNANT NEOPLASM OF TRANSVERSE COLON (H): ICD-10-CM

## 2021-06-30 DIAGNOSIS — M81.0 AGE-RELATED OSTEOPOROSIS WITHOUT CURRENT PATHOLOGICAL FRACTURE: ICD-10-CM

## 2021-06-30 DIAGNOSIS — E11.42 TYPE 2 DIABETES MELLITUS WITH DIABETIC POLYNEUROPATHY, WITHOUT LONG-TERM CURRENT USE OF INSULIN (H): ICD-10-CM

## 2021-06-30 LAB
ANION GAP SERPL CALCULATED.3IONS-SCNC: 15 MMOL/L (ref 5–18)
BUN SERPL-MCNC: 13 MG/DL (ref 8–28)
CALCIUM SERPL-MCNC: 9.7 MG/DL (ref 8.5–10.5)
CEA SERPL-MCNC: 1.5 NG/ML (ref 0–3)
CHLORIDE BLD-SCNC: 104 MMOL/L (ref 98–107)
CO2 SERPL-SCNC: 21 MMOL/L (ref 22–31)
CREAT SERPL-MCNC: 0.74 MG/DL (ref 0.6–1.1)
CREAT UR-MCNC: 38.2 MG/DL
GFR SERPL CREATININE-BSD FRML MDRD: >60 ML/MIN/1.73M2
GLUCOSE BLD-MCNC: 161 MG/DL (ref 70–125)
HBA1C MFR BLD: 6.2 %
LDLC SERPL CALC-MCNC: 80 MG/DL
MICROALBUMIN UR-MCNC: 2.16 MG/DL (ref 0–1.99)
MICROALBUMIN/CREAT UR: 56.5 MG/G
POTASSIUM BLD-SCNC: 4.2 MMOL/L (ref 3.5–5)
SODIUM SERPL-SCNC: 140 MMOL/L (ref 136–145)

## 2021-06-30 NOTE — PROGRESS NOTES
Progress Notes by Samanta Mccoy CNP at 5/13/2021  1:20 PM     Author: Samanta Mccoy CNP Service: -- Author Type: Nurse Practitioner    Filed: 5/13/2021  3:07 PM Encounter Date: 5/13/2021 Status: Signed    : Samanta Mccoy CNP (Nurse Practitioner)               [facility]     FOLLOW UP NOTE      Date of Service: 5/13/2021    Date Last Seen: 4/23/2021; 4/23/2021    Chief Complaint: Lower back incisional skin breakdown    Pt returns to [facility] with regards to the lower back incisional skin breakdown following L4-L5 laminectomy and posterior instrumented fusion on March 10, 2021. Healing is complicated by type 2 diabetes mellitus, vitamin D deficiency, and obesity.      The patient arrives today with  from home. They are currently using Xeroform and bordered gauze dressing to the wounds. This is being done by the patient , daily.      Today, the patient reports feeling well. Denies pain, fevers, chills. No shortness of breath. No acute medical concerns.       Pertinent Medications:    Vitamin D    Gabapentin    Metformin    MVM    Allergies: Venom-honey bee, Blood-group specific substance, Nitrofurantoin, and Penicillins    Medications:   Current Outpatient Medications:   ?  acetaminophen (TYLENOL) 500 MG tablet, Take 1,000 mg by mouth every 6 (six) hours as needed for pain., Disp: , Rfl:   ?  baclofen (LIORESAL) 10 MG tablet, Take 1 tablet (10 mg total) by mouth every 8 (eight) hours as needed (every 8 hours as needed)., Disp: 60 tablet, Rfl: 3  ?  blood-glucose meter Misc, Use 1 Device As Directed daily. Type 2 DM, without long term insulin use, Disp: 1 each, Rfl: 0  ?  cholecalciferol, vitamin D3, 2,000 unit cap, Take 2 tablets by mouth every evening. , Disp: , Rfl:   ?  gabapentin (NEURONTIN) 300 MG capsule, 1 cap at bedtime x 3 days, then may take 1 cap twice daily x 3 days, then may take 1 cap three times daily.  (Patient taking differently: 1 cap in the morning and 1 at night.), Disp: 90 capsule, Rfl: 2  ?  glipiZIDE (GLUCOTROL) 10 MG tablet, Take 1 tablet (10 mg total) by mouth 2 (two) times a day before meals., Disp: 180 tablet, Rfl: 3  ?  losartan (COZAAR) 50 MG tablet, Take 1 tablet (50 mg total) by mouth daily., Disp: 90 tablet, Rfl: 3  ?  metFORMIN (GLUCOPHAGE) 1000 MG tablet, Take 1 tablet (1,000 mg total) by mouth 2 (two) times a day with meals., Disp: 180 tablet, Rfl: 2  ?  multivitamin (MULTIVITAMIN) per tablet, Take 1 tablet by mouth daily., Disp: , Rfl:   ?  ONETOUCH VERIO strips, USE 1 EACH AS DIRECTED 2 (TWO) TIMES A DAY., Disp: 200 strip, Rfl: 3  ?  simvastatin (ZOCOR) 40 MG tablet, TAKE 1 TABLET BY MOUTH EVERYDAY AT BEDTIME, Disp: 90 tablet, Rfl: 2    Current Facility-Administered Medications:   ?  lidocaine 2 % jelly (XYLOCAINE), , Topical, PRN, Samanta Mccoy, CNP, 1 application at 04/22/21 1441      History:   Past Medical History:   Diagnosis Date   ? Anemia    ? Arthritis    ? Cataract    ? Cholelithiasis    ? Colon cancer (H) 2014   ? Diabetes mellitus (H)    ? Diverticular disease    ? GI bleed 2/15/2014   ? History of anesthesia complications     slow to wake up   ? History of transfusion     has antibodies   ? Hyperlipidemia    ? Hypertension    ? Osteoporosis    ? Peripheral neuropathy     feet   ? Red blood cell antibody positive      h/o blood transfusion 2015. 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  Head: normocephalic atraumatic  Psychiatric:  Alert and oriented x3.   Respiratory: unlabored breathing; no cough  Integumentary:  Skin is uniformly warm, dry and pink.      Ulceration(s)/Wound(s):   Wound/Ulcer location: LOWER BACK, MIDLINE   Size: see below  Edges: Attached with new epithelial bridge.   Undermining: None  Base: yellow slough  Tissues: adipose, dermis,  epidermis  Thickness: full  Exudate Type: serous  Exudate Amount: small to moderate  Mirian-Wound/Ulcer: no erythema or induration  Odor: none    Urethral Catheter Latex 16 Fr. (Active)       VASC Wound 04/22/21 lumbar spine (Active)   Pre Size Length 2.7 05/13/21 1300   Pre Size Width 0.3 05/13/21 1300   Pre Size Depth 0.3 05/13/21 1300   Pre Total Sq cm 1.35 05/13/21 1300   Post Size Length 3.2 05/13/21 1300   Post Size Width 0.4 05/13/21 1300   Post Size Depth 0.5 05/13/21 1300   Post Total Sq cm 1.28 05/13/21 1300   Description epithelial bridge 05/13/21 1300       Incision 10/10/18 Abdomen (Active)       Incision 03/10/21 Surgical Back (Active)        Laboratory/Diagnostics studes:  None to review.       Diagnoses:  1. Superficial dehiscence of wound     2. Wound of back, unspecified laterality, subsequent encounter     3. Type 2 diabetes mellitus with other skin ulcer, without long-term current use of insulin (H)     4. Vitamin D deficiency         Photo:  5/13/2021  Lower back, post-debridement          Are any of these wounds new today: No.      Assessment/Plan:  1. Debridement: After discussion of risk factors and verbal consent was obtained 2% Lidocaine HCL jelly was applied, under clean conditions, the lower back wound/ulceration(s) were debrided using small currette. Devitalized and nonviable tissue, along with any fibrin and slough, was removed to improve granulation tissue formation, stimulate wound healing, decrease overall bacteria load, disrupt biofilm formation and decrease edge senescence.  Total excisional debridement was 1.28 sq cm from the epidermis/dermis area and into the subcutaneous tissue with a depth of 0.5 cm.   Ulcers were improved afterwards and .  Measures were as noted on the flow sheet.    2. Treatment: wound treatment will include irrigation and dressings to promote autolytic debridement which will include: Continue primary dressing of Xeroform and secondary dressing of  bordered gauze, daily.    3. Edema: NA    4. Offloading: NA    5. Nutrition: NA    6. Diagnostics: NA    7. Medications: NA    8. Referrals: NA    9. Education: See above. Education provided regarding debridement, dressing changes, healing progress in the wound bed. Patient verbalizes understanding and all questions answered to their satisfaction.        Impression:  Karol Mujica is a 76 year old patient with a superficial dehiscence of a lower back, lumbar incision. This extends just into the adipose tissue. Healing will be complicated by type 2 diabetes mellitus without hyperglycemia and normal tension on the incisional due to anatomical location.    Today, the lower back incisional wound appears smaller. There is a new epithelial bridge through the center of the ulcer. The remaining open portions are covered in adherent yellow slough and fibrin; this is obstructing granulation tissue formation and epithelial migration over the remaining open areas. Will continue current plan of care to maintain moisture balance and promote autolytic debridement of nonviable tissue.  Patient to continue therapy and activity as tolerated.        Patient will follow up with me in 3 weeks for reevaluation. They were instructed to call the clinic sooner with any signs or symptoms of infection or any further questions/concerns. Answered all questions.      Samanta Mccoy, MSN, CNP, CWOCN-AP  [facility]  [phone]     The assessment, plan of care, and note were electronically prepared by Samanta Mccoy, CNP, CWOCN-AP.

## 2021-06-30 NOTE — PROGRESS NOTES
Progress Notes by Samanta Mccoy CNP at 4/22/2021  2:00 PM     Author: Samanta Mccoy CNP Service: -- Author Type: Nurse Practitioner    Filed: 4/23/2021  9:16 AM Encounter Date: 4/22/2021 Status: Signed    : Samanta Mccoy CNP (Nurse Practitioner)             [facility] Consult Note    Date of Service:  4/22/2021    Requesting Provider: Yuliya Cotton PA-C    Chief Complaint: back incision skin breakdown    History of Present Illness: Karol Mujica is being seen at [facility] today regarding back incision skin breakdown following L4-L5 laminectomy and posterior instrumented fusion on March 10, 2021, by Dr. Jia Chen. She did require a durotomy repair but had not further complications. Her sutures were moved during her first post-operative appointment and no concerns were noted. The patient has continued to follow with her surgical clinic and been instructed she can begin to wean from her back brace and will also start some therapy. The patient reports that she felt the brace and/or her pants rub on the area.  The patient arrives to the clinic today from home with her . The patient's medical history is significant for type 2 diabetes mellitus, vitamin D deficiency, and obesity.    Today, the patient denies any pain, fevers, chills, generalized ill feeling or other acute medical concerns.  She has been instructed to pain the area with betadine and cover, daily. Her  assists her with this care. She is keeping the area covered with a water proof dressing during showers.     I personally reviewed outside imaging, lab work, and progress noted through Care Everywhere and outside records.      Pertinent Medications:    Vitamin D    Gabapentin    Metformin    MVM       Review of Systems:   Constitutional:  negative for fever, chills or night sweats  EENTM: negative for glasses;  negative [place]  GI:  negative for nausea/vomiting;  negative for constipation, negative diarrhea;  negative for fecal incontinence, negative weight loss  :  negative dysuria, negative incontinence  MS: patient is ambulatory;  does use assistive devices; negative history of falls  Cardiac:  negative chest pain or palpitations  Respiratory:  negative shortness of breath or cough  Endocrine:  positive diabetes, negative thyroid disorder  Vascular: negative swelling, numbness  Psych:  negative acute depression/anxiety    Past Medical History:    Past Medical History:   Diagnosis Date   ? Anemia    ? Arthritis    ? Cataract    ? Cholelithiasis    ? Colon cancer (H)    ? Diabetes mellitus (H)    ? Diverticular disease    ? GI bleed 2/15/2014   ? History of anesthesia complications     slow to wake up   ? History of transfusion     has antibodies   ? Hyperlipidemia    ? Hypertension    ? Osteoporosis    ? Peripheral neuropathy     feet   ? Red blood cell antibody positive      h/o blood transfusion . known red cell antibodies to C and K per preop H&P       Surgical History:   Past Surgical History:   Procedure Laterality Date   ? APPENDECTOMY     ? BREAST BIOPSY Left     Years ago   ? BREAST EXCISIONAL BIOPSY Left     Years ago   ?  SECTION     ?  SECTION, CLASSIC     ? COLON SURGERY     ? COLONOSCOPY N/A 3/26/2019    Procedure: COLONOSCOPY WITH BIOPSY;  Surgeon: Barry Del Castillo MD;  [facility];  Service: General   ? COLONOSCOPY W/ BIOPSIES  2014    3 polyps removed   ? EYE SURGERY     ? FEMUR SURGERY Left     for fracture   ? FRACTURE SURGERY     ? HYSTERECTOMY     ? JOINT REPLACEMENT     ? LAPAROSCOPIC CHOLECYSTECTOMY N/A 10/10/2018    Procedure: CHOLECYSTECTOMY, LAPAROSCOPIC;  Surgeon: Demarcus Barriga MD;  [facility];  Service:    ? MA STEREOTACTIC BREAST BIOPSY VACUUM L Left 2021   ? OOPHORECTOMY Bilateral    ? WI ARTHRODESIS POSTERIOR/POSTEROLATERAL THORACIC N/A 3/10/2021     Procedure: BILATERAL LUMBAR 4-LUMBAR 5 LAMINECTOMIES, MEDIAL FACETECTOMY, FORAMINOTOMIES; LUMBAR 4-LUMBAR 5 POSTERIOR INSTRUMENTED FUSION AND ARTHRODESIS, USE OF ALLOGRAFT, USE OF AUTOGRAFT, USE OF STEALTH NAVIGATION;  Surgeon: Jia Chen MD;  Location: [facility] [place], [place];  Service: Spine   ? AZ PART REMOVAL COLON W ANASTOMOSIS      Description: Right Hemicolectomy;  Proc Date: 02/16/2014;   ? AZ REMOVE TONSILS/ADENOIDS,<14 Y/O  age 22    Description: Tonsillectomy With Adenoidectomy;  Recorded: 08/07/2013;   ? AZ TOTAL KNEE ARTHROPLASTY Bilateral     Description: Total Knee Arthroplasty;  Recorded: 08/22/2014;  Comments: Bilateral   ? TONSILLECTOMY     ? TUBAL LIGATION  1981       Medications:      Current Outpatient Medications:   ?  acetaminophen (TYLENOL) 500 MG tablet, Take 1,000 mg by mouth every 6 (six) hours as needed for pain., Disp: , Rfl:   ?  baclofen (LIORESAL) 10 MG tablet, Take 1 tablet (10 mg total) by mouth every 8 (eight) hours as needed (every 8 hours as needed)., Disp: 60 tablet, Rfl: 3  ?  blood-glucose meter Misc, Use 1 Device As Directed daily. Type 2 DM, without long term insulin use, Disp: 1 each, Rfl: 0  ?  cholecalciferol, vitamin D3, 2,000 unit cap, Take 2 tablets by mouth every evening. , Disp: , Rfl:   ?  gabapentin (NEURONTIN) 300 MG capsule, 1 cap at bedtime x 3 days, then may take 1 cap twice daily x 3 days, then may take 1 cap three times daily. (Patient taking differently: 1 cap in the morning and 1 at night.), Disp: 90 capsule, Rfl: 2  ?  glipiZIDE (GLUCOTROL) 10 MG tablet, Take 1 tablet (10 mg total) by mouth 2 (two) times a day before meals., Disp: 180 tablet, Rfl: 3  ?  losartan (COZAAR) 50 MG tablet, Take 1 tablet (50 mg total) by mouth daily., Disp: 90 tablet, Rfl: 3  ?  metFORMIN (GLUCOPHAGE) 1000 MG tablet, TAKE 1 TABLET (1,000 MG TOTAL) BY MOUTH 2 (TWO) TIMES A DAY WITH MEALS., Disp: 180 tablet, Rfl: 3  ?  multivitamin (MULTIVITAMIN) per tablet, Take 1 tablet  by mouth daily., Disp: , Rfl:   ?  ONETOUCH VERIO strips, USE 1 EACH AS DIRECTED 2 (TWO) TIMES A DAY., Disp: 200 strip, Rfl: 3  ?  simvastatin (ZOCOR) 40 MG tablet, TAKE 1 TABLET BY MOUTH EVERYDAY AT BEDTIME, Disp: 90 tablet, Rfl: 2    Current Facility-Administered Medications:   ?  lidocaine 2 % jelly (XYLOCAINE), , Topical, JOHNN, Samanta Mccoy, CNP, 1 application at 04/22/21 1441    Allergies:   Allergies   Allergen Reactions   ? Venom-Honey Bee Anaphylaxis   ? Blood-Group Specific Substance      Anti-C, Anti-K, and Warm autoantibodies present. Expect up to 24 hour delay in blood for transfusion. Draw 2 lavender and 1 red tube for type and screen orders.   ? Nitrofurantoin Hives     Nausea   ? Penicillins Hives       Family history:   Family History   Problem Relation Age of Onset   ? Coronary artery disease Father    ? Breast cancer Mother 81   ? Heart disease Sister    ? Heart disease Brother    ? Kidney cancer Brother    ? Lung cancer Brother    ? Osteoporosis Sister    ? Heart disease Sister    ? Cancer Sister         Bladder   ? Kidney disease Sister    ? Diabetes Sister    ? Heart disease Sister        Social History:   Social History     Socioeconomic History   ? Marital status:      Spouse name: Vinay   ? Number of children: 2   ? Years of education: 16   ? Highest education level: Some college, no degree   Occupational History     Employer: RETIRED   Social Needs   ? Financial resource strain: Not hard at all   ? Food insecurity     Worry: Never true     Inability: Never true   ? Transportation needs     Medical: No     Non-medical: No   Tobacco Use   ? Smoking status: Never Smoker   ? Smokeless tobacco: Never Used   Substance and Sexual Activity   ? Alcohol use: No     Frequency: Never     Binge frequency: Never   ? Drug use: No   ? Sexual activity: Not on file   Lifestyle   ? Physical activity     Days per week: Not on file     Minutes per session: Not on file   ? Stress: Not at all    Relationships   ? Social connections     Talks on phone: Not on file     Gets together: Not on file     Attends Holiness service: Not on file     Active member of club or organization: Not on file     Attends meetings of clubs or organizations: Not on file     Relationship status: Not on file   ? Intimate partner violence     Fear of current or ex partner: No     Emotionally abused: No     Physically abused: No     Forced sexual activity: No   Other Topics Concern   ? Not on file   Social History Narrative   ? Not on file        Physical Exam  Vitals: Blood pressure 156/78, pulse 100, temperature 99  F (37.2  C), resp. rate 12, not currently breastfeeding.  General: This is a 76 y.o. female who appears their reported age, not in acute distress  Head: normocephalic, Atraumatic; wearing glasses; non-icteric sclera; no exudate; mild hearing loss  Respiratory: Clear throughout all lung fields; unlabored breathing; no cough  Cardiac: Regular, Rate and Rhythm, no murmurs appreciated  Skin: Uniformly warm and dry  Psych: Alert and oriented x4; calm and cooperative throughout exam  Abdomen: Normal bowel sounds. Soft, symmetric, no guarding or rigidity, nontender with palpation.  No organomegaly or masses palpated.   Lymphatics: No palpable nodes to bilateral groin   Extremities: Full range of motion of all four extremities.     Ulceration(s)/Wound(s):   Wound/Ulcer location: LOWER BACK, MIDLINE   Size: see below  Edges: Attached  Undermining: None  Base: yellow slough  Tissues: adipose, dermis, epidermis  Thickness: full  Exudate Type: serous  Exudate Amount: small to moderate  Mirian-Wound/Ulcer: no erythema or induration  Odor: none    Urethral Catheter Latex 16 Fr. (Active)       VASC Wound 04/22/21 lumbar spine (Active)   Pre Size Length 2.7 04/22/21 1400   Pre Size Width 0.5 04/22/21 1400   Pre Size Depth 0.3 04/22/21 1400   Pre Total Sq cm 1.35 04/22/21 1400       Incision 10/10/18 Abdomen (Active)       Incision  03/10/21 Surgical Back (Active)       Laboratory studies:   No results found for: SEDRATE  Lab Results   Component Value Date    CREATININE 0.66 03/11/2021     Lab Results   Component Value Date    HGBA1C 7.2 (H) 01/08/2021     Lab Results   Component Value Date    BUN 10 03/11/2021     Lab Results   Component Value Date    ALBUMIN 3.8 03/07/2020     Vitamin D, Total (25-Hydroxy)   Date Value Ref Range Status   01/08/2021 35.1 30.0 - 80.0 ng/mL Final     01/08/2021 AIC: 7.2      Impression:   1. Back wound  lidocaine 2 % jelly (XYLOCAINE)    Change dressing   2. Superficial dehiscence of wound     3. Type 2 diabetes mellitus with other skin ulcer, without long-term current use of insulin (H)     4. Vitamin D deficiency     5. Wound of back, unspecified laterality, initial encounter          4/22/2021 Back, post-debridement        Assessment/Plan:  1. Debridement: After discussion of risk factors and verbal consent was obtained 2% Lidocaine HCL jelly was applied, under clean conditions, the lumbar wound was debrided using currette. Devitalized and nonviable tissue, along with any fibrin and slough, was removed to improve granulation tissue formation, stimulate wound healing, decrease overall bacteria load, disrupt biofilm formation and decrease edge senescence.  Total excisional debridement was 2.5 sq cm from the epidermis/dermis area and into the subcutaneous tissue with a depth of 0.5 cm.   Ulcers were improved afterwards and .  Measures were as noted on the flow sheet.    2. Treatment: wound treatment will include irrigation and dressings to promote autolytic debridement which will include: Xeroform and bordered gauze dressing, daily.    3. Edema: NA    4. Offloading: NA    5. Nutrition: Continued diabetic control and appropriate protein intake.     6. Diagnostics: NA    7. Medications: NA    8. Education: See above. Education provided regarding debridement, nutrition, wound dressings and care. Patient  verbalizes understanding and all questions answered to her satisfaction.       Impression:  Karol Mujica is a 76 year old patient with a superficial dehiscence of a lower back, lumbar incision. This extends just into the adipose tissue. There are no signs of infection. Healing will be complicated by type 2 diabetes mellitus without hyperglycemia and normal tension on the incisional due to anatomical location. I informed the patient that activity should be as tolerated but not unduly restricted. The new tissue will need to accommodate movement in the future. Healing should not disrupt normal therapy advancement. I will debride further slough today and start a prophylactic antimicrobial dressing that will also maintain moisture level to promote autolytic debridement and granulation of the ulcer bed.       Patient to return to clinic in 3 week(s) for re-evaluation. They were instructed to call the clinic sooner with any further questions or concerns.       Samanta Mccoy, MSN, CNP, CWOCN-AP  [facility]  [phone]        The assessment, plan of care, and note were electronically prepared by Samanta Mccoy CNP, CWOCN-AP.

## 2021-07-01 LAB — 25(OH)D3 SERPL-MCNC: 34.9 NG/ML (ref 30–80)

## 2021-07-03 NOTE — ADDENDUM NOTE
Addendum Note by Tabitha Merida RT (R) at 4/5/2019  8:00 AM     Author: Tabitha Merida RT (R) Service: -- Author Type: Radiologic Technologist    Filed: 4/8/2019  3:21 PM Encounter Date: 4/5/2019 Status: Signed    : Tabitha Merida RT (R) (Radiologic Technologist)    Addended by: TABITHA HUFF on: 4/8/2019 03:21 PM        Modules accepted: Orders

## 2021-07-03 NOTE — ADDENDUM NOTE
Addendum Note by Artur Smith DO at 1/31/2020  5:28 PM     Author: Artur Smith DO Service: -- Author Type: Physician    Filed: 1/31/2020  5:28 PM Encounter Date: 1/24/2020 Status: Signed    : Artur Smith DO (Physician)    Addended by: ARTUR SMITH on: 1/31/2020 05:28 PM        Modules accepted: Orders

## 2021-07-03 NOTE — ADDENDUM NOTE
Addendum Note by Artur Smith DO at 2/3/2020  7:33 AM     Author: Artur Smith DO Service: -- Author Type: Physician    Filed: 2/3/2020  7:33 AM Encounter Date: 1/24/2020 Status: Signed    : Artur Smith DO (Physician)    Addended by: ARTUR SMITH on: 2/3/2020 07:33 AM        Modules accepted: Orders

## 2021-07-03 NOTE — ADDENDUM NOTE
Addendum Note by Artur Smith DO at 1/29/2020 10:22 AM     Author: Artur Smith DO Service: -- Author Type: Physician    Filed: 1/29/2020 10:22 AM Encounter Date: 1/24/2020 Status: Signed    : Artur Smith DO (Physician)    Addended by: ARTUR SMITH on: 1/29/2020 10:22 AM        Modules accepted: Orders

## 2021-07-04 NOTE — PROGRESS NOTES
Progress Notes by Samanta Mccoy CNP at 6/16/2021  1:00 PM     Author: Samanta Mccoy CNP Service: -- Author Type: Nurse Practitioner    Filed: 6/16/2021  1:09 PM Encounter Date: 6/16/2021 Status: Signed    : Samanta Mccoy CNP (Nurse Practitioner)               [facility]     FOLLOW UP NOTE      Date of Service: 6/16/2021    Date Last Seen: 4/23/2021; 4/23/2021    Chief Complaint: Lower back incisional skin breakdown    Pt returns to [facility] with regards to the lower back incisional skin breakdown following L4-L5 laminectomy and posterior instrumented fusion on March 10, 2021. Healing is complicated by type 2 diabetes mellitus, vitamin D deficiency, and obesity.      The patient arrives today with  from home. They are currently using Xeroform and bordered gauze dressing to the wounds. This is being done by the patient , daily. They report there has been no drainage on the dressings for 3 weeks.      Today, the patient reports feeling well. Denies pain, fevers, chills. No shortness of breath. No acute medical concerns.       Pertinent Medications:    Vitamin D    Gabapentin    Metformin    MVM    Allergies: Venom-honey bee, Blood-group specific substance, Nitrofurantoin, and Penicillins    Medications:   Current Outpatient Medications:   ?  acetaminophen (TYLENOL) 500 MG tablet, Take 1,000 mg by mouth every 6 (six) hours as needed for pain., Disp: , Rfl:   ?  baclofen (LIORESAL) 10 MG tablet, Take 1 tablet (10 mg total) by mouth every 8 (eight) hours as needed (every 8 hours as needed)., Disp: 60 tablet, Rfl: 3  ?  blood-glucose meter Misc, Use 1 Device As Directed daily. Type 2 DM, without long term insulin use, Disp: 1 each, Rfl: 0  ?  cholecalciferol, vitamin D3, 2,000 unit cap, Take 2 tablets by mouth every evening. , Disp: , Rfl:   ?  gabapentin (NEURONTIN) 300 MG capsule, Take 1 capsule (300 mg total) by mouth 2  (two) times a day., Disp: 180 capsule, Rfl: 3  ?  glipiZIDE (GLUCOTROL) 10 MG tablet, Take 1 tablet (10 mg total) by mouth 2 (two) times a day before meals., Disp: 180 tablet, Rfl: 3  ?  losartan (COZAAR) 50 MG tablet, Take 1 tablet (50 mg total) by mouth daily., Disp: 90 tablet, Rfl: 3  ?  metFORMIN (GLUCOPHAGE) 1000 MG tablet, Take 1 tablet (1,000 mg total) by mouth 2 (two) times a day with meals., Disp: 180 tablet, Rfl: 2  ?  multivitamin (MULTIVITAMIN) per tablet, Take 1 tablet by mouth daily., Disp: , Rfl:   ?  ONETOUCH VERIO strips, USE 1 EACH AS DIRECTED 2 (TWO) TIMES A DAY., Disp: 200 strip, Rfl: 3  ?  simvastatin (ZOCOR) 40 MG tablet, TAKE 1 TABLET BY MOUTH EVERYDAY AT BEDTIME, Disp: 90 tablet, Rfl: 2    Current Facility-Administered Medications:   ?  lidocaine 2 % jelly (XYLOCAINE), , Topical, PRN, Samanta Mccoy, CNP, 1 application at 04/22/21 1441      History:   Past Medical History:   Diagnosis Date   ? Anemia    ? Arthritis    ? Cataract    ? Cholelithiasis    ? Colon cancer (H) 2014   ? Diabetes mellitus (H)    ? Diverticular disease    ? GI bleed 2/15/2014   ? History of anesthesia complications     slow to wake up   ? History of transfusion     has antibodies   ? Hyperlipidemia    ? Hypertension    ? Osteoporosis    ? Peripheral neuropathy     feet   ? Red blood cell antibody positive      h/o blood transfusion 2015. known red cell antibodies to C and K per preop H&P       Physical Exam:    /68   Pulse 80   Temp 98.1  F (36.7  C)   LMP  (LMP Unknown)     General:  Patient presents to clinic in no apparent distress.  Head: normocephalic atraumatic  Psychiatric:  Alert and oriented x3.   Respiratory: unlabored breathing; no cough  Integumentary:  Skin is uniformly warm, dry and pink.      Ulceration(s)/Wound(s):   Wound/Ulcer location: LOWER BACK, MIDLINE   Closed,     Urethral Catheter Latex 16 Fr. (Active)       Incision 10/10/18 Abdomen (Active)       Incision 03/10/21 Surgical  Back (Active)        Laboratory/Diagnostics studes:  None to review.       Diagnoses:  1. Wound of back, unspecified laterality, subsequent encounter     2. Superficial dehiscence of wound         Are any of these wounds new today: No.      Assessment/Plan:  1. Debridement: na    2. Treatment: May leave open to air. Healed.     3. Edema: NA    4. Offloading: NA    5. Nutrition: NA    6. Diagnostics: NA    7. Medications: NA    8. Referrals: NA    9. Education: See above. Education provided regarding plan of care. Patient verbalizes understanding and all questions answered to their satisfaction.        Impression:  Karol Mujica is a 76 year old patient with a superficial dehiscence of a lower back, lumbar incision. This extends just into the adipose tissue. Healing will be complicated by type 2 diabetes mellitus without hyperglycemia and normal tension on the incisional due to anatomical location.    Today, the lower back incisional wound is closed. There is no drainage or signs of infection.       Patient will follow up as needed. They were instructed to call the clinic sooner with any signs or symptoms of infection or any further questions/concerns. Answered all questions.      Samanta Mccoy, MSN, CNP, CWOCN-AP  [facility]  [phone]     The assessment, plan of care, and note were electronically prepared by Samanta Mccoy, RUBIO, CWOCN-AP.

## 2021-07-04 NOTE — PROGRESS NOTES
Progress Notes by Samanta Mccoy CNP at 6/2/2021 12:00 PM     Author: Samanta Mccoy CNP Service: -- Author Type: Nurse Practitioner    Filed: 6/2/2021 12:34 PM Encounter Date: 6/2/2021 Status: Signed    : Samanta Mccoy CNP (Nurse Practitioner)               [facility]     FOLLOW UP NOTE      Date of Service: 6/2/2021    Date Last Seen: 4/23/2021; 4/23/2021    Chief Complaint: Lower back incisional skin breakdown    Pt returns to [facility] with regards to the lower back incisional skin breakdown following L4-L5 laminectomy and posterior instrumented fusion on March 10, 2021. Healing is complicated by type 2 diabetes mellitus, vitamin D deficiency, and obesity.      The patient arrives today with  from home. They are currently using Xeroform and bordered gauze dressing to the wounds. This is being done by the patient , daily.      Today, the patient reports feeling well. Denies pain, fevers, chills. No shortness of breath. No acute medical concerns.       Pertinent Medications:    Vitamin D    Gabapentin    Metformin    MVM    Allergies: Venom-honey bee, Blood-group specific substance, Nitrofurantoin, and Penicillins    Medications:   Current Outpatient Medications:   ?  acetaminophen (TYLENOL) 500 MG tablet, Take 1,000 mg by mouth every 6 (six) hours as needed for pain., Disp: , Rfl:   ?  baclofen (LIORESAL) 10 MG tablet, Take 1 tablet (10 mg total) by mouth every 8 (eight) hours as needed (every 8 hours as needed)., Disp: 60 tablet, Rfl: 3  ?  blood-glucose meter Misc, Use 1 Device As Directed daily. Type 2 DM, without long term insulin use, Disp: 1 each, Rfl: 0  ?  cholecalciferol, vitamin D3, 2,000 unit cap, Take 2 tablets by mouth every evening. , Disp: , Rfl:   ?  gabapentin (NEURONTIN) 300 MG capsule, 1 cap at bedtime x 3 days, then may take 1 cap twice daily x 3 days, then may take 1 cap three times daily.  (Patient taking differently: 1 cap in the morning and 1 at night.), Disp: 90 capsule, Rfl: 2  ?  glipiZIDE (GLUCOTROL) 10 MG tablet, Take 1 tablet (10 mg total) by mouth 2 (two) times a day before meals., Disp: 180 tablet, Rfl: 3  ?  losartan (COZAAR) 50 MG tablet, Take 1 tablet (50 mg total) by mouth daily., Disp: 90 tablet, Rfl: 3  ?  metFORMIN (GLUCOPHAGE) 1000 MG tablet, Take 1 tablet (1,000 mg total) by mouth 2 (two) times a day with meals., Disp: 180 tablet, Rfl: 2  ?  multivitamin (MULTIVITAMIN) per tablet, Take 1 tablet by mouth daily., Disp: , Rfl:   ?  ONETOUCH VERIO strips, USE 1 EACH AS DIRECTED 2 (TWO) TIMES A DAY., Disp: 200 strip, Rfl: 3  ?  simvastatin (ZOCOR) 40 MG tablet, TAKE 1 TABLET BY MOUTH EVERYDAY AT BEDTIME, Disp: 90 tablet, Rfl: 2    Current Facility-Administered Medications:   ?  lidocaine 2 % jelly (XYLOCAINE), , Topical, PRN, Samanta Mccoy, CNP, 1 application at 04/22/21 1441      History:   Past Medical History:   Diagnosis Date   ? Anemia    ? Arthritis    ? Cataract    ? Cholelithiasis    ? Colon cancer (H) 2014   ? Diabetes mellitus (H)    ? Diverticular disease    ? GI bleed 2/15/2014   ? History of anesthesia complications     slow to wake up   ? History of transfusion     has antibodies   ? Hyperlipidemia    ? Hypertension    ? Osteoporosis    ? Peripheral neuropathy     feet   ? Red blood cell antibody positive      h/o blood transfusion 2015. 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  Head: normocephalic atraumatic  Psychiatric:  Alert and oriented x3.   Respiratory: unlabored breathing; no cough  Integumentary:  Skin is uniformly warm, dry and pink.      Ulceration(s)/Wound(s):   Wound/Ulcer location: LOWER BACK, MIDLINE   Size: see below  Edges: Two pin point openings with epithelial bridge.    Undermining: None  Base: yellow slough  Tissues: adipose, dermis, epidermis  Thickness: full  Exudate Type: serous  Exudate Amount: scant  Mirian-Wound/Ulcer: no erythema or induration  Odor: none    Urethral Catheter Latex 16 Fr. (Active)       VASC Wound 04/22/21 lumbar spine (Active)   Pre Size Length 1 06/02/21 1100   Pre Size Width 0.2 06/02/21 1100   Pre Size Depth 0.1 06/02/21 1100   Pre Total Sq cm 0.2 06/02/21 1100   Post Size Length 1 06/02/21 1100   Post Size Width 0.2 06/02/21 1100   Post Size Depth 0.1 06/02/21 1100   Post Total Sq cm 0.4 06/02/21 1100   Description epithelial bridge 06/02/21 1100       Incision 10/10/18 Abdomen (Active)       Incision 03/10/21 Surgical Back (Active)        Laboratory/Diagnostics studes:  None to review.       Diagnoses:  1. Wound of back, unspecified laterality, subsequent encounter  Change dressing   2. Superficial dehiscence of wound     3. Type 2 diabetes mellitus with other skin ulcer, without long-term current use of insulin (H)         Are any of these wounds new today: No.      Assessment/Plan:  1. Debridement: na    2. Treatment: wound treatment will include irrigation and dressings to promote autolytic debridement which will include: primary dressing of hydrogel and secondary dressing of bordered gauze, daily.    3. Edema: NA    4. Offloading: NA    5. Nutrition: NA    6. Diagnostics: NA    7. Medications: NA    8. Referrals: NA    9. Education: See above. Education provided regarding debridement, dressing changes, healing progress in the wound bed. Patient verbalizes understanding and all questions answered to their satisfaction.        Impression:  Karol Mujica is a 76 year old patient with a superficial dehiscence of a lower back, lumbar incision. This extends just into the adipose tissue. Healing will be complicated by type 2 diabetes mellitus without hyperglycemia and normal tension on the incisional due to anatomical location.    Today, the lower  back incisional wound is smaller and limited to skin breakdown. There are two remaining pin points areas where epithelial coverage is impeded by adherent slough. I will add moisture with hydrogel under dressing to promote autolytic debridement of remaining open area and moist healing. There are no signs of infection.  Patient to continue therapy and activity as tolerated.        Patient will follow up with me in 2 weeks for reevaluation. They were instructed to call the clinic sooner with any signs or symptoms of infection or any further questions/concerns. Answered all questions.      Samanta Mccoy, MSN, CNP, CWOCN-AP  [facility]  [phone]     The assessment, plan of care, and note were electronically prepared by Samanta Mccoy, RUBIO, CWOCN-AP.

## 2021-07-12 NOTE — PROGRESS NOTES
Medication Therapy Management (MTM) Encounter    ASSESSMENT:                            Diarrhea: This has appeared to resolve.  She does still get some loose bowel movements, but she is back to her baseline.  Per chart review, with her history of colon cancer she had a right hemicolectomy.  Her labs that were done from [facility] were all normal.  At the time it was recommended to try Pepto-Bismol or Imodium, and if ineffective try cholestyramine.  On 6/11/2021 she had an endoscopy and colonoscopy, endoscopy showed gastric erythema.  See below regarding Metformin and glipizide.    Type 2 Diabetes: Patient is no longer on glipizide and her diarrhea did improve.  Reviewed that diarrhea is not a very common side effect of glipizide, but due to the timing it seems like stopping glipizide was helpful.  We discussed that typically Metformin causes loose stool/diarrhea, but since her bowel movements improved and she continues Metformin I would recommend continuing.  In the future we could consider switching her to the extended release.  Her fasting a.m. blood sugars are slightly elevated, but her 2-hour postprandial values are well controlled.  We elected to recheck her A1c in October, and if her A1c has significantly worsened, then we can discuss alternative medications.  Cost does appear to be an issue for her, although she would be a good candidate for a GLP-1 agonist.  If cost is a concern, we could try a low-dose alternative sulfonylurea such as glimepiride which she may tolerate better.  BP well controlled.  She is appropriately on aspirin and statin.  She was agreeable to this plan.    Spinal stenosis: Med list updated today.  We will have her continue gabapentin 300 mg at night, does not sound like she needs a higher dose.  Okay to use cyclobenzaprine only as needed.  Reviewed that since she is avoiding oral NSAIDs due to the gastric erythema, she could try over-the-counter diclofenac gel on her  joints.    Osteoporosis: Per chart review it appears that endocrinology provided her risedronate after Prolia was discontinued.  It sounds like PCP recommended against present urinate likely due to her GI issues.  Per the note, it sounds like Endo was going to contact PCP about risedronate, but I do not see that either 1 have been started.  She would likely benefit more so from restarting Prolia, therefore I will reach out to Dr. Zamora to get that set up.  Since the previous bone density dated  December 9, 2019, there has been a   -9.4% change in the bone density of the spine.  Additionally there has been a -9.6% change in the right total hip, therefore we will want to make sure that Prolia is appropriate, but her decline is likely due to being off schedule.  Of note, she last met with Dr. Thompson in 2016 to get started on Prolia.  Last vitamin D level at goal.      PLAN:                            1.  Okay to remain off glipizide for now.  Will recheck A1c in October and consider an alternative depending on results.  2.  Try diclofenac gel 2 to 4 g 4 times daily as needed for your joints  3.  I will reach out to PCP about restarting you on Prolia    Follow-up: MyChart after I hear from PCP regarding Prolia    SUBJECTIVE/OBJECTIVE:                          Karol Mujica is a 76 year old female coming in for an initial visit. She was referred to me from Dr. Zamora to discuss diarrhea associated with medications.      Reason for visit: Discuss diabetes and diarrhea.  Medication Adherence/Access: no issues reported.  She brings her prescription medications today.  Referred from Dr. Zamora.     Diarrhea: Reports that she started to have diarrhea in May.  Had a colonoscopy and endoscopy done and continued to have no relief.  Her glipizide was discontinued on 6/22/2021 and she reports the diarrhea improved.  She used to have 7 bowel movements in the morning and now it is about 2-3, which is normal for her as she has  a history of colon cancer.  She does continue Metformin.  See below.  Reports that currently the diarrhea is tolerable.    Type 2 Diabetes: Currently taking Metformin 1000 mg twice daily.  No longer on glipizide, stopped 6/22/2021.  She has been on both Metformin and glipizide for years and the diarrhea started in May.  It was recommended for her to try semaglutide 3 mg instead of glipizide, but the price was $225 per month which she cannot afford.  Tests BG 1-2 times daily.  Blood sugars per glucometer since 6/23/2021:  Fasting a.m. = 144, 161, 164, 148, 135, 147, 167, 174, 155, 178, 153, 141, 177, 152, 140, 149, 137, 149, 133, 134  2 hours after breakfast = 147, 151  6 PM = 143  2 hours after dinner = 187  And her glucometer, her fasting blood sugars when she was on glipizide were in the 110s to 120s.  Last A1c checked 6/30/2021 =6.2%.   Hypoglycemia a few times on the glipizide, but not <70  Using a One Touch verio flex meter.   Microalbumin checked 1/8/2021.  Taking losartan 50 mg once daily  Continues simvastatin 40 mg nightly.  Last LDL checked 6/30/2021  Is taking aspirin 81 mg for primary prevention  Patient follows with endocrinology  She exercises on a bike every other day and does the pool 2 times a week  BP Readings from Last 6 Encounters:   06/21/21 112/70   06/16/21 122/68   06/02/21 136/74   06/02/21 (!) 148/84   05/20/21 138/80   05/13/21 132/76       Spinal stenosis: Underwent surgery on 3/10/2021 and follows with neurosurgery.  She does have a wound at the incision which she follows with vascular.  Continues acetaminophen 1000 mg 3 times daily, cyclobenzaprine 10 mg as needed, and gabapentin 300 mg nightly.  No longer on oxycodone.  Reports that she used to have baclofen, but she stopped it due to the diarrhea, and she was told she should not be using cyclobenzaprine but she still has both at home.  Acetaminophen is not helpful at all for her joints, but she is not taking NSAIDs.  Reports that she  has a little bit of neuropathy in both toes but it does not bother her and she uses tart cherry juice which helps.  She had an EMG done and was told to titrate up on the gabapentin, but she chose to continue 1 capsule at night which works well for her.    Osteoporosis: Currently taking a multivitamin and vitamin D 2000 units x 2 (4000 units) daily.  Her last DEXA on 11/27/2020 showed a T score of -1.3.  She used to be on Prolia and she was told to stop and try a different medicine instead, but Dr. Suazo said did not want her on that medicine.  Therefore at this time she has not been taking anything for her bones.  Last Vitamin D level = 34.9 on 6/30/21    Today's Vitals: There were no vitals taken for this visit.     Allergies/ADRs: Reviewed in chart  Tobacco: She reports that she has never smoked. She has never used smokeless tobacco.  Alcohol: reviewed in EPIC  Caffeine: reviewed in EPIC  Activity: reviewed in EPIC  Past Medical History: Reviewed in chart      ----------------      I spent 60 minutes with this patient today. All changes were made via collaborative practice agreement with Dr. Zamora. A copy of the visit note was provided to the patient's primary care provider.    The patient was given a summary of these recommendations.     Regla Shi, Pharm.D., [facility]           Medication Therapy Recommendations  Osteoporosis, unspecified osteoporosis type, unspecified pathological fracture presence    Rationale: Untreated condition - Needs additional medication therapy - Indication   Recommendation: Start Medication - Prolia 60 MG/ML Sosy   Status: Contact Provider - Awaiting Response         S/P total hip arthroplasty    Rationale: Synergistic therapy - Needs additional medication therapy - Indication   Recommendation: Start Medication - Voltaren 1 % Gel   Status: Accepted - no CPA Needed

## 2021-07-13 ENCOUNTER — OFFICE VISIT (OUTPATIENT)
Dept: PHARMACY | Facility: CLINIC | Age: 76
End: 2021-07-13
Payer: COMMERCIAL

## 2021-07-13 DIAGNOSIS — Z96.649 STATUS POST TOTAL REPLACEMENT OF HIP, UNSPECIFIED LATERALITY: ICD-10-CM

## 2021-07-13 DIAGNOSIS — E11.9 TYPE 2 DIABETES MELLITUS WITHOUT COMPLICATION, WITHOUT LONG-TERM CURRENT USE OF INSULIN (H): ICD-10-CM

## 2021-07-13 DIAGNOSIS — M81.0 OSTEOPOROSIS, UNSPECIFIED OSTEOPOROSIS TYPE, UNSPECIFIED PATHOLOGICAL FRACTURE PRESENCE: ICD-10-CM

## 2021-07-13 DIAGNOSIS — C18.9 MALIGNANT NEOPLASM OF COLON, UNSPECIFIED PART OF COLON (H): Primary | ICD-10-CM

## 2021-07-13 PROCEDURE — 99605 MTMS BY PHARM NP 15 MIN: CPT | Performed by: PHARMACIST

## 2021-07-13 PROCEDURE — 99607 MTMS BY PHARM ADDL 15 MIN: CPT | Performed by: PHARMACIST

## 2021-07-13 RX ORDER — MULTIVITAMIN WITH IRON
1 TABLET ORAL DAILY
COMMUNITY
End: 2021-07-13

## 2021-07-13 RX ORDER — BLOOD SUGAR DIAGNOSTIC
1 STRIP MISCELLANEOUS 2 TIMES DAILY
COMMUNITY
Start: 2020-08-08 | End: 2021-08-02

## 2021-07-13 RX ORDER — ACETAMINOPHEN 500 MG
1000 TABLET ORAL PRN
COMMUNITY

## 2021-07-13 RX ORDER — CYCLOBENZAPRINE HCL 10 MG
10 TABLET ORAL DAILY PRN
COMMUNITY
Start: 2021-01-08 | End: 2022-05-02

## 2021-07-13 RX ORDER — LOSARTAN POTASSIUM 50 MG/1
50 TABLET ORAL DAILY
COMMUNITY
Start: 2021-05-05 | End: 2022-02-18

## 2021-07-13 RX ORDER — ASPIRIN 81 MG/1
81 TABLET ORAL DAILY
COMMUNITY
End: 2022-01-12

## 2021-07-13 NOTE — PATIENT INSTRUCTIONS
Recommendations from today's MTM visit:                                                    1. Let's have you stay off glipizide and continue metformin on its own. We will recheck your A1c 10/1/21 and I will call you in the afternoon to discuss next steps.     2. I will reach out Dr. Zamora about restarting you on Prolia.     3. Find diclofenac gel (Voltaren gel) overt he counter for your joints. Use the dosing card to measure. Ok to use up to 4 times a day  Upper joints (elbows, shoulder, etc) - 2 grams  Lower joints (hip, knees, etc) - 4 grams     Follow-up: via [facility] regarding Prolia updates     It was great to speak with you today.  I value your experience and would be very thankful for your time with providing feedback on our clinic survey. You may receive a survey via email or text message in the next few days.     To schedule another MTM appointment, please call the clinic directly or you may call the MTM scheduling line at [phone] or toll-free at [phone].     My Clinical Pharmacist's contact information:                                                      Please feel free to contact me with any questions or concerns you have.      Regla Shi, Pharm.D.

## 2021-07-13 NOTE — Clinical Note
Hi! I met with her today - we are going to hold off on starting an alternative to glipizide for now, but I do agree that a GLP1 agonist would be great for her, but we will chat about cost at follow up. Regarding her Prolia -- looks like endo stopped it (not sure why), but likely her loss in the DEXA is from being off her Prolia schedule (last injection in 2019...). Ok to have her restart? Let me know, thanks!

## 2021-07-13 NOTE — LETTER
"        Date: 2021    Karol Mujica  [address] [address]    Dear Ms. Mujica,    Thank you for talking with me on 21 about your health and medications. Medicare s MTM (Medication Therapy Management) program helps you understand your medications and use them safely.      This letter includes an action plan (Medication Action Plan) and medication list (Personal Medication List). The action plan has steps you should take to help you get the best results from your medications. The medication list will help you keep track of your medications and how to use them the right way.       Have your action plan and medication list with you when you talk with your doctors, pharmacists, and other healthcare providers in your care team.     Ask your doctors, pharmacists, and other healthcare providers to update the action plan and medication list at every visit.     Take your medication list with you if you go to the hospital or emergency room.     Give a copy of the action plan and medication list to your family or caregivers.     If you want to talk about this letter or any of the papers with it, please call   [phone].We look forward to working with you, your doctors, and other healthcare providers to help you stay healthy through the [facility] MTM program.    Sincerely,  Regla Shi, PharmD    Enclosed: Medication Action Plan and Personal Medication List    MEDICATION ACTION PLAN FOR Karol Mujica,  1945     This action plan will help you get the best results from your medications if you:   1. Read \"What we talked about.\"   2. Take the steps listed in the \"What I need to do\" boxes.   3. Fill in \"What I did and when I did it.\"   4. Fill in \"My follow-up plan\" and \"Questions I want to ask.\"     Have this action plan with you when you talk with your doctors, pharmacists, and other healthcare providers in your care team. Share this with your family or caregivers too.  DATE PREPARED: " 2021  What we talked about: For your arthritis, I agree with avoiding ibuprofen and Aleve.  Since Tylenol does not work well for you, you could try diclofenac gel, which is over-the-counter.                                                 What I need to do: Use diclofenac gel on your joints, 2 g on upper half of body and 4 g on lower half.  Use dosing card.  Okay to use 4 times daily as needed. What I did and when I did it:                                              What we talked about: I am not sure why Prolia was stopped then never restarted.  We should get you back on Prolia so you do not lose further bone density.                                  What I need to do: I will reach out to Dr. Zamora about restarting you on Prolia. What I did and when I did it:                                                My follow-up plan:                 Questions I want to ask:              If you have any questions about your action plan, call Regla Shi PharmD, Phone: [phone] , Monday-Friday 8-4:30pm.           PERSONAL MEDICATION LIST FOR Karol Mujica,  1945     This medication list was made for you after we talked. We also used information from your doctor's chart.      Use blank rows to add new medications. Then fill in the dates you started using them.    Cross out medications when you no longer use them. Then write the date and why you stopped using them.    Ask your doctors, pharmacists, and other healthcare providers to update this list at every visit. Keep this list up-to-date with:       Prescription medications    Over the counter drugs     Herbals    Vitamins    Minerals      If you go to the hospital or emergency room, take this list with you. Share this with your family or caregivers too.     DATE PREPARED: 2021  Allergies or side effects: Bee venom and Penicillins     Medication:  ACETAMINOPHEN 500 MG PO TABS      How I use it:  Take 1,000 mg by mouth as needed      Why I  use it:  Joint pain    Prescriber:   Saul Zamora, DO      Date I started using it:       Date I stopped using it:         Why I stopped using it:            Medication:  ASPIRIN EC 81 MG PO TBEC      How I use it:  Take 81 mg by mouth daily      Why I use it:  Heart health    Prescriber:  Saul Zamora, DO      Date I started using it:       Date I stopped using it:         Why I stopped using it:            Medication:  CHOLECALCIFEROL 50 MCG (2000 UT) PO CAPS      How I use it:  Take 4,000 Units by mouth daily      Why I use it:  Bone health    Prescriber:  Saul Zamora, DO      Date I started using it:       Date I stopped using it:         Why I stopped using it:            Medication:  CRANBERRY 450 MG PO TABS      How I use it:  Take 450 mg by mouth as needed      Why I use it:  UTI prevention    Prescriber:  Saul Zamora, DO      Date I started using it:       Date I stopped using it:         Why I stopped using it:            Medication:  CYCLOBENZAPRINE HCL 10 MG PO TABS      How I use it:  Take 10 mg by mouth daily as needed      Why I use it:  Muscle spasms    Prescriber:  Saul Zamora, DO      Date I started using it:       Date I stopped using it:         Why I stopped using it:            Medication:  LOSARTAN POTASSIUM 50 MG PO TABS      How I use it:  Take 50 mg by mouth daily      Why I use it:  Blood pressure    Prescriber:  Saul Zamora, DO      Date I started using it:       Date I stopped using it:         Why I stopped using it:            Medication:  METFORMIN HCL 1000 MG PO TABS      How I use it:  Take 1,000 mg by mouth 2 times daily (with meals)      Why I use it:  Diabetes    Prescriber:  Saul Zamora, DO      Date I started using it:       Date I stopped using it:         Why I stopped using it:            Medication:  MULTIVITAL-M PO TABS      How I use it:  Take 1 tablet by mouth daily      Why I use it:  General health    Prescriber:   Saul Zamora, DO      Date I started using it:       Date I stopped using it:         Why I stopped using it:            Medication:  NEURONTIN PO      How I use it:  Take 300 mg by mouth At Bedtime      Why I use it:  Neuropathy    Prescriber:  Saul Zamora, DO      Date I started using it:       Date I stopped using it:         Why I stopped using it:            Medication:  ONETOUCH VERIO VI STRP      How I use it:  1 strip by In Vitro route 2 times daily      Why I use it:  Diabetes    Prescriber:  Saul Zamora, DO      Date I started using it:       Date I stopped using it:         Why I stopped using it:            Medication:  ZOCOR PO      How I use it:  Take 40 mg by mouth At Bedtime      Why I use it:  Cholesterol    Prescriber:  Saul Zamora, DO      Date I started using it:       Date I stopped using it:         Why I stopped using it:            Medication:         How I use it:         Why I use it:      Prescriber:         Date I started using it:       Date I stopped using it:         Why I stopped using it:            Medication:         How I use it:         Why I use it:      Prescriber:         Date I started using it:       Date I stopped using it:         Why I stopped using it:            Medication:         How I use it:         Why I use it:      Prescriber:         Date I started using it:       Date I stopped using it:         Why I stopped using it:              Other Information:     If you have any questions about your medication list, call Regla Shi PharmD, Phone: [phone] , Monday-Friday 8-4:30pm.    According to the Paperwork Reduction Act of 1995, no persons are required to respond to a collection of information unless it displays a valid OMB control number. The valid B number for this information collection is 6882-1256. The time required to complete this information collection is estimated to average 40 minutes per response, including the  time to review instructions, searching existing data resources, gather the data needed, and complete and review the information collection. If you have any comments concerning the accuracy of the time estimate(s) or suggestions for improving this form, please write to: CMS, Attn: MELLISA Reports Clearance Officer, [address] [place] [address], [place], [place] [address].

## 2021-07-22 ENCOUNTER — RECORDS - HEALTHEAST (OUTPATIENT)
Dept: FAMILY MEDICINE | Facility: CLINIC | Age: 76
End: 2021-07-22

## 2021-07-22 DIAGNOSIS — Z12.31 OTHER SCREENING MAMMOGRAM: ICD-10-CM

## 2021-07-28 DIAGNOSIS — E11.42 TYPE 2 DIABETES MELLITUS WITH DIABETIC POLYNEUROPATHY, WITHOUT LONG-TERM CURRENT USE OF INSULIN (H): Primary | ICD-10-CM

## 2021-07-29 ENCOUNTER — TELEPHONE (OUTPATIENT)
Dept: PHARMACY | Facility: CLINIC | Age: 76
End: 2021-07-29

## 2021-07-29 DIAGNOSIS — M81.0 OSTEOPOROSIS WITHOUT CURRENT PATHOLOGICAL FRACTURE, UNSPECIFIED OSTEOPOROSIS TYPE: Primary | ICD-10-CM

## 2021-07-29 NOTE — TELEPHONE ENCOUNTER
----- Message from Saul Zamora DO sent at 7/28/2021  3:22 PM CDT -----  Regarding: RE: Prolia  Yes, I think we should restart her. Can you help with that? Or should I try and get through our clinic?    Respectfully,  Dago Noah DO   ----- Message -----  From: Regla Shi PharmD  Sent: 7/28/2021  12:43 PM CDT  To: Saul Zamora DO  Subject: Prolia                                           Hi! Just wanted to send you another message on this patient. I met with her last week - we are going to hold off on starting an alternative to glipizide for now, but I do agree that a GLP1 agonist would be great for her, but we will chat about cost at follow up.     Regarding her Prolia -- looks like endo stopped it (not sure why), but likely her loss in the DEXA is from being off her Prolia schedule (last injection in 2019...). Ok to have her restart? Let me know, thanks!     Regla Shi, Pharm.D., BCACP   Medication Therapy Management Pharmacist   [facility]

## 2021-07-29 NOTE — ASSESSMENT & PLAN NOTE
Responded well to Prolia previously. Significant decrease in bone density off therapy. Given her history of GERD, colon cancer, not a good candidate for fosamax. Will send to osteoporosis expert for further evaluation.

## 2021-07-29 NOTE — TELEPHONE ENCOUNTER
Spoke to Karol.  Reviewed that I will have Dr. Zamora place a referral to osteoporosis care, specifically so that she can see Dr. Thompson and get restarted on Prolia.    She reports that her blood sugar was 2 oh 5 in the morning yesterday, this morning it was 178 because she had protein before bed.  We will have her continue to work on protein before bed and if her blood sugars remain this high in the morning, we may want to start an additional drug sooner than October.  She was agreeable with this plan.

## 2021-08-01 NOTE — TELEPHONE ENCOUNTER
"Routing refill request to provider for review/approval because:  Patient needs to be seen because it has been more than 6 months since last office visit.      Last office visit provider:  6/30/21     Requested Prescriptions   Pending Prescriptions Disp Refills     ONETOUCH VERIO IQ test strip [Pharmacy Med Name: ONE TOUCH VERIO TEST STRIP] 200 strip 3     Sig: USE 1 EACH AS DIRECTED 2 (TWO) TIMES A DAY.       Diabetic Supplies Protocol Failed - 7/28/2021 10:47 AM        Failed - Recent (6 mo) or future (30 days) visit within the authorizing provider's specialty     Patient had office visit in the last 6 months or has a visit in the next 30 days with authorizing provider.  See \"Patient Info\" tab in inbasket, or \"Choose Columns\" in Meds & Orders section of the refill encounter.            Passed - Medication is active on med list        Passed - Patient is 18 years of age or older             jeanie caro RN 08/01/21 1:52 PM  "

## 2021-08-02 RX ORDER — BLOOD SUGAR DIAGNOSTIC
STRIP MISCELLANEOUS
Qty: 200 STRIP | Refills: 3 | Status: SHIPPED | OUTPATIENT
Start: 2021-08-02 | End: 2022-11-12

## 2021-08-04 NOTE — TELEPHONE ENCOUNTER
Left message for pt informing her that Dr. Raymond's first osteo consult is on 11/24/21 at 10:40 am. She only sees one a week so her schedule books up pretty far in advance. I did book that appointment just to save the spot, but if the patient does not like that date/time or wants to cancel or reschedule, please assist if she calls back.

## 2021-08-23 ENCOUNTER — TRANSFERRED RECORDS (OUTPATIENT)
Dept: HEALTH INFORMATION MANAGEMENT | Facility: CLINIC | Age: 76
End: 2021-08-23
Payer: COMMERCIAL

## 2021-08-23 LAB — RETINOPATHY: NEGATIVE

## 2021-09-15 ENCOUNTER — OFFICE VISIT (OUTPATIENT)
Dept: NEUROSURGERY | Facility: CLINIC | Age: 76
End: 2021-09-15
Payer: COMMERCIAL

## 2021-09-15 ENCOUNTER — HOSPITAL ENCOUNTER (OUTPATIENT)
Dept: RADIOLOGY | Facility: HOSPITAL | Age: 76
Discharge: HOME | End: 2021-09-15
Attending: NURSE PRACTITIONER | Admitting: NURSE PRACTITIONER
Payer: COMMERCIAL

## 2021-09-15 VITALS
BODY MASS INDEX: 37.22 KG/M2 | SYSTOLIC BLOOD PRESSURE: 116 MMHG | RESPIRATION RATE: 16 BRPM | HEIGHT: 64 IN | OXYGEN SATURATION: 97 % | WEIGHT: 218 LBS | DIASTOLIC BLOOD PRESSURE: 74 MMHG | HEART RATE: 92 BPM

## 2021-09-15 DIAGNOSIS — Z98.1 S/P LUMBAR FUSION: ICD-10-CM

## 2021-09-15 DIAGNOSIS — M43.16 SPONDYLOLISTHESIS OF LUMBAR REGION: ICD-10-CM

## 2021-09-15 DIAGNOSIS — M48.062 SPINAL STENOSIS OF LUMBAR REGION WITH NEUROGENIC CLAUDICATION: ICD-10-CM

## 2021-09-15 DIAGNOSIS — M48.062 SPINAL STENOSIS OF LUMBAR REGION WITH NEUROGENIC CLAUDICATION: Primary | ICD-10-CM

## 2021-09-15 PROCEDURE — 99213 OFFICE O/P EST LOW 20 MIN: CPT

## 2021-09-15 PROCEDURE — 72100 X-RAY EXAM L-S SPINE 2/3 VWS: CPT

## 2021-09-15 ASSESSMENT — MIFFLIN-ST. JEOR: SCORE: 1460.9

## 2021-09-15 NOTE — PATIENT INSTRUCTIONS
1. OK to advance activity as you tolerate  2. Continue tylenol. You can use ibuprofen if you need to on a limited basis  3. Return in 6 months with CT scan sooner if you have concerns

## 2021-09-15 NOTE — PROGRESS NOTES
"Neurosurgery Progress Note:  9/15/2021    A/P: Karol Mujica is a 76 year old female S/P BILATERAL LUMBAR 4-LUMBAR 5 LAMINECTOMIES, MEDIAL FACETECTOMY, FORAMINOTOMIES; LUMBAR 4-LUMBAR 5 POSTERIOR INSTRUMENTED FUSION AND ARTHRODESIS 3/10/2021 with Dr Chen. XR today is without concern.     Karol feels well. No significant back pain. Left hip pain flares up from time to time but likely related to femur history. No numbness or tingling. No bowel or bladder issues. She finished PT. Has been active in her garden and at the [employer]. She asks about a one hour pool therapy exercise program which I think she would benefit from. She has upcoming meeting to potentially resume Prolia which I also support. We will see her back 1 year post op with CT scan. OK to take ibuprofen if needed on limited basis. Tylenol preferred.     HPI: Patient presented with claudicating low back and leg pain, numbness and weakness as well as urinary urgency with difficulty emptying bladder. PVR prior to surgery considered normal. MRI with severe spinal canal stenosis and crowding of the cauda equina L4-5. Moderate edema facet joints. Spinal listhesis L4-5 progressed to 1.5 mm.     Physical Exam  /74   Pulse 92   Resp 16   Ht 1.613 m (5' 3.5\")   Wt 98.9 kg (218 lb)   SpO2 97%   BMI 38.01 kg/m      General: alert, oriented. LIM. Speech clear.     Motor: normal bulk and tone    Strength: full strength lower extremities     Gait: steady gait.     Imaging: reviewed and compared to prior.     MERLE Saha-CNP  [facility] Neurosurgery  O: [phone]      "

## 2021-09-24 ENCOUNTER — TELEPHONE (OUTPATIENT)
Dept: LAB | Facility: CLINIC | Age: 76
End: 2021-09-24

## 2021-09-24 DIAGNOSIS — E11.42 TYPE 2 DIABETES MELLITUS WITH DIABETIC POLYNEUROPATHY, WITHOUT LONG-TERM CURRENT USE OF INSULIN (H): Primary | ICD-10-CM

## 2021-09-24 NOTE — PROGRESS NOTES
Dr. Zamora, your patient has an upcoming lab appointment for an A1C. Please place orders in the chart. Thank you.

## 2021-09-30 NOTE — PROGRESS NOTES
Medication Therapy Management (MTM) Encounter    ASSESSMENT:                            Diarrhea: This has appeared to improve.  She does still get some loose bowel movements, but she is back to her baseline.  Per chart review, with her history of colon cancer she had a right hemicolectomy.   On 6/11/2021 she had an endoscopy and colonoscopy, endoscopy showed gastric erythema.     Type 2 Diabetes: Patient is no longer on glipizide and her diarrhea did improve.  Reviewed that diarrhea is not a very common side effect of glipizide, but due to the timing it seems like stopping glipizide was helpful.  We discussed that typically Metformin causes loose stool/diarrhea, but since her bowel movements improved and she continues Metformin I would recommend continuing.  In the future we could consider switching her to the extended release, she was not interested today.  Her fasting a.m. blood sugars are slightly elevated.  Her A1c today was right at goal. Since she will be starting more exercise soon, we elected to work on lifestyle and recheck A1c in 3 months at her physical with Dr Zamora. If at follow up, A1c is worse then we will add additional medication. She would be a good candidate for a GLP-1 agonist, but may be cost prohibative. If cost is a concern, we could try a low-dose alternative sulfonylurea such as glimepiride which she may tolerate better. She is appropriately on aspirin and statin.  She was agreeable to this plan.    Spinal stenosis: Ok to continue current regimen. Reviewed that she can try diclofenac gel up to 4 times daily    Osteoporosis: Per chart review it appears that endocrinology provided her risedronate after Prolia was discontinued.  It sounds like PCP recommended against risendronate likely due to her GI issues.  Since the previous bone density dated  December 9, 2019, there has been a   -9.4% change in the bone density of the spine.  Additionally there has been a -9.6% change in the right total  hip, therefore we will want to make sure that Prolia is appropriate, but her decline is likely due to being off schedule.  She will be meeting with Dr. Thompson in November to discuss Prolia.  Last vitamin D level at goal.      PLAN:                            1.  Continue metformin 1000 mg TWICE DAILY and recheck A1c in 3 months. Continue to work on lifestyle changes.     Follow-up: 3 month follow up with PCP - I will follow up with her after based on A1c result     SUBJECTIVE/OBJECTIVE:                          Karol Mujica is a 76 year old female called for a follow up visit. She was referred to me from Dr. Zamora to discuss diarrhea associated with medications.      Reason for visit: Follow up on A1c from last MTM appointment on 7/13/21  Medication Adherence/Access: no issues reported.      Diarrhea: She started to have diarrhea in May.  Had a colonoscopy and endoscopy done and continued to have no relief.  Her glipizide was discontinued and she reports the diarrhea improved.  She used to have 7 bowel movements in the morning and now it is about 2-3 always in the morning, which is normal for her as she has a history of colon cancer.  She does continue Metformin.  Reports that currently the diarrhea is tolerable.    Type 2 Diabetes: Continues Metformin 1000 mg twice daily.  No longer on glipizide, stopped 6/22/2021.  She had been on both Metformin and glipizide for years and the diarrhea started in May.  It was recommended for her to try semaglutide 3 mg instead of glipizide, but the price was $225 per month which she cannot afford.  Tests BG 1 times daily fasting AM.  Blood sugars she reports Fasting a.m. = 148-168. One morning was 205, she ate out, always spikes when she out.   Last A1c checked today = 7%, previously 6/30/2021 =6.2%.   Using a One Touch verio flex meter.   Microalbumin checked 1/8/2021.  Taking losartan 50 mg once daily  Continues simvastatin 40 mg nightly.  Last LDL checked 6/30/2021  Is  taking aspirin 81 mg for primary prevention  Patient follows with endocrinology  [facility] programs - got the ok from surgeon.   BP Readings from Last 6 Encounters:   09/15/21 116/74   06/21/21 112/70   06/16/21 122/68   06/02/21 136/74   06/02/21 (!) 148/84   05/20/21 138/80       Spinal stenosis: Underwent surgery on 3/10/2021 and follows with neurosurgery -- last seen 9/15/21. Continues acetaminophen 1000 mg 3 times daily, cyclobenzaprine 10 mg as needed, and gabapentin 300 mg nightly.  At last MTM appt I recommended a trial of diclofenac gel - she tried it a few times and she is not sure if it worked, was short-lived.  Acetaminophen is not helpful at all for her joints, but she is not taking NSAIDs.  Reports that she has a little bit of neuropathy in both toes but it does not bother her and she uses tart cherry juice which helps.  She had an EMG done and was told to titrate up on the gabapentin, but she chose to continue 1 capsule at night which works well for her.    Osteoporosis: Currently taking a multivitamin and vitamin D 2000 units x 2 (4000 units) daily.  Her last DEXA on 11/27/2020 showed a T score of -1.3.  She used to be on Prolia and she was told to stop and try risendronate, but Dr. Zamora did not want her to take.  Therefore we are considering restarting Prolia, she will be seeing Dr. Thompson to discuss on 11/24/21.   Last Vitamin D level = 34.9 on 6/30/21    Today's Vitals: There were no vitals taken for this visit.     Allergies/ADRs: Reviewed in chart  Tobacco: She reports that she has never smoked. She has never used smokeless tobacco.  Alcohol: reviewed in EPIC  Caffeine: reviewed in EPIC  Activity: reviewed in EPIC  Past Medical History: Reviewed in chart    ---------------    Telemedicine Visit Details  Type of service:  Telephone visit  Start Time: 1PM  End Time: 1:10PM  Originating Location (patient location): Home  Distant Location (provider location):   [facility]    I spent 10 minutes with this patient today. All changes were made via collaborative practice agreement with Dr. Zamora. A copy of the visit note was provided to the patient's primary care provider.    The patient was given a summary of these recommendations.     Regla Shi, Pharm.D., BCACP

## 2021-10-01 ENCOUNTER — VIRTUAL VISIT (OUTPATIENT)
Dept: PHARMACY | Facility: CLINIC | Age: 76
End: 2021-10-01
Payer: COMMERCIAL

## 2021-10-01 ENCOUNTER — LAB (OUTPATIENT)
Dept: LAB | Facility: CLINIC | Age: 76
End: 2021-10-01
Payer: COMMERCIAL

## 2021-10-01 DIAGNOSIS — E11.9 TYPE 2 DIABETES MELLITUS WITHOUT COMPLICATION, WITHOUT LONG-TERM CURRENT USE OF INSULIN (H): ICD-10-CM

## 2021-10-01 DIAGNOSIS — Z96.649 STATUS POST TOTAL REPLACEMENT OF HIP, UNSPECIFIED LATERALITY: ICD-10-CM

## 2021-10-01 DIAGNOSIS — E11.42 TYPE 2 DIABETES MELLITUS WITH DIABETIC POLYNEUROPATHY, WITHOUT LONG-TERM CURRENT USE OF INSULIN (H): ICD-10-CM

## 2021-10-01 DIAGNOSIS — M81.0 OSTEOPOROSIS, UNSPECIFIED OSTEOPOROSIS TYPE, UNSPECIFIED PATHOLOGICAL FRACTURE PRESENCE: ICD-10-CM

## 2021-10-01 DIAGNOSIS — C18.9 MALIGNANT NEOPLASM OF COLON, UNSPECIFIED PART OF COLON (H): Primary | ICD-10-CM

## 2021-10-01 LAB — HBA1C MFR BLD: 7 % (ref 0–5.6)

## 2021-10-01 PROCEDURE — 83036 HEMOGLOBIN GLYCOSYLATED A1C: CPT

## 2021-10-01 PROCEDURE — 36415 COLL VENOUS BLD VENIPUNCTURE: CPT

## 2021-10-01 PROCEDURE — 99606 MTMS BY PHARM EST 15 MIN: CPT | Performed by: PHARMACIST

## 2021-10-23 ENCOUNTER — HEALTH MAINTENANCE LETTER (OUTPATIENT)
Age: 76
End: 2021-10-23

## 2021-10-27 ENCOUNTER — LAB (OUTPATIENT)
Dept: FAMILY MEDICINE | Facility: CLINIC | Age: 76
End: 2021-10-27
Attending: FAMILY MEDICINE
Payer: COMMERCIAL

## 2021-10-27 ENCOUNTER — VIRTUAL VISIT (OUTPATIENT)
Dept: FAMILY MEDICINE | Facility: CLINIC | Age: 76
End: 2021-10-27
Payer: COMMERCIAL

## 2021-10-27 DIAGNOSIS — Z20.822 SUSPECTED 2019 NOVEL CORONAVIRUS INFECTION: ICD-10-CM

## 2021-10-27 DIAGNOSIS — Z20.822 SUSPECTED 2019 NOVEL CORONAVIRUS INFECTION: Primary | ICD-10-CM

## 2021-10-27 LAB
FLUAV AG SPEC QL IA: NEGATIVE
FLUBV AG SPEC QL IA: NEGATIVE

## 2021-10-27 PROCEDURE — 99441 PR PHYSICIAN TELEPHONE EVALUATION 5-10 MIN: CPT | Mod: 95 | Performed by: FAMILY MEDICINE

## 2021-10-27 PROCEDURE — 87804 INFLUENZA ASSAY W/OPTIC: CPT

## 2021-10-27 PROCEDURE — U0005 INFEC AGEN DETEC AMPLI PROBE: HCPCS

## 2021-10-27 PROCEDURE — U0003 INFECTIOUS AGENT DETECTION BY NUCLEIC ACID (DNA OR RNA); SEVERE ACUTE RESPIRATORY SYNDROME CORONAVIRUS 2 (SARS-COV-2) (CORONAVIRUS DISEASE [COVID-19]), AMPLIFIED PROBE TECHNIQUE, MAKING USE OF HIGH THROUGHPUT TECHNOLOGIES AS DESCRIBED BY CMS-2020-01-R: HCPCS

## 2021-10-27 NOTE — PROGRESS NOTES
Karol is a 76 year old who is being evaluated via a billable telephone visit.      What phone number would you like to be contacted at? [phone]  How would you like to obtain your AVS? MyChart    PHONE VISIT  Due to current COVID19 pandemic, pt was offered virtual visit in lieu of in office evaluation to limit exposures.      Assessment/plan  Karol Mujica is a 76 year old female who is established to my practice.    Suspected 2019 novel coronavirus infection  Based on clinical hx in setting of high community spread of COVID19 in unvaccinated individual with known exposures to COVID, we reviewed current Dx is presumed viral URI s/t COVID19 ( vs other viral etiology like influenza). Also reviewed alternative causes for cough or shortness of breath which can be serious and require more evaluation if not improving or worsening.  Pt understands strict return policies for urgent evaluation.   - COVID testing ADVISED & isolation precautions reviewed.   - Reviewed given high suspicion for COVID19 despite a negative test, or if test is positive, would remain out of work/public to isolate for 10 days from when symptoms started AND 24 hours after fever resolves (without fever reducing medications) AND improvement of respiratory symptoms (whichever is longer). Will place referral to get well loop if test is positive.  - [facility] decision tree reviewed regarding pertinent family situations/quarantine.  - Symptomatic cares for viral illness reviewed and encouraged home oxygen monitoring via roel on phone    - Symptomatic COVID-19 Virus (Coronavirus) by PCR; Future  - Influenza A & B Antigen - Clinic Collect; Future    COVID19 precautions reviewed, questions answered. Referred to CDC for latest updates.     Telephone visit start time: 9:31am  Telephone visit end time: 941am  Total time: 10min    Follow up: as needed    Maya Elliott MD    Subjective:      HPI: Karol Mujica is a 76 year old female who is being evaluated  via a billable telephone visit due to COVID19 pandemic precautions.    Chief Complaint   Patient presents with     Cough     Monday night- was at a large event over the weekend- 3 people at the event has COVID- would like to get tested for COVID.     Headache     on the right side of face      Generalized Body Aches       Cough: had a large event on Saturday; she was wearing a surgical mask but most people were not masking.     3 friends on Monday called her due to having + COVID test results and there were also at this event with her.    On Sunday night she felt 'droopy' then Monday morning was feeling miserable  Right side of her face is painful  Headaches  Developed a wet sounding cough  Mild shortness of breath  Aching in her body , mylagias  No rash  No change in taste/Smell    Hasn't had the COVID vaccine, nor the flu shot       Review of Systems:  12 point comprehensive review of systems was negative except as noted and HPI     Social History:  Social History     Social History Narrative     Not on file       Medical History:   I have reviewed and updated the patient's Past Medical History, Social History, Family History and Medication List.    Medications:  Current Outpatient Medications   Medication Sig Dispense Refill     acetaminophen (TYLENOL) 500 MG tablet Take 1,000 mg by mouth as needed       aspirin 81 MG EC tablet Take 81 mg by mouth daily       cholecalciferol 50 MCG (2000 UT) CAPS Take 4,000 Units by mouth daily       Cranberry 450 MG TABS Take 450 mg by mouth as needed       cyclobenzaprine (FLEXERIL) 10 MG tablet Take 10 mg by mouth daily as needed       Gabapentin (NEURONTIN PO) Take 300 mg by mouth At Bedtime       losartan (COZAAR) 50 MG tablet Take 50 mg by mouth daily       metFORMIN (GLUCOPHAGE) 1000 MG tablet Take 1,000 mg by mouth 2 times daily (with meals)       Multiple Vitamins-Minerals (MULTIVITAL-M) TABS Take 1 tablet by mouth daily       ONETOUCH VERIO IQ test strip USE 1 EACH AS  DIRECTED 2 (TWO) TIMES A DAY. 200 strip 3     Simvastatin (ZOCOR PO) Take 40 mg by mouth At Bedtime         Imaging & Labs reviewed this visit: none      Objective:      There were no vitals filed for this visit.    Physical Exam: Not performed in context of phone visit

## 2021-10-28 LAB — SARS-COV-2 RNA RESP QL NAA+PROBE: POSITIVE

## 2021-11-02 ENCOUNTER — HOME INFUSION (PRE-WILLOW HOME INFUSION) (OUTPATIENT)
Dept: PHARMACY | Facility: CLINIC | Age: 76
End: 2021-11-02
Payer: COMMERCIAL

## 2021-11-05 ENCOUNTER — HOME INFUSION (PRE-WILLOW HOME INFUSION) (OUTPATIENT)
Dept: PHARMACY | Facility: CLINIC | Age: 76
End: 2021-11-05
Payer: COMMERCIAL

## 2021-11-24 ENCOUNTER — OFFICE VISIT (OUTPATIENT)
Dept: INTERNAL MEDICINE | Facility: CLINIC | Age: 76
End: 2021-11-24
Payer: COMMERCIAL

## 2021-11-24 ENCOUNTER — TELEPHONE (OUTPATIENT)
Dept: FAMILY MEDICINE | Facility: CLINIC | Age: 76
End: 2021-11-24

## 2021-11-24 VITALS
OXYGEN SATURATION: 95 % | WEIGHT: 223.4 LBS | SYSTOLIC BLOOD PRESSURE: 121 MMHG | HEART RATE: 84 BPM | BODY MASS INDEX: 38.95 KG/M2 | DIASTOLIC BLOOD PRESSURE: 64 MMHG

## 2021-11-24 DIAGNOSIS — C18.9 MALIGNANT NEOPLASM OF COLON, UNSPECIFIED PART OF COLON (H): ICD-10-CM

## 2021-11-24 DIAGNOSIS — M81.0 AGE-RELATED OSTEOPOROSIS WITHOUT CURRENT PATHOLOGICAL FRACTURE: Primary | ICD-10-CM

## 2021-11-24 DIAGNOSIS — E11.42 TYPE 2 DIABETES MELLITUS WITH DIABETIC POLYNEUROPATHY, WITHOUT LONG-TERM CURRENT USE OF INSULIN (H): ICD-10-CM

## 2021-11-24 PROCEDURE — 99214 OFFICE O/P EST MOD 30 MIN: CPT | Performed by: INTERNAL MEDICINE

## 2021-11-24 NOTE — PROGRESS NOTES
"Karol Mujica was seen today for management of osteoporosis.  The last bone density scan was done on 1/18/21:  1. The spine bone density L1-L4 with T-score -1.3.  Focal sclerotic changes are noted in the lumbar vertebrae, which may artifactually elevate the bone mineral density.  2. Femoral bone density shows right total hip T- score -1.3.  3. Trabecular bone score indicates good trabecular bone architecture.        75 y.o. female with LOW BONE DENSITY (OSTEOPENIA) and MODERATE fracture risk, adjusted for the TBS, with major osteoporotic fracture risk 10.1% and hip fracture risk 0.9%.      Since the previous bone density dated  December 9, 2019, there has been a   -9.4% change in the bone density of the spine.  Additionally there has been a -9.6% change in the right total hip.                               Recommendations:  A short course of an antiresorptive agent is recommended to \"seal\" the gains achieved by Prolia therapy and prevent further bone loss is recommended with follow up bone density scan in 1 to 2 years.    Patient was treated in the past with Prolia 8003-3012. Switched to Actonel in 2/2020 but never took this medications. So, no active treatment for 1.5 years.     Social history:  reports that she has never smoked. She has never used smokeless tobacco. She reports that she does not drink alcohol and does not use drugs.    Past medical history: Colon cancer in 2014, partial colectomy. No radiation or chemo. H/o kidney stones  Patient Active Problem List   Diagnosis     Aftercare following joint replacement, left femur fracture     HTN (hypertension)     Diabetes mellitus, type 2 (H)     Anxiety     Colon cancer, with resection 2/10/14     S/P total hip arthroplasty     Benign essential hypertension     Osteoporosis     Type 2 diabetes mellitus with diabetic polyneuropathy, without long-term current use of insulin (H)     Morbid obesity (H)     History of fractures: left femur at age 69 when she " fell.      FH: her sister has OP and had hip fracture       Family History   Problem Relation Age of Onset     Coronary Artery Disease Father      Breast Cancer Mother 81.00     Heart Disease Sister      Heart Disease Brother      Kidney Cancer Brother      Lung Cancer Brother      Osteoporosis Sister      Heart Disease Sister      Cancer Sister         Bladder     Kidney Disease Sister      Diabetes Sister      Heart Disease Sister        Diet and supplements: Ca 330 mg pill, MVI + diet, vit D 4000 international unit(s) daily    Risk medications: none    Gynecologic history: menarche at age 54 when had THBO    Laboratory testing:   Component      Latest Ref Rng & Units 6/30/2021   Vitamin D, Total (25-Hydroxy)      30.0 - 80.0 ng/mL 34.9     Component      Latest Ref Rng & Units 6/30/2021   Sodium      136 - 145 mmol/L 140   Potassium      3.5 - 5.0 mmol/L 4.2   Chloride      98 - 107 mmol/L 104   Carbon Dioxide      22 - 31 mmol/L 21 (L)   Anion Gap      5 - 18 mmol/L 15   Glucose      70 - 125 mg/dL 161 (H)   Calcium      8.5 - 10.5 mg/dL 9.7   Urea Nitrogen      8 - 28 mg/dL 13   Creatinine      0.60 - 1.10 mg/dL 0.74   GFR Estimate If Black      >60 mL/min/1.73m2 >60   GFR Estimate      >60 mL/min/1.73m2 >60     ROS:     Constitutional: Negative.    HENT: Negative.    Eyes: Negative.    Respiratory: Negative.    Cardiovascular: Negative.    Gastrointestinal: Negative.    Endocrine: Negative.    Genitourinary: Negative.    Musculoskeletal: Negative.    Skin: Negative.    Allergic/Immunologic: Negative.    Neurological: Negative.    Hematological: Negative.    Psychiatric/Behavioral: Negative.      PE:  /64   Pulse 84   Wt 101.3 kg (223 lb 6.4 oz)   SpO2 95%   BMI 38.95 kg/m    Constitutional:  oriented to person, place, and time, appears well-nourished. No distress.           Impression:   (M81.0) Age-related osteoporosis without current pathological fracture  (primary encounter diagnosis)  Last DXA  scan showed some decline in the bone density since Prolia was suddenly stopped in 2020, but her T-scores are in mild osteopenia range and FRAX showed moderate fracture risk. I would like to see DXA scan in Jan 2022 before we make a decision if she needs to restart medication.  Plan: DX Hip/Pelvis/Spine            (C18.9) Malignant neoplasm of colon, unspecified part of colon (H)  Comment: s/p partial colectomy and possible malabsorption issues.    (E11.42) Type 2 diabetes mellitus with diabetic polyneuropathy, without long-term current use of insulin (H)  Comment: Stable,last hgbA1c 7.0 in October, on metformin.        Plan:  1. The patient will take 1200 - 1500 mg of calcium daily from the diet and supplements.  2. The patient will take 4000 IU of vit D daily.      Patient Instructions     I would like to see you after DXA scan is done in 2022.    DXA in 1/2022 .   Phone number to schedule [phone].    Daily calcium need is 0160-8544 mg a day from the diet and supplements.  Calcium citrate is easier to digest.  Vitamin D 4000 IU daily recommended.        Thank you for the opportunity to participate in the care of your patient. If you have any additional questions, do not hesitate to contact me at [facility].    This note has been dictated using voice recognition software. Any grammatical or context distortions are unintentional and inherent to the software      With best personal regards,   Torrie Thompson MD, MD, CCD  11/24/2021

## 2021-11-24 NOTE — PATIENT INSTRUCTIONS
I would like to see you after DXA scan is done in 2022.    DXA in 1/2022 .   Phone number to schedule [phone].    Daily calcium need is 4225-3306 mg a day from the diet and supplements.  Calcium citrate is easier to digest.  Vitamin D 4000 IU daily recommended.

## 2021-11-24 NOTE — TELEPHONE ENCOUNTER
Reason for Call:  Other     Detailed comments: Please send patients DEXA order over to [facility] instead. Their fax number is [phone].    Phone Number Patient can be reached at: Home number on file [phone] (home)    Best Time: ANY    Can we leave a detailed message on this number? YES    Call taken on 11/24/2021 at 3:17 PM by Brennon Mcdonald

## 2022-01-10 ASSESSMENT — ACTIVITIES OF DAILY LIVING (ADL): CURRENT_FUNCTION: NO ASSISTANCE NEEDED

## 2022-01-12 ENCOUNTER — OFFICE VISIT (OUTPATIENT)
Dept: FAMILY MEDICINE | Facility: CLINIC | Age: 77
End: 2022-01-12
Payer: COMMERCIAL

## 2022-01-12 VITALS
HEIGHT: 64 IN | RESPIRATION RATE: 12 BRPM | WEIGHT: 220.1 LBS | SYSTOLIC BLOOD PRESSURE: 104 MMHG | BODY MASS INDEX: 37.58 KG/M2 | TEMPERATURE: 98.1 F | HEART RATE: 72 BPM | DIASTOLIC BLOOD PRESSURE: 64 MMHG

## 2022-01-12 DIAGNOSIS — Z00.00 ENCOUNTER FOR MEDICARE ANNUAL WELLNESS EXAM: Primary | ICD-10-CM

## 2022-01-12 DIAGNOSIS — I10 BENIGN ESSENTIAL HYPERTENSION: ICD-10-CM

## 2022-01-12 DIAGNOSIS — C18.9 MALIGNANT NEOPLASM OF COLON, UNSPECIFIED PART OF COLON (H): ICD-10-CM

## 2022-01-12 DIAGNOSIS — M54.42 CHRONIC BILATERAL LOW BACK PAIN WITH BILATERAL SCIATICA: ICD-10-CM

## 2022-01-12 DIAGNOSIS — Z13.220 SCREENING FOR HYPERLIPIDEMIA: ICD-10-CM

## 2022-01-12 DIAGNOSIS — Z85.038 HISTORY OF MALIGNANT NEOPLASM OF COLON: ICD-10-CM

## 2022-01-12 DIAGNOSIS — G89.29 CHRONIC BILATERAL LOW BACK PAIN WITH BILATERAL SCIATICA: ICD-10-CM

## 2022-01-12 DIAGNOSIS — E66.01 SEVERE OBESITY (BMI 35.0-39.9) WITH COMORBIDITY (H): ICD-10-CM

## 2022-01-12 DIAGNOSIS — E11.42 TYPE 2 DIABETES MELLITUS WITH DIABETIC POLYNEUROPATHY, WITHOUT LONG-TERM CURRENT USE OF INSULIN (H): ICD-10-CM

## 2022-01-12 DIAGNOSIS — M81.0 AGE-RELATED OSTEOPOROSIS WITHOUT CURRENT PATHOLOGICAL FRACTURE: ICD-10-CM

## 2022-01-12 DIAGNOSIS — R35.0 FREQUENT URINATION: ICD-10-CM

## 2022-01-12 DIAGNOSIS — M54.41 CHRONIC BILATERAL LOW BACK PAIN WITH BILATERAL SCIATICA: ICD-10-CM

## 2022-01-12 PROBLEM — G62.9 PERIPHERAL NEUROPATHY: Status: ACTIVE | Noted: 2022-01-12

## 2022-01-12 LAB
ALBUMIN UR-MCNC: ABNORMAL MG/DL
ANION GAP SERPL CALCULATED.3IONS-SCNC: 10 MMOL/L (ref 5–18)
APPEARANCE UR: CLEAR
AST SERPL W P-5'-P-CCNC: 25 U/L (ref 0–40)
BILIRUB UR QL STRIP: NEGATIVE
BUN SERPL-MCNC: 12 MG/DL (ref 8–28)
CALCIUM SERPL-MCNC: 10.4 MG/DL (ref 8.5–10.5)
CEA SERPL-MCNC: 2 NG/ML (ref 0–3)
CHLORIDE BLD-SCNC: 104 MMOL/L (ref 98–107)
CHOLEST SERPL-MCNC: 181 MG/DL
CO2 SERPL-SCNC: 26 MMOL/L (ref 22–31)
COLOR UR AUTO: YELLOW
CREAT SERPL-MCNC: 0.75 MG/DL (ref 0.6–1.1)
FASTING STATUS PATIENT QL REPORTED: YES
GFR SERPL CREATININE-BSD FRML MDRD: 83 ML/MIN/1.73M2
GLUCOSE BLD-MCNC: 174 MG/DL (ref 70–125)
GLUCOSE UR STRIP-MCNC: NEGATIVE MG/DL
HBA1C MFR BLD: 7.6 % (ref 0–5.6)
HDLC SERPL-MCNC: 77 MG/DL
HGB UR QL STRIP: NEGATIVE
KETONES UR STRIP-MCNC: NEGATIVE MG/DL
LDLC SERPL CALC-MCNC: 83 MG/DL
LEUKOCYTE ESTERASE UR QL STRIP: ABNORMAL
NITRATE UR QL: NEGATIVE
PH UR STRIP: 5.5 [PH] (ref 5–8)
POTASSIUM BLD-SCNC: 4.4 MMOL/L (ref 3.5–5)
RBC #/AREA URNS AUTO: ABNORMAL /HPF
SODIUM SERPL-SCNC: 140 MMOL/L (ref 136–145)
SP GR UR STRIP: 1.02 (ref 1–1.03)
SQUAMOUS #/AREA URNS AUTO: ABNORMAL /LPF
TRIGL SERPL-MCNC: 106 MG/DL
UROBILINOGEN UR STRIP-ACNC: 0.2 E.U./DL
WBC #/AREA URNS AUTO: ABNORMAL /HPF

## 2022-01-12 PROCEDURE — 81001 URINALYSIS AUTO W/SCOPE: CPT | Performed by: FAMILY MEDICINE

## 2022-01-12 PROCEDURE — 82378 CARCINOEMBRYONIC ANTIGEN: CPT | Performed by: FAMILY MEDICINE

## 2022-01-12 PROCEDURE — 36415 COLL VENOUS BLD VENIPUNCTURE: CPT | Performed by: FAMILY MEDICINE

## 2022-01-12 PROCEDURE — G0439 PPPS, SUBSEQ VISIT: HCPCS | Performed by: FAMILY MEDICINE

## 2022-01-12 PROCEDURE — 83036 HEMOGLOBIN GLYCOSYLATED A1C: CPT | Performed by: FAMILY MEDICINE

## 2022-01-12 PROCEDURE — 80061 LIPID PANEL: CPT | Performed by: FAMILY MEDICINE

## 2022-01-12 PROCEDURE — 84450 TRANSFERASE (AST) (SGOT): CPT | Performed by: FAMILY MEDICINE

## 2022-01-12 PROCEDURE — 80048 BASIC METABOLIC PNL TOTAL CA: CPT | Performed by: FAMILY MEDICINE

## 2022-01-12 RX ORDER — SULFAMETHOXAZOLE/TRIMETHOPRIM 800-160 MG
1 TABLET ORAL 2 TIMES DAILY
Qty: 14 TABLET | Refills: 0 | Status: SHIPPED | OUTPATIENT
Start: 2022-01-12 | End: 2022-01-19

## 2022-01-12 RX ORDER — GABAPENTIN 300 MG/1
300 CAPSULE ORAL AT BEDTIME
Qty: 90 CAPSULE | Refills: 3 | Status: SHIPPED | OUTPATIENT
Start: 2022-01-12 | End: 2023-01-30

## 2022-01-12 ASSESSMENT — MIFFLIN-ST. JEOR: SCORE: 1478.37

## 2022-01-12 ASSESSMENT — ACTIVITIES OF DAILY LIVING (ADL): CURRENT_FUNCTION: NO ASSISTANCE NEEDED

## 2022-01-12 NOTE — ASSESSMENT & PLAN NOTE
Continue efforts for weight loss.  The ability to control blood sugars and blood pressure more adequately in relationship to current weight status reviewed.

## 2022-01-12 NOTE — ASSESSMENT & PLAN NOTE
Not fully controlled on current modality of metformin 1000 mg twice a day.  Intolerant to glipizide and GLP-1's were cost prohibitive.  Will discuss with MTM about possible start of Janumet versus low-dose Actos.  Of note I think over the next 3 months if we can bring her A1c below 7.5 then when summer comes around and she is able to exercise and as the pandemic clears down she is able to get back to the gym more often patient will be able to bring down on her own with just metformin.

## 2022-01-12 NOTE — ASSESSMENT & PLAN NOTE
Recent notes from osteoporosis specialist reviewed.  Has DEXA scan pending later this month and will follow-up with specialist.

## 2022-01-12 NOTE — PATIENT INSTRUCTIONS
Patient Education   Personalized Prevention Plan  You are due for the preventive services outlined below.  Your care team is available to assist you in scheduling these services.  If you have already completed any of these items, please share that information with your care team to update in your medical record.  Health Maintenance Due   Topic Date Due     ANNUAL REVIEW OF HM ORDERS  Never done     Cholesterol Lab  01/08/2022     FALL RISK ASSESSMENT  01/08/2022       Signs of Hearing Loss      Hearing much better with one ear can be a sign of hearing loss.   Hearing loss is a problem shared by many people. In fact, it is one of the most common health problems, particularly as people age. Most people age 65 and older have some hearing loss. By age 80, almost everyone does. Hearing loss often occurs slowly over the years. So you may not realize your hearing has gotten worse.  Have your hearing checked  Call your healthcare provider if you:    Have to strain to hear normal conversation    Have to watch other people s faces very carefully to follow what they re saying    Need to ask people to repeat what they ve said    Often misunderstand what people are saying    Turn the volume of the television or radio up so high that others complain    Feel that people are mumbling when they re talking to you    Find that the effort to hear leaves you feeling tired and irritated    Notice, when using the phone, that you hear better with one ear than the other  [employer] last reviewed this educational content on 1/1/2020 2000-2021 [employer]. All rights reserved. This information is not intended as a substitute for professional medical care. Always follow your healthcare professional's instructions.

## 2022-01-12 NOTE — ASSESSMENT & PLAN NOTE
Very well controlled on current dosing of losartan 50 mg daily.  Will continue.  Diet and exercise reviewed.

## 2022-01-12 NOTE — PROGRESS NOTES
"SUBJECTIVE:   Karol Mujica is a 76 year old female who presents for Preventive Visit.      Patient has been advised of split billing requirements and indicates understanding: Yes   Are you in the first 12 months of your Medicare coverage?  No    Denies any chest pain, shortness of breath, dyspnea exertion, palpitations, nausea or vomiting.  Denies any changes in vision or hearing, or urinary or bowel habits.  Blood sugars have been higher in the morning over the past month.    Healthy Habits:     In general, how would you rate your overall health?  Good    Frequency of exercise:  2-3 days/week    Duration of exercise:  15-30 minutes    Do you usually eat at least 4 servings of fruit and vegetables a day, include whole grains    & fiber and avoid regularly eating high fat or \"junk\" foods?  Yes    Taking medications regularly:  Yes    Barriers to taking medications:  None    Medication side effects:  Not applicable    Ability to successfully perform activities of daily living:  No assistance needed    Home Safety:  No safety concerns identified    Hearing Impairment:  Difficult to understand a speaker at a public meeting or Zoroastrianism service    In the past 6 months, have you been bothered by leaking of urine?  No    In general, how would you rate your overall mental or emotional health?  Good      PHQ-2 Total Score: 0    Additional concerns today:  No    Do you feel safe in your environment? Yes    Have you ever done Advance Care Planning? (For example, a Health Directive, POLST, or a discussion with a medical provider or your loved ones about your wishes): Yes, patient states has an Advance Care Planning document and will bring a copy to the clinic.       Fall risk  Fallen 2 or more times in the past year?: No  Any fall with injury in the past year?: No    Cognitive Screening   1) Repeat 3 items (Leader, Season, Table)    2) Clock draw: NORMAL  3) 3 item recall: Recalls 2 objects   Results: NORMAL clock, 1-2 " "items recalled: COGNITIVE IMPAIRMENT LESS LIKELY    Mini-CogTM Copyright LUCY Del [employer]. Licensed by the author for use in [facility]; reprinted with permission (phuc@[facility]). All rights reserved.        Reviewed and updated as needed this visit by clinical staff  Tobacco  Allergies    Med Hx  Surg Hx  Fam Hx  Soc Hx       Reviewed and updated as needed this visit by Provider               Social History     Tobacco Use     Smoking status: Never Smoker     Smokeless tobacco: Never Used   Substance Use Topics     Alcohol use: No         Alcohol Use 1/10/2022   Prescreen: >3 drinks/day or >7 drinks/week? No       Current providers sharing in care for this patient include:   Patient Care Team:  Saul Zamora DO as PCP - General (Family Medicine)  Regla Shi PharmD as MTM Pharamcist (Pharmacist)  Saul Zamora DO as Assigned PCP  Zoila Martin APRN CNP as Assigned Neuroscience Provider  Torrie Thompson MD as Physician (Internal Medicine)  Jia Chen MD as Surgeon (Neurological Surgery)    The following health maintenance items are reviewed in Epic and correct as of today:  Health Maintenance Due   Topic Date Due     ANNUAL REVIEW OF HM ORDERS  Never done     LIPID  01/08/2022     FALL RISK ASSESSMENT  01/08/2022     MEDICARE ANNUAL WELLNESS VISIT  01/08/2022     Lab work is in process    Pertinent mammograms are reviewed under the imaging tab.    Review of Systems   All other systems reviewed and are negative.        OBJECTIVE:   /64 (BP Location: Left arm, Patient Position: Sitting, Cuff Size: Adult Large)   Pulse 72   Temp 98.1  F (36.7  C) (Oral)   Resp 12   Ht 1.626 m (5' 4\")   Wt 99.8 kg (220 lb 1.6 oz)   LMP  (LMP Unknown)   Breastfeeding No   BMI 37.78 kg/m   Estimated body mass index is 37.78 kg/m  as calculated from the following:    Height as of this encounter: 1.626 m (5' 4\").    Weight as of this encounter: 99.8 kg (220 lb 1.6 " oz).  Physical Exam  Vitals and nursing note reviewed.   Constitutional:       General: She is not in acute distress.     Appearance: Normal appearance.   HENT:      Head: Normocephalic and atraumatic.      Right Ear: Tympanic membrane, ear canal and external ear normal.      Left Ear: Tympanic membrane, ear canal and external ear normal.      Nose: Nose normal.      Mouth/Throat:      Mouth: Mucous membranes are moist.      Pharynx: Oropharynx is clear. No oropharyngeal exudate.   Eyes:      General: No scleral icterus.     Extraocular Movements: Extraocular movements intact.      Conjunctiva/sclera: Conjunctivae normal.   Neck:      Vascular: No carotid bruit.   Cardiovascular:      Rate and Rhythm: Normal rate and regular rhythm.      Pulses: Normal pulses.      Heart sounds: Normal heart sounds. No murmur heard.  No friction rub. No gallop.    Pulmonary:      Effort: Pulmonary effort is normal.      Breath sounds: Normal breath sounds. No wheezing, rhonchi or rales.   Musculoskeletal:         General: No swelling. Normal range of motion.      Cervical back: Normal range of motion.      Right lower leg: No edema.      Left lower leg: No edema.   Skin:     General: Skin is warm and dry.      Capillary Refill: Capillary refill takes less than 2 seconds.      Findings: No rash.   Neurological:      General: No focal deficit present.      Mental Status: She is alert and oriented to person, place, and time.      Cranial Nerves: No cranial nerve deficit.      Gait: Gait is intact. Gait normal.      Deep Tendon Reflexes: Reflexes normal.      Reflex Scores:       Bicep reflexes are 2+ on the right side and 2+ on the left side.       Patellar reflexes are 2+ on the right side and 2+ on the left side.  Psychiatric:         Mood and Affect: Mood normal.         Thought Content: Thought content normal.               ASSESSMENT / PLAN:     Problem List Items Addressed This Visit        Nervous and Auditory    Type 2 diabetes  mellitus with diabetic polyneuropathy, without long-term current use of insulin (H)     Not fully controlled on current modality of metformin 1000 mg twice a day.  Intolerant to glipizide and GLP-1's were cost prohibitive.  Will discuss with MTM about possible start of Janumet versus low-dose Actos.  Of note I think over the next 3 months if we can bring her A1c below 7.5 then when summer comes around and she is able to exercise and as the pandemic clears down she is able to get back to the gym more often patient will be able to bring down on her own with just metformin.         Relevant Medications    gabapentin (NEURONTIN) 300 MG capsule    Other Relevant Orders    Hemoglobin A1c (Completed)    Basic metabolic panel    Chronic bilateral low back pain with bilateral sciatica     Adequate control with the use of gabapentin at night.  We will plan to continue.         Relevant Medications    gabapentin (NEURONTIN) 300 MG capsule       Digestive    Colon cancer, with resection 2/10/14    Relevant Orders    CEA    AST    Severe obesity (BMI 35.0-39.9) with comorbidity (H)     Continue efforts for weight loss.  The ability to control blood sugars and blood pressure more adequately in relationship to current weight status reviewed.            Circulatory    Benign essential hypertension     Very well controlled on current dosing of losartan 50 mg daily.  Will continue.  Diet and exercise reviewed.         Relevant Orders    Basic metabolic panel    AST       Musculoskeletal and Integumentary    Osteoporosis     Recent notes from osteoporosis specialist reviewed.  Has DEXA scan pending later this month and will follow-up with specialist.         Relevant Medications    Calcium-Magnesium-Zinc 333-133-5 MG TABS per tablet       Other    History of malignant neoplasm of colon     As per plan we will continue 6-month check of CEA.           Other Visit Diagnoses     Encounter for Medicare annual wellness exam    -  Primary     "Frequent urination        Relevant Medications    sulfamethoxazole-trimethoprim (BACTRIM DS) 800-160 MG tablet    Other Relevant Orders    UA Macro with Reflex to Micro and Culture - lab collect (Completed)    Urine Microscopic (Completed)    Screening for hyperlipidemia        Relevant Orders    Lipid panel reflex to direct LDL Fasting          Patient has been advised of split billing requirements and indicates understanding: Yes  COUNSELING:  Reviewed preventive health counseling, as reflected in patient instructions       Regular exercise       Healthy diet/nutrition       Dental care       Aspirin prophylaxis        Advanced Planning     Estimated body mass index is 37.78 kg/m  as calculated from the following:    Height as of this encounter: 1.626 m (5' 4\").    Weight as of this encounter: 99.8 kg (220 lb 1.6 oz).    Weight management plan: Discussed healthy diet and exercise guidelines    She reports that she has never smoked. She has never used smokeless tobacco.      Appropriate preventive services were discussed with this patient, including applicable screening as appropriate for cardiovascular disease, diabetes, osteopenia/osteoporosis, and glaucoma.  As appropriate for age/gender, discussed screening for colorectal cancer, prostate cancer, breast cancer, and cervical cancer. Checklist reviewing preventive services available has been given to the patient.    Reviewed patients plan of care and provided an AVS. The Intermediate Care Plan ( asthma action plan, low back pain action plan, and migraine action plan) for Karol meets the Care Plan requirement. This Care Plan has been established and reviewed with the Patient.    Counseling Resources:  ATP IV Guidelines  Pooled Cohorts Equation Calculator  Breast Cancer Risk Calculator  Breast Cancer: Medication to Reduce Risk  FRAX Risk Assessment  ICSI Preventive Guidelines  Dietary Guidelines for Americans, 2010  USDA's MyPlate  ASA Prophylaxis  Lung CA " Screening    Saul Zamora DO  [facility]    Identified Health Risks:    The patient was provided with written information regarding signs of hearing loss.

## 2022-01-17 ENCOUNTER — TRANSFERRED RECORDS (OUTPATIENT)
Dept: MULTI SPECIALTY CLINIC | Facility: CLINIC | Age: 77
End: 2022-01-17

## 2022-01-17 LAB — RETINOPATHY: NORMAL

## 2022-01-18 VITALS
SYSTOLIC BLOOD PRESSURE: 148 MMHG | RESPIRATION RATE: 18 BRPM | OXYGEN SATURATION: 98 % | DIASTOLIC BLOOD PRESSURE: 84 MMHG | WEIGHT: 220 LBS | BODY MASS INDEX: 37.56 KG/M2 | HEIGHT: 64 IN | HEART RATE: 72 BPM

## 2022-01-18 VITALS
HEART RATE: 80 BPM | DIASTOLIC BLOOD PRESSURE: 86 MMHG | HEIGHT: 64 IN | BODY MASS INDEX: 37.56 KG/M2 | WEIGHT: 220 LBS | OXYGEN SATURATION: 97 % | SYSTOLIC BLOOD PRESSURE: 144 MMHG | RESPIRATION RATE: 16 BRPM

## 2022-01-18 VITALS
WEIGHT: 225.2 LBS | SYSTOLIC BLOOD PRESSURE: 144 MMHG | DIASTOLIC BLOOD PRESSURE: 76 MMHG | BODY MASS INDEX: 38.45 KG/M2 | RESPIRATION RATE: 12 BRPM | TEMPERATURE: 98.2 F | HEART RATE: 88 BPM | HEIGHT: 64 IN

## 2022-01-18 VITALS
TEMPERATURE: 98.2 F | OXYGEN SATURATION: 97 % | RESPIRATION RATE: 16 BRPM | SYSTOLIC BLOOD PRESSURE: 175 MMHG | HEART RATE: 76 BPM | DIASTOLIC BLOOD PRESSURE: 85 MMHG

## 2022-01-18 VITALS
BODY MASS INDEX: 38.41 KG/M2 | RESPIRATION RATE: 16 BRPM | OXYGEN SATURATION: 98 % | DIASTOLIC BLOOD PRESSURE: 84 MMHG | WEIGHT: 225 LBS | SYSTOLIC BLOOD PRESSURE: 130 MMHG | HEIGHT: 64 IN | HEART RATE: 80 BPM

## 2022-01-18 VITALS
OXYGEN SATURATION: 97 % | HEIGHT: 64 IN | HEART RATE: 94 BPM | SYSTOLIC BLOOD PRESSURE: 132 MMHG | WEIGHT: 220.38 LBS | TEMPERATURE: 98.6 F | DIASTOLIC BLOOD PRESSURE: 67 MMHG | SYSTOLIC BLOOD PRESSURE: 142 MMHG | HEART RATE: 72 BPM | DIASTOLIC BLOOD PRESSURE: 70 MMHG | BODY MASS INDEX: 37.62 KG/M2 | RESPIRATION RATE: 16 BRPM

## 2022-01-18 VITALS
SYSTOLIC BLOOD PRESSURE: 118 MMHG | RESPIRATION RATE: 12 BRPM | DIASTOLIC BLOOD PRESSURE: 78 MMHG | BODY MASS INDEX: 37.85 KG/M2 | HEART RATE: 64 BPM | HEIGHT: 64 IN | TEMPERATURE: 98.5 F | WEIGHT: 221.7 LBS

## 2022-01-18 VITALS
TEMPERATURE: 99 F | BODY MASS INDEX: 37.73 KG/M2 | OXYGEN SATURATION: 96 % | DIASTOLIC BLOOD PRESSURE: 76 MMHG | HEIGHT: 64 IN | TEMPERATURE: 98.6 F | WEIGHT: 221 LBS | DIASTOLIC BLOOD PRESSURE: 80 MMHG | HEART RATE: 100 BPM | SYSTOLIC BLOOD PRESSURE: 132 MMHG | SYSTOLIC BLOOD PRESSURE: 156 MMHG | HEART RATE: 88 BPM | RESPIRATION RATE: 12 BRPM | SYSTOLIC BLOOD PRESSURE: 138 MMHG | HEART RATE: 92 BPM | DIASTOLIC BLOOD PRESSURE: 78 MMHG

## 2022-01-18 VITALS
DIASTOLIC BLOOD PRESSURE: 64 MMHG | HEART RATE: 86 BPM | WEIGHT: 230.9 LBS | SYSTOLIC BLOOD PRESSURE: 138 MMHG | HEIGHT: 64 IN | BODY MASS INDEX: 39.42 KG/M2

## 2022-01-18 VITALS — BODY MASS INDEX: 37.73 KG/M2 | WEIGHT: 221 LBS | HEIGHT: 64 IN

## 2022-01-18 VITALS — SYSTOLIC BLOOD PRESSURE: 136 MMHG | HEART RATE: 80 BPM | TEMPERATURE: 98.2 F | DIASTOLIC BLOOD PRESSURE: 74 MMHG

## 2022-01-18 VITALS — SYSTOLIC BLOOD PRESSURE: 122 MMHG | HEART RATE: 80 BPM | TEMPERATURE: 98.1 F | DIASTOLIC BLOOD PRESSURE: 68 MMHG

## 2022-01-19 ENCOUNTER — TRANSFERRED RECORDS (OUTPATIENT)
Dept: HEALTH INFORMATION MANAGEMENT | Facility: CLINIC | Age: 77
End: 2022-01-19
Payer: COMMERCIAL

## 2022-01-28 NOTE — PROGRESS NOTES
This is a recent snapshot of the patient's [facility] medical record.  For current drug dose and complete information and questions, call [phone] or In Basket pool, [facility] home infusion ([id number])  CSN Number:  [id number]

## 2022-01-28 NOTE — PROGRESS NOTES
This is a recent snapshot of the patient's [facility] medical record.  For current drug dose and complete information and questions, call [phone] or In Basket pool, [facility]v home infusion ([id number])  CSN Number:  [id number]

## 2022-01-28 NOTE — PROGRESS NOTES
This is a recent snapshot of the patient's [facility] medical record.  For current drug dose and complete information and questions, call [phone] or In Basket pool, [facility]v home infusion ([id number])  CSN Number:  [id number]

## 2022-02-03 ENCOUNTER — OFFICE VISIT (OUTPATIENT)
Dept: INTERNAL MEDICINE | Facility: CLINIC | Age: 77
End: 2022-02-03
Payer: COMMERCIAL

## 2022-02-03 VITALS
OXYGEN SATURATION: 97 % | BODY MASS INDEX: 38.07 KG/M2 | HEART RATE: 76 BPM | WEIGHT: 223 LBS | DIASTOLIC BLOOD PRESSURE: 68 MMHG | SYSTOLIC BLOOD PRESSURE: 122 MMHG | HEIGHT: 64 IN

## 2022-02-03 DIAGNOSIS — M85.89 OSTEOPENIA OF MULTIPLE SITES: ICD-10-CM

## 2022-02-03 PROCEDURE — 99213 OFFICE O/P EST LOW 20 MIN: CPT | Performed by: INTERNAL MEDICINE

## 2022-02-03 ASSESSMENT — MIFFLIN-ST. JEOR: SCORE: 1491.52

## 2022-02-03 NOTE — PROGRESS NOTES
(M85.89) Osteopenia of multiple sites    Patient was treated in the past with Prolia [id number]. Switched to Actonel in 2/2020 but never took this medications. So, no active treatment for almost 2 years.    DXA scan done 1/2022 showed osteopenia with the lowest T-score -1.9 in the lumbar spine and moderate fracture risk. No significant decline compared to DXA scan done in 2021.    No reported fragility fractures.    Appropriate calcium, vitamin D supplements, along with balance and weight bearing exercise recommended with followup bone density scan in 2 years.   This note has been dictated using voice recognition software. Any grammatical or context distortions are unintentional and inherent to the software.      Return in about 2 years (around 2/3/2024) for Follow up osteopenia.    Patient Instructions   DXA scan in 2 years, so Jan 2024.  Appropriate calcium, vitamin D supplements, along with balance and weight bearing exercise recommended with followup bone density scan in 2 years.           Subjective:    Karol Mujica is a 76 year old female  here for follow up.      Social History     Socioeconomic History     Marital status:      Spouse name: Vinay     Number of children: 2     Years of education: 16     Highest education level: Some college, no degree   Occupational History     Occupation: Retired   Tobacco Use     Smoking status: Never Smoker     Smokeless tobacco: Never Used   Vaping Use     Vaping Use: Never used   Substance and Sexual Activity     Alcohol use: No     Drug use: No     Sexual activity: Not on file   Other Topics Concern     Parent/sibling w/ CABG, MI or angioplasty before 65F 55M? No   Social History Narrative     Not on file     Social Determinants of Health     Financial Resource Strain: Not on file   Food Insecurity: Not on file   Transportation Needs: Not on file   Physical Activity: Not on file   Stress: Not on file   Social Connections: Not on file   Intimate Partner  "Violence: Not on file   Housing Stability: Not on file       Family History   Problem Relation Age of Onset     Coronary Artery Disease Father      Breast Cancer Mother 81.00     Heart Disease Sister      Heart Disease Brother      Kidney Cancer Brother      Lung Cancer Brother      Osteoporosis Sister      Heart Disease Sister      Cancer Sister         Bladder     Kidney Disease Sister      Diabetes Sister      Heart Disease Sister      No Known Problems Maternal Grandmother      No Known Problems Maternal Grandfather      No Known Problems Paternal Grandmother      No Known Problems Paternal Grandfather      Review of Systems:  A 12 point comprehensive review of systems was negative except as noted in HPI.        Objective:    Physical Exam   /68 (BP Location: Right arm, Patient Position: Sitting, Cuff Size: Adult Regular)   Pulse 76   Ht 1.626 m (5' 4\")   Wt 101.2 kg (223 lb)   LMP  (LMP Unknown)   SpO2 97%   BMI 38.28 kg/m    Body mass index is 38.28 kg/m .    Constitutional: oriented to person, place, and time, appears well-nourished. No distress.         Patient Active Problem List   Diagnosis     Aftercare following joint replacement, left femur fracture     HTN (hypertension)     Diabetes mellitus, type 2 (H)     Anxiety     Colon cancer, with resection 2/10/14     S/P total hip arthroplasty     Benign essential hypertension     Type 2 diabetes mellitus with diabetic polyneuropathy, without long-term current use of insulin (H)     Severe obesity (BMI 35.0-39.9) with comorbidity (H)     Chronic bilateral low back pain with bilateral sciatica     History of malignant neoplasm of colon     Peripheral neuropathy     Vitamin D deficiency     Osteopenia of multiple sites       Current Outpatient Medications   Medication     acetaminophen (TYLENOL) 500 MG tablet     Calcium-Magnesium-Zinc 333-133-5 MG TABS per tablet     cholecalciferol 50 MCG (2000 UT) CAPS     Cranberry 450 MG TABS     " cyclobenzaprine (FLEXERIL) 10 MG tablet     gabapentin (NEURONTIN) 300 MG capsule     losartan (COZAAR) 50 MG tablet     metFORMIN (GLUCOPHAGE) 1000 MG tablet     Multiple Vitamins-Minerals (MULTIVITAL-M) TABS     ONETOUCH VERIO IQ test strip     Simvastatin (ZOCOR PO)     No current facility-administered medications for this visit.               Torrie Thompson MD  2/3/2022

## 2022-02-03 NOTE — PATIENT INSTRUCTIONS
DXA scan in 2 years, so Jan 2024.  Appropriate calcium, vitamin D supplements, along with balance and weight bearing exercise recommended with followup bone density scan in 2 years.

## 2022-02-03 NOTE — PROGRESS NOTES
This is a recent snapshot of the patient's [facility] medical record.  For current drug dose and complete information and questions, call [phone] or In [facility] pool, fv home infusion ([id number])  CSN Number:  [id number]

## 2022-02-15 DIAGNOSIS — E11.65 TYPE 2 DIABETES MELLITUS WITH HYPERGLYCEMIA (H): ICD-10-CM

## 2022-02-15 DIAGNOSIS — E78.5 HYPERLIPIDEMIA, UNSPECIFIED: ICD-10-CM

## 2022-02-16 DIAGNOSIS — I10 ESSENTIAL (PRIMARY) HYPERTENSION: ICD-10-CM

## 2022-02-17 RX ORDER — SIMVASTATIN 40 MG
TABLET ORAL
Qty: 90 TABLET | Refills: 3 | Status: SHIPPED | OUTPATIENT
Start: 2022-02-17 | End: 2023-02-09

## 2022-02-17 NOTE — TELEPHONE ENCOUNTER
"Last Written Prescription Date:  5/4/2021  Last Fill Quantity: 180,  # refills: 2   Last office visit provider:  1/12/2022     Requested Prescriptions   Pending Prescriptions Disp Refills     metFORMIN (GLUCOPHAGE) 1000 MG tablet [Pharmacy Med Name: METFORMIN HCL 1,000 MG TABLET] 180 tablet 2     Sig: TAKE 1 TABLET (1,000 MG TOTAL) BY MOUTH 2 (TWO) TIMES A DAY WITH MEALS.       Biguanide Agents Passed - 2/17/2022 11:18 AM        Passed - Patient is age 10 or older        Passed - Patient has documented A1c within the specified period of time.     If HgbA1C is 8 or greater, it needs to be on file within the past 3 months.  If less than 8, must be on file within the past 6 months.     Recent Labs   Lab Test 01/12/22  1128   A1C 7.6*             Passed - Patient's CR is NOT>1.4 OR Patient's EGFR is NOT<45 within past 12 mos.     Recent Labs   Lab Test 01/12/22  1128 06/30/21  1310   GFRESTIMATED 83 >60   GFRESTBLACK  --  >60       Recent Labs   Lab Test 01/12/22  1128   CR 0.75             Passed - Patient does NOT have a diagnosis of CHF.        Passed - Medication is active on med list        Passed - Patient is not pregnant        Passed - Patient has not had a positive pregnancy test within the past 12 mos.         Passed - Recent (6 mo) or future (30 days) visit within the authorizing provider's specialty     Patient had office visit in the last 6 months or has a visit in the next 30 days with authorizing provider or within the authorizing provider's specialty.  See \"Patient Info\" tab in inbasket, or \"Choose Columns\" in Meds & Orders section of the refill encounter.          Last Written Prescription Date:  5/4/2021  Last Fill Quantity: 90,  # refills: 2   Last office visit provider:  1/12/22        simvastatin (ZOCOR) 40 MG tablet [Pharmacy Med Name: SIMVASTATIN 40 MG TABLET] 90 tablet 2     Sig: TAKE 1 TABLET BY MOUTH EVERYDAY AT BEDTIME       Statins Protocol Passed - 2/17/2022 11:18 AM        Passed - LDL on " "file in past 12 months     Recent Labs   Lab Test 01/12/22  1128   LDL 83             Passed - No abnormal creatine kinase in past 12 months     No lab results found.             Passed - Recent (12 mo) or future (30 days) visit within the authorizing provider's specialty     Patient has had an office visit with the authorizing provider or a provider within the authorizing providers department within the previous 12 mos or has a future within next 30 days. See \"Patient Info\" tab in inbasket, or \"Choose Columns\" in Meds & Orders section of the refill encounter.              Passed - Medication is active on med list        Passed - Patient is age 18 or older        Passed - No active pregnancy on record        Passed - No positive pregnancy test in past 12 months             Elena Benson RN 02/17/22 11:18 AM  "

## 2022-02-17 NOTE — PROGRESS NOTES
Medication Therapy Management (MTM) Encounter    ASSESSMENT:                            Type 2 Diabetes: Last A1c not at goal less than 7%.  Her fasting a.m. blood sugars are elevated and not at goal of 80 to 130 mg/dL.  Patient is no longer on glipizide and her diarrhea did improve.  Reviewed that diarrhea is not a very common side effect of glipizide, but due to the timing it seems like stopping glipizide was helpful.  We discussed that typically Metformin causes loose stool/diarrhea, but since her bowel movements improved and she continues Metformin I would recommend continuing. She would be a good candidate for a GLP-1 agonist, but may be cost prohibative, but she was open to this in the future.  Since she is dealing with her  right now, we elected to try a low-dose alternative sulfonylurea such as glimepiride which she may tolerate better.  We will follow-up on MyChart in 1 week.  She was agreeable to this plan.  I will send her the Accu-Chek fast clicks to use.    Osteopenia: Patient to continue calcium supplementation, vitamin D, and weightbearing exercise.  DEXA scan repeat in 2 years.  Last vitamin D level at goal.      PLAN:                            1.  Start glimepiride 1 mg daily AM  2.  Accu-Chek fast clixs lancing device and lancets sent    Follow-up: 1 week MyChart follow up     SUBJECTIVE/OBJECTIVE:                          Karol Mujica is a 76 year old female called for a follow up visit. She initially was referred to me from Dr. Zamora to discuss diarrhea associated with medications.      Reason for visit: Follow up since last MTM appointment on 10/1/2021  Medication Adherence/Access: no issues reported.      Type 2 Diabetes: Continues Metformin 1000 mg twice daily.  No longer on glipizide, stopped 6/22/2021.  She had been on both Metformin and glipizide for years and the diarrhea started in May.  The diarrhea has resolved since stopping glipizide.  It was recommended for her to try  semaglutide 3 mg instead of glipizide, but the price was $225 per month.  Tests BG 1 times daily fasting AM.  Blood sugars she reports = 161, 155, 173, 201, 161, 151  Last A1c checked 1/12/22 = 7.6%, previously = 7% on 10/1/21  Using a One Touch verio flex meter. Using AccuChek Multiclix, but it is no longer available.   Microalbumin checked 1/8/2021.  Taking losartan 50 mg once daily  Last GFR = 83 ml/min on 1/12/22  Continues simvastatin 40 mg nightly.  Last LDL checked 1/12/22  Is not taking aspirin 81 mg for primary prevention -it was stopped by PCP.  Of note, her  recently had a stroke.  Patient follows with endocrinology  Was going to the  and can now go to exercise programs, but now she is riding her exercise bike at home  BP Readings from Last 3 Encounters:   02/03/22 122/68   01/12/22 104/64   11/24/21 121/64       Osteopenia: Currently taking calcium twice daily, multivitamin and vitamin D 2000 units x 2 (4000 units) daily.  Her last DEXA on 1/19/22 showed a T score of -1.9.  She used to be on Prolia and she was told to stop and try risendronate, but Dr. Zamora did not want her to take.  She met with Dr. Thompson to discuss on 11/24/21 and had a follow up 2/3/22. Recommended to continue calcium, Vitamin D, and balance/weight bearing exercises. Repeat DEXA in 2 years.   left femur fracture at age 69 when she fell.  Last Vitamin D level = 34.9 on 6/30/21    Today's Vitals: LMP  (LMP Unknown)      Allergies/ADRs: Reviewed in chart  Tobacco: She reports that she has never smoked. She has never used smokeless tobacco.  Alcohol: reviewed in EPIC  Caffeine: reviewed in EPIC  Activity: reviewed in EPIC  Past Medical History: Reviewed in chart    ---------------    Telemedicine Visit Details  Type of service:  Telephone visit  Start Time: 8:33 AM  End Time: 8:55 AM  Originating Location (patient location): Home  Distant Location (provider location):  [facility]    I spent 22  minutes with this patient today. All changes were made via collaborative practice agreement with Dr. Zamora. A copy of the visit note was provided to the patient's primary care provider.    The patient was gievn a summary of these recommendations on Leland hSi Pharm.D., BCACP

## 2022-02-18 ENCOUNTER — VIRTUAL VISIT (OUTPATIENT)
Dept: PHARMACY | Facility: CLINIC | Age: 77
End: 2022-02-18
Payer: COMMERCIAL

## 2022-02-18 DIAGNOSIS — E11.9 TYPE 2 DIABETES MELLITUS WITHOUT COMPLICATION, WITHOUT LONG-TERM CURRENT USE OF INSULIN (H): Primary | ICD-10-CM

## 2022-02-18 DIAGNOSIS — M85.89 OSTEOPENIA OF MULTIPLE SITES: ICD-10-CM

## 2022-02-18 PROCEDURE — 99607 MTMS BY PHARM ADDL 15 MIN: CPT | Performed by: PHARMACIST

## 2022-02-18 PROCEDURE — 99605 MTMS BY PHARM NP 15 MIN: CPT | Performed by: PHARMACIST

## 2022-02-18 RX ORDER — GLIMEPIRIDE 1 MG/1
1 TABLET ORAL
Qty: 30 TABLET | Refills: 0 | Status: SHIPPED | OUTPATIENT
Start: 2022-02-18 | End: 2022-02-28

## 2022-02-18 RX ORDER — LANCETS
EACH MISCELLANEOUS
Qty: 102 EACH | Refills: 11 | Status: SHIPPED | OUTPATIENT
Start: 2022-02-18 | End: 2024-01-09

## 2022-02-18 RX ORDER — LOSARTAN POTASSIUM 50 MG/1
50 TABLET ORAL DAILY
Qty: 90 TABLET | Refills: 3 | Status: SHIPPED | OUTPATIENT
Start: 2022-02-18 | End: 2023-02-09

## 2022-02-18 RX ORDER — LANCING DEVICE/LANCETS
KIT MISCELLANEOUS
Qty: 1 EACH | Refills: 0 | Status: SHIPPED | OUTPATIENT
Start: 2022-02-18

## 2022-02-18 NOTE — LETTER
"Recommended To-Do List      Prepared on: 2/18/2022     You can get the best results from your medications by completing the items on this \"To-Do List.\"      Bring your To-Do List when you go to your doctor. And, share it with your family or caregivers.    My To-Do List:  What we talked about: What I should do:   A new medication that may be of benefit to you    Start taking glimepiride 1 mg daily in the morning           What we talked about: What I should do:                     "

## 2022-02-18 NOTE — TELEPHONE ENCOUNTER
"Outpatient Medication Detail     Disp Refills Start End SHANAE   losartan (COZAAR) 50 MG tablet 90 tablet 3 2/6/2021  No   Sig - Route: Take 1 tablet (50 mg total) by mouth daily. - Oral   Sent to pharmacy as: losartan 50 mg tablet (COZAAR)   E-Prescribing Status: Receipt confirmed by pharmacy (2/6/2021  6:29 AM CST)       Last office visit provider:  2/3/22     Requested Prescriptions   Pending Prescriptions Disp Refills     losartan (COZAAR) 50 MG tablet [Pharmacy Med Name: LOSARTAN POTASSIUM 50 MG TAB] 90 tablet 3     Sig: TAKE 1 TABLET BY MOUTH EVERY DAY       Angiotensin-II Receptors Passed - 2/18/2022  7:38 AM        Passed - Last blood pressure under 140/90 in past 12 months     BP Readings from Last 3 Encounters:   02/03/22 122/68   01/12/22 104/64   11/24/21 121/64                 Passed - Recent (12 mo) or future (30 days) visit within the authorizing provider's specialty     Patient has had an office visit with the authorizing provider or a provider within the authorizing providers department within the previous 12 mos or has a future within next 30 days. See \"Patient Info\" tab in inbasket, or \"Choose Columns\" in Meds & Orders section of the refill encounter.              Passed - Medication is active on med list        Passed - Patient is age 18 or older        Passed - No active pregnancy on record        Passed - Normal serum creatinine on file in past 12 months     Recent Labs   Lab Test 01/12/22  1128   CR 0.75       Ok to refill medication if creatinine is low          Passed - Normal serum potassium on file in past 12 months     Recent Labs   Lab Test 01/12/22  1128   POTASSIUM 4.4                    Passed - No positive pregnancy test in past 12 months             Vance Sanchez RN 02/18/22 7:39 AM  "

## 2022-02-18 NOTE — LETTER
February 18, 2022  Karol Mujica  [address] [address]    Dear Ms. Mujica, M [facility]        Thank you for talking with me on Feb 18, 2022 about your health and medications. As a follow-up to our conversation, I have included two documents:      1. Your Recommended To-Do List has steps you should take to get the best results from your medications.  2. Your Medication List will help you keep track of your medications and how to take them.    If you want to talk about these documents, please call Regla Shi PharmD at phone: [phone], Monday-Friday 8-4:30pm.    I look forward to working with you and your doctors to make sure your medications work well for you.    Sincerely,    Regla Shi PharmD

## 2022-02-18 NOTE — LETTER
_  Medication List        Prepared on: 2/18/2022     Bring your Medication List when you go to the doctor, hospital, or   emergency room. And, share it with your family or caregivers.     Note any changes to how you take your medications.  Cross out medications when you no longer use them.    Medication How I take it Why I use it Prescriber   acetaminophen (TYLENOL) 500 MG tablet Take 1,000 mg by mouth as needed Pain Saul Zamora, DO   blood glucose (ACCU-CHEK FASTCLIX) lancing device Lancing device to be used with lancets. Type 2 diabetes  Saul Zamora, DO   blood glucose monitoring (ACCU-CHEK FASTCLIX) lancets Use to test blood sugar 2 times daily. Fastclix please Type 2 diabetes Saul Zamora, DO   Calcium-Magnesium-Zinc 333-133-5 MG TABS per tablet Take 1 tablet by mouth 2 times daily Bone Helath  Saul Zamora, DO   cholecalciferol 50 MCG (2000 UT) CAPS Take 4,000 Units by mouth daily Bone Health  Saul Zamora, DO   Cranberry 450 MG TABS Take 450 mg by mouth as needed UTI Prevention  Saul Zamora, DO   cyclobenzaprine (FLEXERIL) 10 MG tablet Take 10 mg by mouth daily as needed Pain  Saul Zamora, DO   gabapentin (NEURONTIN) 300 MG capsule Take 1 capsule (300 mg) by mouth At Bedtime Chronic bilateral low back pain with bilateral sciatica Saul Zamora, DO   glimepiride (AMARYL) 1 MG tablet Take 1 tablet (1 mg) by mouth every morning (before breakfast) Type 2 diabetes  Saul Zamora, DO   losartan (COZAAR) 50 MG tablet Take 1 tablet (50 mg) by mouth daily  Hypertension Saul Zamora, DO   metFORMIN (GLUCOPHAGE) 1000 MG tablet TAKE 1 TABLET (1,000 MG TOTAL) BY MOUTH 2 (TWO) TIMES A DAY WITH MEALS. Type 2 diabetes  Saul Zamora, DO   Multiple Vitamins-Minerals (MULTIVITAL-M) TABS Take 1 tablet by mouth daily General health  Saul Zamora, DO   ONETOUCH VERIO IQ test strip USE 1 EACH AS DIRECTED 2 (TWO) TIMES A DAY. Type 2  diabetes Saul Zamora, DO   simvastatin (ZOCOR) 40 MG tablet TAKE 1 TABLET BY MOUTH EVERYDAY AT BEDTIME Cholesterol  Saul Zamora, DO         Add new medications, over-the-counter drugs, herbals, vitamins, or  minerals in the blank rows below.    Medication How I take it Why I use it Prescriber                          Allergies:      bee venom; blood-group specific substance; nitrofurantoin; penicillins        Side effects I have had:              Other Information:             My notes and questions:

## 2022-02-23 ENCOUNTER — MYC MEDICAL ADVICE (OUTPATIENT)
Dept: PHARMACY | Facility: CLINIC | Age: 77
End: 2022-02-23
Payer: COMMERCIAL

## 2022-02-23 DIAGNOSIS — E11.9 TYPE 2 DIABETES MELLITUS WITHOUT COMPLICATION, WITHOUT LONG-TERM CURRENT USE OF INSULIN (H): ICD-10-CM

## 2022-02-23 NOTE — PROGRESS NOTES
This is a recent snapshot of the patient's [facility] medical record.  For current drug dose and complete information and questions, call [phone] or In Basket pool, fv home infusion ([id number])  CSN Number:  [id number]

## 2022-02-28 RX ORDER — GLIMEPIRIDE 1 MG/1
2 TABLET ORAL
Qty: 30 TABLET | Refills: 0
Start: 2022-02-28 | End: 2022-03-04

## 2022-03-04 RX ORDER — GLIMEPIRIDE 1 MG/1
1 TABLET ORAL
Qty: 30 TABLET | Refills: 0
Start: 2022-03-04 | End: 2022-03-12

## 2022-03-06 ENCOUNTER — MYC MEDICAL ADVICE (OUTPATIENT)
Dept: FAMILY MEDICINE | Facility: CLINIC | Age: 77
End: 2022-03-06
Payer: COMMERCIAL

## 2022-03-06 DIAGNOSIS — R35.0 FREQUENT URINATION: Primary | ICD-10-CM

## 2022-03-07 RX ORDER — SULFAMETHOXAZOLE/TRIMETHOPRIM 800-160 MG
1 TABLET ORAL 2 TIMES DAILY
Qty: 14 TABLET | Refills: 0 | Status: SHIPPED | OUTPATIENT
Start: 2022-03-07 | End: 2022-03-22

## 2022-03-12 DIAGNOSIS — E11.9 TYPE 2 DIABETES MELLITUS WITHOUT COMPLICATION, WITHOUT LONG-TERM CURRENT USE OF INSULIN (H): ICD-10-CM

## 2022-03-12 RX ORDER — GLIMEPIRIDE 1 MG/1
1 TABLET ORAL
Qty: 30 TABLET | Refills: 5 | Status: SHIPPED | OUTPATIENT
Start: 2022-03-12 | End: 2022-03-16

## 2022-03-13 NOTE — TELEPHONE ENCOUNTER
"Last Written Prescription Date:  3/4/2022 \"no print out\"  Last Fill Quantity: 30,  # refills: 0  Last Last office visit provider:  Virtual visit 2/18/2022 with Regla Shi Pharm D   1/12/2022 with PCP Dr ANEL Zamora    Requested Prescriptions   Pending Prescriptions Disp Refills     glimepiride (AMARYL) 1 MG tablet [Pharmacy Med Name: GLIMEPIRIDE 1 MG TABLET] 30 tablet 0     Sig: TAKE 1 TABLET (1 MG) BY MOUTH EVERY MORNING (BEFORE BREAKFAST)       Sulfonylurea Agents Passed - 3/12/2022  9:31 AM        Passed - Patient has documented A1c within the specified period of time.     If HgbA1C is 8 or greater, it needs to be on file within the past 3 months.  If less than 8, must be on file within the past 6 months.     Recent Labs   Lab Test 01/12/22  1128   A1C 7.6*             Passed - Medication is active on med list        Passed - Patient is age 18 or older        Passed - No active pregnancy on record        Passed - Patient has a recent creatinine (normal) within the past 12 mos.     Recent Labs   Lab Test 01/12/22  1128   CR 0.75       Ok to refill medication if creatinine is low          Passed - Patient has not had a positive pregnancy test within the past 12 mos.        Passed - Recent (6 mo) or future (30 days) visit within the authorizing provider's specialty     Patient had office visit in the last 6 months or has a visit in the next 30 days with authorizing provider or within the authorizing provider's specialty.  See \"Patient Info\" tab in inbasket, or \"Choose Columns\" in Meds & Orders section of the refill encounter.                 Queenie Riggs RN 03/12/22 8:58 PM  "

## 2022-03-14 ENCOUNTER — OFFICE VISIT (OUTPATIENT)
Dept: NEUROSURGERY | Facility: CLINIC | Age: 77
End: 2022-03-14
Payer: COMMERCIAL

## 2022-03-14 ENCOUNTER — HOSPITAL ENCOUNTER (OUTPATIENT)
Dept: CT IMAGING | Facility: HOSPITAL | Age: 77
Discharge: HOME | End: 2022-03-14
Attending: NURSE PRACTITIONER | Admitting: NURSE PRACTITIONER
Payer: COMMERCIAL

## 2022-03-14 VITALS
HEIGHT: 64 IN | OXYGEN SATURATION: 95 % | HEART RATE: 106 BPM | WEIGHT: 218 LBS | BODY MASS INDEX: 37.22 KG/M2 | SYSTOLIC BLOOD PRESSURE: 107 MMHG | DIASTOLIC BLOOD PRESSURE: 57 MMHG

## 2022-03-14 DIAGNOSIS — M43.16 SPONDYLOLISTHESIS OF LUMBAR REGION: ICD-10-CM

## 2022-03-14 DIAGNOSIS — Z98.1 S/P LUMBAR FUSION: ICD-10-CM

## 2022-03-14 DIAGNOSIS — M48.062 SPINAL STENOSIS OF LUMBAR REGION WITH NEUROGENIC CLAUDICATION: ICD-10-CM

## 2022-03-14 DIAGNOSIS — M48.062 SPINAL STENOSIS OF LUMBAR REGION WITH NEUROGENIC CLAUDICATION: Primary | ICD-10-CM

## 2022-03-14 PROCEDURE — 72131 CT LUMBAR SPINE W/O DYE: CPT

## 2022-03-14 PROCEDURE — 99213 OFFICE O/P EST LOW 20 MIN: CPT | Performed by: PHYSICIAN ASSISTANT

## 2022-03-14 NOTE — PATIENT INSTRUCTIONS
Follow up will be on an as needed basis and she understands that should any symptoms return or arise to contact the office.

## 2022-03-14 NOTE — LETTER
"    3/14/2022         RE: Karol Mujica  [address] [facility] [address]        Dear Colleague,    Thank you for referring your patient, Karol Mujica, to the [facility]. Please see a copy of my visit note below.    Neurosurgery Progress Note:  3/14/2022    A/P: Karol Mujica is a 76 year old female S/P BILATERAL LUMBAR 4-LUMBAR 5 LAMINECTOMIES, MEDIAL FACETECTOMY, FORAMINOTOMIES; LUMBAR 4-LUMBAR 5 POSTERIOR INSTRUMENTED FUSION AND ARTHRODESIS 3/10/2021 with Dr Chen. XR today is without concern.     Karol feels well. She is very pleased with her progress and currently has no low back pain or radicular leg pain. She denies lower extremity numbness, tingling, or weakness. She feels that she is doing all of the activiest that she would want to be doing and is not limited by her back. She will note low back aching pain after she feels that she has over done it but states it will resolve on its own. Her  recently had a stroke and she has had to help him more around the house and with his appointments.  She had a DEXA completed this year showing osteopenia and advised to repeat in 2 years to evaluate need for Prolia again.      Follow up will be on an as needed basis and she understands that should any symptoms return or arise to contact the office.         HPI: Patient presented with claudicating low back and leg pain, numbness and weakness as well as urinary urgency with difficulty emptying bladder. PVR prior to surgery considered normal. MRI with severe spinal canal stenosis and crowding of the cauda equina L4-5. Moderate edema facet joints. Spinal listhesis L4-5 progressed to 1.5 mm.     Physical Exam  /74   Pulse 92   Resp 16   Ht 1.613 m (5' 3.5\")   Wt 98.9 kg (218 lb)   SpO2 97%   BMI 38.01 kg/m      General: alert, oriented. LIM. Speech clear.     Motor: normal bulk and tone    Strength: full strength lower extremities     Gait: steady gait. "     Incision well healed     Imaging: CT reviewed personally and with stable appearance of hardware and solid bony fusion noted    Yuliya Red PA-C  [facility] Neurosurgery  O: [phone]          Again, thank you for allowing me to participate in the care of your patient.        Sincerely,        Yuliya Red PA-C

## 2022-03-14 NOTE — PROGRESS NOTES
"Neurosurgery Progress Note:  3/14/2022    A/P: Karol Mujica is a 76 year old female S/P BILATERAL LUMBAR 4-LUMBAR 5 LAMINECTOMIES, MEDIAL FACETECTOMY, FORAMINOTOMIES; LUMBAR 4-LUMBAR 5 POSTERIOR INSTRUMENTED FUSION AND ARTHRODESIS 3/10/2021 with Dr Cehn. XR today is without concern.     Karol feels well. She is very pleased with her progress and currently has no low back pain or radicular leg pain. She denies lower extremity numbness, tingling, or weakness. She feels that she is doing all of the activiest that she would want to be doing and is not limited by her back. She will note low back aching pain after she feels that she has over done it but states it will resolve on its own. Her  recently had a stroke and she has had to help him more around the house and with his appointments.  She had a DEXA completed this year showing osteopenia and advised to repeat in 2 years to evaluate need for Prolia again.      Follow up will be on an as needed basis and she understands that should any symptoms return or arise to contact the office.         HPI: Patient presented with claudicating low back and leg pain, numbness and weakness as well as urinary urgency with difficulty emptying bladder. PVR prior to surgery considered normal. MRI with severe spinal canal stenosis and crowding of the cauda equina L4-5. Moderate edema facet joints. Spinal listhesis L4-5 progressed to 1.5 mm.     Physical Exam  /74   Pulse 92   Resp 16   Ht 1.613 m (5' 3.5\")   Wt 98.9 kg (218 lb)   SpO2 97%   BMI 38.01 kg/m      General: alert, oriented. LIM. Speech clear.     Motor: normal bulk and tone    Strength: full strength lower extremities     Gait: steady gait.     Incision well healed     Imaging: CT reviewed personally and with stable appearance of hardware and solid bony fusion noted    Yuliya Red PA-C  [facility] Neurosurgery  O: [phone]      "

## 2022-03-15 ENCOUNTER — MYC MEDICAL ADVICE (OUTPATIENT)
Dept: PHARMACY | Facility: CLINIC | Age: 77
End: 2022-03-15
Payer: COMMERCIAL

## 2022-03-15 NOTE — PROGRESS NOTES
Medication Therapy Management (MTM) Encounter    ASSESSMENT:                            Type 2 Diabetes: Last A1c not at goal less than 7%.  Her fasting a.m. blood sugars are elevated and not at goal of 80 to 130 mg/dL, slightly better since starting glimepiride however unable to tolerate. She would be a good candidate for a GLP-1 agonist, but may be cost prohibative, but she was open to a free trial since she is concerned about her BG. Will elect to start her on Ozempic so that she can self-titrate up the dose. If she tolerates, will apply her for [facility] (we reviewed her income and they should qualify). Will meet in person next week to review administration. She will complete glimiperide then stop (has 2 days left)    PLAN:                            1.  Stop glimepiride 1 mg daily AM  2.  Ozempic free trial sent.     Follow-up: 1 week [facility] follow up     SUBJECTIVE/OBJECTIVE:                          Karol Mujica is a 76 year old female called for a follow up visit. She initially was referred to me from Dr. Zamora to discuss diarrhea associated with medications.      Reason for visit: Follow up since last MTM appointment on 2/18/21  Medication Adherence/Access: no issues reported.      Type 2 Diabetes: Continues Metformin 1000 mg twice daily and glimepiride 1 mg daily.   She started glimepiride 1 mg on 2/18/2022.  She tolerated the 1 mg, but once we increased to 2 mg she started to experience diarrhea again.  She also has had diarrhea on glipizide which resolved after discontinuing. Diarrhea is better but still goes 2 times daily in the morning, which is better than the 2 mg but bothers her in case she needs to leave the house.   It was recommended for her to try semaglutide 3 mg instead of glipizide, but the price was $225 per month.  Never been on injections.   Tests BG 1 times daily fasting AM.  Blood sugars she reports = 141 this morning. Other values = 157, 148, 152, 154, 171, 150, 168, 172, 155,  170.   Last A1c checked 1/12/22 = 7.6%, previously = 7% on 10/1/21  Using a One Touch verio flex meter.   Microalbumin checked 1/8/2021.  Taking losartan 50 mg once daily  Last GFR = 83 ml/min on 1/12/22  Continues simvastatin 40 mg nightly.  Last LDL checked 1/12/22  Is not taking aspirin 81 mg for primary prevention -it was stopped by PCP.  Of note, her  recently had a stroke.  Patient follows with endocrinology  Was going to the  and can now go to exercise programs, but now she is riding her exercise bike at home  BP Readings from Last 3 Encounters:   03/14/22 107/57   02/03/22 122/68   01/12/22 104/64       Today's Vitals: LMP  (LMP Unknown)      Allergies/ADRs: Reviewed in chart  Tobacco: She reports that she has never smoked. She has never used smokeless tobacco.  Alcohol: reviewed in EPIC  Caffeine: reviewed in EPIC  Activity: reviewed in EPIC  Past Medical History: Reviewed in chart    ---------------    Telemedicine Visit Details  Type of service:  Telephone visit  Start Time: 1:31 PM  End Time: 1:52 PM  Originating Location (patient location): Home  Distant Location (provider location):  [facility]    I spent 21 minutes with this patient today. All changes were made via collaborative practice agreement with Dr. Zamora. A copy of the visit note was provided to the patient's primary care provider.    The patient sdeclined summary of these recommendations    Regla Shi, Pharm.D., BCACP

## 2022-03-16 ENCOUNTER — VIRTUAL VISIT (OUTPATIENT)
Dept: PHARMACY | Facility: CLINIC | Age: 77
End: 2022-03-16
Payer: COMMERCIAL

## 2022-03-16 DIAGNOSIS — E11.42 TYPE 2 DIABETES MELLITUS WITH DIABETIC POLYNEUROPATHY, WITHOUT LONG-TERM CURRENT USE OF INSULIN (H): Primary | ICD-10-CM

## 2022-03-16 PROCEDURE — 99606 MTMS BY PHARM EST 15 MIN: CPT | Performed by: PHARMACIST

## 2022-03-16 PROCEDURE — 99607 MTMS BY PHARM ADDL 15 MIN: CPT | Performed by: PHARMACIST

## 2022-03-17 RX ORDER — SEMAGLUTIDE 1.34 MG/ML
0.5 INJECTION, SOLUTION SUBCUTANEOUS WEEKLY
Qty: 1.5 ML | Refills: 0 | Status: SHIPPED | OUTPATIENT
Start: 2022-03-17 | End: 2022-04-14

## 2022-03-21 NOTE — PROGRESS NOTES
Clinical Pharmacy Consult:                                                    Karol Mujica is a 77 year old female coming in for a clinical pharmacist consult.  She was referred to me from Dr. Zamora.     Reason for Consult: Ozempic start     Discussion:     Continues Metformin 1000 mg twice daily.    She started glimepiride 1 mg on 2/18/2022.  She tolerated the 1 mg, but once we increased to 2 mg she started to experience diarrhea again.  We discontinued the glimepiride and her diarrhea improved.   Never been on injections.   Tests BG 1 times daily fasting AM.  Blood sugars per glucometer since 3/15/22 = 162, 143, 202, 136, 189, 169, 142  Last A1c checked 1/12/22 = 7.6%, previously = 7% on 10/1/21  Using a One Touch verio flex meter.   At last MT appointment we started her on a trial of Ozempic.  We reviewed that if tolerated, we can apply her for [employer] patient assistance, she confirms that they qualify based off of income.    Today she brings in the Ozempic and we reviewed how to administer, mechanisms of action, side effects, storage.  She was able to demonstrate how to inject using a demo device, then she administered her first injection into her abdomen today while I observed.  We will follow-up in a few weeks on MyChart and if tolerated, we will start the assistance program.  We will continue the 0.25 mg dose at this time since she has a history of intolerances.    Plan:  1. Start Ozempic 0.25 mg weekly today     Regla Shi, Pharm.D., BCACP   Medication Therapy Management Pharmacist   [facility]

## 2022-03-22 ENCOUNTER — OFFICE VISIT (OUTPATIENT)
Dept: PHARMACY | Facility: CLINIC | Age: 77
End: 2022-03-22
Payer: COMMERCIAL

## 2022-03-22 DIAGNOSIS — E11.42 TYPE 2 DIABETES MELLITUS WITH DIABETIC POLYNEUROPATHY, WITHOUT LONG-TERM CURRENT USE OF INSULIN (H): Primary | ICD-10-CM

## 2022-03-22 PROCEDURE — 99207 PR NO CHARGE LOS: CPT | Performed by: PHARMACIST

## 2022-03-30 ENCOUNTER — MYC MEDICAL ADVICE (OUTPATIENT)
Dept: PHARMACY | Facility: CLINIC | Age: 77
End: 2022-03-30
Payer: COMMERCIAL

## 2022-04-11 ENCOUNTER — ANCILLARY PROCEDURE (OUTPATIENT)
Dept: MAMMOGRAPHY | Facility: CLINIC | Age: 77
End: 2022-04-11
Attending: FAMILY MEDICINE
Payer: COMMERCIAL

## 2022-04-11 DIAGNOSIS — Z12.31 VISIT FOR SCREENING MAMMOGRAM: ICD-10-CM

## 2022-04-11 PROCEDURE — 77067 SCR MAMMO BI INCL CAD: CPT

## 2022-05-02 ENCOUNTER — OFFICE VISIT (OUTPATIENT)
Dept: FAMILY MEDICINE | Facility: CLINIC | Age: 77
End: 2022-05-02
Payer: COMMERCIAL

## 2022-05-02 VITALS
WEIGHT: 218.2 LBS | HEART RATE: 64 BPM | TEMPERATURE: 98.2 F | DIASTOLIC BLOOD PRESSURE: 62 MMHG | HEIGHT: 64 IN | BODY MASS INDEX: 37.25 KG/M2 | SYSTOLIC BLOOD PRESSURE: 108 MMHG | RESPIRATION RATE: 12 BRPM

## 2022-05-02 DIAGNOSIS — M54.42 CHRONIC BILATERAL LOW BACK PAIN WITH BILATERAL SCIATICA: ICD-10-CM

## 2022-05-02 DIAGNOSIS — G89.29 CHRONIC BILATERAL LOW BACK PAIN WITH BILATERAL SCIATICA: ICD-10-CM

## 2022-05-02 DIAGNOSIS — M54.41 CHRONIC BILATERAL LOW BACK PAIN WITH BILATERAL SCIATICA: ICD-10-CM

## 2022-05-02 DIAGNOSIS — E11.42 TYPE 2 DIABETES MELLITUS WITH DIABETIC POLYNEUROPATHY, WITHOUT LONG-TERM CURRENT USE OF INSULIN (H): Primary | ICD-10-CM

## 2022-05-02 LAB
CREAT UR-MCNC: 100 MG/DL
HBA1C MFR BLD: 6.5 % (ref 0–5.6)
HOLD SPECIMEN: NORMAL
MICROALBUMIN UR-MCNC: 2.94 MG/DL (ref 0–1.99)
MICROALBUMIN/CREAT UR: 29.4 MG/G CR

## 2022-05-02 PROCEDURE — 82043 UR ALBUMIN QUANTITATIVE: CPT | Performed by: FAMILY MEDICINE

## 2022-05-02 PROCEDURE — 99213 OFFICE O/P EST LOW 20 MIN: CPT | Performed by: FAMILY MEDICINE

## 2022-05-02 PROCEDURE — 83036 HEMOGLOBIN GLYCOSYLATED A1C: CPT | Performed by: FAMILY MEDICINE

## 2022-05-02 PROCEDURE — 36415 COLL VENOUS BLD VENIPUNCTURE: CPT | Performed by: FAMILY MEDICINE

## 2022-05-02 RX ORDER — CYCLOBENZAPRINE HCL 10 MG
10 TABLET ORAL DAILY PRN
Qty: 30 TABLET | Refills: 11 | Status: SHIPPED | OUTPATIENT
Start: 2022-05-02 | End: 2023-06-13

## 2022-05-02 NOTE — ASSESSMENT & PLAN NOTE
Excellent improvement with the addition of Ozempic as well as change in diet and increase in exercise.  A1c today is 6.5.  Continue current treatment modalities.   Birth Control Pills Pregnancy And Lactation Text: This medication should be avoided if pregnant and for the first 30 days post-partum.

## 2022-05-02 NOTE — PROGRESS NOTES
Problem List Items Addressed This Visit        Nervous and Auditory    Type 2 diabetes mellitus with diabetic polyneuropathy, without long-term current use of insulin (H) - Primary     Excellent improvement with the addition of Ozempic as well as change in diet and increase in exercise.  A1c today is 6.5.  Continue current treatment modalities.           Relevant Medications    cyclobenzaprine (FLEXERIL) 10 MG tablet    Other Relevant Orders    Hemoglobin A1c (Completed)    Albumin Random Urine Quantitative with Creat Ratio    Chronic bilateral low back pain with bilateral sciatica     Doing very well with home exercises.  Also has noticed considerable improvement with her recent weight loss.  Continue current efforts.           Relevant Medications    cyclobenzaprine (FLEXERIL) 10 MG tablet        Return in about 6 months (around 11/2/2022) for Routine preventive, Diabetes.    Subjective   Karol is a 77 year old who presents for the following health issues   Up on diabetes.  Has been watching her diet more closely and exercising more.  Also with the addition of Ozempic she has noticed her home Accu-Cheks have improved significantly. Denies any chest pain, shortness of breath, dyspnea exertion, palpitations, nausea or vomiting.  Denies any changes in vision or hearing, or urinary or bowel habits.      History of Present Illness       Reason for visit:  A1c check    She eats 2-3 servings of fruits and vegetables daily.She consumes 0 sweetened beverage(s) daily.She exercises with enough effort to increase her heart rate 10 to 19 minutes per day.  She exercises with enough effort to increase her heart rate 3 or less days per week.   She is taking medications regularly.       Review of Systems   All other systems reviewed and are negative.           Objective    /62 (BP Location: Left arm, Patient Position: Sitting, Cuff Size: Adult Large)   Pulse 64   Temp 98.2  F (36.8  C) (Oral)   Resp 12   Ht 1.626 m  "(5' 4\")   Wt 99 kg (218 lb 3.2 oz)   LMP  (LMP Unknown)   Breastfeeding No   BMI 37.45 kg/m    Body mass index is 37.45 kg/m .  Physical Exam  Vitals and nursing note reviewed.   Constitutional:       General: She is not in acute distress.     Appearance: Normal appearance. She is not ill-appearing.   HENT:      Head: Normocephalic and atraumatic.   Eyes:      Extraocular Movements: Extraocular movements intact.      Conjunctiva/sclera: Conjunctivae normal.   Pulmonary:      Effort: Pulmonary effort is normal.   Musculoskeletal:      Right lower leg: No edema.      Left lower leg: No edema.   Neurological:      Mental Status: She is alert and oriented to person, place, and time.   Psychiatric:         Attention and Perception: Attention normal.         Mood and Affect: Mood normal.         Speech: Speech normal.         Thought Content: Thought content normal.        Hemoglobin A1C   Date Value Ref Range Status   05/02/2022 6.5 (H) 0.0 - 5.6 % Final     Comment:     Normal <5.7%   Prediabetes 5.7-6.4%    Diabetes 6.5% or higher     Note: Adopted from ADA consensus guidelines.   01/12/2022 7.6 (H) 0.0 - 5.6 % Final     Comment:     Normal <5.7%   Prediabetes 5.7-6.4%    Diabetes 6.5% or higher     Note: Adopted from ADA consensus guidelines.   10/01/2021 7.0 (H) 0.0 - 5.6 % Final     Comment:     Normal <5.7%   Prediabetes 5.7-6.4%    Diabetes 6.5% or higher     Note: Adopted from ADA consensus guidelines.                 "

## 2022-05-02 NOTE — ASSESSMENT & PLAN NOTE
Doing very well with home exercises.  Also has noticed considerable improvement with her recent weight loss.  Continue current efforts.

## 2022-05-19 ENCOUNTER — VIRTUAL VISIT (OUTPATIENT)
Dept: FAMILY MEDICINE | Facility: CLINIC | Age: 77
End: 2022-05-19
Payer: COMMERCIAL

## 2022-05-19 ENCOUNTER — NURSE TRIAGE (OUTPATIENT)
Dept: NURSING | Facility: CLINIC | Age: 77
End: 2022-05-19

## 2022-05-19 DIAGNOSIS — U07.1 COVID-19 VIRUS INFECTION: Primary | ICD-10-CM

## 2022-05-19 PROCEDURE — 99443 PR PHYSICIAN TELEPHONE EVALUATION 21-30 MIN: CPT | Mod: 95 | Performed by: PHYSICIAN ASSISTANT

## 2022-05-19 RX ORDER — GUAIFENESIN/DEXTROMETHORPHAN 100-10MG/5
10 SYRUP ORAL EVERY 4 HOURS PRN
Qty: 118 ML | Refills: 0 | Status: SHIPPED | OUTPATIENT
Start: 2022-05-19 | End: 2022-08-19

## 2022-05-19 NOTE — PROGRESS NOTES
Karol is a 77 year old who is being evaluated via a billable telephone visit.      What phone number would you like to be contacted at? [phone]  How would you like to obtain your AVS? [facility]    Assessment & Plan   Problem List Items Addressed This Visit    None     Visit Diagnoses     COVID-19 virus infection    -  Primary    Relevant Medications    nirmatrelvir and ritonavir (PAXLOVID) therapy pack    guaiFENesin-dextromethorphan (ROBITUSSIN DM) 100-10 MG/5ML syrup         Impression is COVID-19. Positive home test. Sounds well and non-toxic and I have low suspicion for impending airway obstruction or respiratory distress.  She will push p.o. fluids, use over-the-counter meds for symptoms, complete a course of Paxlovid after discussing risks/benefits, and follow-up with us in 2 weeks if not improving or urgent care/the ER if symptoms worsen/change at any time. Will hold simvastatin for 8 days.     DDx and Dx discussed with and explained to the pt to their satisfaction.  All questions were answered at this time. Pt expressed understanding of and agreement with this dx, tx, and plan. No further workup warranted and standard medication warnings given. I have given the patient a list of pertinent indications for re-evaluation. Will go to the Emergency Department if symptoms worsen or new concerning symptoms arise. Patient left the call in no apparent distress.     26 minutes spent on the date of the encounter doing chart review, history and exam, documentation and further activities per the note     See Patient Instructions  COVID-19 positive patient.  Encounter for consideration of medication intervention. Patient does qualify for a prescription. Full discussion with patient including medication options, risks and benefits. Potential drug interactions reviewed with patient.     Treatment Planned Paxlovid, Rx sent to her [facility] pharmacy in [place]. I called t confirm it is in stock.  Temporary change to  "home medications: Hold simvastatin for 8 days.    Estimated body mass index is 37.45 kg/m  as calculated from the following:    Height as of 5/2/22: 1.626 m (5' 4\").    Weight as of 5/2/22: 99 kg (218 lb 3.2 oz).  GFR Estimate   Date Value Ref Range Status   01/12/2022 83 >60 mL/min/1.73m2 Final     Comment:     Effective December 21, 2021 eGFRcr in adults is calculated using the 2021 CKD-EPI creatinine equation which includes age and gender (Aliza et al., NEJ, DOI: 10.1056/QOCJsf6181377)   06/30/2021 >60 >60 mL/min/1.73m2 Final     Lab Results   Component Value Date    [id number] Positive (A) 10/27/2021       Return in about 2 weeks (around 6/2/2022) for a recheck of your symptoms if not improving, or call 911/go to an ER anytime if worsening.    BRIDGETT Dickerson  [facility] MICHAELA Roberson is a 77 year old who presents for the following health issues     HPI       COVID-19 Symptom Review  How many days ago did these symptoms start? Yesterday AM    Are any of the following symptoms significant for you?    New or worsening difficulty breathing? No    Worsening cough? Yes, I am coughing up mucus.    Fever or chills? Yes, I felt feverish or had chills.    Headache: YES    Sore throat: YES- yesterday    Chest pain: YES    Diarrhea: no    Body aches? YES    Unvaccinated. Had COVID  Last fall and received IV MAb  What treatments has patient tried? Advil, OTC allergy pills   Does patient live in a nursing home, group home, or shelter? no  Does patient have a way to get food/medications during quarantined? Yes, I have a friend or family member who can help me.    Review of Systems   Constitutional, HEENT, cardiovascular, pulmonary, gi and gu systems are negative, except as otherwise noted.      Objective           Vitals:  No vitals were obtained today due to virtual visit.    Physical Exam   healthy, alert and no distress  PSYCH: Alert and oriented times 3; coherent speech, normal   rate " and volume, able to articulate logical thoughts, able   to abstract reason, no tangential thoughts, no hallucinations   or delusions  Her affect is normal and pleasant  RESP: No cough, no audible wheezing, able to talk in full sentences  Remainder of exam unable to be completed due to telephone visits     Phone call duration: 26 minutes

## 2022-05-19 NOTE — TELEPHONE ENCOUNTER
covid positive this morning.  Symptoms of fever and cough started in the night. Asking for antiviral.    Asking what about husbands OT at another facility.  She needs to contact them about his exposure.    Transferred her to covid treatment scheduling line per her request.    Janell Cyr RN  [facility] Nurse Advisor

## 2022-05-19 NOTE — PATIENT INSTRUCTIONS
Marco A Roberson-  Thank you for allowing [facility] to manage your care.     If you develop worsening/changing symptoms at any time such as chest pain, shortness of breath, yellow/itchy skin, color changes to urine/stool, etc., please call 911 or go to the emergency department for evaluation.     I sent your prescriptions to your pharmacy.     DISCONTINUE YOUR SIMVASTATIN FOR 8 DAYS WHILE ON PAXLOVID.     Drink 8-10 glasses of fluid daily to stay well-hydrated.     For your pain, please use Ibuprofen 400mg four times daily with food. Between ibuprofen doses, you may use Tylenol 650mg.      Max acetaminophen (Tylenol) 3,000mg/24 hours  Max ibuprofen 2,400mg/24 hours     For cough, please use the cough syrup as prescribed. Do not use this medication while driving, operating machinery, with other sedating medications, or while drinking alcohol as it will make you drowsy.    Drink 8-10 glasses of fluid daily to stay well-hydrated.     If you have any questions or concerns, please feel free to call us at [phone]     Sincerely,     Diego Villarreal PA-C     Did you know?       You can schedule a video visit for follow-up appointments as well as future appointments for certain conditions.  Please see the below link.      [email or web address]     If you have not already done so,  I encourage you to sign up for [facility] ([email or web address]).  This will allow you to review your results, securely communicate with a provider, and schedule virtual visits as well.

## 2022-08-13 DIAGNOSIS — E11.65 TYPE 2 DIABETES MELLITUS WITH HYPERGLYCEMIA (H): ICD-10-CM

## 2022-08-14 NOTE — TELEPHONE ENCOUNTER
"Last Written Prescription Date:  2/17/22  Last Fill Quantity: 180,  # refills: 1   Last office visit provider:  5/2/22     Requested Prescriptions   Pending Prescriptions Disp Refills     metFORMIN (GLUCOPHAGE) 1000 MG tablet [Pharmacy Med Name: METFORMIN HCL 1,000 MG TABLET] 180 tablet 1     Sig: TAKE 1 TABLET BY MOUTH TWICE A DAY WITH MEALS       Biguanide Agents Passed - 8/13/2022 12:20 AM        Passed - Patient is age 10 or older        Passed - Patient has documented A1c within the specified period of time.     If HgbA1C is 8 or greater, it needs to be on file within the past 3 months.  If less than 8, must be on file within the past 6 months.     Recent Labs   Lab Test 05/02/22  0915   A1C 6.5*             Passed - Patient's CR is NOT>1.4 OR Patient's EGFR is NOT<45 within past 12 mos.     Recent Labs   Lab Test 01/12/22  1128 06/30/21  1310   GFRESTIMATED 83 >60   GFRESTBLACK  --  >60       Recent Labs   Lab Test 01/12/22  1128   CR 0.75             Passed - Patient does NOT have a diagnosis of CHF.        Passed - Medication is active on med list        Passed - Patient is not pregnant        Passed - Patient has not had a positive pregnancy test within the past 12 mos.         Passed - Recent (6 mo) or future (30 days) visit within the authorizing provider's specialty     Patient had office visit in the last 6 months or has a visit in the next 30 days with authorizing provider or within the authorizing provider's specialty.  See \"Patient Info\" tab in inbasket, or \"Choose Columns\" in Meds & Orders section of the refill encounter.                 Nikia Zamudio RN 08/13/22 10:23 PM  "

## 2022-08-16 ENCOUNTER — OFFICE VISIT (OUTPATIENT)
Dept: FAMILY MEDICINE | Facility: CLINIC | Age: 77
End: 2022-08-16
Payer: COMMERCIAL

## 2022-08-16 VITALS
TEMPERATURE: 98.2 F | OXYGEN SATURATION: 97 % | HEART RATE: 91 BPM | DIASTOLIC BLOOD PRESSURE: 76 MMHG | SYSTOLIC BLOOD PRESSURE: 156 MMHG

## 2022-08-16 DIAGNOSIS — N30.00 ACUTE CYSTITIS WITHOUT HEMATURIA: Primary | ICD-10-CM

## 2022-08-16 DIAGNOSIS — R30.0 DYSURIA: ICD-10-CM

## 2022-08-16 LAB
ALBUMIN UR-MCNC: NEGATIVE MG/DL
APPEARANCE UR: ABNORMAL
BACTERIA #/AREA URNS HPF: ABNORMAL /HPF
BILIRUB UR QL STRIP: NEGATIVE
COLOR UR AUTO: YELLOW
GLUCOSE UR STRIP-MCNC: NEGATIVE MG/DL
HGB UR QL STRIP: ABNORMAL
KETONES UR STRIP-MCNC: NEGATIVE MG/DL
LEUKOCYTE ESTERASE UR QL STRIP: ABNORMAL
NITRATE UR QL: POSITIVE
PH UR STRIP: 5.5 [PH] (ref 5–8)
RBC #/AREA URNS AUTO: ABNORMAL /HPF
SP GR UR STRIP: 1.02 (ref 1–1.03)
SQUAMOUS #/AREA URNS AUTO: ABNORMAL /LPF
UROBILINOGEN UR STRIP-ACNC: 0.2 E.U./DL
WBC #/AREA URNS AUTO: ABNORMAL /HPF
WBC CLUMPS #/AREA URNS HPF: PRESENT /HPF

## 2022-08-16 PROCEDURE — 81001 URINALYSIS AUTO W/SCOPE: CPT | Performed by: PHYSICIAN ASSISTANT

## 2022-08-16 PROCEDURE — 87086 URINE CULTURE/COLONY COUNT: CPT | Performed by: PHYSICIAN ASSISTANT

## 2022-08-16 PROCEDURE — 99214 OFFICE O/P EST MOD 30 MIN: CPT | Performed by: PHYSICIAN ASSISTANT

## 2022-08-16 PROCEDURE — 87186 SC STD MICRODIL/AGAR DIL: CPT | Performed by: PHYSICIAN ASSISTANT

## 2022-08-16 RX ORDER — SULFAMETHOXAZOLE/TRIMETHOPRIM 800-160 MG
1 TABLET ORAL 2 TIMES DAILY
Qty: 14 TABLET | Refills: 0 | Status: SHIPPED | OUTPATIENT
Start: 2022-08-16 | End: 2022-08-23

## 2022-08-16 ASSESSMENT — ENCOUNTER SYMPTOMS
FLANK PAIN: 0
HEMATURIA: 0
DYSURIA: 1
ABDOMINAL PAIN: 0
NAUSEA: 0
CHILLS: 0
VOMITING: 0
FREQUENCY: 1
FEVER: 0

## 2022-08-16 NOTE — PROGRESS NOTES
Patient presents with:  Urinary Problem: Burning, frequency       Clinical Decision Making: Patient experiencing irritative voiding symptoms.  UA is indicative of UTI.  No findings concerning for pyelonephritis or sepsis.  Patient started on Bactrim which she has previously tolerated.  Urine culture is in process.      ICD-10-CM    1. Acute cystitis without hematuria  N30.00 sulfamethoxazole-trimethoprim (BACTRIM DS) 800-160 MG tablet   2. Dysuria  R30.0 UA macro with reflex to Microscopic and Culture - Clinc Collect     Urine Microscopic Exam     Urine Culture       Patient Instructions   1. Increase fluid intake  2. Complete antibiotic regimen as prescribed. You will be notified if the treatment plan needs to be changed based on the urine culture results.   3. Follow if you have a fever of 100.4 F (38 C) or higher, no improvement after three days of treatment, trouble urinating because of pain,new or increased discharge from the vagina, rash or joint pain, Increased back or abdominal pain.        HPI:  Karol Mujica is a 77 year old female who presents today complaining of dysuria and urinary frequency that started yesterday.  In the past she has had UTIs that were treated with Bactrim.    History obtained from the patient.    Problem List:  2022-02: Osteopenia of multiple sites  2022-01: Peripheral neuropathy  2021-11: Severe obesity (BMI 35.0-39.9) with comorbidity (H)  2021-08: Type 2 diabetes mellitus with diabetic polyneuropathy,   without long-term current use of insulin (H)  2021-01: Chronic bilateral low back pain with bilateral sciatica  2018-02: History of malignant neoplasm of colon  2016-02: Vitamin D deficiency  2016-01: Benign essential hypertension  2016-01: Osteoporosis  2014-08: S/P total hip arthroplasty  2014-07: HTN (hypertension)  2014-07: Diabetes mellitus, type 2 (H)  2014-07: Anxiety  2014-07: Colon cancer, with resection 2/10/14  2014-07: Aftercare following joint replacement, left  femur fracture  2014-07: Intertrochanteric fracture of left femur (H)      Past Medical History:   Diagnosis Date     Anemia      Anxiety 7/22/2014     Arthritis      Cataract      Cholelithiasis      Colon cancer (H) 2014     Colon cancer, with resection 2/10/14 7/22/2014     Diabetes mellitus (H)      Diverticular disease      GI bleed 2/15/2014     History of anesthesia complications     slow to wake up     History of transfusion     has antibodies     HTN (hypertension) 7/22/2014     Hyperlipidemia      Hypertension      Osteoporosis      Peripheral neuropathy     feet     Red blood cell antibody positive      h/o blood transfusion 2015. known red cell antibodies to C and K per preop H&P     Type II or unspecified type diabetes mellitus without mention of complication, not stated as uncontrolled 7/22/2014       Social History     Tobacco Use     Smoking status: Never Smoker     Smokeless tobacco: Never Used   Substance Use Topics     Alcohol use: No       Review of Systems   Constitutional: Negative for chills and fever.   Gastrointestinal: Negative for abdominal pain, nausea and vomiting.   Genitourinary: Positive for dysuria and frequency. Negative for flank pain, hematuria, vaginal discharge and vaginal pain.       Vitals:    08/16/22 1041   BP: (!) 156/76   BP Location: Right arm   Patient Position: Chair   Cuff Size: Adult Large   Pulse: 91   Temp: 98.2  F (36.8  C)   TempSrc: Tympanic   SpO2: 97%       Physical Exam  Vitals and nursing note reviewed.   Constitutional:       General: She is not in acute distress.     Appearance: She is not ill-appearing.   HENT:      Head: Normocephalic and atraumatic.      Right Ear: External ear normal.      Left Ear: External ear normal.   Eyes:      Conjunctiva/sclera: Conjunctivae normal.   Abdominal:      General: Abdomen is flat. There is no distension.      Tenderness: There is no abdominal tenderness. There is no right CVA tenderness, left CVA tenderness or  guarding.   Neurological:      Mental Status: She is alert.   Psychiatric:         Mood and Affect: Mood normal.         Behavior: Behavior normal.         Thought Content: Thought content normal.         Judgment: Judgment normal.         Results:  Results for orders placed or performed in visit on 08/16/22   UA macro with reflex to Microscopic and Culture - Clinc Collect     Status: Abnormal    Specimen: Urine, Midstream   Result Value Ref Range    Color Urine Yellow Colorless, Straw, Light Yellow, Yellow    Appearance Urine Slightly Cloudy (A) Clear    Glucose Urine Negative Negative mg/dL    Bilirubin Urine Negative Negative    Ketones Urine Negative Negative mg/dL    Specific Gravity Urine 1.020 1.005 - 1.030    Blood Urine Trace (A) Negative    pH Urine 5.5 5.0 - 8.0    Protein Albumin Urine Negative Negative mg/dL    Urobilinogen Urine 0.2 0.2, 1.0 E.U./dL    Nitrite Urine Positive (A) Negative    Leukocyte Esterase Urine Moderate (A) Negative   Urine Microscopic Exam     Status: Abnormal   Result Value Ref Range    Bacteria Urine Many (A) None Seen /HPF    RBC Urine 0-2 0-2 /HPF /HPF    WBC Urine  (A) 0-5 /HPF /HPF    Squamous Epithelials Urine Few (A) None Seen /LPF    WBC Clumps Urine Present (A) None Seen /HPF       At the end of the encounter, I discussed results, diagnosis, medications. Discussed red flags for immediate return to clinic/ER, as well as indications for follow up if no improvement. Patient understood and agreed to plan. Patient was stable for discharge.

## 2022-08-18 LAB — BACTERIA UR CULT: ABNORMAL

## 2022-08-19 ENCOUNTER — OFFICE VISIT (OUTPATIENT)
Dept: FAMILY MEDICINE | Facility: CLINIC | Age: 77
End: 2022-08-19
Payer: COMMERCIAL

## 2022-08-19 VITALS
BODY MASS INDEX: 36.77 KG/M2 | WEIGHT: 215.4 LBS | SYSTOLIC BLOOD PRESSURE: 110 MMHG | HEIGHT: 64 IN | RESPIRATION RATE: 12 BRPM | TEMPERATURE: 98.1 F | HEART RATE: 60 BPM | DIASTOLIC BLOOD PRESSURE: 56 MMHG

## 2022-08-19 DIAGNOSIS — E11.42 TYPE 2 DIABETES MELLITUS WITH DIABETIC POLYNEUROPATHY, WITHOUT LONG-TERM CURRENT USE OF INSULIN (H): ICD-10-CM

## 2022-08-19 DIAGNOSIS — C18.9 MALIGNANT NEOPLASM OF COLON, UNSPECIFIED PART OF COLON (H): Primary | ICD-10-CM

## 2022-08-19 LAB
CEA SERPL-MCNC: 1.7 NG/ML
HBA1C MFR BLD: 6.2 % (ref 0–5.6)

## 2022-08-19 PROCEDURE — 83036 HEMOGLOBIN GLYCOSYLATED A1C: CPT | Performed by: FAMILY MEDICINE

## 2022-08-19 PROCEDURE — 99213 OFFICE O/P EST LOW 20 MIN: CPT | Performed by: FAMILY MEDICINE

## 2022-08-19 PROCEDURE — 36415 COLL VENOUS BLD VENIPUNCTURE: CPT | Performed by: FAMILY MEDICINE

## 2022-08-19 PROCEDURE — 82378 CARCINOEMBRYONIC ANTIGEN: CPT | Performed by: FAMILY MEDICINE

## 2022-08-19 NOTE — PROGRESS NOTES
Problem List Items Addressed This Visit        Medium    Colon cancer, with resection 2/10/14 - Primary     As per current treatment plan, will continue to check CEA every 6 months.  Ordered and drawn today.  Asymptomatic.           Relevant Orders    CEA    Type 2 diabetes mellitus with diabetic polyneuropathy, without long-term current use of insulin (H)     Excellent control with the use of metformin and Ozempic.  Of note she currently gets Ozempic through  program and not through her pharmacy.  She does have approval for 1 more year.  May need to look at formulary changes at the end of this year to be able to continue with therapy through insurance if the  program is no longer available           Relevant Orders    Hemoglobin A1c (Completed)    Adult Eye  Referral             Subjective   Karol is a 77 year old who presents for the following health issues  accompanied by her spouse  Chief Complaint   Patient presents with     Follow Up     Diabetes; CEA Labs      Follow-up on diabetes colon cancer.  Overall doing well.  Denies any chest pain, shortness of breath, dyspnea exertion, palpitations, nausea or vomiting.  Blood sugars have been staying well controlled.  She is tolerating her Ozempic shots very well.  Occasionally gets diarrhea with metformin but if she takes it carefully she is able to avoid.  She is coming up due for her eye exam and does have that scheduled.    History of Present Illness       Diabetes:   She presents for follow up of diabetes.  She is checking home blood glucose one time daily. She checks blood glucose before meals.  Blood glucose is never over 200 and never under 70. She is aware of hypoglycemia symptoms including shakiness. She has no concerns regarding her diabetes at this time.  She is having numbness in feet and burning in feet. The patient has had a diabetic eye exam in the last 12 months. Eye exam performed on 8/23/21. Location of last  "eye exam [facility], [place], [place].        She eats 2-3 servings of fruits and vegetables daily.She consumes 0 sweetened beverage(s) daily.She exercises with enough effort to increase her heart rate 10 to 19 minutes per day.  She exercises with enough effort to increase her heart rate 3 or less days per week.   She is taking medications regularly.       Review of Systems   All other systems reviewed and are negative.           Objective    /56 (BP Location: Left arm, Patient Position: Sitting, Cuff Size: Adult Large)   Pulse 60   Temp 98.1  F (36.7  C) (Oral)   Resp 12   Ht 1.626 m (5' 4\")   Wt 97.7 kg (215 lb 6.4 oz)   LMP  (LMP Unknown)   Breastfeeding No   BMI 36.97 kg/m    Body mass index is 36.97 kg/m .  Physical Exam  Vitals and nursing note reviewed.   Constitutional:       General: She is not in acute distress.     Appearance: Normal appearance. She is not ill-appearing.   HENT:      Head: Normocephalic and atraumatic.   Eyes:      Extraocular Movements: Extraocular movements intact.      Conjunctiva/sclera: Conjunctivae normal.   Pulmonary:      Effort: Pulmonary effort is normal.   Neurological:      Mental Status: She is alert and oriented to person, place, and time.   Psychiatric:         Attention and Perception: Attention normal.         Mood and Affect: Mood normal.         Speech: Speech normal.         Thought Content: Thought content normal.              Hemoglobin A1C   Date Value Ref Range Status   08/19/2022 6.2 (H) 0.0 - 5.6 % Final     Comment:     Normal <5.7%   Prediabetes 5.7-6.4%    Diabetes 6.5% or higher     Note: Adopted from ADA consensus guidelines.      This note has been dictated using voice recognition software. Any grammatical or context distortions are unintentional and inherent to the software      "

## 2022-08-19 NOTE — ASSESSMENT & PLAN NOTE
As per current treatment plan, will continue to check CEA every 6 months.  Ordered and drawn today.  Asymptomatic.

## 2022-08-19 NOTE — ASSESSMENT & PLAN NOTE
Excellent control with the use of metformin and Ozempic.  Of note she currently gets Ozempic through  program and not through her pharmacy.  She does have approval for 1 more year.  May need to look at formulary changes at the end of this year to be able to continue with therapy through insurance if the  program is no longer available

## 2022-08-24 ENCOUNTER — DOCUMENTATION ONLY (OUTPATIENT)
Dept: OTHER | Facility: CLINIC | Age: 77
End: 2022-08-24

## 2022-08-26 ENCOUNTER — TRANSFERRED RECORDS (OUTPATIENT)
Dept: HEALTH INFORMATION MANAGEMENT | Facility: CLINIC | Age: 77
End: 2022-08-26

## 2022-08-26 LAB — RETINOPATHY: NEGATIVE

## 2022-10-10 ENCOUNTER — HEALTH MAINTENANCE LETTER (OUTPATIENT)
Age: 77
End: 2022-10-10

## 2022-11-11 DIAGNOSIS — E11.65 TYPE 2 DIABETES MELLITUS WITH HYPERGLYCEMIA (H): ICD-10-CM

## 2022-11-11 DIAGNOSIS — E11.42 TYPE 2 DIABETES MELLITUS WITH DIABETIC POLYNEUROPATHY, WITHOUT LONG-TERM CURRENT USE OF INSULIN (H): ICD-10-CM

## 2022-11-12 RX ORDER — BLOOD SUGAR DIAGNOSTIC
STRIP MISCELLANEOUS
Qty: 200 STRIP | Refills: 0 | Status: SHIPPED | OUTPATIENT
Start: 2022-11-12 | End: 2023-02-20

## 2022-11-13 NOTE — TELEPHONE ENCOUNTER
"Last Written Prescription Date:  8/13/22  Last Fill Quantity: 180,  # refills: 0   Last office visit provider:  8/19/22     Requested Prescriptions   Pending Prescriptions Disp Refills     metFORMIN (GLUCOPHAGE) 1000 MG tablet [Pharmacy Med Name: METFORMIN HCL 1,000 MG TABLET] 180 tablet 0     Sig: TAKE 1 TABLET BY MOUTH TWICE A DAY WITH MEALS       Biguanide Agents Passed - 11/11/2022 10:09 AM        Passed - Patient is age 10 or older        Passed - Patient has documented A1c within the specified period of time.     If HgbA1C is 8 or greater, it needs to be on file within the past 3 months.  If less than 8, must be on file within the past 6 months.     Recent Labs   Lab Test 08/19/22  0918   A1C 6.2*             Passed - Patient's CR is NOT>1.4 OR Patient's EGFR is NOT<45 within past 12 mos.     Recent Labs   Lab Test 01/12/22  1128 06/30/21  1310   GFRESTIMATED 83 >60   GFRESTBLACK  --  >60       Recent Labs   Lab Test 01/12/22  1128   CR 0.75             Passed - Patient does NOT have a diagnosis of CHF.        Passed - Medication is active on med list        Passed - Patient is not pregnant        Passed - Patient has not had a positive pregnancy test within the past 12 mos.         Passed - Recent (6 mo) or future (30 days) visit within the authorizing provider's specialty     Patient had office visit in the last 6 months or has a visit in the next 30 days with authorizing provider or within the authorizing provider's specialty.  See \"Patient Info\" tab in inbasket, or \"Choose Columns\" in Meds & Orders section of the refill encounter.               ONETOUCH VERIO IQ test strip [Pharmacy Med Name: ONE TOUCH VERIO TEST STRIP] 200 strip 3     Sig: USE 1 EACH AS DIRECTED 2 (TWO) TIMES A DAY.       Diabetic Supplies Protocol Passed - 11/11/2022 10:09 AM        Passed - Medication is active on med list        Passed - Patient is 18 years of age or older        Passed - Recent (6 mo) or future (30 days) visit within " "the authorizing provider's specialty     Patient had office visit in the last 6 months or has a visit in the next 30 days with authorizing provider.  See \"Patient Info\" tab in inbasket, or \"Choose Columns\" in Meds & Orders section of the refill encounter.                 [place], Nikia, RN 11/12/22 6:40 PM  "

## 2022-11-21 ENCOUNTER — OFFICE VISIT (OUTPATIENT)
Dept: FAMILY MEDICINE | Facility: CLINIC | Age: 77
End: 2022-11-21
Payer: COMMERCIAL

## 2022-11-21 VITALS
DIASTOLIC BLOOD PRESSURE: 76 MMHG | OXYGEN SATURATION: 98 % | HEIGHT: 64 IN | TEMPERATURE: 98.3 F | SYSTOLIC BLOOD PRESSURE: 118 MMHG | BODY MASS INDEX: 36.76 KG/M2 | WEIGHT: 215.3 LBS | HEART RATE: 72 BPM | RESPIRATION RATE: 16 BRPM

## 2022-11-21 DIAGNOSIS — E11.42 TYPE 2 DIABETES MELLITUS WITH DIABETIC POLYNEUROPATHY, WITHOUT LONG-TERM CURRENT USE OF INSULIN (H): ICD-10-CM

## 2022-11-21 PROCEDURE — 99212 OFFICE O/P EST SF 10 MIN: CPT | Performed by: FAMILY MEDICINE

## 2022-11-21 NOTE — PROGRESS NOTES
Problem List Items Addressed This Visit        Medium    Type 2 diabetes mellitus with diabetic polyneuropathy, without long-term current use of insulin (H)     Doing very well by home numbers.  Of note, patient actually not due for an appointment for another 3 months.  Happens to be in office with her  today.  Continue current therapy.  Discussed elevating walking during the winter season.               Subjective   Karol is a 77 year old who presents for the following health issues  accompanied by her spouse  Chief Complaint   Patient presents with     Follow Up     Diabetes      Overall doing well.  Here today with her  who has a preop appointment.  Home blood sugars have been holding steady.  Weight is holding steady.  Having no difficulty remembering her dosing and not missing any doses.    History of Present Illness       Diabetes:   She presents for follow up of diabetes.  She is checking home blood glucose one time daily. She checks blood glucose before meals.  Blood glucose is never over 200 and never under 70. She is aware of hypoglycemia symptoms including dizziness. She has no concerns regarding her diabetes at this time.  She is having numbness in feet and burning in feet. The patient has had a diabetic eye exam in the last 12 months. Eye exam performed on 1/17/22. Location of last eye exam [facility].        Reason for visit:  A1c check    She eats 2-3 servings of fruits and vegetables daily.She consumes 0 sweetened beverage(s) daily.She exercises with enough effort to increase her heart rate 10 to 19 minutes per day.  She exercises with enough effort to increase her heart rate 4 days per week.   She is taking medications regularly.       Review of Systems   All other systems reviewed and are negative.        Objective    /76 (BP Location: Left arm, Patient Position: Sitting, Cuff Size: Adult Large)   Pulse 72   Temp 98.3  F (36.8  C) (Oral)   Resp 16   Ht 1.626 m (5'  "4\")   Wt 97.7 kg (215 lb 4.8 oz)   LMP  (LMP Unknown)   SpO2 98%   Breastfeeding No   BMI 36.96 kg/m    Body mass index is 36.96 kg/m .  Physical Exam  Vitals and nursing note reviewed.   Constitutional:       General: She is not in acute distress.     Appearance: Normal appearance. She is not ill-appearing.   HENT:      Head: Normocephalic and atraumatic.   Eyes:      Extraocular Movements: Extraocular movements intact.      Conjunctiva/sclera: Conjunctivae normal.   Pulmonary:      Effort: Pulmonary effort is normal.   Neurological:      Mental Status: She is alert and oriented to person, place, and time.   Psychiatric:         Attention and Perception: Attention normal.         Mood and Affect: Mood normal.         Speech: Speech normal.         Thought Content: Thought content normal.            This note has been dictated using voice recognition software. Any grammatical or context distortions are unintentional and inherent to the software      "

## 2022-11-22 NOTE — ASSESSMENT & PLAN NOTE
Doing very well by home numbers.  Of note, patient actually not due for an appointment for another 3 months.  Happens to be in office with her  today.  Continue current therapy.  Discussed elevating walking during the winter season.

## 2022-12-13 ENCOUNTER — OFFICE VISIT (OUTPATIENT)
Dept: FAMILY MEDICINE | Facility: CLINIC | Age: 77
End: 2022-12-13
Payer: COMMERCIAL

## 2022-12-13 VITALS
BODY MASS INDEX: 37.08 KG/M2 | OXYGEN SATURATION: 97 % | HEART RATE: 88 BPM | DIASTOLIC BLOOD PRESSURE: 76 MMHG | WEIGHT: 216 LBS | TEMPERATURE: 98.8 F | SYSTOLIC BLOOD PRESSURE: 149 MMHG | RESPIRATION RATE: 24 BRPM

## 2022-12-13 DIAGNOSIS — N30.01 ACUTE CYSTITIS WITH HEMATURIA: Primary | ICD-10-CM

## 2022-12-13 LAB
ALBUMIN UR-MCNC: 30 MG/DL
APPEARANCE UR: CLEAR
BACTERIA #/AREA URNS HPF: ABNORMAL /HPF
BILIRUB UR QL STRIP: NEGATIVE
COLOR UR AUTO: YELLOW
GLUCOSE UR STRIP-MCNC: NEGATIVE MG/DL
HGB UR QL STRIP: ABNORMAL
KETONES UR STRIP-MCNC: NEGATIVE MG/DL
LEUKOCYTE ESTERASE UR QL STRIP: ABNORMAL
NITRATE UR QL: POSITIVE
PH UR STRIP: 5.5 [PH] (ref 5–8)
RBC #/AREA URNS AUTO: ABNORMAL /HPF
SP GR UR STRIP: 1.02 (ref 1–1.03)
SQUAMOUS #/AREA URNS AUTO: ABNORMAL /LPF
UROBILINOGEN UR STRIP-ACNC: 0.2 E.U./DL
WBC #/AREA URNS AUTO: >100 /HPF
WBC CLUMPS #/AREA URNS HPF: PRESENT /HPF

## 2022-12-13 PROCEDURE — 81001 URINALYSIS AUTO W/SCOPE: CPT | Performed by: PHYSICIAN ASSISTANT

## 2022-12-13 PROCEDURE — 99213 OFFICE O/P EST LOW 20 MIN: CPT | Performed by: PHYSICIAN ASSISTANT

## 2022-12-13 PROCEDURE — 87086 URINE CULTURE/COLONY COUNT: CPT | Performed by: PHYSICIAN ASSISTANT

## 2022-12-13 PROCEDURE — 87186 SC STD MICRODIL/AGAR DIL: CPT | Performed by: PHYSICIAN ASSISTANT

## 2022-12-13 RX ORDER — SULFAMETHOXAZOLE/TRIMETHOPRIM 800-160 MG
1 TABLET ORAL 2 TIMES DAILY
Qty: 14 TABLET | Refills: 0 | Status: SHIPPED | OUTPATIENT
Start: 2022-12-13 | End: 2022-12-20

## 2022-12-13 NOTE — PATIENT INSTRUCTIONS
Patient was educated on the natural course of condition.  Take medication as directed. Side effects discussed. Conservative measures include drinking fluids (water), wiping from front to back, avoiding holding urine when there is urge to urinate, urinating before and after sexual intercourse, avoiding sexual activity until infection is eliminated, and over-the-counter AZO. See your primary care provider if symptoms do not improve in 3 days. Seek emergency care if you develop severe abdominal/flank pain, or fever.

## 2022-12-13 NOTE — PROGRESS NOTES
URGENT CARE VISIT:    SUBJECTIVE:    Karol Mujica is a 77 year old female who  presents today for a possible UTI. Symptoms of dysuria, urgency and frequency have been going on for 2 day(s). Symptoms were gradual onset and moderate.  Patient denies back pain, nausea, vomiting, fever and chills or vaginal discharge. This patient does have a history of urinary tract infections.     PMH:   Past Medical History:   Diagnosis Date     Anemia      Anxiety 7/22/2014     Arthritis      Cataract      Cholelithiasis      Colon cancer (H) 2014     Colon cancer, with resection 2/10/14 7/22/2014     Diabetes mellitus (H)      Diverticular disease      GI bleed 2/15/2014     History of anesthesia complications     slow to wake up     History of transfusion     has antibodies     HTN (hypertension) 7/22/2014     Hyperlipidemia      Hypertension      Osteoporosis      Peripheral neuropathy     feet     Red blood cell antibody positive      h/o blood transfusion 2015. known red cell antibodies to C and K per preop H&P     Type II or unspecified type diabetes mellitus without mention of complication, not stated as uncontrolled 7/22/2014     Allergies: Bee venom, Blood-group specific substance, Nitrofurantoin, and Penicillins   Medications:   Current Outpatient Medications   Medication Sig Dispense Refill     acetaminophen (TYLENOL) 500 MG tablet Take 1,000 mg by mouth as needed       blood glucose (ACCU-CHEK FASTCLIX) lancing device Lancing device to be used with lancets. 1 each 0     blood glucose monitoring (ACCU-CHEK FASTCLIX) lancets Use to test blood sugar 2 times daily. Fastclix please 102 each 11     Calcium-Magnesium-Zinc 333-133-5 MG TABS per tablet Take 1 tablet by mouth 2 times daily       cholecalciferol 50 MCG (2000 UT) CAPS Take 4,000 Units by mouth daily       Cranberry 450 MG TABS Take 450 mg by mouth as needed       cyclobenzaprine (FLEXERIL) 10 MG tablet Take 1 tablet (10 mg) by mouth daily as needed 30 tablet 11      gabapentin (NEURONTIN) 300 MG capsule Take 1 capsule (300 mg) by mouth At Bedtime 90 capsule 3     losartan (COZAAR) 50 MG tablet Take 1 tablet (50 mg) by mouth daily 90 tablet 3     metFORMIN (GLUCOPHAGE) 1000 MG tablet TAKE 1 TABLET BY MOUTH TWICE A DAY WITH MEALS 180 tablet 0     Multiple Vitamins-Minerals (MULTIVITAL-M) TABS Take 1 tablet by mouth daily       ONETOUCH VERIO IQ test strip USE 1 EACH AS DIRECTED 2 (TWO) TIMES A DAY. 200 strip 0     simvastatin (ZOCOR) 40 MG tablet TAKE 1 TABLET BY MOUTH EVERYDAY AT BEDTIME 90 tablet 3     sulfamethoxazole-trimethoprim (BACTRIM DS) 800-160 MG tablet Take 1 tablet by mouth 2 times daily for 7 days 14 tablet 0     UNABLE TO FIND MEDICATION NAME: Ozempic injection       Social History:   Social History     Tobacco Use     Smoking status: Never     Smokeless tobacco: Never   Substance Use Topics     Alcohol use: No       ROS:  Review of systems negative except as stated above.    OBJECTIVE:  BP (!) 149/76 (BP Location: Right arm, Patient Position: Sitting, Cuff Size: Adult Large)   Pulse 88   Temp 98.8  F (37.1  C) (Oral)   Resp 24   Wt 98 kg (216 lb)   LMP  (LMP Unknown)   SpO2 97%   BMI 37.08 kg/m    GENERAL APPEARANCE: healthy, alert and no distress  RESP: lungs clear to auscultation - no rales, rhonchi or wheezes  CV: regular rates and rhythm, normal S1 S2, no murmur noted  ABDOMEN:  soft, nontender, no HSM or masses and bowel sounds normal  BACK: No CVA tenderness  SKIN: no suspicious lesions or rashes    Labs:    Results for orders placed or performed in visit on 12/13/22   UA macro with reflex to Microscopic and Culture - Clinc Collect     Status: Abnormal    Specimen: Urine, Clean Catch   Result Value Ref Range    Color Urine Yellow Colorless, Straw, Light Yellow, Yellow    Appearance Urine Clear Clear    Glucose Urine Negative Negative mg/dL    Bilirubin Urine Negative Negative    Ketones Urine Negative Negative mg/dL    Specific Gravity Urine 1.025  1.005 - 1.030    Blood Urine Trace (A) Negative    pH Urine 5.5 5.0 - 8.0    Protein Albumin Urine 30 (A) Negative mg/dL    Urobilinogen Urine 0.2 0.2, 1.0 E.U./dL    Nitrite Urine Positive (A) Negative    Leukocyte Esterase Urine Large (A) Negative   Urine Microscopic Exam     Status: Abnormal   Result Value Ref Range    Bacteria Urine Many (A) None Seen /HPF    RBC Urine 2-5 (A) 0-2 /HPF /HPF    WBC Urine >100 (A) 0-5 /HPF /HPF    Squamous Epithelials Urine Few (A) None Seen /LPF    WBC Clumps Urine Present (A) None Seen /HPF       ASSESSMENT:     ICD-10-CM    1. Acute cystitis with hematuria  N30.01 UA macro with reflex to Microscopic and Culture - Clinc Collect     Urine Microscopic Exam     Urine Culture     sulfamethoxazole-trimethoprim (BACTRIM DS) 800-160 MG tablet          PLAN:  Patient Instructions   Patient was educated on the natural course of condition.  Bactrim DS has shown susceptibility in the past so will Rx that again. Take medication as directed. Side effects discussed. Conservative measures include drinking fluids (water), wiping from front to back, avoiding holding urine when there is urge to urinate, urinating before and after sexual intercourse, avoiding sexual activity until infection is eliminated, and over-the-counter AZO. See your primary care provider if symptoms do not improve in 3 days. Seek emergency care if you develop severe abdominal/flank pain, or fever.  Patient verbalized understanding and is agreeable to plan. The patient was discharged ambulatory and in stable condition.    Hanny Mcpherson PA-C on 12/13/2022 at 4:38 PM

## 2022-12-15 LAB — BACTERIA UR CULT: ABNORMAL

## 2022-12-31 ENCOUNTER — MYC MEDICAL ADVICE (OUTPATIENT)
Dept: FAMILY MEDICINE | Facility: CLINIC | Age: 77
End: 2022-12-31

## 2022-12-31 DIAGNOSIS — E11.43 TYPE 2 DIABETES MELLITUS WITH DIABETIC AUTONOMIC NEUROPATHY, WITHOUT LONG-TERM CURRENT USE OF INSULIN (H): ICD-10-CM

## 2022-12-31 DIAGNOSIS — R19.7 DIARRHEA, UNSPECIFIED TYPE: Primary | ICD-10-CM

## 2023-01-03 NOTE — TELEPHONE ENCOUNTER
Given recent antibiotics, will get stool studies. Labs ordered. Please coordinate with lab for sample supplies /instructions.    Also, have patient hold metformin for next 72 hours. To help the diarrhea to resolve/improve. This could be antibiotic induced and just need to hold metformin for a few days for bowels to recover, then restart. But still need stool samples to rule out other possible treatable causes.

## 2023-01-04 ENCOUNTER — LAB (OUTPATIENT)
Dept: LAB | Facility: CLINIC | Age: 78
End: 2023-01-04
Payer: COMMERCIAL

## 2023-01-04 DIAGNOSIS — R19.7 DIARRHEA, UNSPECIFIED TYPE: ICD-10-CM

## 2023-01-04 DIAGNOSIS — E11.43 TYPE 2 DIABETES MELLITUS WITH DIABETIC AUTONOMIC NEUROPATHY, WITHOUT LONG-TERM CURRENT USE OF INSULIN (H): ICD-10-CM

## 2023-01-04 PROCEDURE — 87506 IADNA-DNA/RNA PROBE TQ 6-11: CPT | Mod: 59

## 2023-01-04 PROCEDURE — 87177 OVA AND PARASITES SMEARS: CPT

## 2023-01-04 PROCEDURE — 87493 C DIFF AMPLIFIED PROBE: CPT

## 2023-01-04 PROCEDURE — 87209 SMEAR COMPLEX STAIN: CPT

## 2023-01-05 ENCOUNTER — APPOINTMENT (OUTPATIENT)
Dept: LAB | Facility: CLINIC | Age: 78
End: 2023-01-05
Payer: COMMERCIAL

## 2023-01-05 ENCOUNTER — NURSE TRIAGE (OUTPATIENT)
Dept: NURSING | Facility: CLINIC | Age: 78
End: 2023-01-05

## 2023-01-05 DIAGNOSIS — A09 DIARRHEA OF INFECTIOUS ORIGIN: Primary | ICD-10-CM

## 2023-01-05 LAB — C DIFF TOX B STL QL: NEGATIVE

## 2023-01-06 ENCOUNTER — LAB (OUTPATIENT)
Dept: LAB | Facility: CLINIC | Age: 78
End: 2023-01-06
Payer: COMMERCIAL

## 2023-01-06 DIAGNOSIS — A09 DIARRHEA OF INFECTIOUS ORIGIN: ICD-10-CM

## 2023-01-06 LAB
ALBUMIN SERPL BCG-MCNC: 4.3 G/DL (ref 3.5–5.2)
ALBUMIN UR-MCNC: NEGATIVE MG/DL
ALP SERPL-CCNC: 74 U/L (ref 35–104)
ALT SERPL W P-5'-P-CCNC: 22 U/L (ref 10–35)
ANION GAP SERPL CALCULATED.3IONS-SCNC: 6 MMOL/L (ref 7–15)
APPEARANCE UR: CLEAR
AST SERPL W P-5'-P-CCNC: 26 U/L (ref 10–35)
BILIRUB SERPL-MCNC: 0.5 MG/DL
BILIRUB UR QL STRIP: NEGATIVE
BUN SERPL-MCNC: 12.2 MG/DL (ref 8–23)
CALCIUM SERPL-MCNC: 10.2 MG/DL (ref 8.8–10.2)
CHLORIDE SERPL-SCNC: 105 MMOL/L (ref 98–107)
COLOR UR AUTO: YELLOW
CREAT SERPL-MCNC: 0.66 MG/DL (ref 0.51–0.95)
DEPRECATED HCO3 PLAS-SCNC: 28 MMOL/L (ref 22–29)
ERYTHROCYTE [DISTWIDTH] IN BLOOD BY AUTOMATED COUNT: 14.1 % (ref 10–15)
GFR SERPL CREATININE-BSD FRML MDRD: 90 ML/MIN/1.73M2
GLUCOSE SERPL-MCNC: 100 MG/DL (ref 70–99)
GLUCOSE UR STRIP-MCNC: NEGATIVE MG/DL
HCT VFR BLD AUTO: 40.1 % (ref 35–47)
HGB BLD-MCNC: 13.2 G/DL (ref 11.7–15.7)
HGB UR QL STRIP: NEGATIVE
KETONES UR STRIP-MCNC: NEGATIVE MG/DL
LEUKOCYTE ESTERASE UR QL STRIP: NEGATIVE
MCH RBC QN AUTO: 28.9 PG (ref 26.5–33)
MCHC RBC AUTO-ENTMCNC: 32.9 G/DL (ref 31.5–36.5)
MCV RBC AUTO: 88 FL (ref 78–100)
NITRATE UR QL: NEGATIVE
O+P STL MICRO: NEGATIVE
PH UR STRIP: 6 [PH] (ref 5–7)
PLATELET # BLD AUTO: 208 10E3/UL (ref 150–450)
POTASSIUM SERPL-SCNC: 4.7 MMOL/L (ref 3.4–5.3)
PROT SERPL-MCNC: 7.2 G/DL (ref 6.4–8.3)
RBC # BLD AUTO: 4.57 10E6/UL (ref 3.8–5.2)
SODIUM SERPL-SCNC: 139 MMOL/L (ref 136–145)
SP GR UR STRIP: >=1.03 (ref 1–1.03)
UROBILINOGEN UR STRIP-ACNC: 0.2 E.U./DL
WBC # BLD AUTO: 6.2 10E3/UL (ref 4–11)

## 2023-01-06 PROCEDURE — 36415 COLL VENOUS BLD VENIPUNCTURE: CPT

## 2023-01-06 PROCEDURE — 80053 COMPREHEN METABOLIC PANEL: CPT

## 2023-01-06 PROCEDURE — 85027 COMPLETE CBC AUTOMATED: CPT

## 2023-01-06 PROCEDURE — 81003 URINALYSIS AUTO W/O SCOPE: CPT

## 2023-01-06 NOTE — TELEPHONE ENCOUNTER
Problem List Items Addressed This Visit    None  Visit Diagnoses     Diarrhea of infectious origin    -  Primary    Relevant Orders    Comprehensive metabolic panel (BMP + Alb, Alk Phos, ALT, AST, Total. Bili, TP)    UA Macro with Reflex to Micro and Culture - lab collect    CBC with platelets        Please let her know, there is no antibiotic treatment for this type of infection. We basically need to let the body fight it off and it can take up to 14 days for the virus to clear then another 2-3 weeks for return to normal bowel movements.    Hold metformin for th next two weeks.  Fluid hydration is essential, so keep up on oral fluids. If getting any nausea let us know and we will calm the nausea so you can continue to take fluids.  The dehydration that can result and the Shiga toxin can also affect kidneys, nutrition and platelets. As such, I do want to check a few labs today. I have ordered. Please arrange a lab visit. Will be getting blood tests and a urine test (so be ready to give urine sample.)    Do not use imodium for next two weeks. The body is trying to get rid of the Shiga through stool, stopping the stool can cause the infection to last longer.    Please call her and let her know the above, and help arrange a lab visit. Thank you.

## 2023-01-06 NOTE — TELEPHONE ENCOUNTER
Stool Panel obtained Jan 4 at 4 pm, stool enteric pathogen and PCR - shiga toxin 2 detected.   Collected at Jan 4, 2023 at 4 PM  Ordering Provider RICHI Zamora From [facility]  Reported by More from [facility].     Paging on call provider Dr. Gordillo via after-hours answering service @ 12 am/ Provider recommendations: The provider will see the results in the AM and can address then.     Routed to provider to see that stool panel results from 1/4/23 detected shiga toxin 2.    Protocol Recommended Disposition:   Call PCP Within 24 Hours    Jaymie Booker RN [facility] 1/6/2023 12:08 AM    Reason for Disposition    Lab or radiology calling with test results    Additional Information    Negative: Lab calling with strep throat test results and triager can call in prescription    Negative: Lab calling with urinalysis test results and triager can call in prescription    Negative: Medication questions    Negative: Medication renewal and refill questions    Negative: Pre-operative or pre-procedural questions    Negative: ED call to PCP (i.e., primary care provider; doctor, NP, or PA)    Negative: Doctor (or NP/PA) call to PCP    Negative: Call about patient who is currently hospitalized    Negative: Lab or radiology calling with CRITICAL test results    Negative: [1] Follow-up call from patient regarding patient's clinical status AND [2] information urgent    Negative: [1] Caller requests to speak ONLY to PCP AND [2] URGENT question    Negative: [1] Caller requests to speak to PCP now AND [2] won't tell us reason for call  (Exception: If 10 pm to 6 am, caller must first discuss reason for the call.)    Negative: Notification of hospital admission    Negative: Notification of death    Negative: Caller requesting lab results  (Exception: Routine or non-urgent lab result.)    Protocols used: [facility] - [facility]

## 2023-01-06 NOTE — TELEPHONE ENCOUNTER
Patient notified of provider's message as written. Patient verbalized understanding. Transferred patient to centralized scheduling so she can schedule lab appointment at preferred clinic.     Encouraged patient to call the clinic with further questions/concerns.     Chris Marinelli RN  [facility]

## 2023-01-16 ENCOUNTER — TELEPHONE (OUTPATIENT)
Dept: FAMILY MEDICINE | Facility: CLINIC | Age: 78
End: 2023-01-16

## 2023-01-16 NOTE — TELEPHONE ENCOUNTER
Reason for Call:  Other call back    Detailed comments: Karol is requesting a call back in regards to the instructions that were given to her by Dr. Zamora & [place] in regards to her severe diarrhea. She was told to follow a new diet & suspend her diabetic medication until her symptoms improved but they are not improving & she has further questions in regards to how long she should follow these instructions & not take her meds. She had requested a telephone appt with Dr. Zamora but the first available appt to central scheduling was at the end of February, even for virtual.     Phone Number Patient can be reached at: Cell number on file:    Telephone Information:   Mobile [phone]       Best Time: any    Can we leave a detailed message on this number? YES    Call taken on 1/16/2023 at 10:37 AM by Hilda [place]

## 2023-01-17 NOTE — TELEPHONE ENCOUNTER
"Patient calling checking the status of this. Is upset that she didn't get a call back from PCP yesterday for it.   Patient aware that  is not in clinic today 01/17/23 and that she will wait until tomorrow for a response from him, and that if she doesn't hear back tomorrow she \"might just look for a new clinic\" because \"this health care isn't good, you never get to talk to anyone you need when you need them\"  "

## 2023-01-18 LAB
C COLI+JEJUNI+LARI FUSA STL QL NAA+PROBE: NOT DETECTED
EC STX1 GENE STL QL NAA+PROBE: NOT DETECTED
EC STX2 GENE STL QL NAA+PROBE: DETECTED
NOROV GI+II ORF1-ORF2 JNC STL QL NAA+PR: NOT DETECTED
RVA NSP5 STL QL NAA+PROBE: NOT DETECTED
SALMONELLA SP RPOD STL QL NAA+PROBE: NOT DETECTED
SHIGELLA SP+EIEC IPAH STL QL NAA+PROBE: NOT DETECTED
V CHOL+PARA RFBL+TRKH+TNAA STL QL NAA+PR: NOT DETECTED
Y ENTERO RECN STL QL NAA+PROBE: NOT DETECTED

## 2023-01-18 NOTE — TELEPHONE ENCOUNTER
"Patient is calling back about this again she states that her symptoms are worse.  It is now \"a pinkish color and more mattery\"  "

## 2023-01-19 ENCOUNTER — VIRTUAL VISIT (OUTPATIENT)
Dept: FAMILY MEDICINE | Facility: CLINIC | Age: 78
End: 2023-01-19
Payer: COMMERCIAL

## 2023-01-19 DIAGNOSIS — A49.8: Primary | ICD-10-CM

## 2023-01-19 PROCEDURE — 99213 OFFICE O/P EST LOW 20 MIN: CPT | Mod: 95 | Performed by: FAMILY MEDICINE

## 2023-01-19 NOTE — PROGRESS NOTES
Massiel is a 77 year old who is being evaluated via a billable video visit.      How would you like to obtain your AVS? MyChart  If the video visit is dropped, the invitation should be resent by:Will anyone else be joining your video visit? No          1. STEC O157:H7 (Shiga toxin-producing Escherichia coli)  Massiel presents for a telephone encounter.  She was diagnosed with E. coli on January 6 but has been having diarrhea now for about a month or so.  She had normal blood work on January 6 with no symptoms of hemolytic uremic syndrome.  She feels like she is staying hydrated.  She has continue to hold her metformin.  I recommend that she come in for an in person evaluation and repeat blood work.  She was scheduled with her primary care physician for tomorrow.      Subjective   Massiel is a 77 year old, presenting for the following health issues:  Bowel issues      HPI   Massiel presents is a telephone encounter today.  I have never met her before but did a chart review.  She has been dealing with diarrhea since about mid December.  She had stool studies performed that came back on January 4.  She was positive for Shiga toxin 2 gene.  She came in 2 days later and had normal blood work.  Unfortunately, she continues to have diarrhea.  She states if she does not eat anything, then she does not have diarrhea.  But anytime she eats anything she will have anywhere from 4-6 episodes of diarrhea.  She has been trying to follow a brat diet and has held her metformin.  She states she is drinking a lot of water and is having good urine output.  She does not feel like she is dehydrated.  She is just frustrated at this point.  I discussed with her we really do not treat E. coli with a specific antibiotic.  It is mainly supportive cares and typically will run its course.      Objective           Vitals:  No vitals were obtained today due to virtual visit.    Physical Exam   Unable due to telephone encounter.  Video-Visit  Details    Type of service:  Video Visit   Video Start Time: 7 AM  Video End Time: 7:10 AM    Originating Location (pt. Location): Home    Distant Location (provider location):  On-site  Platform used for Video Visit: Unable to complete video visit

## 2023-01-20 ENCOUNTER — OFFICE VISIT (OUTPATIENT)
Dept: FAMILY MEDICINE | Facility: CLINIC | Age: 78
End: 2023-01-20
Payer: COMMERCIAL

## 2023-01-20 VITALS
RESPIRATION RATE: 16 BRPM | WEIGHT: 209 LBS | HEIGHT: 64 IN | TEMPERATURE: 98.1 F | OXYGEN SATURATION: 98 % | BODY MASS INDEX: 35.68 KG/M2 | HEART RATE: 84 BPM | DIASTOLIC BLOOD PRESSURE: 64 MMHG | SYSTOLIC BLOOD PRESSURE: 134 MMHG

## 2023-01-20 DIAGNOSIS — A49.8: Primary | ICD-10-CM

## 2023-01-20 DIAGNOSIS — E66.01 SEVERE OBESITY (BMI 35.0-39.9) WITH COMORBIDITY (H): ICD-10-CM

## 2023-01-20 DIAGNOSIS — Z13.220 SCREENING FOR HYPERLIPIDEMIA: ICD-10-CM

## 2023-01-20 DIAGNOSIS — E11.42 TYPE 2 DIABETES MELLITUS WITH DIABETIC POLYNEUROPATHY, WITHOUT LONG-TERM CURRENT USE OF INSULIN (H): ICD-10-CM

## 2023-01-20 LAB
ALBUMIN SERPL BCG-MCNC: 4.3 G/DL (ref 3.5–5.2)
ALBUMIN UR-MCNC: NEGATIVE MG/DL
ALP SERPL-CCNC: 73 U/L (ref 35–104)
ALT SERPL W P-5'-P-CCNC: 30 U/L (ref 10–35)
ANION GAP SERPL CALCULATED.3IONS-SCNC: 12 MMOL/L (ref 7–15)
APPEARANCE UR: CLEAR
AST SERPL W P-5'-P-CCNC: 31 U/L (ref 10–35)
BACTERIA #/AREA URNS HPF: ABNORMAL /HPF
BILIRUB SERPL-MCNC: 0.6 MG/DL
BILIRUB UR QL STRIP: NEGATIVE
BUN SERPL-MCNC: 9.2 MG/DL (ref 8–23)
CALCIUM SERPL-MCNC: 10.2 MG/DL (ref 8.8–10.2)
CHLORIDE SERPL-SCNC: 105 MMOL/L (ref 98–107)
COLOR UR AUTO: YELLOW
CREAT SERPL-MCNC: 0.73 MG/DL (ref 0.51–0.95)
DEPRECATED HCO3 PLAS-SCNC: 23 MMOL/L (ref 22–29)
ERYTHROCYTE [DISTWIDTH] IN BLOOD BY AUTOMATED COUNT: 14 % (ref 10–15)
GFR SERPL CREATININE-BSD FRML MDRD: 85 ML/MIN/1.73M2
GLUCOSE SERPL-MCNC: 132 MG/DL (ref 70–99)
GLUCOSE UR STRIP-MCNC: NEGATIVE MG/DL
HCT VFR BLD AUTO: 42.7 % (ref 35–47)
HGB BLD-MCNC: 14.2 G/DL (ref 11.7–15.7)
HGB UR QL STRIP: NEGATIVE
KETONES UR STRIP-MCNC: NEGATIVE MG/DL
LEUKOCYTE ESTERASE UR QL STRIP: ABNORMAL
MCH RBC QN AUTO: 29.5 PG (ref 26.5–33)
MCHC RBC AUTO-ENTMCNC: 33.3 G/DL (ref 31.5–36.5)
MCV RBC AUTO: 89 FL (ref 78–100)
MUCOUS THREADS #/AREA URNS LPF: PRESENT /LPF
NITRATE UR QL: NEGATIVE
PH UR STRIP: 5.5 [PH] (ref 5–8)
PLATELET # BLD AUTO: 192 10E3/UL (ref 150–450)
POTASSIUM SERPL-SCNC: 4.6 MMOL/L (ref 3.4–5.3)
PROT SERPL-MCNC: 7.2 G/DL (ref 6.4–8.3)
RBC # BLD AUTO: 4.81 10E6/UL (ref 3.8–5.2)
RBC #/AREA URNS AUTO: ABNORMAL /HPF
SODIUM SERPL-SCNC: 140 MMOL/L (ref 136–145)
SP GR UR STRIP: 1.02 (ref 1–1.03)
SQUAMOUS #/AREA URNS AUTO: ABNORMAL /LPF
UROBILINOGEN UR STRIP-ACNC: 0.2 E.U./DL
WBC # BLD AUTO: 5.2 10E3/UL (ref 4–11)
WBC #/AREA URNS AUTO: ABNORMAL /HPF

## 2023-01-20 PROCEDURE — 36415 COLL VENOUS BLD VENIPUNCTURE: CPT | Performed by: FAMILY MEDICINE

## 2023-01-20 PROCEDURE — 99214 OFFICE O/P EST MOD 30 MIN: CPT | Performed by: FAMILY MEDICINE

## 2023-01-20 PROCEDURE — 80053 COMPREHEN METABOLIC PANEL: CPT | Performed by: FAMILY MEDICINE

## 2023-01-20 PROCEDURE — 85027 COMPLETE CBC AUTOMATED: CPT | Performed by: FAMILY MEDICINE

## 2023-01-20 PROCEDURE — 81001 URINALYSIS AUTO W/SCOPE: CPT | Performed by: FAMILY MEDICINE

## 2023-01-20 RX ORDER — SEMAGLUTIDE 1.34 MG/ML
0.25 INJECTION, SOLUTION SUBCUTANEOUS
COMMUNITY
End: 2023-06-14

## 2023-01-20 NOTE — ASSESSMENT & PLAN NOTE
Continue to monitor albumin, electrolytes and CBC.  Will provide supportive care when needed.  Discussed advancing protein in the diet as well as continuing hydration.  With the new order pain in the lower abdomen area as well as the right flank will check UA and treat it if it is present

## 2023-01-20 NOTE — ASSESSMENT & PLAN NOTE
Of note she has had weight loss with these episodes.  But not overly healthy weight loss.  Worried for protein malnutrition secondary to the continued diarrhea.  Will check CMP.  If needed will replace albumin

## 2023-01-20 NOTE — PROGRESS NOTES
Problem List Items Addressed This Visit     Type 2 diabetes mellitus with diabetic polyneuropathy, without long-term current use of insulin (H)     Blood sugar slightly elevated but still remain in an acceptable range off metformin.  Once the current infection has fully cleared will look at restarting metformin slowly given the past GI distress that she has had.         Relevant Medications    semaglutide (OZEMPIC, 0.25 OR 0.5 MG/DOSE,) 2 MG/1.5ML SOPN pen    Severe obesity (BMI 35.0-39.9) with comorbidity (H)     Of note she has had weight loss with these episodes.  But not overly healthy weight loss.  Worried for protein malnutrition secondary to the continued diarrhea.  Will check CMP.  If needed will replace albumin         Relevant Medications    semaglutide (OZEMPIC, 0.25 OR 0.5 MG/DOSE,) 2 MG/1.5ML SOPN pen    STEC O157:H7 (Shiga toxin-producing Escherichia coli) - Primary     Continue to monitor albumin, electrolytes and CBC.  Will provide supportive care when needed.  Discussed advancing protein in the diet as well as continuing hydration.  With the new order pain in the lower abdomen area as well as the right flank will check UA and treat it if it is present         Relevant Orders    CBC with Platelets and Reflex to Iron Studies (Completed)    Comprehensive metabolic panel    UA Macro with Reflex to Micro and Culture - lab collect (Completed)    Urine Microscopic (Completed)   Other Visit Diagnoses     Screening for hyperlipidemia                 Astrid Rankin is a 77 year old who presents for the following health issues  accompanied by her spouse  Chief Complaint   Patient presents with     Follow Up     STEC O157:H7 (Shiga toxin-producing Escherichia coli)      Continued diarrhea with known E. coli/sugar toxin source.  Now also getting right flank pain into the lower abdomen pain.  No fevers or chills.  She is staying hydrated and attempting to eat.  Whenever she eats it does go straight  "through though.  She is tired from it continuing on.  Does understand though that treatment is time and supportive care.  She is doing her best again to stay hydrated as well as to bring down food sources.  We talked about options of food sources to help especially around protein.  Blood sugars are staying below 150.    History of Present Illness       Reason for visit:  F/U Diarrhea  Symptom onset:  3-4 weeks ago  Symptoms include:  Diarrhea  Symptom intensity:  Severe  Symptom progression:  Worsening  Had these symptoms before:  No  What makes it worse:  N/A  What makes it better:  N/A    She eats 0-1 servings of fruits and vegetables daily.She consumes 0 sweetened beverage(s) daily.She exercises with enough effort to increase her heart rate 9 or less minutes per day.  She exercises with enough effort to increase her heart rate 3 or less days per week.   She is taking medications regularly.       Review of Systems   All other systems reviewed and are negative.           Objective    /64 (BP Location: Left arm, Patient Position: Sitting, Cuff Size: Adult Large)   Pulse 84   Temp 98.1  F (36.7  C) (Oral)   Resp 16   Ht 1.626 m (5' 4\")   Wt 94.8 kg (209 lb)   LMP  (LMP Unknown)   SpO2 98%   Breastfeeding No   BMI 35.87 kg/m    Body mass index is 35.87 kg/m .  Physical Exam  Vitals and nursing note reviewed.   Constitutional:       General: She is not in acute distress.     Appearance: Normal appearance. She is not ill-appearing.   HENT:      Head: Normocephalic and atraumatic.   Eyes:      Extraocular Movements: Extraocular movements intact.      Conjunctiva/sclera: Conjunctivae normal.   Cardiovascular:      Rate and Rhythm: Normal rate and regular rhythm.      Pulses: Normal pulses.      Heart sounds: Normal heart sounds.   Pulmonary:      Effort: Pulmonary effort is normal.      Breath sounds: Normal breath sounds.   Musculoskeletal:      Right lower leg: No edema.      Left lower leg: No edema. "   Skin:     Capillary Refill: Capillary refill takes less than 2 seconds.   Neurological:      Mental Status: She is alert and oriented to person, place, and time.   Psychiatric:         Attention and Perception: Attention normal.         Mood and Affect: Mood normal.         Speech: Speech normal.         Thought Content: Thought content normal.                  This note has been dictated using voice recognition software. Any grammatical or context distortions are unintentional and inherent to the software

## 2023-01-20 NOTE — ASSESSMENT & PLAN NOTE
Blood sugar slightly elevated but still remain in an acceptable range off metformin.  Once the current infection has fully cleared will look at restarting metformin slowly given the past GI distress that she has had.

## 2023-01-29 DIAGNOSIS — M54.42 CHRONIC BILATERAL LOW BACK PAIN WITH BILATERAL SCIATICA: ICD-10-CM

## 2023-01-29 DIAGNOSIS — G89.29 CHRONIC BILATERAL LOW BACK PAIN WITH BILATERAL SCIATICA: ICD-10-CM

## 2023-01-29 DIAGNOSIS — M54.41 CHRONIC BILATERAL LOW BACK PAIN WITH BILATERAL SCIATICA: ICD-10-CM

## 2023-01-30 RX ORDER — GABAPENTIN 300 MG/1
CAPSULE ORAL
Qty: 90 CAPSULE | Refills: 3 | Status: SHIPPED | OUTPATIENT
Start: 2023-01-30 | End: 2024-01-09

## 2023-02-09 DIAGNOSIS — I10 ESSENTIAL (PRIMARY) HYPERTENSION: ICD-10-CM

## 2023-02-09 DIAGNOSIS — E78.5 HYPERLIPIDEMIA, UNSPECIFIED: ICD-10-CM

## 2023-02-09 RX ORDER — SIMVASTATIN 40 MG
TABLET ORAL
Qty: 90 TABLET | Refills: 3 | Status: SHIPPED | OUTPATIENT
Start: 2023-02-09 | End: 2024-02-01

## 2023-02-09 RX ORDER — LOSARTAN POTASSIUM 50 MG/1
TABLET ORAL
Qty: 90 TABLET | Refills: 3 | Status: SHIPPED | OUTPATIENT
Start: 2023-02-09 | End: 2024-02-01

## 2023-02-09 NOTE — TELEPHONE ENCOUNTER
"Routing refill request to provider for review/approval because:  Labs not current:  ldl    Last Written Prescription Date:  2/17/22  Last Fill Quantity: 90,  # refills: 3   Last office visit provider:  1/20/23     Requested Prescriptions   Pending Prescriptions Disp Refills     simvastatin (ZOCOR) 40 MG tablet [Pharmacy Med Name: SIMVASTATIN 40 MG TABLET] 90 tablet 3     Sig: TAKE 1 TABLET BY MOUTH EVERYDAY AT BEDTIME       Statins Protocol Failed - 2/9/2023 12:22 AM        Failed - LDL on file in past 12 months     Recent Labs   Lab Test 01/12/22  1128   LDL 83             Passed - No abnormal creatine kinase in past 12 months     No lab results found.             Passed - Recent (12 mo) or future (30 days) visit within the authorizing provider's specialty     Patient has had an office visit with the authorizing provider or a provider within the authorizing providers department within the previous 12 mos or has a future within next 30 days. See \"Patient Info\" tab in inbasket, or \"Choose Columns\" in Meds & Orders section of the refill encounter.              Passed - Medication is active on med list        Passed - Patient is age 18 or older        Passed - No active pregnancy on record        Passed - No positive pregnancy test in past 12 months           losartan (COZAAR) 50 MG tablet [Pharmacy Med Name: LOSARTAN POTASSIUM 50 MG TAB] 90 tablet 3     Sig: TAKE 1 TABLET BY MOUTH EVERY DAY       Angiotensin-II Receptors Passed - 2/9/2023 12:22 AM        Passed - Last blood pressure under 140/90 in past 12 months     BP Readings from Last 3 Encounters:   01/20/23 134/64   12/13/22 (!) 149/76   11/21/22 118/76                 Passed - Recent (12 mo) or future (30 days) visit within the authorizing provider's specialty     Patient has had an office visit with the authorizing provider or a provider within the authorizing providers department within the previous 12 mos or has a future within next 30 days. See \"Patient " "Info\" tab in inbasket, or \"Choose Columns\" in Meds & Orders section of the refill encounter.              Passed - Medication is active on med list        Passed - Patient is age 18 or older        Passed - No active pregnancy on record        Passed - Normal serum creatinine on file in past 12 months     Recent Labs   Lab Test 01/20/23 0913   CR 0.73       Ok to refill medication if creatinine is low          Passed - Normal serum potassium on file in past 12 months     Recent Labs   Lab Test 01/20/23 0913   POTASSIUM 4.6                    Passed - No positive pregnancy test in past 12 months             Nikia Zamudio, RN 02/09/23 11:02 AM  "

## 2023-02-10 DIAGNOSIS — E11.65 TYPE 2 DIABETES MELLITUS WITH HYPERGLYCEMIA (H): ICD-10-CM

## 2023-02-10 NOTE — TELEPHONE ENCOUNTER
"Prescription approved per [facility] Refill Protocol.  Requested Prescriptions   Pending Prescriptions Disp Refills     metFORMIN (GLUCOPHAGE) 1000 MG tablet [Pharmacy Med Name: METFORMIN HCL 1,000 MG TABLET] 180 tablet 0     Sig: TAKE 1 TABLET BY MOUTH TWICE A DAY WITH MEALS       Biguanide Agents Passed - 2/10/2023 12:21 AM        Passed - Patient is age 10 or older        Passed - Patient has documented A1c within the specified period of time.     If HgbA1C is 8 or greater, it needs to be on file within the past 3 months.  If less than 8, must be on file within the past 6 months.     Recent Labs   Lab Test 08/19/22  0918   A1C 6.2*             Passed - Patient's CR is NOT>1.4 OR Patient's EGFR is NOT<45 within past 12 mos.     Recent Labs   Lab Test 01/20/23  0913 01/12/22  1128 06/30/21  1310   GFRESTIMATED 85   < > >60   GFRESTBLACK  --   --  >60    < > = values in this interval not displayed.       Recent Labs   Lab Test 01/20/23 0913   CR 0.73             Passed - Patient does NOT have a diagnosis of CHF.        Passed - Medication is active on med list        Passed - Patient is not pregnant        Passed - Patient has not had a positive pregnancy test within the past 12 mos.         Passed - Recent (6 mo) or future (30 days) visit within the authorizing provider's specialty     Patient had office visit in the last 6 months or has a visit in the next 30 days with authorizing provider or within the authorizing provider's specialty.  See \"Patient Info\" tab in inbasket, or \"Choose Columns\" in Meds & Orders section of the refill encounter.               "

## 2023-02-18 ENCOUNTER — HEALTH MAINTENANCE LETTER (OUTPATIENT)
Age: 78
End: 2023-02-18

## 2023-02-20 DIAGNOSIS — E11.42 TYPE 2 DIABETES MELLITUS WITH DIABETIC POLYNEUROPATHY, WITHOUT LONG-TERM CURRENT USE OF INSULIN (H): ICD-10-CM

## 2023-02-20 RX ORDER — BLOOD SUGAR DIAGNOSTIC
STRIP MISCELLANEOUS
Qty: 200 STRIP | Refills: 0 | Status: SHIPPED | OUTPATIENT
Start: 2023-02-20 | End: 2023-09-14

## 2023-02-20 NOTE — TELEPHONE ENCOUNTER
"Last Written Prescription Date:  11/12/2022  Last Fill Quantity: 200,  # refills: 0   Last office visit provider:  01/20/2023     Requested Prescriptions   Pending Prescriptions Disp Refills     ONETOUCH VERIO IQ test strip [Pharmacy Med Name: ONE TOUCH VERIO TEST STRIP] 200 strip 0     Sig: USE 1 EACH AS DIRECTED 2 (TWO) TIMES A DAY.       Diabetic Supplies Protocol Passed - 2/20/2023 12:19 AM        Passed - Medication is active on med list        Passed - Patient is 18 years of age or older        Passed - Recent (6 mo) or future (30 days) visit within the authorizing provider's specialty     Patient had office visit in the last 6 months or has a visit in the next 30 days with authorizing provider.  See \"Patient Info\" tab in inbasket, or \"Choose Columns\" in Meds & Orders section of the refill encounter.                 KRISTA ANDREA RN 02/20/23 3:56 PM  "

## 2023-02-27 ENCOUNTER — OFFICE VISIT (OUTPATIENT)
Dept: FAMILY MEDICINE | Facility: CLINIC | Age: 78
End: 2023-02-27
Attending: FAMILY MEDICINE
Payer: COMMERCIAL

## 2023-02-27 VITALS
BODY MASS INDEX: 36.76 KG/M2 | TEMPERATURE: 98 F | DIASTOLIC BLOOD PRESSURE: 80 MMHG | HEART RATE: 71 BPM | WEIGHT: 215.3 LBS | HEIGHT: 64 IN | OXYGEN SATURATION: 99 % | RESPIRATION RATE: 16 BRPM | SYSTOLIC BLOOD PRESSURE: 138 MMHG

## 2023-02-27 DIAGNOSIS — I10 PRIMARY HYPERTENSION: ICD-10-CM

## 2023-02-27 DIAGNOSIS — E66.813 CLASS 3 OBESITY: ICD-10-CM

## 2023-02-27 DIAGNOSIS — C18.9 MALIGNANT NEOPLASM OF COLON, UNSPECIFIED PART OF COLON (H): ICD-10-CM

## 2023-02-27 DIAGNOSIS — Z00.00 ENCOUNTER FOR MEDICARE ANNUAL WELLNESS EXAM: ICD-10-CM

## 2023-02-27 DIAGNOSIS — I10 BENIGN ESSENTIAL HYPERTENSION: ICD-10-CM

## 2023-02-27 DIAGNOSIS — E11.9 TYPE 2 DIABETES MELLITUS WITHOUT COMPLICATION, WITHOUT LONG-TERM CURRENT USE OF INSULIN (H): Primary | ICD-10-CM

## 2023-02-27 DIAGNOSIS — E11.42 TYPE 2 DIABETES MELLITUS WITH DIABETIC POLYNEUROPATHY, WITHOUT LONG-TERM CURRENT USE OF INSULIN (H): ICD-10-CM

## 2023-02-27 DIAGNOSIS — G62.9 PERIPHERAL POLYNEUROPATHY: ICD-10-CM

## 2023-02-27 PROBLEM — A49.8: Status: RESOLVED | Noted: 2023-01-20 | Resolved: 2023-02-27

## 2023-02-27 LAB
ANION GAP SERPL CALCULATED.3IONS-SCNC: 10 MMOL/L (ref 7–15)
BUN SERPL-MCNC: 13.6 MG/DL (ref 8–23)
CALCIUM SERPL-MCNC: 9.8 MG/DL (ref 8.8–10.2)
CEA SERPL-MCNC: 1.6 NG/ML
CHLORIDE SERPL-SCNC: 104 MMOL/L (ref 98–107)
CHOLEST SERPL-MCNC: 178 MG/DL
CREAT SERPL-MCNC: 0.72 MG/DL (ref 0.51–0.95)
CREAT UR-MCNC: 97.6 MG/DL
DEPRECATED HCO3 PLAS-SCNC: 26 MMOL/L (ref 22–29)
ERYTHROCYTE [DISTWIDTH] IN BLOOD BY AUTOMATED COUNT: 13.7 % (ref 10–15)
GFR SERPL CREATININE-BSD FRML MDRD: 86 ML/MIN/1.73M2
GLUCOSE SERPL-MCNC: 106 MG/DL (ref 70–99)
HBA1C MFR BLD: 6.3 % (ref 0–5.6)
HCT VFR BLD AUTO: 40.7 % (ref 35–47)
HDLC SERPL-MCNC: 85 MG/DL
HGB BLD-MCNC: 13.5 G/DL (ref 11.7–15.7)
LDLC SERPL CALC-MCNC: 72 MG/DL
MCH RBC QN AUTO: 29.4 PG (ref 26.5–33)
MCHC RBC AUTO-ENTMCNC: 33.2 G/DL (ref 31.5–36.5)
MCV RBC AUTO: 89 FL (ref 78–100)
MICROALBUMIN UR-MCNC: 38.6 MG/L
MICROALBUMIN/CREAT UR: 39.55 MG/G CR (ref 0–25)
NONHDLC SERPL-MCNC: 93 MG/DL
PLATELET # BLD AUTO: 189 10E3/UL (ref 150–450)
POTASSIUM SERPL-SCNC: 4.5 MMOL/L (ref 3.4–5.3)
RBC # BLD AUTO: 4.59 10E6/UL (ref 3.8–5.2)
SODIUM SERPL-SCNC: 140 MMOL/L (ref 136–145)
TRIGL SERPL-MCNC: 105 MG/DL
WBC # BLD AUTO: 6.3 10E3/UL (ref 4–11)

## 2023-02-27 PROCEDURE — 83036 HEMOGLOBIN GLYCOSYLATED A1C: CPT | Performed by: FAMILY MEDICINE

## 2023-02-27 PROCEDURE — 82570 ASSAY OF URINE CREATININE: CPT | Performed by: FAMILY MEDICINE

## 2023-02-27 PROCEDURE — 82043 UR ALBUMIN QUANTITATIVE: CPT | Performed by: FAMILY MEDICINE

## 2023-02-27 PROCEDURE — 80061 LIPID PANEL: CPT | Performed by: FAMILY MEDICINE

## 2023-02-27 PROCEDURE — 82378 CARCINOEMBRYONIC ANTIGEN: CPT | Performed by: FAMILY MEDICINE

## 2023-02-27 PROCEDURE — 80048 BASIC METABOLIC PNL TOTAL CA: CPT | Performed by: FAMILY MEDICINE

## 2023-02-27 PROCEDURE — G0439 PPPS, SUBSEQ VISIT: HCPCS | Performed by: FAMILY MEDICINE

## 2023-02-27 PROCEDURE — 99214 OFFICE O/P EST MOD 30 MIN: CPT | Mod: 25 | Performed by: FAMILY MEDICINE

## 2023-02-27 PROCEDURE — 36415 COLL VENOUS BLD VENIPUNCTURE: CPT | Performed by: FAMILY MEDICINE

## 2023-02-27 PROCEDURE — 85027 COMPLETE CBC AUTOMATED: CPT | Performed by: FAMILY MEDICINE

## 2023-02-27 ASSESSMENT — ENCOUNTER SYMPTOMS
HEMATOCHEZIA: 0
ABDOMINAL PAIN: 0
NAUSEA: 0
DYSURIA: 0
ARTHRALGIAS: 0
DIARRHEA: 0
CHILLS: 0
NERVOUS/ANXIOUS: 0
COUGH: 0
WEAKNESS: 0
HEMATURIA: 0
EYE PAIN: 0
JOINT SWELLING: 0
DIZZINESS: 0
PARESTHESIAS: 0
HEADACHES: 0
BREAST MASS: 0
CONSTIPATION: 0
HEARTBURN: 0
SHORTNESS OF BREATH: 0
MYALGIAS: 0
PALPITATIONS: 0
FREQUENCY: 0
SORE THROAT: 0
FEVER: 0

## 2023-02-27 ASSESSMENT — ANXIETY QUESTIONNAIRES
1. FEELING NERVOUS, ANXIOUS, OR ON EDGE: NOT AT ALL
IF YOU CHECKED OFF ANY PROBLEMS ON THIS QUESTIONNAIRE, HOW DIFFICULT HAVE THESE PROBLEMS MADE IT FOR YOU TO DO YOUR WORK, TAKE CARE OF THINGS AT HOME, OR GET ALONG WITH OTHER PEOPLE: NOT DIFFICULT AT ALL
GAD7 TOTAL SCORE: 0
8. IF YOU CHECKED OFF ANY PROBLEMS, HOW DIFFICULT HAVE THESE MADE IT FOR YOU TO DO YOUR WORK, TAKE CARE OF THINGS AT HOME, OR GET ALONG WITH OTHER PEOPLE?: NOT DIFFICULT AT ALL
4. TROUBLE RELAXING: NOT AT ALL
6. BECOMING EASILY ANNOYED OR IRRITABLE: NOT AT ALL
3. WORRYING TOO MUCH ABOUT DIFFERENT THINGS: NOT AT ALL
GAD7 TOTAL SCORE: 0
5. BEING SO RESTLESS THAT IT IS HARD TO SIT STILL: NOT AT ALL
7. FEELING AFRAID AS IF SOMETHING AWFUL MIGHT HAPPEN: NOT AT ALL
2. NOT BEING ABLE TO STOP OR CONTROL WORRYING: NOT AT ALL
7. FEELING AFRAID AS IF SOMETHING AWFUL MIGHT HAPPEN: NOT AT ALL

## 2023-02-27 ASSESSMENT — ACTIVITIES OF DAILY LIVING (ADL): CURRENT_FUNCTION: NO ASSISTANCE NEEDED

## 2023-02-27 NOTE — PATIENT INSTRUCTIONS
Patient Education   Personalized Prevention Plan  You are due for the preventive services outlined below.  Your care team is available to assist you in scheduling these services.  If you have already completed any of these items, please share that information with your care team to update in your medical record.  Health Maintenance Due   Topic Date Due     Annual Wellness Visit  01/12/2023     Cholesterol Lab  01/12/2023     Eye Exam  01/17/2023     A1C Lab  02/19/2023

## 2023-02-27 NOTE — PROGRESS NOTES
"SUBJECTIVE:   Massiel is a 77 year old who presents for Preventive Visit.    Patient has been advised of split billing requirements and indicates understanding: Yes  Are you in the first 12 months of your Medicare coverage?  No    Denies any chest pain, shortness of breath, dyspnea exertion, palpitations, nausea or vomiting.  Denies any changes in vision or hearing, or urinary or bowel habits.      Healthy Habits:     In general, how would you rate your overall health?  Good    Frequency of exercise:  2-3 days/week    Duration of exercise:  15-30 minutes    Do you usually eat at least 4 servings of fruit and vegetables a day, include whole grains    & fiber and avoid regularly eating high fat or \"junk\" foods?  Yes    Taking medications regularly:  Yes    Barriers to taking medications:  None    Medication side effects:  None    Ability to successfully perform activities of daily living:  No assistance needed    Home Safety:  No safety concerns identified    Hearing Impairment:  No hearing concerns    In the past 6 months, have you been bothered by leaking of urine?  No    In general, how would you rate your overall mental or emotional health?  Good      PHQ-2 Total Score: 0    Additional concerns today:  No      Have you ever done Advance Care Planning? (For example, a Health Directive, POLST, or a discussion with a medical provider or your loved ones about your wishes): Yes, advance care planning is on file.       Fall risk  Fallen 2 or more times in the past year?: No  Any fall with injury in the past year?: No    Cognitive Screening   1) Repeat 3 items (Leader, Season, Table)    2) Clock draw: NORMAL  3) 3 item recall: Recalls 3 objects  Results: 3 items recalled: COGNITIVE IMPAIRMENT LESS LIKELY    Mini-CogTM Copyright LUCY Del Rio. Licensed by the author for use in [facility]; reprinted with permission ([email or web address]). All rights reserved.      Reviewed and updated as needed this visit by clinical staff   " Tobacco  Allergies  Meds  Problems  Med Hx  Surg Hx  Fam Hx  Soc   Hx        Reviewed and updated as needed this visit by Provider   Tobacco  Allergies  Meds  Problems  Med Hx  Surg Hx  Fam Hx         Social History     Tobacco Use     Smoking status: Never     Passive exposure: Never     Smokeless tobacco: Never   Substance Use Topics     Alcohol use: No       Alcohol Use 2/27/2023   Prescreen: >3 drinks/day or >7 drinks/week? Not Applicable     Current providers sharing in care for this patient include:   Patient Care Team:  Saul Zamora DO as PCP - General (Family Medicine)  Regla Shi, PharmD as MTM Pharamcist (Pharmacist)  Saul Zamora DO as Assigned PCP  Torrie Thompson MD as Physician (Internal Medicine)  Yuliya Marsh PA-C as Assigned Neuroscience Provider  Regla Shi, PharmD as Assigned MTM Pharmacist    The following health maintenance items are reviewed in Epic and correct as of today:  Health Maintenance   Topic Date Due     LIPID  01/12/2023     EYE EXAM  01/17/2023     INFLUENZA VACCINE (1) 03/27/2023 (Originally 9/1/2022)     Pneumococcal Vaccine: 65+ Years (2 - PPSV23 if available, else PCV20) 03/27/2023 (Originally 5/3/2021)     ZOSTER IMMUNIZATION (1 of 2) 03/27/2023 (Originally 3/9/1996)     COVID-19 Vaccine (1) 03/27/2023 (Originally 1945)     MICROALBUMIN  05/02/2023     A1C  08/27/2023     BMP  01/20/2024     ANNUAL REVIEW OF HM ORDERS  01/20/2024     MEDICARE ANNUAL WELLNESS VISIT  02/27/2024     DIABETIC FOOT EXAM  02/27/2024     FALL RISK ASSESSMENT  02/27/2024     DTAP/TDAP/TD IMMUNIZATION (2 - Td or Tdap) 06/22/2025     ADVANCE CARE PLANNING  02/27/2028     COLORECTAL CANCER SCREENING  06/24/2031     DEXA  01/19/2037     HEPATITIS C SCREENING  Completed     PHQ-2 (once per calendar year)  Completed     IPV IMMUNIZATION  Aged Out     MENINGITIS IMMUNIZATION  Aged Out     MAMMO SCREENING  Discontinued     Lab work is in  "process  Labs reviewed in EPIC    Mammogram Screening - Patient over age 75, has elected to discontinue screenings.  Pertinent mammograms are reviewed under the imaging tab.    Review of Systems   Constitutional: Negative for chills and fever.   HENT: Negative for congestion, ear pain, hearing loss and sore throat.    Eyes: Negative for pain and visual disturbance.   Respiratory: Negative for cough and shortness of breath.    Cardiovascular: Negative for chest pain, palpitations and peripheral edema.   Gastrointestinal: Negative for abdominal pain, constipation, diarrhea, heartburn, hematochezia and nausea.   Breasts:  Negative for tenderness, breast mass and discharge.   Genitourinary: Negative for dysuria, frequency, genital sores, hematuria, pelvic pain, urgency, vaginal bleeding and vaginal discharge.   Musculoskeletal: Negative for arthralgias, joint swelling and myalgias.   Skin: Negative for rash.   Neurological: Negative for dizziness, weakness, headaches and paresthesias.   Psychiatric/Behavioral: Negative for mood changes. The patient is not nervous/anxious.      OBJECTIVE:   /80 (BP Location: Left arm, Patient Position: Sitting, Cuff Size: Adult Large)   Pulse 71   Temp 98  F (36.7  C) (Oral)   Resp 16   Ht 1.626 m (5' 4\")   Wt 97.7 kg (215 lb 4.8 oz)   LMP  (LMP Unknown)   SpO2 99%   Breastfeeding No   BMI 36.96 kg/m   Estimated body mass index is 36.96 kg/m  as calculated from the following:    Height as of this encounter: 1.626 m (5' 4\").    Weight as of this encounter: 97.7 kg (215 lb 4.8 oz).  Physical Exam  Vitals and nursing note reviewed.   Constitutional:       General: She is not in acute distress.     Appearance: Normal appearance.   HENT:      Head: Normocephalic and atraumatic.      Right Ear: Tympanic membrane, ear canal and external ear normal.      Left Ear: Tympanic membrane, ear canal and external ear normal.      Nose: Nose normal.      Mouth/Throat:      Mouth: Mucous " membranes are moist.      Pharynx: Oropharynx is clear. No oropharyngeal exudate.   Eyes:      General: No scleral icterus.     Extraocular Movements: Extraocular movements intact.      Conjunctiva/sclera: Conjunctivae normal.   Neck:      Vascular: No carotid bruit.   Cardiovascular:      Rate and Rhythm: Normal rate and regular rhythm.      Pulses: Normal pulses.      Heart sounds: Normal heart sounds. No murmur heard.    No friction rub. No gallop.   Pulmonary:      Effort: Pulmonary effort is normal.      Breath sounds: Normal breath sounds. No wheezing, rhonchi or rales.   Musculoskeletal:         General: No swelling. Normal range of motion.      Cervical back: Normal range of motion.      Right lower leg: No edema.      Left lower leg: No edema.   Skin:     General: Skin is warm and dry.      Capillary Refill: Capillary refill takes less than 2 seconds.      Findings: No rash.   Neurological:      General: No focal deficit present.      Mental Status: She is alert and oriented to person, place, and time.      Cranial Nerves: No cranial nerve deficit.      Gait: Gait is intact. Gait normal.      Deep Tendon Reflexes: Reflexes normal.      Reflex Scores:       Bicep reflexes are 2+ on the right side and 2+ on the left side.       Patellar reflexes are 2+ on the right side and 2+ on the left side.  Psychiatric:         Mood and Affect: Mood normal.         Thought Content: Thought content normal.         ASSESSMENT / PLAN:     Problem List Items Addressed This Visit     Colon cancer, with resection 2/10/14     On every 6 month CEA check. Will continue         Relevant Orders    CEA    Benign essential hypertension     Below goal of 140/90. Continue current plan         Type 2 diabetes mellitus with diabetic polyneuropathy, without long-term current use of insulin (H)     Below goal despite off metformin. Given her reactions to metformin, will continue to hold and continue with current therapy.         Relevant  "Orders    Adult Eye  Referral    Class 3 obesity (H)     BMI over 35 with HTN and DM2. Diet and exercise reviewed.         Peripheral neuropathy     Decent response with gabapentin. Will continue to monitor and adjust as needed.         RESOLVED: HTN (hypertension)    Relevant Orders    Basic metabolic panel   Other Visit Diagnoses     Type 2 diabetes mellitus without complication, without long-term current use of insulin (H)    -  Primary    Relevant Orders    Albumin Random Urine Quantitative with Creat Ratio    Hemoglobin A1c (Completed)    Lipid panel reflex to direct LDL Fasting    Basic metabolic panel    CBC with Platelets and Reflex to Iron Studies (Completed)    Encounter for Medicare annual wellness exam              Patient has been advised of split billing requirements and indicates understanding: Yes    Answers for HPI/ROS submitted by the patient on 2/27/2023  MITCHEL 7 TOTAL SCORE: 0      COUNSELING:  Reviewed preventive health counseling, as reflected in patient instructions       Regular exercise       Healthy diet/nutrition       Hearing screening       Dental care       Osteoporosis prevention/bone health      BMI:   Estimated body mass index is 36.96 kg/m  as calculated from the following:    Height as of this encounter: 1.626 m (5' 4\").    Weight as of this encounter: 97.7 kg (215 lb 4.8 oz).   Weight management plan: Discussed healthy diet and exercise guidelines      She reports that she has never smoked. She has never been exposed to tobacco smoke. She has never used smokeless tobacco.      Appropriate preventive services were discussed with this patient, including applicable screening as appropriate for cardiovascular disease, diabetes, osteopenia/osteoporosis, and glaucoma.  As appropriate for age/gender, discussed screening for colorectal cancer, prostate cancer, breast cancer, and cervical cancer. Checklist reviewing preventive services available has been given to the " patient.    Reviewed patients plan of care and provided an AVS. The Intermediate Care Plan ( asthma action plan, low back pain action plan, and migraine action plan) for Karol meets the Care Plan requirement. This Care Plan has been established and reviewed with the Patient.          Saul Zamora, [facility]    Identified Health Risks:

## 2023-02-27 NOTE — ASSESSMENT & PLAN NOTE
Below goal despite off metformin. Given her reactions to metformin, will continue to hold and continue with current therapy.

## 2023-04-12 ENCOUNTER — ANCILLARY PROCEDURE (OUTPATIENT)
Dept: MAMMOGRAPHY | Facility: CLINIC | Age: 78
End: 2023-04-12
Attending: FAMILY MEDICINE
Payer: COMMERCIAL

## 2023-04-12 DIAGNOSIS — Z12.31 VISIT FOR SCREENING MAMMOGRAM: ICD-10-CM

## 2023-04-12 PROCEDURE — 77067 SCR MAMMO BI INCL CAD: CPT

## 2023-04-18 ENCOUNTER — OFFICE VISIT (OUTPATIENT)
Dept: FAMILY MEDICINE | Facility: CLINIC | Age: 78
End: 2023-04-18
Payer: COMMERCIAL

## 2023-04-18 VITALS
RESPIRATION RATE: 16 BRPM | DIASTOLIC BLOOD PRESSURE: 79 MMHG | HEIGHT: 64 IN | OXYGEN SATURATION: 97 % | WEIGHT: 221.3 LBS | HEART RATE: 78 BPM | SYSTOLIC BLOOD PRESSURE: 159 MMHG | BODY MASS INDEX: 37.78 KG/M2

## 2023-04-18 DIAGNOSIS — R10.31 RLQ ABDOMINAL PAIN: ICD-10-CM

## 2023-04-18 DIAGNOSIS — R30.0 DYSURIA: Primary | ICD-10-CM

## 2023-04-18 DIAGNOSIS — I10 BENIGN ESSENTIAL HYPERTENSION: ICD-10-CM

## 2023-04-18 LAB
ALBUMIN UR-MCNC: NEGATIVE MG/DL
APPEARANCE UR: CLEAR
BILIRUB UR QL STRIP: NEGATIVE
COLOR UR AUTO: YELLOW
GLUCOSE UR STRIP-MCNC: NEGATIVE MG/DL
HGB UR QL STRIP: NEGATIVE
KETONES UR STRIP-MCNC: NEGATIVE MG/DL
LEUKOCYTE ESTERASE UR QL STRIP: NEGATIVE
NITRATE UR QL: NEGATIVE
PH UR STRIP: 7 [PH] (ref 5–8)
SP GR UR STRIP: 1.01 (ref 1–1.03)
UROBILINOGEN UR STRIP-ACNC: 0.2 E.U./DL

## 2023-04-18 PROCEDURE — 81003 URINALYSIS AUTO W/O SCOPE: CPT | Performed by: FAMILY MEDICINE

## 2023-04-18 PROCEDURE — 87086 URINE CULTURE/COLONY COUNT: CPT | Performed by: FAMILY MEDICINE

## 2023-04-18 PROCEDURE — 99214 OFFICE O/P EST MOD 30 MIN: CPT | Performed by: FAMILY MEDICINE

## 2023-04-18 NOTE — PROGRESS NOTES
Assessment & Plan   Problem List Items Addressed This Visit     Benign essential hypertension   Other Visit Diagnoses     Dysuria    -  Primary    Relevant Orders    UA Macro with Reflex to Micro and Culture - lab collect (Completed)    Urine Culture (Completed)    CT Abdomen Pelvis w Contrast    RLQ abdominal pain        Relevant Orders    CT Abdomen Pelvis w Contrast         No evidence of UTI on urine analysis/ culture.   Consider possibility of stone and will refer for CT scan.    Needs recheck of BP in future.      [id number]         Eliana Hammonds MD  [facility]    Astrid Rankin is a 78 year old, presenting for the following health issues:  UTI (Frequency urination x 3 days ago, no blood )    She presents with a 3 day history of urinary frequency / urgency and dysuria. Symptoms have been persistent.  She denies any blood in the urine.  She also has had some right sided abdominal pain radiating to the back.  Her appetite and bowel movements have been normal.    No nausea or vomiting / fevers or chills.  No vaginal discharge or itching.    BP high today but states BP always high when she has UTI.    PMH: colon resection / appy /  / tubal ligation / hysterectomy / oophorectomy / lap choly.  She has had kidney stones in the past.    Patient Active Problem List   Diagnosis     Anxiety     Colon cancer, with resection 2/10/14     S/P total hip arthroplasty     Benign essential hypertension     Type 2 diabetes mellitus with diabetic polyneuropathy, without long-term current use of insulin (H)     Class 3 obesity (H)     Chronic bilateral low back pain with bilateral sciatica     History of malignant neoplasm of colon     Peripheral neuropathy     Vitamin D deficiency     Osteopenia of multiple sites              2023     2:40 PM   Additional Questions   Roomed by werner     UTI    History of Present Illness       Reason for visit:  Bladder infection    She eats 2-3  "servings of fruits and vegetables daily.She consumes 0 sweetened beverage(s) daily.She exercises with enough effort to increase her heart rate 9 or less minutes per day.  She exercises with enough effort to increase her heart rate 3 or less days per week.   She is taking medications regularly.               Review of Systems         Objective    BP (!) 171/84 (BP Location: Left arm, Patient Position: Sitting, Cuff Size: Adult Large)   Pulse 78   Resp 16   Ht 1.626 m (5' 4\")   Wt 100.4 kg (221 lb 4.8 oz)   LMP  (LMP Unknown)   SpO2 97%   BMI 37.99 kg/m    Body mass index is 37.99 kg/m .  Physical Exam   Lungs: CTA  CV: RRR. S1 and S2 normal.  Abdomen: Soft.  Tender over RLQ with no rebound or guarding.  Back: minimal right CVA tenderness    Results for orders placed or performed in visit on 04/18/23   UA Macro with Reflex to Micro and Culture - lab collect     Status: Normal    Specimen: Urine, Clean Catch   Result Value Ref Range    Color Urine Yellow Colorless, Straw, Light Yellow, Yellow    Appearance Urine Clear Clear    Glucose Urine Negative Negative mg/dL    Bilirubin Urine Negative Negative    Ketones Urine Negative Negative mg/dL    Specific Gravity Urine 1.015 1.005 - 1.030    Blood Urine Negative Negative    pH Urine 7.0 5.0 - 8.0    Protein Albumin Urine Negative Negative mg/dL    Urobilinogen Urine 0.2 0.2, 1.0 E.U./dL    Nitrite Urine Negative Negative    Leukocyte Esterase Urine Negative Negative    Narrative    Microscopic not indicated   Urine Culture     Status: None (Preliminary result)    Specimen: Urine, Clean Catch   Result Value Ref Range    Culture No growth, less than 1 day                  BP Readings from Last 6 Encounters:   04/18/23 (!) 159/79   02/27/23 138/80   01/20/23 134/64   12/13/22 (!) 149/76   11/21/22 118/76   08/19/22 110/56     Eliana Hammonds MD   "

## 2023-04-19 ENCOUNTER — TELEPHONE (OUTPATIENT)
Dept: FAMILY MEDICINE | Facility: CLINIC | Age: 78
End: 2023-04-19
Payer: COMMERCIAL

## 2023-04-19 NOTE — TELEPHONE ENCOUNTER
----- Message from Eliana Hammonds MD sent at 4/19/2023  2:39 PM CDT -----  Call Massiel- urine culture does not show infection so if symptoms are not better, I recommend she schedule the CT exam.

## 2023-04-20 LAB — BACTERIA UR CULT: NO GROWTH

## 2023-04-21 ENCOUNTER — HOSPITAL ENCOUNTER (OUTPATIENT)
Dept: CT IMAGING | Facility: HOSPITAL | Age: 78
Discharge: HOME | End: 2023-04-21
Attending: FAMILY MEDICINE | Admitting: FAMILY MEDICINE
Payer: COMMERCIAL

## 2023-04-21 DIAGNOSIS — R30.0 DYSURIA: ICD-10-CM

## 2023-04-21 DIAGNOSIS — R10.31 RLQ ABDOMINAL PAIN: ICD-10-CM

## 2023-04-21 LAB
CREAT BLD-MCNC: 0.8 MG/DL (ref 0.6–1.1)
GFR SERPL CREATININE-BSD FRML MDRD: >60 ML/MIN/1.73M2

## 2023-04-21 PROCEDURE — 74178 CT ABD&PLV WO CNTR FLWD CNTR: CPT

## 2023-04-21 PROCEDURE — 250N000011 HC RX IP 250 OP 636: Performed by: FAMILY MEDICINE

## 2023-04-21 PROCEDURE — 82565 ASSAY OF CREATININE: CPT

## 2023-04-21 RX ORDER — IOPAMIDOL 755 MG/ML
100 INJECTION, SOLUTION INTRAVASCULAR ONCE
Status: COMPLETED | OUTPATIENT
Start: 2023-04-21 | End: 2023-04-21

## 2023-04-21 RX ADMIN — IOPAMIDOL 100 ML: 755 INJECTION, SOLUTION INTRAVENOUS at 08:36

## 2023-06-13 DIAGNOSIS — M54.42 CHRONIC BILATERAL LOW BACK PAIN WITH BILATERAL SCIATICA: ICD-10-CM

## 2023-06-13 DIAGNOSIS — G89.29 CHRONIC BILATERAL LOW BACK PAIN WITH BILATERAL SCIATICA: ICD-10-CM

## 2023-06-13 DIAGNOSIS — M54.41 CHRONIC BILATERAL LOW BACK PAIN WITH BILATERAL SCIATICA: ICD-10-CM

## 2023-06-13 RX ORDER — CYCLOBENZAPRINE HCL 10 MG
TABLET ORAL
Qty: 30 TABLET | Refills: 11 | Status: SHIPPED | OUTPATIENT
Start: 2023-06-13 | End: 2024-08-13

## 2023-06-14 ENCOUNTER — TELEPHONE (OUTPATIENT)
Dept: FAMILY MEDICINE | Facility: CLINIC | Age: 78
End: 2023-06-14
Payer: COMMERCIAL

## 2023-06-14 ENCOUNTER — MYC MEDICAL ADVICE (OUTPATIENT)
Dept: FAMILY MEDICINE | Facility: CLINIC | Age: 78
End: 2023-06-14
Payer: COMMERCIAL

## 2023-06-14 DIAGNOSIS — E11.42 TYPE 2 DIABETES MELLITUS WITH DIABETIC POLYNEUROPATHY, WITHOUT LONG-TERM CURRENT USE OF INSULIN (H): Primary | ICD-10-CM

## 2023-06-14 RX ORDER — SEMAGLUTIDE 1.34 MG/ML
0.25 INJECTION, SOLUTION SUBCUTANEOUS
Qty: 1.5 ML | Refills: 1 | Status: SHIPPED | OUTPATIENT
Start: 2023-06-14 | End: 2023-08-12

## 2023-06-14 NOTE — TELEPHONE ENCOUNTER
June 14, 2023 I spoke with Massiel, she is in need of financial assistance for medication.    We reviewed the Prescription Assistance Program for manfacturer assistance programs, gross income, insurance and Rx list.    I am taking this case for her Ozempic. I am also assisting her  with his medications.    *The application for Ozempic that was emailed to her was the wrong email address.*    I will complete the [employer] assistance application for Ozempic.    When approved, Massiel will receive this medication at no cost through December 2023.    Kathy Rojas  Prescription Assistance Supervisor  Pharmacy Assistance  [phone]

## 2023-07-14 ENCOUNTER — TELEPHONE (OUTPATIENT)
Dept: FAMILY MEDICINE | Facility: CLINIC | Age: 78
End: 2023-07-14
Payer: COMMERCIAL

## 2023-07-14 NOTE — TELEPHONE ENCOUNTER
JORDAN- I have submitted Massiel's Jedempic application to the [employer] assistance program.    I will note Epic when I have a decision from [employer].    Kathy Rojas  Prescription Assistance Supervisor  Pharmacy Assistance  [phone]

## 2023-07-20 NOTE — TELEPHONE ENCOUNTER
[employer] called to inform provider that patient has been approved. Approval is from 7/18/2023-12/31/2023. First shipment will arrive in 10-14 business days. [employer] has informed patient.

## 2023-08-01 ENCOUNTER — TELEPHONE (OUTPATIENT)
Dept: FAMILY MEDICINE | Facility: CLINIC | Age: 78
End: 2023-08-01
Payer: COMMERCIAL

## 2023-08-01 NOTE — TELEPHONE ENCOUNTER
LIZABETH Gómez calling.   Patient feeling sweaty, clammy and liquid diarrhea x2 this AM. Last liquid stool just prior to time of call.   BP at 1140- 90/53 (patient reports taking losartan at 10am)  BP at 1152- 90/56  BS- 226  Patient has eaten 1 piece of toast and an egg so far today.   Denies fever.    Encouraged patient to lay down, apply cool cloth and push fluids. Recheck BP after 1hr and call clinic back.     Patient returning call at 1312, states she feels much better. Drank 2 bottles of water and BP at 1:10pm was 126/64. Now just feeling tired and cold. Will rest today and update clinic with any persistent symptoms, including diarrhea.

## 2023-08-12 DIAGNOSIS — E11.42 TYPE 2 DIABETES MELLITUS WITH DIABETIC POLYNEUROPATHY, WITHOUT LONG-TERM CURRENT USE OF INSULIN (H): ICD-10-CM

## 2023-08-12 RX ORDER — SEMAGLUTIDE 0.68 MG/ML
INJECTION, SOLUTION SUBCUTANEOUS
Qty: 3 ML | Refills: 1 | Status: SHIPPED | OUTPATIENT
Start: 2023-08-12 | End: 2024-01-03 | Stop reason: DRUGHIGH

## 2023-08-12 NOTE — TELEPHONE ENCOUNTER
"Routing refill request to provider for review/approval because:  Early refill request    Last Written Prescription Date:  6/14/2023  Last Fill Quantity: 1.5 ml,  # refills: 1   Last office visit provider:  4/18/2023     Requested Prescriptions   Pending Prescriptions Disp Refills    OZEMPIC (0.25 OR 0.5 MG/DOSE) 2 MG/3ML pen [Pharmacy Med Name: OZEMPIC 0.25-0.5 MG/DOSE PEN]       Sig: INJECT 0.25MG SUBCUTANEOUSLY EVERY 7 DAYS       GLP-1 Agonists Protocol Passed - 8/12/2023 11:49 AM        Passed - HgbA1C in past 3 or 6 months     If HgbA1C is 8 or greater, it needs to be on file within the past 3 months.  If less than 8, must be on file within the past 6 months.     Recent Labs   Lab Test 02/27/23  1314   A1C 6.3*             Passed - Medication is active on med list        Passed - Patient is age 18 or older        Passed - No active pregnancy on record        Passed - Normal serum creatinine on file in past 12 months     Recent Labs   Lab Test 04/21/23  0829   CR 0.8       Ok to refill medication if creatinine is low          Passed - No positive pregnancy test in past 12 months        Passed - Recent (6 mo) or future (30 days) visit within the authorizing provider's specialty     Patient had office visit in the last 6 months or has a visit in the next 30 days with authorizing provider.  See \"Patient Info\" tab in inbasket, or \"Choose Columns\" in Meds & Orders section of the refill encounter.                 Elena Benson RN 08/12/23 11:51 AM  "

## 2023-08-12 NOTE — CONFIDENTIAL NOTE
Med check scheduled 8/28.  The patient had called on 8/1 with symptoms suggesting hypoglycemia.  She is using a company sponsored program to afford her medication.  Refill will be sent in order to ensure continuation of therapy.  However, please verify that the order went to the correct place given the [employer] support.

## 2023-08-28 ENCOUNTER — OFFICE VISIT (OUTPATIENT)
Dept: FAMILY MEDICINE | Facility: CLINIC | Age: 78
End: 2023-08-28
Payer: COMMERCIAL

## 2023-08-28 VITALS
RESPIRATION RATE: 12 BRPM | DIASTOLIC BLOOD PRESSURE: 74 MMHG | BODY MASS INDEX: 37.05 KG/M2 | WEIGHT: 217 LBS | OXYGEN SATURATION: 100 % | HEIGHT: 64 IN | SYSTOLIC BLOOD PRESSURE: 134 MMHG | HEART RATE: 68 BPM | TEMPERATURE: 97.8 F

## 2023-08-28 DIAGNOSIS — C18.9 MALIGNANT NEOPLASM OF COLON, UNSPECIFIED PART OF COLON (H): ICD-10-CM

## 2023-08-28 DIAGNOSIS — E11.42 TYPE 2 DIABETES MELLITUS WITH DIABETIC POLYNEUROPATHY, WITHOUT LONG-TERM CURRENT USE OF INSULIN (H): Primary | ICD-10-CM

## 2023-08-28 LAB
CEA SERPL-MCNC: 1.6 NG/ML
HBA1C MFR BLD: 7.6 % (ref 0–5.6)
HOLD SPECIMEN: NORMAL
HOLD SPECIMEN: NORMAL

## 2023-08-28 PROCEDURE — 83036 HEMOGLOBIN GLYCOSYLATED A1C: CPT | Performed by: FAMILY MEDICINE

## 2023-08-28 PROCEDURE — 82378 CARCINOEMBRYONIC ANTIGEN: CPT | Performed by: FAMILY MEDICINE

## 2023-08-28 PROCEDURE — 36415 COLL VENOUS BLD VENIPUNCTURE: CPT | Performed by: FAMILY MEDICINE

## 2023-08-28 PROCEDURE — 99214 OFFICE O/P EST MOD 30 MIN: CPT | Performed by: FAMILY MEDICINE

## 2023-08-28 NOTE — PROGRESS NOTES
"  Assessment & Plan   Problem List Items Addressed This Visit       Colon cancer, with resection 2/10/14     Continue current 6-month CEA checks as per recommendations.         Relevant Orders    CEA (Completed)    Extra Tube (Completed)    Type 2 diabetes mellitus with diabetic polyneuropathy, without long-term current use of insulin (H) - Primary     Not fully controlled but still below 8, just not below 7.  Most likely secondary to recent temporary lifestyle change with her 's recent medical treatments.  Encourage patient to return to gym for walking activities as per previous now that  is in better condition.  We discussed strategies of how she can accomplish this to not only help her with her diabetes, blood pressure but also with her mental health.         Relevant Orders    Adult Eye  Referral    HEMOGLOBIN A1C (Completed)    Extra Tube (Completed)         BMI:   Estimated body mass index is 37.25 kg/m  as calculated from the following:    Height as of this encounter: 1.626 m (5' 4\").    Weight as of this encounter: 98.4 kg (217 lb).   Weight management plan: Discussed healthy diet and exercise guidelines    Regular exercise  See Patient Instructions    Saul Zamora [facility]    Astrid Rankin is a 78 year old, presenting for the following health issues:  Follow Up (Diabetes)      8/28/2023     8:57 AM   Additional Questions   Roomed by ANEL Chin   Accompanied by Self         8/28/2023     8:57 AM   Patient Reported Additional Medications   Patient reports taking the following new medications N/A       Patient presents today for follow-up on diabetes as well as history of colon cancer.  Overall she is doing well but the past few months have been difficult.  Her  fell and broke her hip and has needed more home care for her which has prevented her from normal exercise routine and also not as well as diet as previous.   is now at a " "point where he is able to ambulate.  His physical therapy has advanced to wear he is now going to be seeing his orthopedic surgery and possibly starting physical therapy in the amatory setting instead at home.  However with increased care and her having a feeling goals that he previously.  She has not been exercising as much and watch her diet closely.    History of Present Illness       Diabetes:   She presents for follow up of diabetes.  She is checking home blood glucose one time daily.   She checks blood glucose before meals.  Blood glucose is never over 200 and never under 70. She is aware of hypoglycemia symptoms including none.   She is concerned about other.   She is having numbness in feet, burning in feet and weight gain.  The patient has not had a diabetic eye exam in the last 12 months.          She eats 2-3 servings of fruits and vegetables daily.She consumes 0 sweetened beverage(s) daily.She exercises with enough effort to increase her heart rate 9 or less minutes per day.  She exercises with enough effort to increase her heart rate 3 or less days per week.   She is taking medications regularly.         Review of Systems   All other systems reviewed and are negative.           Objective    /74 (BP Location: Left arm, Patient Position: Sitting, Cuff Size: Adult Large)   Pulse 68   Temp 97.8  F (36.6  C) (Oral)   Resp 12   Ht 1.626 m (5' 4\")   Wt 98.4 kg (217 lb)   LMP  (LMP Unknown)   SpO2 100%   Breastfeeding No   BMI 37.25 kg/m    Body mass index is 37.25 kg/m .  Physical Exam  Vitals and nursing note reviewed.   Constitutional:       General: She is not in acute distress.     Appearance: Normal appearance. She is not ill-appearing.   HENT:      Head: Normocephalic and atraumatic.   Eyes:      Extraocular Movements: Extraocular movements intact.      Conjunctiva/sclera: Conjunctivae normal.   Pulmonary:      Effort: Pulmonary effort is normal.   Neurological:      Mental Status: She is " alert and oriented to person, place, and time.   Psychiatric:         Attention and Perception: Attention normal.         Mood and Affect: Mood normal.         Speech: Speech normal.         Thought Content: Thought content normal.

## 2023-08-29 NOTE — ASSESSMENT & PLAN NOTE
Not fully controlled but still below 8, just not below 7.  Most likely secondary to recent temporary lifestyle change with her 's recent medical treatments.  Encourage patient to return to gym for walking activities as per previous now that  is in better condition.  We discussed strategies of how she can accomplish this to not only help her with her diabetes, blood pressure but also with her mental health.

## 2023-09-01 ENCOUNTER — TELEPHONE (OUTPATIENT)
Dept: FAMILY MEDICINE | Facility: CLINIC | Age: 78
End: 2023-09-01
Payer: COMMERCIAL

## 2023-09-01 NOTE — TELEPHONE ENCOUNTER
"Called Karol regarding a note that was placed on writers desk stating, \"Pt had positive Shiga toxin and is looking for follow up with worsening symptoms.\"    Left message to call back for: Karol  Information to relay to patient: Please ask if Karol is looking for Nurse triage on her symptoms or a follow-up appointment with her provider. Please schedule an appointment if needed. Please transfer to Nurse triage if after hours, if needed.  "

## 2023-09-02 ENCOUNTER — NURSE TRIAGE (OUTPATIENT)
Dept: NURSING | Facility: CLINIC | Age: 78
End: 2023-09-02
Payer: COMMERCIAL

## 2023-09-02 NOTE — TELEPHONE ENCOUNTER
The patient denies any symptoms of bowel problems.  She says she had symptoms back in January of 2024, so they must be a mix up as to why she was called and left a message to make an appointment or triage.

## 2023-09-02 NOTE — TELEPHONE ENCOUNTER
The patient called the triage line on 09/02/23 and says she was unaware of any new symptoms and those symptoms were from January of 2023. She is not sure if there was a mixup of patients.

## 2023-09-05 ENCOUNTER — TRANSFERRED RECORDS (OUTPATIENT)
Dept: HEALTH INFORMATION MANAGEMENT | Facility: CLINIC | Age: 78
End: 2023-09-05
Payer: COMMERCIAL

## 2023-09-05 LAB — RETINOPATHY: POSITIVE

## 2023-09-14 DIAGNOSIS — E11.42 TYPE 2 DIABETES MELLITUS WITH DIABETIC POLYNEUROPATHY, WITHOUT LONG-TERM CURRENT USE OF INSULIN (H): ICD-10-CM

## 2023-09-14 RX ORDER — BLOOD SUGAR DIAGNOSTIC
STRIP MISCELLANEOUS
Qty: 200 STRIP | Refills: 0 | Status: SHIPPED | OUTPATIENT
Start: 2023-09-14

## 2023-09-17 ENCOUNTER — MYC MEDICAL ADVICE (OUTPATIENT)
Dept: FAMILY MEDICINE | Facility: CLINIC | Age: 78
End: 2023-09-17
Payer: COMMERCIAL

## 2023-09-17 DIAGNOSIS — R30.0 DYSURIA: Primary | ICD-10-CM

## 2023-09-18 ENCOUNTER — LAB (OUTPATIENT)
Dept: LAB | Facility: CLINIC | Age: 78
End: 2023-09-18
Payer: COMMERCIAL

## 2023-09-18 DIAGNOSIS — R30.0 DYSURIA: ICD-10-CM

## 2023-09-18 LAB
ALBUMIN UR-MCNC: ABNORMAL MG/DL
APPEARANCE UR: ABNORMAL
BACTERIA #/AREA URNS HPF: ABNORMAL /HPF
BILIRUB UR QL STRIP: NEGATIVE
COLOR UR AUTO: YELLOW
GLUCOSE UR STRIP-MCNC: NEGATIVE MG/DL
HGB UR QL STRIP: ABNORMAL
KETONES UR STRIP-MCNC: NEGATIVE MG/DL
LEUKOCYTE ESTERASE UR QL STRIP: ABNORMAL
NITRATE UR QL: POSITIVE
PH UR STRIP: 6 [PH] (ref 5–8)
RBC #/AREA URNS AUTO: ABNORMAL /HPF
SP GR UR STRIP: 1.01 (ref 1–1.03)
UROBILINOGEN UR STRIP-ACNC: 0.2 E.U./DL
WBC #/AREA URNS AUTO: >100 /HPF

## 2023-09-18 PROCEDURE — 87086 URINE CULTURE/COLONY COUNT: CPT

## 2023-09-18 PROCEDURE — 81001 URINALYSIS AUTO W/SCOPE: CPT

## 2023-09-18 PROCEDURE — 87186 SC STD MICRODIL/AGAR DIL: CPT

## 2023-09-18 RX ORDER — SULFAMETHOXAZOLE/TRIMETHOPRIM 800-160 MG
1 TABLET ORAL 2 TIMES DAILY
Qty: 14 TABLET | Refills: 0 | Status: SHIPPED | OUTPATIENT
Start: 2023-09-18 | End: 2023-09-25

## 2023-09-20 LAB — BACTERIA UR CULT: ABNORMAL

## 2023-11-29 ENCOUNTER — LAB (OUTPATIENT)
Dept: LAB | Facility: CLINIC | Age: 78
End: 2023-11-29
Payer: COMMERCIAL

## 2023-11-29 DIAGNOSIS — E11.42 TYPE 2 DIABETES MELLITUS WITH DIABETIC POLYNEUROPATHY, WITHOUT LONG-TERM CURRENT USE OF INSULIN (H): ICD-10-CM

## 2023-11-29 LAB — HBA1C MFR BLD: 7.6 % (ref 0–5.6)

## 2023-11-29 PROCEDURE — 36415 COLL VENOUS BLD VENIPUNCTURE: CPT

## 2023-11-29 PROCEDURE — 83036 HEMOGLOBIN GLYCOSYLATED A1C: CPT

## 2023-12-02 ENCOUNTER — TELEPHONE (OUTPATIENT)
Dept: FAMILY MEDICINE | Facility: CLINIC | Age: 78
End: 2023-12-02
Payer: COMMERCIAL

## 2023-12-02 NOTE — TELEPHONE ENCOUNTER
Reason for Call:  Appointment Request    Patient requesting this type of appt:  Provider requested that patient make an appointment to see him to follow up on some labs. He wanted to see her before Dec 28 but he doesn't have any openings. Wondering what she should do.    Requested provider: Saul Zamora    Reason patient unable to be scheduled: Not within requested timeframe    When does patient want to be seen/preferred time:  Before Dec 28    Comments: Follow up appt for labs    Could we send this information to you in [employer] or would you prefer to receive a phone call?:   Patient would like to be contacted via [employer]    Call taken on 12/2/2023 at 9:33 AM by Rosa Hilliard

## 2023-12-06 ENCOUNTER — OFFICE VISIT (OUTPATIENT)
Dept: FAMILY MEDICINE | Facility: CLINIC | Age: 78
End: 2023-12-06
Payer: COMMERCIAL

## 2023-12-06 VITALS
BODY MASS INDEX: 37.66 KG/M2 | WEIGHT: 220.6 LBS | HEART RATE: 77 BPM | RESPIRATION RATE: 12 BRPM | OXYGEN SATURATION: 96 % | SYSTOLIC BLOOD PRESSURE: 134 MMHG | HEIGHT: 64 IN | TEMPERATURE: 98.1 F | DIASTOLIC BLOOD PRESSURE: 70 MMHG

## 2023-12-06 DIAGNOSIS — G89.29 CHRONIC BILATERAL LOW BACK PAIN WITH BILATERAL SCIATICA: ICD-10-CM

## 2023-12-06 DIAGNOSIS — M54.41 CHRONIC BILATERAL LOW BACK PAIN WITH BILATERAL SCIATICA: ICD-10-CM

## 2023-12-06 DIAGNOSIS — E11.42 TYPE 2 DIABETES MELLITUS WITH DIABETIC POLYNEUROPATHY, WITHOUT LONG-TERM CURRENT USE OF INSULIN (H): Primary | ICD-10-CM

## 2023-12-06 DIAGNOSIS — I10 BENIGN ESSENTIAL HYPERTENSION: ICD-10-CM

## 2023-12-06 DIAGNOSIS — E66.813 CLASS 3 OBESITY: ICD-10-CM

## 2023-12-06 DIAGNOSIS — M54.42 CHRONIC BILATERAL LOW BACK PAIN WITH BILATERAL SCIATICA: ICD-10-CM

## 2023-12-06 PROCEDURE — 99214 OFFICE O/P EST MOD 30 MIN: CPT | Performed by: FAMILY MEDICINE

## 2023-12-06 RX ORDER — VIT A/VIT C/VIT E/ZINC/COPPER 2148-113
1 TABLET ORAL DAILY
COMMUNITY

## 2023-12-06 NOTE — PROGRESS NOTES
Assessment & Plan   Problem List Items Addressed This Visit       Benign essential hypertension     Below goal of 140/90 on current dosing of losartan 50 mg daily along with diet and exercise.  Encouraged to continue.         Type 2 diabetes mellitus with diabetic polyneuropathy, without long-term current use of insulin (H) - Primary     A1c unchanged despite the addition of the Ozempic.  Discussed with patient the possibility of starting long-acting insulin instead of continuing the Ozempic or in addition to the Ozempic.  Patient would like to consider this.  Will send to [facility] for further consideration.  Patient will determine between now and that appointment as to whether or not she is ready to go to a daily shot.         Relevant Orders    Med Therapy Management Referral    Class 3 obesity (H)     Weight is holding stable.  Discussed strategies for helping to reduce her intake slightly more.         Chronic bilateral low back pain with bilateral sciatica     Still having some shocking pain when she turns to the left or the right.  Due to some the complication she had with the surgery she would like to go to a different spine surgeon.  Her family has gone to [facility] orthopedics and she is planning to go there for further review.             Regular exercise  See Patient Instructions    Saul Zamora DO  [facility]    Astrid Rankin is a 78 year old, presenting for the following health issues:  Follow Up (Labs; Middle Back Pain)      12/6/2023     4:33 PM   Additional Questions   Roomed by ANEL Chin   Accompanied by Self         12/6/2023     4:33 PM   Patient Reported Additional Medications   Patient reports taking the following new medications N/A       Overall doing well.  Weight is holding stable.  She has been taking the Ozempic but with that her A1c has stayed the same at 7.6.  She does admit that she has been eating slightly more as her 's appetite is gone down  "and she does not like to waste food.  We did discuss the idea of may be making the less each night since her  is not eating as much to help reduce the amount she is taking to prevent waste of food.    History of Present Illness       Diabetes:   She presents for follow up of diabetes.  She is checking home blood glucose one time daily.   She checks blood glucose before meals.  Blood glucose is never over 200 and never under 70. She is aware of hypoglycemia symptoms including dizziness.   She is concerned about other.   She is having numbness in feet and burning in feet.  The patient has had a diabetic eye exam in the last 12 months. Eye exam performed on 9/5/23. Location of last eye exam [facility].        Reason for visit:  Lab Results    She eats 2-3 servings of fruits and vegetables daily.She consumes 0 sweetened beverage(s) daily.She exercises with enough effort to increase her heart rate 9 or less minutes per day.  She exercises with enough effort to increase her heart rate 3 or less days per week.   She is taking medications regularly.       Review of Systems   All other systems reviewed and are negative.           Objective    BP (!) 158/82 (BP Location: Left arm, Patient Position: Sitting, Cuff Size: Adult Large)   Pulse 77   Temp 98.1  F (36.7  C) (Oral)   Resp 12   Ht 1.626 m (5' 4\")   Wt 100.1 kg (220 lb 9.6 oz)   LMP  (LMP Unknown)   SpO2 96%   Breastfeeding No   BMI 37.87 kg/m    Body mass index is 37.87 kg/m .  Physical Exam  Vitals and nursing note reviewed.   Constitutional:       General: She is not in acute distress.     Appearance: Normal appearance. She is not ill-appearing.   HENT:      Head: Normocephalic and atraumatic.   Eyes:      Extraocular Movements: Extraocular movements intact.      Conjunctiva/sclera: Conjunctivae normal.   Pulmonary:      Effort: Pulmonary effort is normal.   Neurological:      Mental Status: She is alert and oriented to person, place, and time. "   Psychiatric:         Attention and Perception: Attention normal.         Mood and Affect: Mood normal.         Speech: Speech normal.         Thought Content: Thought content normal.

## 2023-12-07 NOTE — ASSESSMENT & PLAN NOTE
Still having some shocking pain when she turns to the left or the right.  Due to some the complication she had with the surgery she would like to go to a different spine surgeon.  Her family has gone to [facility] orthopedics and she is planning to go there for further review.

## 2023-12-07 NOTE — ASSESSMENT & PLAN NOTE
A1c unchanged despite the addition of the Ozempic.  Discussed with patient the possibility of starting long-acting insulin instead of continuing the Ozempic or in addition to the Ozempic.  Patient would like to consider this.  Will send to [facility] for further consideration.  Patient will determine between now and that appointment as to whether or not she is ready to go to a daily shot.

## 2023-12-07 NOTE — ASSESSMENT & PLAN NOTE
Below goal of 140/90 on current dosing of losartan 50 mg daily along with diet and exercise.  Encouraged to continue.

## 2023-12-08 ENCOUNTER — TELEPHONE (OUTPATIENT)
Dept: FAMILY MEDICINE | Facility: CLINIC | Age: 78
End: 2023-12-08
Payer: COMMERCIAL

## 2023-12-08 NOTE — TELEPHONE ENCOUNTER
[facility] referral from: [facility] visit (referral by provider)    [facility] referral outreach attempt #2 on December 8, 2023 at 1:32 PM      Outcome: Patient not reachable after several attempts, will route to [facility] Pharmacist/Provider as an FYI.  [facility] scheduling number is .  Thank you for the referral.    Use [employer] for the carrier/Plan on the flowsheet      [employer] Message Sent    EMMANUEL Strange

## 2024-01-03 ENCOUNTER — OFFICE VISIT (OUTPATIENT)
Dept: PHARMACY | Facility: CLINIC | Age: 79
End: 2024-01-03
Attending: FAMILY MEDICINE
Payer: COMMERCIAL

## 2024-01-03 DIAGNOSIS — M54.41 CHRONIC BILATERAL LOW BACK PAIN WITH BILATERAL SCIATICA: ICD-10-CM

## 2024-01-03 DIAGNOSIS — G47.00 INSOMNIA, UNSPECIFIED TYPE: ICD-10-CM

## 2024-01-03 DIAGNOSIS — E11.42 TYPE 2 DIABETES MELLITUS WITH DIABETIC POLYNEUROPATHY, WITHOUT LONG-TERM CURRENT USE OF INSULIN (H): Primary | ICD-10-CM

## 2024-01-03 DIAGNOSIS — M54.42 CHRONIC BILATERAL LOW BACK PAIN WITH BILATERAL SCIATICA: ICD-10-CM

## 2024-01-03 DIAGNOSIS — E78.5 HYPERLIPIDEMIA LDL GOAL <100: ICD-10-CM

## 2024-01-03 DIAGNOSIS — Z78.9 TAKES DIETARY SUPPLEMENTS: ICD-10-CM

## 2024-01-03 DIAGNOSIS — G89.29 CHRONIC BILATERAL LOW BACK PAIN WITH BILATERAL SCIATICA: ICD-10-CM

## 2024-01-03 DIAGNOSIS — I10 BENIGN ESSENTIAL HYPERTENSION: ICD-10-CM

## 2024-01-03 PROCEDURE — 99607 MTMS BY PHARM ADDL 15 MIN: CPT | Performed by: PHARMACIST

## 2024-01-03 PROCEDURE — 99605 MTMS BY PHARM NP 15 MIN: CPT | Performed by: PHARMACIST

## 2024-01-03 NOTE — LETTER
"Recommended To-Do List      Prepared on: 01/03/2024       You can get the best results from your medications by completing the items on this \"To-Do List.\"      Bring your To-Do List when you go to your doctor. And, share it with your family or caregivers.    My To-Do List:  What we talked about: What I should do:   An issue with your medication    Change the medication you are taking from Advil to acetaminophen (TYLENOL) - starting with 1000 mg at bedtime of acetaminophen          What we talked about: What I should do:   Your medication dosage being too low    Increase how often you take Cranberry - try taking it daily for UTI prevention.           What we talked about: What I should do:   Your medication dosage being too low    Increase your dosage of semaglutide (OZEMPIC) to 0.5 mg once weekly - call Kathy Rojas to apply for assistance this year.           What we talked about: What I should do:                     "

## 2024-01-03 NOTE — LETTER
January 3, 2024  Karol Mujica  [id number]    Dear Ms. Mujica, [facility]     Thank you for talking with me on Tomy 3, 2024 about your health and medications. As a follow-up to our conversation, I have included two documents:      Your Recommended To-Do List has steps you should take to get the best results from your medications.  Your Medication List will help you keep track of your medications and how to take them.    If you want to talk about these documents, please call Amie Hobbs RPH at phone: [phone], Monday-Friday 8-4:30pm.    I look forward to working with you and your doctors to make sure your medications work well for you.    Sincerely,  Amie Hobbs RPH  [facility] Pharmacist, [facility]

## 2024-01-03 NOTE — PROGRESS NOTES
Medication Therapy Management (MTM) Encounter    ASSESSMENT:                            Medication Adherence/Access: No issues identified    Type 2 Diabetes:  patient continues to be at a starting dose of Ozempic - her morning blood sugars are above goal - A1c can be improved to <7. Patient is working with Kathy Rojas - will have her set up an appointment with Kathy. Put a new prescription in Epic for Ozempic 0.5 mg weekly. Once patient is approved, will have her increase her dose of Ozempic to 0.5 mg weekly.     Hypertension: stable    Hyperlipidemia: stable    Back Pain: recommend patient try Tylenol instead of Advil, less risk for side effects.     Supplements: consider taking cranberry supplement daily for prophylaxis of UTI's.     Neuropathy: stable    Insomnia: stable    PLAN:                            1. I think Ozempic is a good option for you to continue with - you are tolerating it well. I would like to increase your dose as you are at a starting dose - I put a prescription in your profile. Please contact Kathy Rojas to reapply for Ozempic.      2. Try switching from Advil to Tylenol 1000 mg at bedtime.    3. Try taking your cranberry supplement daily to help prevent UTI's.     Follow-up: Wednesday, January 17th at 10:00 AM    SUBJECTIVE/OBJECTIVE:                          Massiel Mujica is a 78 year old female coming in for an initial visit. She was referred to me from Saul Zamora DO.    Reason for visit: per referral: Continuation of Ozempic versus starting insulin glargine.     Allergies/ADRs: Reviewed in chart  Past Medical History: Reviewed in chart  Tobacco: She reports that she has never smoked. She has never been exposed to tobacco smoke. She has never used smokeless tobacco.  Alcohol: not currently using    Medication Adherence/Access: no issues reported    Type 2 Diabetes:    Ozempic 0.25 mg weekly    Patient has been at current Ozempic dose for a couple of years. Tolerating well.  Initially was on immediate release metformin - didn't tolerate due to GI side effects (severe diarrhea). Working with Kathy Rojas for patient assistance - patient has not contacted Kathy yet this year, she has a couple of months left of current Ozempic dose.   .  Blood sugar monitoring: once time(s) daily; Ranges: (patient reported) Fastin-182   Current diabetes symptoms: none  Eye exam: up to date  Foot exam: up to date  Urine Albumin:   Lab Results   Component Value Date    UMALCR 39.55 (H) 2023      Lab Results   Component Value Date    A1C 7.6 (H) 2023       Hypertension:   Losartan 50 mg daily    Patient reports no current medication side effects.    BP Readings from Last 3 Encounters:   23 134/70   23 134/74   23 (!) 159/79     Pulse Readings from Last 3 Encounters:   23 77   23 68   23 78     Hyperlipidemia:   simvastatin 40 mg daily    Patient reports no current medication side effects.  The 10-year ASCVD risk score (Stephany KILGORE, et al., 2019) is: 48.2%    Values used to calculate the score:      Age: 78 years      Sex: Female      Is Non- : No      Diabetic: Yes      Tobacco smoker: No      Systolic Blood Pressure: 134 mmHg      Is BP treated: Yes      HDL Cholesterol: 85 mg/dL      Total Cholesterol: 178 mg/dL  Recent Labs   Lab Test 23  1314 22  1128   CHOL 178 181   HDL 85 77   LDL 72 83   TRIG 105 106     Back Pain:  Advil 400 mg at bedtime  Cyclobenzaprine 10 mg as needed - only takes occasionally    Had back surgery in  - by evening noticing aches/pains. Does not think Tylenol works has well, has not used for a long time.     GFR Estimate   Date Value Ref Range Status   2021 >60 >60 mL/min/1.73m2 Final     GFR, ESTIMATED POCT   Date Value Ref Range Status   2023 >60 >60 mL/min/1.73m2 Final     Supplements:   Calcium one tablet twice daily  Cranberry supplement - only taking if feels a UTI is  coming on  Multivitamin daily  PreserVision AREDS daily  Vitamin D 4000 units daily    Getting around 300 mg of calcium daily from diet.     Neuropathy:  Gabapentin 300 mg at bedtime    Tolerating well. Working well for neuropathy in feet.     Insomnia:  Melatonin 1 mg at bedtime as needed    Working well.   ----------------    I spent 30 minutes with this patient today. All changes were made via collaborative practice agreement with Saul Zamora DO. A copy of the visit note was provided to the patient's provider(s).    A summary of these recommendations was given to the patient.    Amie Hobbs, PharmD  Medication Therapy Management        Medication Therapy Recommendations  Chronic bilateral low back pain with bilateral sciatica    Rationale: Unsafe medication for the patient - Adverse medication event - Safety   Recommendation: Change Medication - acetaminophen 500 MG tablet   Status: Accepted - no CPA Needed         Takes dietary supplements    Current Medication: Cranberry 450 MG TABS   Rationale: Dose too low - Dosage too low - Effectiveness   Recommendation: Increase Frequency   Status: Accepted - no CPA Needed         Type 2 diabetes mellitus with diabetic polyneuropathy, without long-term current use of insulin (H)    Current Medication: semaglutide (OZEMPIC) 2 MG/3ML pen   Rationale: Dose too low - Dosage too low - Effectiveness   Recommendation: Increase Dose   Status: Accepted per CPA

## 2024-01-03 NOTE — PATIENT INSTRUCTIONS
"Recommendations from today's MTM visit:                                                    MTM (medication therapy management) is a service provided by a clinical pharmacist designed to help you get the most of out of your medicines.   Today we reviewed what your medicines are for, how to know if they are working, that your medicines are safe and how to make your medicine regimen as easy as possible.      Massiel,    It was a pleasure talking with you! We discussed the followin. I think Ozempic is a good option for you to continue with - you are tolerating it well. I would like to increase your dose as you are at a starting dose - I put a prescription in your profile. Please contact Kathy Rojas to reapply for Ozempic.      2. Try switching from Advil to Tylenol 1000 mg at bedtime.    3. Try taking your cranberry supplement daily to help prevent UTI's.     Follow-up:  at 10:00 AM    It was great speaking with you today.  I value your experience and would be very thankful for your time in providing feedback in our clinic survey. In the next few days, you may receive an email or text message from [employer] with a link to a survey related to your  clinical pharmacist.\"     To schedule another MTM appointment, please call the clinic directly or you may call the MTM scheduling line at [phone] or toll-free at [phone].     My Clinical Pharmacist's contact information:                                                      Please feel free to contact me with any questions or concerns you have.      Keep up the good work!!    Amie Hobbs, PharmD  [phone] in clinic on Tuesday and Wednesday        " Klisyri Pregnancy And Lactation Text: It is unknown if this medication can harm a developing fetus or if it is excreted in breast milk.

## 2024-01-03 NOTE — LETTER
_  Medication List        Prepared on: 01/03/2024     Bring your Medication List when you go to the doctor, hospital, or   emergency room. And, share it with your family or caregivers.     Note any changes to how you take your medications.  Cross out medications when you no longer use them.    Medication How I take it Why I use it Prescriber   acetaminophen (TYLENOL) 500 MG tablet Take 1,000 mg by mouth as needed Pain Patient Reported   Calcium-Magnesium-Zinc 333-133-5 MG TABS per tablet Take 1 tablet by mouth 2 times daily General Health   Patient Reported   cholecalciferol 50 MCG (2000 UT) CAPS Take 4,000 Units by mouth daily General Health   Patient Reported   Cranberry 450 MG TABS Take 450 mg by mouth daily General Health   Patient Reported   cyclobenzaprine (FLEXERIL) 10 MG tablet TAKE 1 TABLET BY MOUTH EVERY DAY AS NEEDED Chronic bilateral low back pain with bilateral sciatica Saul Castanod, DO   gabapentin (NEURONTIN) 300 MG capsule TAKE 1 CAPSULE BY MOUTH AT BEDTIME Chronic bilateral low back pain with bilateral sciatica Saul Castanod, DO   losartan (COZAAR) 50 MG tablet TAKE 1 TABLET BY MOUTH EVERY DAY Essential (Primary) Hypertension Saul Zamora, DO   melatonin 1 MG TABS tablet Take 1 mg by mouth nightly as needed for sleep Insomnia Patient Reported   Multiple Vitamins-Minerals (MULTIVITAL-M) TABS Take 1 tablet by mouth daily General Health   Patient Reported   Multiple Vitamins-Minerals (PRESERVISION AREDS) TABS Take 1 Dose by mouth daily General Health   Patient Reported   semaglutide (OZEMPIC) 2 MG/3ML pen Inject 0.5 mg Subcutaneous every 7 days Type 2 diabetes mellitus with diabetic polyneuropathy, without long-term current use of insulin (H) Saul Castanod, DO   simvastatin (ZOCOR) 40 MG tablet TAKE 1 TABLET BY MOUTH EVERYDAY AT BEDTIME Hyperlipidemia, unspecified Saul Castanod, DO         Add new medications, over-the-counter drugs, herbals, vitamins, or  minerals  in the blank rows below.    Medication How I take it Why I use it Prescriber                                      Allergies:      bee venom; blood-group specific substance; nitrofurantoin; penicillins        Side effects I have had:               Other Information:              My notes and questions:

## 2024-01-07 DIAGNOSIS — M54.42 CHRONIC BILATERAL LOW BACK PAIN WITH BILATERAL SCIATICA: ICD-10-CM

## 2024-01-07 DIAGNOSIS — E11.9 TYPE 2 DIABETES MELLITUS WITHOUT COMPLICATION, WITHOUT LONG-TERM CURRENT USE OF INSULIN (H): ICD-10-CM

## 2024-01-07 DIAGNOSIS — M54.41 CHRONIC BILATERAL LOW BACK PAIN WITH BILATERAL SCIATICA: ICD-10-CM

## 2024-01-07 DIAGNOSIS — G89.29 CHRONIC BILATERAL LOW BACK PAIN WITH BILATERAL SCIATICA: ICD-10-CM

## 2024-01-09 RX ORDER — LANCETS
EACH MISCELLANEOUS
Qty: 102 EACH | Refills: 11 | Status: SHIPPED | OUTPATIENT
Start: 2024-01-09

## 2024-01-09 RX ORDER — GABAPENTIN 300 MG/1
300 CAPSULE ORAL AT BEDTIME
Qty: 90 CAPSULE | Refills: 3 | Status: SHIPPED | OUTPATIENT
Start: 2024-01-09 | End: 2024-08-13

## 2024-01-17 ENCOUNTER — VIRTUAL VISIT (OUTPATIENT)
Dept: PHARMACY | Facility: CLINIC | Age: 79
End: 2024-01-17
Payer: COMMERCIAL

## 2024-01-17 DIAGNOSIS — G89.29 CHRONIC BILATERAL LOW BACK PAIN WITH BILATERAL SCIATICA: ICD-10-CM

## 2024-01-17 DIAGNOSIS — M54.42 CHRONIC BILATERAL LOW BACK PAIN WITH BILATERAL SCIATICA: ICD-10-CM

## 2024-01-17 DIAGNOSIS — Z78.9 TAKES DIETARY SUPPLEMENTS: ICD-10-CM

## 2024-01-17 DIAGNOSIS — M54.41 CHRONIC BILATERAL LOW BACK PAIN WITH BILATERAL SCIATICA: ICD-10-CM

## 2024-01-17 DIAGNOSIS — E11.42 TYPE 2 DIABETES MELLITUS WITH DIABETIC POLYNEUROPATHY, WITHOUT LONG-TERM CURRENT USE OF INSULIN (H): Primary | ICD-10-CM

## 2024-01-17 PROCEDURE — 99607 MTMS BY PHARM ADDL 15 MIN: CPT | Mod: 93 | Performed by: PHARMACIST

## 2024-01-17 PROCEDURE — 99606 MTMS BY PHARM EST 15 MIN: CPT | Mod: 93 | Performed by: PHARMACIST

## 2024-01-17 NOTE — PROGRESS NOTES
Medication Therapy Management (MTM) Encounter    ASSESSMENT:                            Medication Adherence/Access: No issues identified    Type 2 Diabetes:  I will contact Kathy Rojas to see if there is anything I can do to help with patient's paperwork/reapplication for Ozempic.    Back Pain: try using Voltaren topical gel on your back to see if effective for pain relief. If not effective - okay to go back to low dose Advil 200-400 mg at bedtime. Continue to monitor BP and kidney function as well as for possible GI side effects.     Supplements: stable    PLAN:                            1. I will contact Kathy Rojas to see if there is anything I can do to help with Massiel's paperwork/reapplication for Ozempic.     2.  Try using Voltaren topical gel on your back to see if effective for pain relief.     Follow-up: Wednesday, January 17th at 3:30 PM    SUBJECTIVE/OBJECTIVE:                          Massiel Mujica is a 78 year old female called for a follow-up visit from 1/3/2023.       Reason for visit: follow up MTM visit.    Tobacco: She reports that she has never smoked. She has never been exposed to tobacco smoke. She has never used smokeless tobacco.  Alcohol: not currently using    Medication Adherence/Access: no issues reported    Type 2 Diabetes:    Ozempic 0.25 mg weekly    Patient contacted Kathy Bethea's office two weeks ago, was told she needed to fill out new forms and they would be sent to her, she has not received the forms yet - she contacted Kathy Bethea's office again and was told she has to wait her turn as there are 100 applicants ahead of her. She was sent a letter in November that said she should call to set up a telephone appointment to renew her assistance program and was told what documents were needed to reapply.      Per our last visit: Patient has been at current Ozempic dose for a couple of years. Tolerating well. Initially was on immediate release metformin - didn't tolerate due to  GI side effects (severe diarrhea). Working with Kathy Rojas for patient assistance - patient has not contacted Kathy yet this year, she has a couple of months left of current Ozempic dose.     Blood sugar monitoring: once time(s) daily; Ranges: (patient reported) Fastin-182. States her blood sugars have gone over 200 three separate days - ate a brownie.   Current diabetes symptoms: none  Eye exam: up to date  Foot exam: up to date  Urine Albumin:   Lab Results   Component Value Date    UMALCR 39.55 (H) 2023      Lab Results   Component Value Date    A1C 7.6 (H) 2023     Back Pain:  Acetaminophen 1000 mg at bedtime  Cyclobenzaprine 10 mg as needed - only takes occasionally    Had back surgery in  - by evening noticing aches/pains. Switched from Advil to Tylenol - not working as well as Advil. Using topical Voltaren for her neck pain -  which works well, has not tried it on her back.      GFR Estimate   Date Value Ref Range Status   2021 >60 >60 mL/min/1.73m2 Final     GFR, ESTIMATED POCT   Date Value Ref Range Status   2023 >60 >60 mL/min/1.73m2 Final     Supplements:   Calcium one tablet twice daily  Cranberry 1500 mg daily  Multivitamin daily  PreserVision AREDS daily  Vitamin D 4000 units daily    Getting around 300 mg of calcium daily from diet. Started taking her cranberry supplement daily - states urinating more frequently, but no burning - states she typically has burning with a UTI.   ----------------    I spent 20 minutes with this patient today. All changes were made via collaborative practice agreement with Saul Zamora DO. A copy of the visit note was provided to the patient's provider(s).    A summary of these recommendations was sent via [employer].    Amie Hobbs, PharmD  Medication Therapy Management     Telemedicine Visit Details  Type of service:  Telephone visit  Start Time:  3:30 PM  End Time:  3:50 PM     Medication Therapy Recommendations  Chronic  bilateral low back pain with bilateral sciatica    Rationale: Synergistic therapy - Needs additional medication therapy - Indication   Recommendation: Start Medication - Voltaren 1 % Gel   Status: Accepted - no CPA Needed

## 2024-01-17 NOTE — PATIENT INSTRUCTIONS
"Recommendations from today's MTM visit:                                                         Massiel,    It was a pleasure talking with you! We discussed the followin. I will contact Kathy Rojas to see if there is anything I can do to help with your paperwork/reapplication for Ozempic.     2.  Try using Voltaren topical gel on your back to see if effective for pain relief.     Follow-up:  at 3:30 PM    It was great speaking with you today.  I value your experience and would be very thankful for your time in providing feedback in our clinic survey. In the next few days, you may receive an email or text message from [facility] [employer] with a link to a survey related to your  clinical pharmacist.\"     To schedule another MTM appointment, please call the clinic directly or you may call the MTM scheduling line at [phone] or toll-free at [phone].     My Clinical Pharmacist's contact information:                                                      Please feel free to contact me with any questions or concerns you have.      Keep up the good work!!    Amie Hobbs, PharmD  [phone] in clinic on Tuesday and Wednesday        "

## 2024-01-23 ENCOUNTER — VIRTUAL VISIT (OUTPATIENT)
Dept: PHARMACY | Facility: CLINIC | Age: 79
End: 2024-01-23
Payer: COMMERCIAL

## 2024-01-23 DIAGNOSIS — E11.42 TYPE 2 DIABETES MELLITUS WITH DIABETIC POLYNEUROPATHY, WITHOUT LONG-TERM CURRENT USE OF INSULIN (H): Primary | ICD-10-CM

## 2024-01-23 PROCEDURE — 99606 MTMS BY PHARM EST 15 MIN: CPT | Mod: 93 | Performed by: PHARMACIST

## 2024-01-23 NOTE — PROGRESS NOTES
Medication Therapy Management (MTM) Encounter    ASSESSMENT:                            Medication Adherence/Access: No issues identified    Type 2 Diabetes: increase your Ozempic to 0.5 mg once weekly. Continue to monitor your blood sugars. Kathy is working on sending Massiel the applications for .     PLAN:                            1. Reviewed with patient that Kathy is in the process of reviewing her medication list and sending the applications with a letter for the  enrollments.     2. Increase Ozempic dose to 0.5 mg once weekly. Continue to monitor blood sugars.     Follow-up:  at 2:30 PM    SUBJECTIVE/OBJECTIVE:                          Massiel Mujica is a 78 year old female called for a follow-up visit from 2024.       Reason for visit: follow up MTM visit.     Tobacco: She reports that she has never smoked. She has never been exposed to tobacco smoke. She has never used smokeless tobacco.  Alcohol: not currently using    Medication Adherence/Access: no issues reported    Type 2 Diabetes:    Ozempic 0.25 mg weekly    Reviewed with patient that Kathy Crystal is in the process of reviewing her medication list and sending the applications with a letter for the  enrollments. Massiel would like to increase her dose of Ozempic as she has over 2 months of Ozempic 0.5 mg per week left.     Blood sugar monitoring: once time(s) daily; Ranges: (patient reported) Fastin-182. States her blood sugars have been higher lately.   Current diabetes symptoms: none  Eye exam: up to date  Foot exam: up to date  Urine Albumin:   Lab Results   Component Value Date    UMALCR 39.55 (H) 2023      Lab Results   Component Value Date    A1C 7.6 (H) 2023     ----------------    I spent 10 minutes with this patient today. All changes were made via collaborative practice agreement with Saul Zamora DO. A copy of the visit note was provided to the patient's provider(s).    A  summary of these recommendations was sent via [employer].    Amie Hobbs, PharmD  Medication Therapy Management     Telemedicine Visit Details  Type of service:  Telephone visit  Start Time:  12:15 PM  End Time:  12:25 PM     Medication Therapy Recommendations  Type 2 diabetes mellitus with diabetic polyneuropathy, without long-term current use of insulin (H)    Current Medication: semaglutide (OZEMPIC) 2 MG/3ML pen   Rationale: Dose too low - Dosage too low - Effectiveness   Recommendation: Increase Dose   Status: Accepted per CPA

## 2024-01-23 NOTE — PATIENT INSTRUCTIONS
"Recommendations from today's MTM visit:                                                       Massiel,    It was a pleasure talking with you! We discussed the followin. Kathy Rojas with patient assistance is in the process of reviewing your medication list and sending you the applications with a letter for the  enrollments.     2. Increase your Ozempic dose to 0.5 mg once weekly. Continue to monitor blood sugars.     Follow-up:  at 2:30 PM    It was great speaking with you today.  I value your experience and would be very thankful for your time in providing feedback in our clinic survey. In the next few days, you may receive an email or text message from [facility] with a link to a survey related to your  clinical pharmacist.\"     To schedule another MTM appointment, please call the clinic directly or you may call the MTM scheduling line at [phone] or toll-free at [phone].     My Clinical Pharmacist's contact information:                                                      Please feel free to contact me with any questions or concerns you have.      Keep up the good work!!    Amie Hobbs, PharmD  [phone] in clinic on Tuesday and Wednesday        "

## 2024-02-01 DIAGNOSIS — E78.5 HYPERLIPIDEMIA, UNSPECIFIED: ICD-10-CM

## 2024-02-01 DIAGNOSIS — I10 ESSENTIAL (PRIMARY) HYPERTENSION: ICD-10-CM

## 2024-02-01 RX ORDER — SIMVASTATIN 40 MG
TABLET ORAL
Qty: 90 TABLET | Refills: 0 | Status: SHIPPED | OUTPATIENT
Start: 2024-02-01 | End: 2024-06-07

## 2024-02-01 RX ORDER — LOSARTAN POTASSIUM 50 MG/1
TABLET ORAL
Qty: 90 TABLET | Refills: 0 | Status: SHIPPED | OUTPATIENT
Start: 2024-02-01 | End: 2024-06-07

## 2024-02-06 ENCOUNTER — VIRTUAL VISIT (OUTPATIENT)
Dept: PHARMACY | Facility: CLINIC | Age: 79
End: 2024-02-06
Payer: COMMERCIAL

## 2024-02-06 DIAGNOSIS — E11.42 TYPE 2 DIABETES MELLITUS WITH DIABETIC POLYNEUROPATHY, WITHOUT LONG-TERM CURRENT USE OF INSULIN (H): Primary | ICD-10-CM

## 2024-02-06 PROCEDURE — 99606 MTMS BY PHARM EST 15 MIN: CPT | Mod: 93 | Performed by: PHARMACIST

## 2024-02-06 NOTE — PROGRESS NOTES
Medication Therapy Management (MTM) Encounter    ASSESSMENT:                            Medication Adherence/Access: No issues identified    Type 2 Diabetes:  blood sugars are improving on higher Ozempic dose. Patient still waiting for re-enrollment forms for Ozempic for this year.     PLAN:                            1. Continue with current Ozempic dose - waiting for patient to receive patient assistance forms.     Follow-up:  at 2:30 PM    SUBJECTIVE/OBJECTIVE:                          Massiel Mujica is a 78 year old female called for a follow-up visit from 2024.       Reason for visit: follow up MTM visit.    Tobacco: She reports that she has never smoked. She has never been exposed to tobacco smoke. She has never used smokeless tobacco.  Alcohol: not currently using    Medication Adherence/Access: no issues reported    Type 2 Diabetes:    Ozempic 0.5 mg once weekly     Patient still has not received her application for  re-enrollment for Ozempic from patient assistance. She did increase her Ozempic to 0.5 mg once weekly, two weeks ago and is tolerating well. She has 2 months left of current dose.      Blood sugar monitoring: one time(s) daily; Ranges: (patient reported) Fastin, 161   Current diabetes symptoms: none  Eye exam: up to date  Foot exam: up to date  Urine Albumin:   Lab Results   Component Value Date    UMALCR 39.55 (H) 2023      Lab Results   Component Value Date    A1C 7.6 (H) 2023     ----------------    I spent 10 minutes with this patient today. All changes were made via collaborative practice agreement with Saul Zamora DO. A copy of the visit note was provided to the patient's provider(s).    A summary of these recommendations was sent via [employer].    Amie Hobbs, PharmD  Medication Therapy Management     Telemedicine Visit Details  Type of service:  Telephone visit  Start Time:  2:30 PM  End Time: 2:40 PM     Medication Therapy  Recommendations  No medication therapy recommendations to display

## 2024-02-06 NOTE — PATIENT INSTRUCTIONS
"Recommendations from today's MTM visit:                                                       Massiel,    It was a pleasure talking with you! We discussed the followin. Continue with your current Ozempic dose (0.5 mg once weekly).      Follow-up:  at 2:30 PM    It was great speaking with you today.  I value your experience and would be very thankful for your time in providing feedback in our clinic survey. In the next few days, you may receive an email or text message from [facility] [employer] with a link to a survey related to your  clinical pharmacist.\"     To schedule another MTM appointment, please call the clinic directly or you may call the MTM scheduling line at [phone] or toll-free at [phone].     My Clinical Pharmacist's contact information:                                                      Please feel free to contact me with any questions or concerns you have.      Keep up the good work!!    Amie Hobbs, PharmD  [phone] in clinic on Tuesday and Wednesday        "

## 2024-02-20 ENCOUNTER — VIRTUAL VISIT (OUTPATIENT)
Dept: PHARMACY | Facility: CLINIC | Age: 79
End: 2024-02-20
Payer: COMMERCIAL

## 2024-02-20 DIAGNOSIS — E11.42 TYPE 2 DIABETES MELLITUS WITH DIABETIC POLYNEUROPATHY, WITHOUT LONG-TERM CURRENT USE OF INSULIN (H): Primary | ICD-10-CM

## 2024-02-20 PROCEDURE — 99606 MTMS BY PHARM EST 15 MIN: CPT | Mod: 93 | Performed by: PHARMACIST

## 2024-02-20 NOTE — PATIENT INSTRUCTIONS
"Recommendations from today's MTM visit:                                                         Massiel,    It was a pleasure talking with you! We discussed the followin. I contacted my supervisor to see if there is any option to get you your patient assistance forms faster  - she stated that she is not aware of any other alternatives. I'm sorry for this frustration!    2. Continue to monitor your constipation - this may be transient if from Ozempic. If you are not able to get your Ozempic from patient assistance on time - we will talk about the next steps at our follow up visit.     Follow-up:  at 2:30 PM    It was great speaking with you today.  I value your experience and would be very thankful for your time in providing feedback in our clinic survey. In the next few days, you may receive an email or text message from "[employer] with a link to a survey related to your  clinical pharmacist.\"     To schedule another MTM appointment, please call the clinic directly or you may call the MTM scheduling line at [phone] or toll-free at [phone].     My Clinical Pharmacist's contact information:                                                      Please feel free to contact me with any questions or concerns you have.      Keep up the good work!!    Amie Hobbs, PharmD  [phone] in clinic on Tuesday and Wednesday        " 50 F sent from [facility] ER for Left upper periapical dental abscess and cellulitis (seen on CT) for drainage. Endorsed pain and swelling to left face fo 2 days.  Pt received unasyn and toradol with some improvement in pain.  [place] ED sent Augmentin for patient already.  Pt sent here for dental.  Denies f/c, nausea/vomiting/diarrhea.

## 2024-02-20 NOTE — PROGRESS NOTES
Medication Therapy Management (MTM) Encounter    ASSESSMENT:                            Medication Adherence/Access: No issues identified    Type 2 Diabetes: morning blood sugars above goal, patient having constipation - possible side effect from Ozempic - continue to monitor, if from Ozempic may be transient. Risk for constipation with Ozempic is 3.1% to 6. Has follow up with PCP first week of March - due for A1c labs. Will continue with current Ozempic dose for now due to constipation and availability.     PLAN:                            1. Contacted my supervisor to see if any option to get Massiel her patient assistance forms faster  - not aware of any other alternatives. Continue to monitor constipation and A1c to see if Ozempic dose should be increased.      Follow-up: Tuesday, March 12th at 2:30 PM    SUBJECTIVE/OBJECTIVE:                          Massiel Mujica is a 78 year old female called for a follow-up visit from 2/6/2024.       Reason for visit: follow up MTM visit.    Tobacco: She reports that she has never smoked. She has never been exposed to tobacco smoke. She has never used smokeless tobacco.  Alcohol: not currently using    Medication Adherence/Access: no issues reported    Type 2 Diabetes:    Ozempic 0.5 mg once weekly     Patient contacted Jackie with patient assistance and was informed it will be another 2-3 weeks before she will receive her patient assistance forms. She has about 1.5 months of Ozempic left at current dose. She is having some constipation - feels it may be Ozempic related. Started using a stool softener - seems to be helping.     Blood sugar monitoring: one time(s) daily; Ranges: (patient reported) Fasting morning blood sugars: 161 158 179.    Current diabetes symptoms: none  Eye exam: up to date  Foot exam: up to date  Urine Albumin:   Lab Results   Component Value Date    UMALCR 39.55 (H) 02/27/2023      Lab Results   Component Value Date    A1C 7.6 (H) 11/29/2023    ----------------      I spent 15 minutes with this patient today. All changes were made via collaborative practice agreement with Saul Zamora DO. A copy of the visit note was provided to the patient's provider(s).    A summary of these recommendations was sent via [employer].    Amie Hobbs PharmD  Medication Therapy Management     Telemedicine Visit Details  Type of service:  Telephone visit  Start Time:  2:30 PM  End Time:  2:45 PM     Medication Therapy Recommendations  No medication therapy recommendations to display

## 2024-03-03 ENCOUNTER — OFFICE VISIT (OUTPATIENT)
Dept: FAMILY MEDICINE | Facility: CLINIC | Age: 79
End: 2024-03-03
Payer: COMMERCIAL

## 2024-03-03 VITALS
HEART RATE: 97 BPM | OXYGEN SATURATION: 96 % | WEIGHT: 219 LBS | RESPIRATION RATE: 16 BRPM | SYSTOLIC BLOOD PRESSURE: 122 MMHG | DIASTOLIC BLOOD PRESSURE: 71 MMHG | BODY MASS INDEX: 37.59 KG/M2 | TEMPERATURE: 98.3 F

## 2024-03-03 DIAGNOSIS — R30.0 DYSURIA: Primary | ICD-10-CM

## 2024-03-03 LAB
ALBUMIN UR-MCNC: NEGATIVE MG/DL
APPEARANCE UR: CLEAR
BACTERIA #/AREA URNS HPF: ABNORMAL /HPF
BILIRUB UR QL STRIP: NEGATIVE
COLOR UR AUTO: YELLOW
GLUCOSE UR STRIP-MCNC: NEGATIVE MG/DL
HGB UR QL STRIP: NEGATIVE
HYALINE CASTS #/AREA URNS LPF: ABNORMAL /LPF
KETONES UR STRIP-MCNC: ABNORMAL MG/DL
LEUKOCYTE ESTERASE UR QL STRIP: ABNORMAL
MUCOUS THREADS #/AREA URNS LPF: PRESENT /LPF
NITRATE UR QL: NEGATIVE
PH UR STRIP: 6 [PH] (ref 5–8)
RBC #/AREA URNS AUTO: ABNORMAL /HPF
SP GR UR STRIP: 1.02 (ref 1–1.03)
SQUAMOUS #/AREA URNS AUTO: ABNORMAL /LPF
UROBILINOGEN UR STRIP-ACNC: 0.2 E.U./DL
WBC #/AREA URNS AUTO: ABNORMAL /HPF

## 2024-03-03 PROCEDURE — 87086 URINE CULTURE/COLONY COUNT: CPT | Performed by: PHYSICIAN ASSISTANT

## 2024-03-03 PROCEDURE — 99213 OFFICE O/P EST LOW 20 MIN: CPT | Performed by: PHYSICIAN ASSISTANT

## 2024-03-03 PROCEDURE — 81001 URINALYSIS AUTO W/SCOPE: CPT | Performed by: PHYSICIAN ASSISTANT

## 2024-03-03 ASSESSMENT — ENCOUNTER SYMPTOMS
CHILLS: 0
CONSTIPATION: 1
DYSURIA: 1
NAUSEA: 0
VOMITING: 0
FREQUENCY: 1
HEMATURIA: 0
FLANK PAIN: 1
ABDOMINAL PAIN: 1
FEVER: 0

## 2024-03-03 NOTE — PROGRESS NOTES
Patient presents with:  UTI: Pt has had lower abdominal pressure and burning while urination since yesterday       Clinical Decision Making:  Low abdominal pressure and dysuria. UA is not indicative of UTI. UC added to confirm. Abdominal CT offered for eval of possible kidney stone, but patient would not like to do advanced imaging today. I have low suspicion for surgical emergency based on patient's PE. Recommend watchful waiting for right now and follow up if symptoms fail to improve. Constipation may be contributing to her urine frequency and pelvic pressure. She will start Miralax.       ICD-10-CM    1. Dysuria  R30.0 UA Macroscopic with reflex to Microscopic and Culture - Clinic Collect     UA Microscopic with Reflex to Culture     Urine Culture Aerobic Bacterial          Patient Instructions   I recommend 1 capful of Miralax daily in combination of stool softener.   If you develop more severe pain please follow up promptly.   We will do a urine culture to confirm or deny the presence of infection.   Follow up for repeat urine test if no improvement in the next 3 day.   Drink plenty of water in the interim.     HPI:  Karol Mujica is a 78 year old female with PMHx of DM2, HTN who presents today complaining of low abdominal pressure and burning with urination that started yesterday. Patient had issues with contipation a couple of days ago and that improved with stool softener. She reports some right flank pain. She has had a kidney stone many years ago, but this pain is not nearly as severe as that. She denies any vaginal concerns. She is not taking any pain meds for her current symptoms.     History obtained from the patient.    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)  2022-02: Osteopenia of multiple sites  2022-01: Peripheral neuropathy  2021-11: Class 3 obesity (H)  2021-08: Type 2 diabetes mellitus with diabetic polyneuropathy,   without long-term current use of  insulin (H)  2021-01: Chronic bilateral low back pain with bilateral sciatica  2018-02: History of malignant neoplasm of colon  2016-02: Vitamin D deficiency  2016-01: Benign essential hypertension  2016-01: Osteoporosis  2014-08: S/P total hip arthroplasty  2014-07: HTN (hypertension)  2014-07: Anxiety  2014-07: Colon cancer, with resection 2/10/14  2014-07: Aftercare following joint replacement, left femur fracture  2014-07: Intertrochanteric fracture of left femur (H)      Past Medical History:   Diagnosis Date    Anemia     Anxiety 7/22/2014    Arthritis     Cataract     Cholelithiasis     Colon cancer (H) 2014    Colon cancer, with resection 2/10/14 7/22/2014    Diabetes mellitus (H)     Diverticular disease     GI bleed 2/15/2014    History of anesthesia complications     slow to wake up    History of transfusion     has antibodies    HTN (hypertension) 7/22/2014    Hyperlipidemia     Hypertension     Osteoporosis     Peripheral neuropathy     feet    Red blood cell antibody positive      h/o blood transfusion 2015. known red cell antibodies to C and K per preop H&P    Type II or unspecified type diabetes mellitus without mention of complication, not stated as uncontrolled 7/22/2014       Social History     Tobacco Use    Smoking status: Never     Passive exposure: Never    Smokeless tobacco: Never   Substance Use Topics    Alcohol use: No       Review of Systems   Constitutional:  Negative for chills and fever.   Gastrointestinal:  Positive for abdominal pain (suprapubic) and constipation. Negative for nausea and vomiting.   Genitourinary:  Positive for dysuria, flank pain (right, moderate) and frequency. Negative for hematuria, vaginal discharge and vaginal pain.       Vitals:    03/03/24 0923   BP: 122/71   Pulse: 97   Resp: 16   Temp: 98.3  F (36.8  C)   TempSrc: Oral   SpO2: 96%   Weight: 99.3 kg (219 lb)       Physical Exam  Vitals and nursing note reviewed.   Constitutional:       General: She is not in  acute distress.     Appearance: She is not ill-appearing.   HENT:      Head: Normocephalic and atraumatic.      Right Ear: External ear normal.      Left Ear: External ear normal.   Eyes:      Conjunctiva/sclera: Conjunctivae normal.   Abdominal:      General: Abdomen is flat. There is no distension.      Tenderness: There is abdominal tenderness in the right lower quadrant and suprapubic area. There is right CVA tenderness. There is no left CVA tenderness or guarding. Negative signs include Coelho's sign, Rovsing's sign, psoas sign and obturator sign.   Neurological:      Mental Status: She is alert.   Psychiatric:         Mood and Affect: Mood normal.         Behavior: Behavior normal.         Thought Content: Thought content normal.         Judgment: Judgment normal.         Results:  Results for orders placed or performed in visit on 03/03/24   UA Macroscopic with reflex to Microscopic and Culture - Clinic Collect     Status: Abnormal    Specimen: Urine, Clean Catch   Result Value Ref Range    Color Urine Yellow Colorless, Straw, Light Yellow, Yellow    Appearance Urine Clear Clear    Glucose Urine Negative Negative mg/dL    Bilirubin Urine Negative Negative    Ketones Urine Trace (A) Negative mg/dL    Specific Gravity Urine 1.020 1.005 - 1.030    Blood Urine Negative Negative    pH Urine 6.0 5.0 - 8.0    Protein Albumin Urine Negative Negative mg/dL    Urobilinogen Urine 0.2 0.2, 1.0 E.U./dL    Nitrite Urine Negative Negative    Leukocyte Esterase Urine Trace (A) Negative   UA Microscopic with Reflex to Culture     Status: Abnormal   Result Value Ref Range    Bacteria Urine Few (A) None Seen /HPF    RBC Urine 2-5 (A) 0-2 /HPF /HPF    WBC Urine 0-5 0-5 /HPF /HPF    Squamous Epithelials Urine Few (A) None Seen /LPF    Mucus Urine Present (A) None Seen /LPF    Hyaline Casts Urine 0-2 (A) None Seen /LPF    Narrative    Urine Culture not indicated         At the end of the encounter, I discussed results, diagnosis,  medications. Discussed red flags for immediate return to clinic/ER, as well as indications for follow up if no improvement. Patient understood and agreed to plan. Patient was stable for discharge.

## 2024-03-03 NOTE — PATIENT INSTRUCTIONS
I recommend 1 capful of Miralax daily in combination of stool softener.   If you develop more severe pain please follow up promptly.   We will do a urine culture to confirm or deny the presence of infection.   Follow up for repeat urine test if no improvement in the next 3 day.   Drink plenty of water in the interim.

## 2024-03-04 LAB — BACTERIA UR CULT: NORMAL

## 2024-03-12 ENCOUNTER — TELEPHONE (OUTPATIENT)
Dept: FAMILY MEDICINE | Facility: CLINIC | Age: 79
End: 2024-03-12
Payer: COMMERCIAL

## 2024-03-12 ENCOUNTER — VIRTUAL VISIT (OUTPATIENT)
Dept: PHARMACY | Facility: CLINIC | Age: 79
End: 2024-03-12
Payer: COMMERCIAL

## 2024-03-12 DIAGNOSIS — E11.42 TYPE 2 DIABETES MELLITUS WITH DIABETIC POLYNEUROPATHY, WITHOUT LONG-TERM CURRENT USE OF INSULIN (H): Primary | ICD-10-CM

## 2024-03-12 PROCEDURE — 99606 MTMS BY PHARM EST 15 MIN: CPT | Mod: 93 | Performed by: PHARMACIST

## 2024-03-12 NOTE — PROGRESS NOTES
Medication Therapy Management (MTM) Encounter    ASSESSMENT:                            Medication Adherence/Access: No issues identified    Type 2 Diabetes: Increase Ozempic to 1 mg weekly. I reached out to patient assistance and was asked to submit Massiel's new dose of Ozempic to her pharmacy as they do not yet have all the documents to submit to the [employer] assistance program - they will update the application with the new dose when they get it back.      PLAN:                            1. Increase Ozempic to 1 mg weekly. I sent a prescription to patient's pharmacy.     2. Continue to test blood sugars every morning, also test some before dinner blood sugars.     Follow-up: Tuesday, April 23rd at 10:00 AM    SUBJECTIVE/OBJECTIVE:                          Massiel Mujica is a 79 year old female called for a follow-up visit from 2/20/2024.       Reason for visit: follow up MTM visit.    Tobacco: She reports that she has never smoked. She has never been exposed to tobacco smoke. She has never used smokeless tobacco.  Alcohol: not currently using    Medication Adherence/Access: no issues reported    Type 2 Diabetes:    Ozempic 0.5 mg once weekly     Patient has still not received her paperwork for patient assistance. She has 3 weeks left of her current dose. Tolerating Ozempic well.    Blood sugar monitoring: one time(s) daily; Ranges: (patient reported) Fasting morning blood sugars: 161, 167.    Current diabetes symptoms: none  Eye exam: up to date  Foot exam: up to date  Urine Albumin:   Lab Results   Component Value Date    UMALCR 39.55 (H) 02/27/2023      Lab Results   Component Value Date    A1C 7.6 (H) 11/29/2023     ----------------    I spent 10 minutes with this patient today. All changes were made via collaborative practice agreement with Saul Zamora DO. A copy of the visit note was provided to the patient's provider(s).    A summary of these recommendations was sent via [employer].    Amie Hobbs  PharmD  Medication Therapy Management       Telemedicine Visit Details  Type of service:  Telephone visit  Start Time:  2:30 PM  End Time:  2:40 PM     Medication Therapy Recommendations  Type 2 diabetes mellitus with diabetic polyneuropathy, without long-term current use of insulin (H)    Current Medication: Semaglutide, 1 MG/DOSE, (OZEMPIC) 4 MG/3ML pen   Rationale: Dose too low - Dosage too low - Effectiveness   Recommendation: Increase Dose   Status: Accepted per CPA

## 2024-03-12 NOTE — TELEPHONE ENCOUNTER
Dose increase of Ozempic to 1 mg weekly. PA required.    Amie Hobbs, PharmD  Medication Therapy Management

## 2024-03-13 NOTE — PATIENT INSTRUCTIONS
"Recommendations from today's MTM visit:                                                         Massiel,    It was a pleasure talking with you! We discussed the followin. Increase Ozempic to 1 mg weekly. I sent a prescription to your pharmacy.     2. Continue to test your blood sugars every morning, also test some before dinner blood sugars.     3. I reached out to patient assistance and this is the reply: \"We do not yet have all documents to submit to the [employer] assistance program. I will update the application with the new dose when we get it back here.\"     Follow-up:  at 10:00 AM    It was great speaking with you today.  I value your experience and would be very thankful for your time in providing feedback in our clinic survey. In the next few days, you may receive an email or text message from [employer] with a link to a survey related to your  clinical pharmacist.\"     To schedule another MTM appointment, please call the clinic directly or you may call the MTM scheduling line at [phone] or toll-free at [phone].     My Clinical Pharmacist's contact information:                                                      Please feel free to contact me with any questions or concerns you have.      Keep up the good work!!    Amie Hobbs, PharmD  [phone] in clinic on Tuesday and Wednesday            "

## 2024-03-14 ENCOUNTER — TELEPHONE (OUTPATIENT)
Dept: FAMILY MEDICINE | Facility: CLINIC | Age: 79
End: 2024-03-14
Payer: COMMERCIAL

## 2024-03-14 DIAGNOSIS — Z20.828 CONTACT WITH AND (SUSPECTED) EXPOSURE TO OTHER VIRAL COMMUNICABLE DISEASES: Primary | ICD-10-CM

## 2024-03-14 RX ORDER — OSELTAMIVIR PHOSPHATE 75 MG/1
75 CAPSULE ORAL DAILY
Qty: 7 CAPSULE | Refills: 0 | Status: SHIPPED | OUTPATIENT
Start: 2024-03-14 | End: 2024-03-21

## 2024-03-14 NOTE — TELEPHONE ENCOUNTER
Influenza-Like Illness (NGA) RN Standing Order (13+)    Karol ATUL Sil      Age: 79 year old     YOB: 1945    Patient has been triaged using Epic triage guidelines: Patient does not need higher level of care     Has the patient been seen at a [facility] or [facility] (established in primary or specialty care) in the last two years? Yes     Do any of the following exclusions apply to the patient?    Does the patient have a history of CrCl less than or equal to 60 ml/min in the previous 12 months?    Estimated Creatinine Clearance: 66.3 mL/min (based on SCr of 0.8 mg/dL).  *If no result, instruct patient to do an evisit No   Is the patient taking Probenecid?     (Probencecid & Tamiflu together are not recommended) No   Patient reports a positive Covid-19 test:     (Encourage at home COVID-19 test or place PCR order per standing order) No     Reported Tamiflu allergy or intolerance    No     Does this patient have ANY of the above exclusions answered Yes?  No.     Does the patient have symptoms or been exposed to a confirmed case of influenza?  Exposed- patient has been exposed to a confirmed case of influenza within 48 hours.       Due to Exposure, does the patient have ANY of these high risk conditions?   Younger than 5 years of age or age 65 and older Yes   Chronic pulmonary disease such as asthma or COPD No   Heart disease (CHF, CAD, anticoagulation d/t arrhytmia, congenital heart anomaly) *HTN alone is excluded No   Kidney disease insufficiency  No   Hepatic or Hematologic disorder (e.g. chronic liver disease patient, sickle cell disease) No   Diabetes (Type 1 or Type 2) Yes   Neurologic and Neurodevelopment Conditions (including disorders of the brain, spinal cord, peripheral nerve, and muscle, such as cerebral palsy, epilepsy (seizure disorders), stroke, intellectual disability, moderate to severe developmental delay, muscular dystrophy, or spinal cord injury) No   Obese with BMI >40 No    Is pregnant, may be pregnant, or is within two weeks after delivery No   Is a resident of a chronic care facility No   Is the patient considered a non- Black,  or , or  or  racial or ethnic minority group? No   Is <19 years old and is receiving long term aspirin- or salicylate-containing medications No   Patient has a metabolic disorder No   Patient has had chemotherapy or radiation within the last 3 months No   Patient has had an organ or bone marrow transplant No   Immunosuppression: Caused by medication such as those taking prednisone in excess of 20mg daily No   Immunosuppression: Congenital or acquired immunodeficiencies including HIV/AIDS No   Immunosuppression: Asplenia No     Does this patient have ANY of the above conditions? Yes     Wt Readings from Last 1 Encounters:   03/03/24 99.3 kg (219 lb)       Is weight documented in the last 12 months?  No, ask the parent and/or patient to obtain a weight.     Current reported weight:  219lbs  *Compare to previous weight in chart and huddle with provider if reported weight would change dose       RECOMMENDATION:  This patient may benefit from an order of Tamiflu for treatment of NGA Exposure per the standing order.    Inform patient: Your symptoms and exposure are consistent with influenza. If your symptoms progress despite oseltamivir, or if you have concerns about the presence fo another infection including coronavirus, please contact your care provider by virtual visit.    Reviewed precautions and no additional information is needed.    Use SmartSet Influenza Antiviral Treatment (Exposure) QD to find suggested Tamiflu order.     AGE AND DOSING TABLE FOR EXPOSURE TO INFLUENZA:  Adult or Pediatric Patients >40kg (> 88.1lbs)    Oseltamivir 75mg by mouth once a day for 7 days        Pediatric Dosing by Weight    If 3 to 12 months old Oral Oseltamivir (Tamiflu) 3mg/kg/dose once daily for 7 days; max dosage 30 mg  daily   If 9 to 12 months old Oral Oseltamivir (Tamiflu) 3.5mg/kg/dose once daily for 7 days; max dosage 30mg daily   If > 12 months or older and:    15 kg or less (33lbs or less) Oral Oseltamivir (Tamiflu) 30mg once a day for 7 days   > 15 kg to 23 kg (> 33lbs to 50.7lbs) Oral Oseltamivir (Tamiflu) 45mg once a day for 7 days   > 23 kg to 40 kg  (> 50.7lbs to 88.1lbs) Oral Oseltamivir (Tamiflu) 60mg once a day for 7 days   > 40 kg (> 88.1lbs) Oral Oseltamivir (Tamiflu) 75mg once a day for 7 days         Additional educational resources include:  http://www.[employer].fm.[employer]  http://www.cdc.gov/flu/  Chris Marinelli RN

## 2024-03-18 SDOH — HEALTH STABILITY: PHYSICAL HEALTH: ON AVERAGE, HOW MANY DAYS PER WEEK DO YOU ENGAGE IN MODERATE TO STRENUOUS EXERCISE (LIKE A BRISK WALK)?: 2 DAYS

## 2024-03-18 SDOH — HEALTH STABILITY: PHYSICAL HEALTH: ON AVERAGE, HOW MANY MINUTES DO YOU ENGAGE IN EXERCISE AT THIS LEVEL?: 20 MIN

## 2024-03-18 ASSESSMENT — SOCIAL DETERMINANTS OF HEALTH (SDOH): HOW OFTEN DO YOU GET TOGETHER WITH FRIENDS OR RELATIVES?: TWICE A WEEK

## 2024-03-18 NOTE — COMMUNITY RESOURCES LIST (ENGLISH)
March 18, 2024           YOUR PERSONALIZED LIST OF SERVICES & PROGRAMS               Bill Payment Assistance      SERVICE [employer]  Phone: [phone]  Website: http://www.[employer]      - Dislocated Worker/Adult [employer] Employment Program  Phone: (823) [phone]  Email: roscoe@[email or web address]  Website: https://[employer]/services/employment-services/dislocated-worker-program/  Language: English, Malian  Hours: Mon 8:00 AM - 4:30 PM Tue 8:00 AM - 4:30 PM Wed 8:00 AM - 4:30 PM Thu 8:00 AM - 4:30 PM Fri 8:00 AM - 4:30 PM  Fee: Free  Accessibility: [employer] accessible      [employer]  Phone: (897) 385-9046  Website: https://www.tkp84-hvtmadeekmehyo.org/programs.html  Language: English  Hours: Mon 7:00 AM - 7:00 PM Tue 7:00 AM - 7:00 PM Wed 7:00 AM - 7:00 PM Thu 7:00 AM - 7:00 PM Fri 7:00 AM - 7:00 PM  Fee: Free               IMPORTANT NUMBERS & WEBSITES        Emergency Services  911  .   [employer] Way  211 http://211unitedway.org  .   Poison Control  (851) 466-5079 http://[place]poison.org http://wis[place]poison.org  .     Suicide and Crisis Lifeline  988 http://988lifeline.org  .   Childhelp National Child Abuse Hotline  834.569.7951 http://Childhelphotline.org   .   National Sexual Assault Hotline  (845) 368-7297 (HOPE) http://Rainn.org   .     National Runaway Safeline  (399) 435-6573 (RUNAWAY) http://1800runaGutenberg Technology.org  .   Pregnancy & Postpartum Support  Call/text 852-311-9822  [place]: http://ppsupportmn.org  [place]: http://Hostway.com/wi  .   Substance Abuse National Helpline ([facility])  318-924-HELP (6951) http://Findtreatment.gov   .                DISCLAIMER: Mishel Paige does not endorse any service providers mentioned in this resource list. [employer] does not guarantee that the services mentioned in this resource list will be available to you or will improve your health or wellness.    [facility]

## 2024-03-20 ENCOUNTER — OFFICE VISIT (OUTPATIENT)
Dept: FAMILY MEDICINE | Facility: CLINIC | Age: 79
End: 2024-03-20
Payer: COMMERCIAL

## 2024-03-20 VITALS
WEIGHT: 221.6 LBS | TEMPERATURE: 97.9 F | SYSTOLIC BLOOD PRESSURE: 134 MMHG | DIASTOLIC BLOOD PRESSURE: 82 MMHG | HEIGHT: 64 IN | BODY MASS INDEX: 37.83 KG/M2 | RESPIRATION RATE: 18 BRPM | OXYGEN SATURATION: 97 % | HEART RATE: 75 BPM

## 2024-03-20 DIAGNOSIS — C18.9 MALIGNANT NEOPLASM OF COLON, UNSPECIFIED PART OF COLON (H): ICD-10-CM

## 2024-03-20 DIAGNOSIS — Z85.038 HISTORY OF MALIGNANT NEOPLASM OF COLON: ICD-10-CM

## 2024-03-20 DIAGNOSIS — Z00.00 ENCOUNTER FOR MEDICARE ANNUAL WELLNESS EXAM: Primary | ICD-10-CM

## 2024-03-20 DIAGNOSIS — E11.42 TYPE 2 DIABETES MELLITUS WITH DIABETIC POLYNEUROPATHY, WITHOUT LONG-TERM CURRENT USE OF INSULIN (H): ICD-10-CM

## 2024-03-20 DIAGNOSIS — G62.9 PERIPHERAL POLYNEUROPATHY: ICD-10-CM

## 2024-03-20 DIAGNOSIS — E66.01 CLASS 2 SEVERE OBESITY DUE TO EXCESS CALORIES WITH SERIOUS COMORBIDITY AND BODY MASS INDEX (BMI) OF 38.0 TO 38.9 IN ADULT (H): ICD-10-CM

## 2024-03-20 DIAGNOSIS — I10 BENIGN ESSENTIAL HYPERTENSION: ICD-10-CM

## 2024-03-20 DIAGNOSIS — E66.812 CLASS 2 SEVERE OBESITY DUE TO EXCESS CALORIES WITH SERIOUS COMORBIDITY AND BODY MASS INDEX (BMI) OF 38.0 TO 38.9 IN ADULT (H): ICD-10-CM

## 2024-03-20 PROBLEM — E66.813 CLASS 3 OBESITY: Status: RESOLVED | Noted: 2021-11-24 | Resolved: 2024-03-20

## 2024-03-20 LAB
HBA1C MFR BLD: 7.8 % (ref 0–5.6)
HOLD SPECIMEN: NORMAL

## 2024-03-20 PROCEDURE — 80061 LIPID PANEL: CPT | Performed by: FAMILY MEDICINE

## 2024-03-20 PROCEDURE — 36415 COLL VENOUS BLD VENIPUNCTURE: CPT | Performed by: FAMILY MEDICINE

## 2024-03-20 PROCEDURE — 80048 BASIC METABOLIC PNL TOTAL CA: CPT | Performed by: FAMILY MEDICINE

## 2024-03-20 PROCEDURE — 99214 OFFICE O/P EST MOD 30 MIN: CPT | Mod: 25 | Performed by: FAMILY MEDICINE

## 2024-03-20 PROCEDURE — 82378 CARCINOEMBRYONIC ANTIGEN: CPT | Performed by: FAMILY MEDICINE

## 2024-03-20 PROCEDURE — 82043 UR ALBUMIN QUANTITATIVE: CPT | Performed by: FAMILY MEDICINE

## 2024-03-20 PROCEDURE — 82570 ASSAY OF URINE CREATININE: CPT | Performed by: FAMILY MEDICINE

## 2024-03-20 PROCEDURE — G0439 PPPS, SUBSEQ VISIT: HCPCS | Performed by: FAMILY MEDICINE

## 2024-03-20 PROCEDURE — 83036 HEMOGLOBIN GLYCOSYLATED A1C: CPT | Performed by: FAMILY MEDICINE

## 2024-03-20 PROCEDURE — 84443 ASSAY THYROID STIM HORMONE: CPT | Performed by: FAMILY MEDICINE

## 2024-03-20 RX ORDER — RESPIRATORY SYNCYTIAL VIRUS VACCINE 120MCG/0.5
0.5 KIT INTRAMUSCULAR ONCE
Qty: 1 EACH | Refills: 0 | Status: CANCELLED | OUTPATIENT
Start: 2024-03-20 | End: 2024-03-20

## 2024-03-20 ASSESSMENT — ANXIETY QUESTIONNAIRES
GAD7 TOTAL SCORE: 2
7. FEELING AFRAID AS IF SOMETHING AWFUL MIGHT HAPPEN: NOT AT ALL
6. BECOMING EASILY ANNOYED OR IRRITABLE: NOT AT ALL
GAD7 TOTAL SCORE: 2
4. TROUBLE RELAXING: NOT AT ALL
2. NOT BEING ABLE TO STOP OR CONTROL WORRYING: NOT AT ALL
5. BEING SO RESTLESS THAT IT IS HARD TO SIT STILL: NOT AT ALL
IF YOU CHECKED OFF ANY PROBLEMS ON THIS QUESTIONNAIRE, HOW DIFFICULT HAVE THESE PROBLEMS MADE IT FOR YOU TO DO YOUR WORK, TAKE CARE OF THINGS AT HOME, OR GET ALONG WITH OTHER PEOPLE: NOT DIFFICULT AT ALL
1. FEELING NERVOUS, ANXIOUS, OR ON EDGE: SEVERAL DAYS
7. FEELING AFRAID AS IF SOMETHING AWFUL MIGHT HAPPEN: NOT AT ALL
8. IF YOU CHECKED OFF ANY PROBLEMS, HOW DIFFICULT HAVE THESE MADE IT FOR YOU TO DO YOUR WORK, TAKE CARE OF THINGS AT HOME, OR GET ALONG WITH OTHER PEOPLE?: NOT DIFFICULT AT ALL
3. WORRYING TOO MUCH ABOUT DIFFERENT THINGS: SEVERAL DAYS

## 2024-03-20 NOTE — PROGRESS NOTES
"Preventive Care Visit  [facility]  Mar 20, 2024      Assessment & Plan   Problem List Items Addressed This Visit       Colon cancer, with resection 2/10/14    Relevant Orders    CEA    Benign essential hypertension     Below goal of 140/90 on current regimen which does include an ARB.  Continue diet, exercise and medication regimen.         Relevant Orders    Basic metabolic panel    Extra SST Tube (LAB USE ONLY) (Completed)    Type 2 diabetes mellitus with diabetic polyneuropathy, without long-term current use of insulin (H)     Not below 7, but is holding below 8.  Given the recent stresses, the fact that her GLP-1 has not been available, and the current difficulties with preparing meals from home due to 's current medical condition, she is doing very well.  Encouraged to continue the same.  Discussed strategies of how to pack her own food and possible cooler or cold sac that could be used for transportation.         Relevant Orders    Lipid panel reflex to direct LDL Fasting    TSH with free T4 reflex    Albumin Random Urine Quantitative with Creat Ratio    Hemoglobin A1c (Completed)    Extra SST Tube (LAB USE ONLY) (Completed)    History of malignant neoplasm of colon     Has been doing very well.  Still on see Hx of every 6 months.  Will check today.         Peripheral neuropathy     Gabapentin working well.  Continue current treatment.         Class 2 severe obesity due to excess calories with serious comorbidity in adult (H)     Diet and exercise reviewed.  See treatment plan for diabetes for details.          Other Visit Diagnoses       Encounter for Medicare annual wellness exam    -  Primary             Patient has been advised of split billing requirements and indicates understanding: Yes       BMI  Estimated body mass index is 38.04 kg/m  as calculated from the following:    Height as of this encounter: 1.626 m (5' 4\").    Weight " as of this encounter: 100.5 kg (221 lb 9.6 oz).   Weight management plan: Discussed healthy diet and exercise guidelines    Counseling  Appropriate preventive services were discussed with this patient, including applicable screening as appropriate for fall prevention, nutrition, physical activity, Tobacco-use cessation, weight loss and cognition.  Checklist reviewing preventive services available has been given to the patient.  Reviewed patient's diet, addressing concerns and/or questions.   She is at risk for lack of exercise and has been provided with information to increase physical activity for the benefit of her well-being.     Regular exercise  See Patient Instructions      Subjective   Massiel is a 79 year old, presenting for the following:  Wellness Visit, Diabetes, and Fasting    Health Care Directive  Patient has a Health Care Directive on file  Advance care planning document is on file and is current.    Denies any chest pain, shortness of breath, dyspnea exertion, palpitations, nausea or vomiting.  Denies any changes in vision or hearing, or urinary or bowel habits.    Has had a stressful couple of weeks.   had broken his hip a while ago, then developed chest pain, found to have severe coronary artery disease and underwent CABG.  After the CABG though also developed an adhesion as well as holding his colon requiring secondary surgery.  Overall doing better.  He is still in the hospital.  He is getting up and walking and feeling better.  But with that she has not been cooking her usual meals or doing her normal exercise.  She has been relying on cafeteria or restaurant meals.  Her family has recently sat down and started helping her have some soups and more appropriate diets and with that her blood sugars have been coming down a little bit.          3/18/2024   General Health   How would you rate your overall physical health? Good   Feel stress (tense, anxious, or unable to sleep) Very much   (!)  STRESS CONCERN      3/18/2024   Nutrition   Diet: Low salt    Diabetic         3/18/2024   Exercise   Days per week of moderate/strenous exercise 2 days   Average minutes spent exercising at this level 20 min   (!) EXERCISE CONCERN      3/18/2024   Social Factors   Frequency of gathering with friends or relatives Twice a week   Worry food won't last until get money to buy more No   Food not last or not have enough money for food? No   Do you have housing?  Yes   Are you worried about losing your housing? No   Lack of transportation? No   Unable to get utilities (heat,electricity)? Yes   Want help with housing or utility concern? No   (!) FINANCIAL RESOURCE STRAIN CONCERN      3/18/2024   Activities of Daily Living- Home Safety   Needs help with the following daily activites None of the above   Safety concerns in the home None of the above         3/18/2024   Dental   Dentist two times every year? Yes         3/18/2024   Hearing Screening   Hearing concerns? None of the above         3/18/2024   Driving Risk Screening   Patient/family members have concerns about driving No         3/18/2024   General Alertness/Fatigue Screening   Have you been more tired than usual lately? No         3/18/2024   Urinary Incontinence Screening   Bothered by leaking urine in past 6 months No         3/18/2024   TB Screening   Were you born outside of the [place]? No         Today's PHQ-2 Score:       3/20/2024     9:54 AM   PHQ-2 ( 1999 [employer])   Q1: Little interest or pleasure in doing things 0   Q2: Feeling down, depressed or hopeless 0   PHQ-2 Score 0   Q1: Little interest or pleasure in doing things Not at all   Q2: Feeling down, depressed or hopeless Not at all   PHQ-2 Score 0           3/18/2024   Substance Use   Alcohol more than 3/day or more than 7/wk No   Do you have a current opioid prescription? No   How severe/bad is pain from 1 to 10? 0/10 (No Pain)   Do you use any other substances recreationally? No     Social History      Tobacco Use    Smoking status: Never     Passive exposure: Never    Smokeless tobacco: Never   Vaping Use    Vaping Use: Never used   Substance Use Topics    Alcohol use: No    Drug use: No           4/12/2023   LAST FHS-7 RESULTS   1st degree relative breast or ovarian cancer Yes   Any relative bilateral breast cancer No   Any male have breast cancer No   Any ONE woman have BOTH breast AND ovarian cancer No   Any woman with breast cancer before 50yrs No   2 or more relatives with breast AND/OR ovarian cancer No   2 or more relatives with breast AND/OR bowel cancer No        Mammogram Screening - After age 74- determine frequency with patient based on health status, life expectancy and patient goals    ASCVD Risk   The 10-year ASCVD risk score (Stephany DK, et al., 2019) is: 52.9%    Values used to calculate the score:      Age: 79 years      Sex: Female      Is Non- : No      Diabetic: Yes      Tobacco smoker: No      Systolic Blood Pressure: 134 mmHg      Is BP treated: Yes      HDL Cholesterol: 85 mg/dL      Total Cholesterol: 178 mg/dL          Reviewed and updated as needed this visit by Provider   Tobacco  Allergies  Meds  Problems  Med Hx  Surg Hx  Fam Hx            Current providers sharing in care for this patient include:  Patient Care Team:  Saul Zamora DO as PCP - General (Family Medicine)  Saul Zamora DO as Assigned PCP  Torrie Thompson MD as Physician (Internal Medicine)  Carline Zavala RPH as Pharmacist (Pharmacist)  Amie Hobbs RPH as Pharmacist (Pharmacist)  Amie Hobbs RPH as Assigned [facility] Pharmacist    The following health maintenance items are reviewed in Epic and correct as of today:  Health Maintenance   Topic Date Due    ZOSTER IMMUNIZATION (1 of 2) Never done    RSV VACCINE (Pregnancy & 60+) (1 - 1-dose 60+ series) Never done    Pneumococcal Vaccine: 65+ Years (2 of 2 - PPSV23 or PCV20) 05/03/2021     "INFLUENZA VACCINE (1) Never done    COVID-19 Vaccine (1 - 2023-24 season) Never done    BMP  02/27/2024    LIPID  02/27/2024    MICROALBUMIN  02/27/2024    DIABETIC FOOT EXAM  02/27/2024    COLORECTAL CANCER SCREENING  06/11/2024    EYE EXAM  09/05/2024    A1C  09/20/2024    MEDICARE ANNUAL WELLNESS VISIT  03/20/2025    ANNUAL REVIEW OF HM ORDERS  03/20/2025    FALL RISK ASSESSMENT  03/20/2025    DTAP/TDAP/TD IMMUNIZATION (2 - Td or Tdap) 06/22/2025    ADVANCE CARE PLANNING  03/20/2029    DEXA  01/19/2037    HEPATITIS C SCREENING  Completed    PHQ-2 (once per calendar year)  Completed    IPV IMMUNIZATION  Aged Out    HPV IMMUNIZATION  Aged Out    MENINGITIS IMMUNIZATION  Aged Out    RSV MONOCLONAL ANTIBODY  Aged Out    MAMMO SCREENING  Discontinued          Objective    Exam  /82   Pulse 75   Temp 97.9  F (36.6  C) (Oral)   Resp 18   Ht 1.626 m (5' 4\")   Wt 100.5 kg (221 lb 9.6 oz)   LMP  (LMP Unknown)   SpO2 97%   BMI 38.04 kg/m     Estimated body mass index is 38.04 kg/m  as calculated from the following:    Height as of this encounter: 1.626 m (5' 4\").    Weight as of this encounter: 100.5 kg (221 lb 9.6 oz).    Physical Exam  Vitals and nursing note reviewed.   Constitutional:       General: She is not in acute distress.     Appearance: Normal appearance.   HENT:      Head: Normocephalic and atraumatic.      Right Ear: Tympanic membrane, ear canal and external ear normal.      Left Ear: Tympanic membrane, ear canal and external ear normal.      Nose: Nose normal.      Mouth/Throat:      Mouth: Mucous membranes are moist.      Pharynx: Oropharynx is clear. No oropharyngeal exudate.   Eyes:      General: No scleral icterus.     Extraocular Movements: Extraocular movements intact.      Conjunctiva/sclera: Conjunctivae normal.   Neck:      Vascular: No carotid bruit.   Cardiovascular:      Rate and Rhythm: Normal rate and regular rhythm.      Pulses: Normal pulses.      Heart sounds: Normal heart " sounds. No murmur heard.     No friction rub. No gallop.   Pulmonary:      Effort: Pulmonary effort is normal.      Breath sounds: Normal breath sounds. No wheezing, rhonchi or rales.   Musculoskeletal:         General: No swelling. Normal range of motion.      Cervical back: Normal range of motion.      Right lower leg: No edema.      Left lower leg: No edema.   Skin:     General: Skin is warm and dry.      Capillary Refill: Capillary refill takes less than 2 seconds.      Findings: No rash.   Neurological:      General: No focal deficit present.      Mental Status: She is alert and oriented to person, place, and time.      Cranial Nerves: No cranial nerve deficit.      Gait: Gait is intact. Gait normal.      Deep Tendon Reflexes: Reflexes normal.      Reflex Scores:       Bicep reflexes are 2+ on the right side and 2+ on the left side.       Patellar reflexes are 2+ on the right side and 2+ on the left side.  Psychiatric:         Mood and Affect: Mood normal.         Thought Content: Thought content normal.              No data to display                       Signed Electronically by: Saul Zamora,

## 2024-03-20 NOTE — ASSESSMENT & PLAN NOTE
Below goal of 140/90 on current regimen which does include an ARB.  Continue diet, exercise and medication regimen.

## 2024-03-20 NOTE — ASSESSMENT & PLAN NOTE
Not below 7, but is holding below 8.  Given the recent stresses, the fact that her GLP-1 has not been available, and the current difficulties with preparing meals from home due to 's current medical condition, she is doing very well.  Encouraged to continue the same.  Discussed strategies of how to pack her own food and possible cooler or cold sac that could be used for transportation.

## 2024-03-20 NOTE — PATIENT INSTRUCTIONS
Preventive Care Advice   This is general advice given by our system to help you stay healthy. However, your care team may have specific advice just for you. Please talk to your care team about your preventive care needs.  Nutrition  Eat 5 or more servings of fruits and vegetables each day.  Try wheat bread, brown rice and whole grain pasta (instead of white bread, rice, and pasta).  Get enough calcium and vitamin D. Check the label on foods and aim for 100% of the RDA (recommended daily allowance).  Lifestyle  Exercise at least 150 minutes each week   (30 minutes a day, 5 days a week).  Do muscle strengthening activities 2 days a week. These help control your weight and prevent disease.  No smoking.  Wear sunscreen to prevent skin cancer.  Have a dental exam and cleaning every 6 months.  Yearly exams  See your health care team every year to talk about:  Any changes in your health.  Any medicines your care team has prescribed.  Preventive care, family planning, and ways to prevent chronic diseases.  Shots (vaccines)   HPV shots (up to age 26), if you've never had them before.  Hepatitis B shots (up to age 59), if you've never had them before.  COVID-19 shot: Get this shot when it's due.  Flu shot: Get a flu shot every year.  Tetanus shot: Get a tetanus shot every 10 years.  Pneumococcal, hepatitis A, and RSV shots: Ask your care team if you need these based on your risk.  Shingles shot (for age 50 and up).  General health tests  Diabetes screening:  Starting at age 35, Get screened for diabetes at least every 3 years.  If you are younger than age 35, ask your care team if you should be screened for diabetes.  Cholesterol test: At age 39, start having a cholesterol test every 5 years, or more often if advised.  Bone density scan (DEXA): At age 50, ask your care team if you should have this scan for osteoporosis (brittle bones).  Hepatitis C: Get tested at least once in your life.  STIs (sexually transmitted  infections)  Before age 24: Ask your care team if you should be screened for STIs.  After age 24: Get screened for STIs if you're at risk. You are at risk for STIs (including HIV) if:  You are sexually active with more than one person.  You don't use condoms every time.  You or a partner was diagnosed with a sexually transmitted infection.  If you are at risk for HIV, ask about PrEP medicine to prevent HIV.  Get tested for HIV at least once in your life, whether you are at risk for HIV or not.  Cancer screening tests  Cervical cancer screening: If you have a cervix, begin getting regular cervical cancer screening tests at age 21. Most people who have regular screenings with normal results can stop after age 65. Talk about this with your provider.  Breast cancer scan (mammogram): If you've ever had breasts, begin having regular mammograms starting at age 40. This is a scan to check for breast cancer.  Colon cancer screening: It is important to start screening for colon cancer at age 45.  Have a colonoscopy test every 10 years (or more often if you're at risk) Or, ask your provider about stool tests like a FIT test every year or Cologuard test every 3 years.  To learn more about your testing options, visit: https://www.[employer]/388734.pdf.  For help making a decision, visit: https://bit.ly/qt49489.  Prostate cancer screening test: If you have a prostate and are age 55 to 69, ask your provider if you would benefit from a yearly prostate cancer screening test.  Lung cancer screening: If you are a current or former smoker age 50 to 80, ask your care team if ongoing lung cancer screenings are right for you.  For informational purposes only. Not to replace the advice of your health care provider. Copyright   2023 [employer]. All rights reserved. Clinically reviewed by the [facility]. [employer] 198675 - REV 01/24.    Learning About Stress  What is stress?     Stress is your  body's response to a hard situation. Your body can have a physical, emotional, or mental response. Stress is a fact of life for most people, and it affects everyone differently. What causes stress for you may not be stressful for someone else.  A lot of things can cause stress. You may feel stress when you go on a job interview, take a test, or run a race. This kind of short-term stress is normal and even useful. It can help you if you need to work hard or react quickly. For example, stress can help you finish an important job on time.  Long-term stress is caused by ongoing stressful situations or events. Examples of long-term stress include long-term health problems, ongoing problems at work, or conflicts in your family. Long-term stress can harm your health.  How does stress affect your health?  When you are stressed, your body responds as though you are in danger. It makes hormones that speed up your heart, make you breathe faster, and give you a burst of energy. This is called the fight-or-flight stress response. If the stress is over quickly, your body goes back to normal and no harm is done.  But if stress happens too often or lasts too long, it can have bad effects. Long-term stress can make you more likely to get sick, and it can make symptoms of some diseases worse. If you tense up when you are stressed, you may develop neck, shoulder, or low back pain. Stress is linked to high blood pressure and heart disease.  Stress also harms your emotional health. It can make you valencia, tense, or depressed. Your relationships may suffer, and you may not do well at work or school.  What can you do to manage stress?  You can try these things to help manage stress:   Do something active. Exercise or activity can help reduce stress. Walking is a great way to get started. Even everyday activities such as housecleaning or yard work can help.  Try yoga or roxana chi. These techniques combine exercise and meditation. You may need  some training at first to learn them.  Do something you enjoy. For example, listen to music or go to a movie. Practice your hobby or do volunteer work.  Meditate. This can help you relax, because you are not worrying about what happened before or what may happen in the future.  Do guided imagery. Imagine yourself in any setting that helps you feel calm. You can use online videos, books, or a teacher to guide you.  Do breathing exercises. For example:  From a standing position, bend forward from the waist with your knees slightly bent. Let your arms dangle close to the floor.  Breathe in slowly and deeply as you return to a standing position. Roll up slowly and lift your head last.  Hold your breath for just a few seconds in the standing position.  Breathe out slowly and bend forward from the waist.  Let your feelings out. Talk, laugh, cry, and express anger when you need to. Talking with supportive friends or family, a counselor, or a petty leader about your feelings is a healthy way to relieve stress. Avoid discussing your feelings with people who make you feel worse.  Write. It may help to write about things that are bothering you. This helps you find out how much stress you feel and what is causing it. When you know this, you can find better ways to cope.  What can you do to prevent stress?  You might try some of these things to help prevent stress:  Manage your time. This helps you find time to do the things you want and need to do.  Get enough sleep. Your body recovers from the stresses of the day while you are sleeping.  Get support. Your family, friends, and community can make a difference in how you experience stress.  Limit your news feed. Avoid or limit time on social media or news that may make you feel stressed.  Do something active. Exercise or activity can help reduce stress. Walking is a great way to get started.  Where can you learn more?  Go to https://www.healthwise.net/patiented  Enter N032 in the  "search box to learn more about \"Learning About Stress.\"  Current as of: October 24, 2023               Content Version: 14.0    1926-4117 [employer].   Care instructions adapted under license by your healthcare professional. If you have questions about a medical condition or this instruction, always ask your healthcare professional. [employer] disclaims any warranty or liability for your use of this information.      "

## 2024-03-21 LAB
ANION GAP SERPL CALCULATED.3IONS-SCNC: 11 MMOL/L (ref 7–15)
BUN SERPL-MCNC: 16.9 MG/DL (ref 8–23)
CALCIUM SERPL-MCNC: 9.8 MG/DL (ref 8.8–10.2)
CEA SERPL-MCNC: 1.7 NG/ML
CHLORIDE SERPL-SCNC: 103 MMOL/L (ref 98–107)
CHOLEST SERPL-MCNC: 169 MG/DL
CREAT SERPL-MCNC: 0.71 MG/DL (ref 0.51–0.95)
CREAT UR-MCNC: 86.8 MG/DL
DEPRECATED HCO3 PLAS-SCNC: 23 MMOL/L (ref 22–29)
EGFRCR SERPLBLD CKD-EPI 2021: 87 ML/MIN/1.73M2
FASTING STATUS PATIENT QL REPORTED: YES
GLUCOSE SERPL-MCNC: 159 MG/DL (ref 70–99)
HDLC SERPL-MCNC: 69 MG/DL
LDLC SERPL CALC-MCNC: 77 MG/DL
MICROALBUMIN UR-MCNC: 41.7 MG/L
MICROALBUMIN/CREAT UR: 48.04 MG/G CR (ref 0–25)
NONHDLC SERPL-MCNC: 100 MG/DL
POTASSIUM SERPL-SCNC: 4.3 MMOL/L (ref 3.4–5.3)
SODIUM SERPL-SCNC: 137 MMOL/L (ref 135–145)
TRIGL SERPL-MCNC: 113 MG/DL
TSH SERPL DL<=0.005 MIU/L-ACNC: 1.9 UIU/ML (ref 0.3–4.2)

## 2024-04-11 ENCOUNTER — TELEPHONE (OUTPATIENT)
Dept: FAMILY MEDICINE | Facility: CLINIC | Age: 79
End: 2024-04-11
Payer: COMMERCIAL

## 2024-04-11 NOTE — TELEPHONE ENCOUNTER
Karol's Ozempic application has been submitted to the [employer] prescription assistance program.     We will note EPIC when [employer] has made their decision.     Thank you!    Jackie Malone   Prescription Assistance Assistant

## 2024-04-22 ENCOUNTER — ANCILLARY PROCEDURE (OUTPATIENT)
Dept: MAMMOGRAPHY | Facility: CLINIC | Age: 79
End: 2024-04-22
Attending: FAMILY MEDICINE
Payer: COMMERCIAL

## 2024-04-22 DIAGNOSIS — Z12.31 SCREENING MAMMOGRAM, ENCOUNTER FOR: ICD-10-CM

## 2024-04-22 PROCEDURE — 77063 BREAST TOMOSYNTHESIS BI: CPT

## 2024-04-23 ENCOUNTER — VIRTUAL VISIT (OUTPATIENT)
Dept: PHARMACY | Facility: CLINIC | Age: 79
End: 2024-04-23
Payer: COMMERCIAL

## 2024-04-23 DIAGNOSIS — E11.42 TYPE 2 DIABETES MELLITUS WITH DIABETIC POLYNEUROPATHY, WITHOUT LONG-TERM CURRENT USE OF INSULIN (H): Primary | ICD-10-CM

## 2024-04-23 PROCEDURE — 99606 MTMS BY PHARM EST 15 MIN: CPT | Mod: 93 | Performed by: PHARMACIST

## 2024-04-23 NOTE — PROGRESS NOTES
Medication Therapy Management (MTM) Encounter    ASSESSMENT:                            Medication Adherence/Access: No issues identified    Type 2 Diabetes:  morning blood sugars improving, post-prandial supper readings at goal. Will continue with current dose of Ozempic - patient receiving her supply through patient assistance - will assess at follow up visit to determine if higher dose is needed.     PLAN:                            1. Continue Ozempic 1 mg once weekly (getting though patient assistance). Will revisit at follow up to see if we should increase dose.    2. Continue to monitor blood sugars fasting mornings and occasionally before dinner readings.      Follow-up: Tuesday, May 28th at 10:30 AM    SUBJECTIVE/OBJECTIVE:                          Massiel Mujica is a 79 year old female called for a follow-up visit from 3/2/2024.     Reason for visit: follow up MTM visit.    Tobacco: She reports that she has never smoked. She has never been exposed to tobacco smoke. She has never used smokeless tobacco.  Alcohol: not currently using    Medication Adherence/Access: no issues reported - patient wants to switch to mail order for herself and her . I emailed their information to mail order pharmacy and gave patient the phone number.     Type 2 Diabetes:    Ozempic 1 mg once weekly    Tolerating Ozempic very well - was nauseous at first when increased dose - no longer nauseous. Losing weight - about 6 lbs. Qualified for patient assistance - is getting Ozempic soon (10-14 days) from this program.     Blood sugar monitoring: one time(s) daily; Ranges: (patient reported) Fasting morning blood sugars: 151-157, 140 today. Evening readings (2 hours post-prandial supper) 135, 140, 145, 131.     Current diabetes symptoms: none  Eye exam: up to date  Foot exam: up to date    Hemoglobin A1C   Date Value Ref Range Status   03/20/2024 7.8 (H) 0.0 - 5.6 % Final     Comment:     Normal <5.7%   Prediabetes 5.7-6.4%     Diabetes 6.5% or higher     Note: Adopted from ADA consensus guidelines.     ----------------      I spent 21 minutes with this patient today. All changes were made via collaborative practice agreement with Saul Zamora DO. A copy of the visit note was provided to the patient's provider(s).    A summary of these recommendations was sent via [employer].    Makayla RomeroD  Medication Therapy Management     Telemedicine Visit Details  Type of service:  Telephone visit  Start Time:  10:00 AM  End Time: 10:21 AM     Medication Therapy Recommendations  No medication therapy recommendations to display

## 2024-04-24 NOTE — PATIENT INSTRUCTIONS
"Recommendations from today's MTM visit:                                                         Massiel,    It was a pleasure talking with you! We discussed the followin. Continue Ozempic 1 mg once weekly.    2. Continue to monitor blood sugars fasting mornings and occasionally before dinner readings.      Follow-up: Tuesday, May 28th at 10:30 AM    It was great speaking with you today.  I value your experience and would be very thankful for your time in providing feedback in our clinic survey. In the next few days, you may receive an email or text message from [employer] with a link to a survey related to your  clinical pharmacist.\"     To schedule another MTM appointment, please call the clinic directly or you may call the MTM scheduling line at [phone] or toll-free at [phone].     My Clinical Pharmacist's contact information:                                                      Please feel free to contact me with any questions or concerns you have.      Keep up the good work!!    Amie Hobbs, PharmD  [phone] in clinic on Tuesday and Wednesday        "

## 2024-05-28 ENCOUNTER — VIRTUAL VISIT (OUTPATIENT)
Dept: PHARMACY | Facility: CLINIC | Age: 79
End: 2024-05-28
Payer: COMMERCIAL

## 2024-05-28 DIAGNOSIS — E11.42 TYPE 2 DIABETES MELLITUS WITH DIABETIC POLYNEUROPATHY, WITHOUT LONG-TERM CURRENT USE OF INSULIN (H): Primary | ICD-10-CM

## 2024-05-28 PROCEDURE — 99606 MTMS BY PHARM EST 15 MIN: CPT | Mod: 93 | Performed by: PHARMACIST

## 2024-05-28 NOTE — PROGRESS NOTES
"Medication Therapy Management (MTM) Encounter    ASSESSMENT:                            Medication Adherence/Access: No issues identified    Type 2 Diabetes: morning readings slightly elevated - increase Ozempic dose to 2 mg weekly, will help with weight loss as well. I let patient assistance know and their reply to what needs to be done and the timeline is:     \"We will have to submit paperwork for a dose increase to the 2mg. There is nothing needed to be done on your end at this moment\".     \"The paperwork for the increase should be submitted within a week. Once [facility] processes the request it takes 10-14 business days for the medication to arrive to the patient's clinic\".     PLAN:                            1. I contacted patient assistance - they will send in the information for a dose increase in Ozempic to 2 mg weekly.     Follow-up: [facility] patient - will follow up in 6 weeks.     SUBJECTIVE/OBJECTIVE:                          Massiel Mujica is a 79 year old female called for a follow-up visit from 4/23/2024.       Reason for visit: follow up MTM visit.    Tobacco: She reports that she has never smoked. She has never been exposed to tobacco smoke. She has never used smokeless tobacco.  Alcohol: not currently using    Medication Adherence/Access: no issues reported    Type 2 Diabetes:    Ozempic 1 mg once weekly    Patient receiving Ozempic from patient assistance. She is currently getting 0.5 mg dose pens. She has been on 1 mg weekly since 3/12/2024.     Tolerating higher dose okay. Blood sugars are mornings: 137, 139, 194, 95, 104 and evenings: 133, 139. Tolerating Ozempic very well. Has lost about 6 lbs total.      Current diabetes symptoms: none  Eye exam: up to date  Foot exam: up to date    Hemoglobin A1C   Date Value Ref Range Status   03/20/2024 7.8 (H) 0.0 - 5.6 % Final     Comment:     Normal <5.7%   Prediabetes 5.7-6.4%    Diabetes 6.5% or higher     Note: Adopted from ADA consensus guidelines. "     ----------------    I spent 15 minutes with this patient today. All changes were made via collaborative practice agreement with Saul Zamora DO. A copy of the visit note was provided to the patient's provider(s).    A summary of these recommendations was sent via [employer].    Amie Hobbs, PharmD  Medication Therapy Management     Telemedicine Visit Details  Type of service:  Telephone visit  Start Time:  10:31 AM  End Time: 10:46 AM     Medication Therapy Recommendations  Type 2 diabetes mellitus with diabetic polyneuropathy, without long-term current use of insulin (H)    Current Medication: Semaglutide, 1 MG/DOSE, (OZEMPIC) 4 MG/3ML pen   Rationale: Dose too low - Dosage too low - Effectiveness   Recommendation: Increase Dose   Status: Accepted per CPA

## 2024-05-28 NOTE — PATIENT INSTRUCTIONS
"Recommendations from today's MTM visit:                                                    MTM (medication therapy management) is a service provided by a clinical pharmacist designed to help you get the most of out of your medicines.   Today we reviewed what your medicines are for, how to know if they are working, that your medicines are safe and how to make your medicine regimen as easy as possible.      Massiel,    It was a pleasure talking with you! We discussed the followin. I contacted patient assistance - they will send in the information for a dose increase in Ozempic to 2 mg weekly. Once you receive the new dose, increase to 2 mg weekly.     Follow-up: 6 weeks    It was great speaking with you today.  I value your experience and would be very thankful for your time in providing feedback in our clinic survey. In the next few days, you may receive an email or text message from [employer] with a link to a survey related to your  clinical pharmacist.\"     To schedule another MTM appointment, please call the clinic directly or you may call the MTM scheduling line at [phone] or toll-free at [phone].     My Clinical Pharmacist's contact information:                                                      Please feel free to contact me with any questions or concerns you have.      Keep up the good work!!    Amie Hobbs, PharmD  [phone] in clinic on Tuesday and Wednesday        "

## 2024-05-29 ENCOUNTER — TELEPHONE (OUTPATIENT)
Dept: FAMILY MEDICINE | Facility: CLINIC | Age: 79
End: 2024-05-29
Payer: COMMERCIAL

## 2024-05-29 NOTE — TELEPHONE ENCOUNTER
A dose increase request has been made to the [employer] assistance program for Ozempic.    We will note EPIC when in process.     Thank you!    Jackie Malone   Prescription Assistance Assistant

## 2024-06-07 DIAGNOSIS — I10 ESSENTIAL (PRIMARY) HYPERTENSION: ICD-10-CM

## 2024-06-07 DIAGNOSIS — E78.5 HYPERLIPIDEMIA, UNSPECIFIED: ICD-10-CM

## 2024-06-07 RX ORDER — LOSARTAN POTASSIUM 50 MG/1
50 TABLET ORAL DAILY
Qty: 90 TABLET | Refills: 2 | Status: SHIPPED | OUTPATIENT
Start: 2024-06-07

## 2024-06-07 RX ORDER — SIMVASTATIN 40 MG
40 TABLET ORAL AT BEDTIME
Qty: 90 TABLET | Refills: 2 | Status: SHIPPED | OUTPATIENT
Start: 2024-06-07

## 2024-06-11 ENCOUNTER — TRANSFERRED RECORDS (OUTPATIENT)
Dept: HEALTH INFORMATION MANAGEMENT | Facility: CLINIC | Age: 79
End: 2024-06-11
Payer: COMMERCIAL

## 2024-06-14 ENCOUNTER — TELEPHONE (OUTPATIENT)
Dept: FAMILY MEDICINE | Facility: CLINIC | Age: 79
End: 2024-06-14
Payer: COMMERCIAL

## 2024-06-14 NOTE — TELEPHONE ENCOUNTER
A Dose Increase request was submitted to [employer] for Ozempic 2mg. This did begin processing 5/31/24 and typically takes 10-14 business days to arrive after processing date.     This shipment will be coming to the [facility]. Please contact Karol once this has arrived.     Thank you!    Jackie Malone   Prescription Assistance Assistant

## 2024-07-01 ENCOUNTER — OFFICE VISIT (OUTPATIENT)
Dept: FAMILY MEDICINE | Facility: CLINIC | Age: 79
End: 2024-07-01
Payer: COMMERCIAL

## 2024-07-01 VITALS
DIASTOLIC BLOOD PRESSURE: 84 MMHG | WEIGHT: 214.8 LBS | HEART RATE: 96 BPM | BODY MASS INDEX: 36.67 KG/M2 | RESPIRATION RATE: 18 BRPM | OXYGEN SATURATION: 100 % | TEMPERATURE: 98.1 F | SYSTOLIC BLOOD PRESSURE: 144 MMHG | HEIGHT: 64 IN

## 2024-07-01 DIAGNOSIS — J01.00 ACUTE NON-RECURRENT MAXILLARY SINUSITIS: Primary | ICD-10-CM

## 2024-07-01 DIAGNOSIS — J06.9 URI WITH COUGH AND CONGESTION: ICD-10-CM

## 2024-07-01 PROCEDURE — 99213 OFFICE O/P EST LOW 20 MIN: CPT | Performed by: FAMILY MEDICINE

## 2024-07-01 RX ORDER — RESPIRATORY SYNCYTIAL VIRUS VACCINE 120MCG/0.5
0.5 KIT INTRAMUSCULAR ONCE
Qty: 1 EACH | Refills: 0 | Status: CANCELLED | OUTPATIENT
Start: 2024-07-01 | End: 2024-07-01

## 2024-07-01 RX ORDER — DOXYCYCLINE 100 MG/1
100 CAPSULE ORAL 2 TIMES DAILY
Qty: 20 CAPSULE | Refills: 0 | Status: SHIPPED | OUTPATIENT
Start: 2024-07-01

## 2024-07-01 NOTE — PROGRESS NOTES
Assessment & Plan   Problem List Items Addressed This Visit    None  Visit Diagnoses       Acute non-recurrent maxillary sinusitis    -  Primary    Relevant Medications    doxycycline hyclate (VIBRAMYCIN) 100 MG capsule    URI with cough and congestion            - Assessment: Likely a viral respiratory infection, with symptoms including cough, fever, and facial pain. Concern about the pressure in the face, which could indicate a sinus infection.  - Plan: Wait a couple more days to see if symptoms improve. If they worsen, especially the facial pain, fill the antibiotic prescription for doxycycline. Also, consider taking a COVID-19 test.  - Risks and side effects: Potential for bacterial infection if antibiotic is not taken. If COVID-19 test is positive, may need to consider treatment with Paxlovid, which would require adjustments to current medications.  - Continue taking Robitussin. Consider using Afrin nasal spray to reduce mucus production. If cough persists and disrupts sleep, consider codeine-guaifenesin combination, with caution due to potential fall risk.    Subjective   Massiel is a 79 year old, presenting for the following health issues:  Symptoms (Coughing, headache mainly on right side, neck pain x 5 days.  Have not done home COVID test)        7/1/2024     9:42 AM   Additional Questions   Roomed by Jean Marie Marcos MA   Accompanied by Self         7/1/2024     9:42 AM   Patient Reported Additional Medications   Patient reports taking the following new medications None     - Massiel Mujica, female, 79 years old  - Reports a severe cough and pain on one side of her face extending down to her neck  - Symptoms started on Thursday, with a fever of 99.5  - Fever was managed with Advil, but returned the following morning  - Reports no pain with breathing, but discomfort in the chest area  - Facial pain preceded the onset of coughing  - Reports no difficulty or pain with swallowing, but feels sick in the throat  -  "Blood sugar level was 121 in the morning, which is normal for her since starting Ozempic  - Reports no previous lung issues      History of Present Illness       Reason for visit:  Cough & pounding headache  Symptom onset:  1-3 days ago    She eats 2-3 servings of fruits and vegetables daily.She consumes 0 sweetened beverage(s) daily.She exercises with enough effort to increase her heart rate 9 or less minutes per day.  She exercises with enough effort to increase her heart rate 3 or less days per week.   She is taking medications regularly.        Objective    BP (!) 144/84 (BP Location: Left arm, Patient Position: Sitting, Cuff Size: Adult Large)   Pulse 96   Temp 98.1  F (36.7  C) (Oral)   Resp 18   Ht 1.626 m (5' 4\")   Wt 97.4 kg (214 lb 12.8 oz)   LMP  (LMP Unknown)   SpO2 100%   BMI 36.87 kg/m    Body mass index is 36.87 kg/m .  Physical Exam  Vitals reviewed.   Constitutional:       Appearance: Normal appearance. She is not ill-appearing.   HENT:      Head: Normocephalic.      Right Ear: Tympanic membrane, ear canal and external ear normal.      Left Ear: Tympanic membrane, ear canal and external ear normal.      Nose: Nose normal.      Mouth/Throat:      Mouth: Mucous membranes are moist.      Pharynx: No oropharyngeal exudate or posterior oropharyngeal erythema.   Eyes:      General: No scleral icterus.        Right eye: No discharge.         Left eye: No discharge.      Conjunctiva/sclera: Conjunctivae normal.   Cardiovascular:      Rate and Rhythm: Normal rate and regular rhythm.      Heart sounds: Normal heart sounds. No murmur heard.     No friction rub. No gallop.   Pulmonary:      Effort: Pulmonary effort is normal. No respiratory distress.      Breath sounds: Normal breath sounds. No wheezing or rales.   Musculoskeletal:      Cervical back: Neck supple.   Lymphadenopathy:      Cervical: No cervical adenopathy.   Skin:     Findings: No rash.   Neurological:      General: No focal deficit " present.      Mental Status: She is alert and oriented to person, place, and time.   Psychiatric:         Mood and Affect: Mood normal.                    Signed Electronically by: Ty Melendrez MD

## 2024-07-09 ENCOUNTER — VIRTUAL VISIT (OUTPATIENT)
Dept: PHARMACY | Facility: CLINIC | Age: 79
End: 2024-07-09
Payer: COMMERCIAL

## 2024-07-09 DIAGNOSIS — E11.42 TYPE 2 DIABETES MELLITUS WITH DIABETIC POLYNEUROPATHY, WITHOUT LONG-TERM CURRENT USE OF INSULIN (H): Primary | ICD-10-CM

## 2024-07-09 PROCEDURE — 99606 MTMS BY PHARM EST 15 MIN: CPT | Mod: 93 | Performed by: PHARMACIST

## 2024-07-09 NOTE — PROGRESS NOTES
Medication Therapy Management (MTM) Encounter    ASSESSMENT:                            Medication Adherence/Access: No issues identified    Type 2 Diabetes: blood sugars are improving - will increase Ozempic dose to help with weight loss and improve A1c.      PLAN:                            1. Increase Ozempic to 2 mg daily on Thursday as planned. This is a review from our last visit - patient wanted to wait to start the higher dose until our visit today.     Follow-up: Tuesday, August 6th at 10:00 AM, patient will reach out to me sooner if not tolerating higher dose of Ozempic.     SUBJECTIVE/OBJECTIVE:                          Massiel Mujica is a 79 year old female seen for a follow-up visit from 5/28/2024.       Reason for visit: follow up MTM visit.    Tobacco: She reports that she has never smoked. She has never been exposed to tobacco smoke. She has never used smokeless tobacco.  Alcohol: not currently using    Medication Adherence/Access: no issues reported    Type 2 Diabetes:    Ozempic 1 mg once weekly    Patient receiving Ozempic from patient assistance. She has received the dose increase of Ozempic - plans to start this Thursday.     Morning blood sugars are  at night 115-155. Weight loss down to 213 lbs. Has lost 8 lbs total.     Hemoglobin A1C   Date Value Ref Range Status   03/20/2024 7.8 (H) 0.0 - 5.6 % Final     Comment:     Normal <5.7%   Prediabetes 5.7-6.4%    Diabetes 6.5% or higher     Note: Adopted from ADA consensus guidelines.     Current diabetes symptoms: none  Eye exam: up to date  Foot exam: up to date    Hemoglobin A1C   Date Value Ref Range Status   03/20/2024 7.8 (H) 0.0 - 5.6 % Final     Comment:     Normal <5.7%   Prediabetes 5.7-6.4%    Diabetes 6.5% or higher     Note: Adopted from ADA consensus guidelines.     ----------------    I spent 15 minutes with this patient today. All changes were made via collaborative practice agreement with Saul Zamora DO. A copy of  the visit note was provided to the patient's provider(s).    A summary of these recommendations was sent via [employer].    Amie Hobbs PharmD  Medication Therapy Management     Telemedicine Visit Details  Type of service:  Telephone visit  Start Time:  10:35 AM  End Time:  10:50 AM     Medication Therapy Recommendations  No medication therapy recommendations to display

## 2024-07-09 NOTE — PATIENT INSTRUCTIONS
"Recommendations from today's MTM visit:                                                       Massiel,    It was a pleasure talking with you! We discussed the followin. Increase Ozempic to 2 mg daily on Thursday as planned.     Follow-up:  at 10:00 AM. Reach out to me sooner if you are not tolerating the higher dose of Ozempic.     It was great speaking with you today.  I value your experience and would be very thankful for your time in providing feedback in our clinic survey. In the next few days, you may receive an email or text message from [facility] [email or web address] with a link to a survey related to your  clinical pharmacist.\"     To schedule another MTM appointment, please call the clinic directly or you may call the MTM scheduling line at [phone] or toll-free at [phone].     My Clinical Pharmacist's contact information:                                                      Please feel free to contact me with any questions or concerns you have.      Keep up the good work!!    Amie Hobbs, PharmD  [phone] in clinic on Tuesday and Wednesday          "

## 2024-08-06 ENCOUNTER — VIRTUAL VISIT (OUTPATIENT)
Dept: PHARMACY | Facility: CLINIC | Age: 79
End: 2024-08-06
Payer: COMMERCIAL

## 2024-08-06 DIAGNOSIS — E11.42 TYPE 2 DIABETES MELLITUS WITH DIABETIC POLYNEUROPATHY, WITHOUT LONG-TERM CURRENT USE OF INSULIN (H): Primary | ICD-10-CM

## 2024-08-06 PROCEDURE — 99606 MTMS BY PHARM EST 15 MIN: CPT | Mod: 93 | Performed by: PHARMACIST

## 2024-08-06 NOTE — PROGRESS NOTES
Medication Therapy Management (MTM) Encounter    ASSESSMENT:                            Medication Adherence/Access: No issues identified    Type 2 Diabetes:  Blood sugars at goal. Due for A1c and follow up with PCP next month.     PLAN:                            1. Continue current drug therapy - follow up with PCP and labs in September.     Follow-up: 6 months - sooner if A1c not at goal.     SUBJECTIVE/OBJECTIVE:                          Massiel Mujica is a 79 year old female seen for a follow-up visit from 7/9/2024.       Reason for visit: follow up MTM visit.    Tobacco: She reports that she has never smoked. She has never been exposed to tobacco smoke. She has never used smokeless tobacco.  Alcohol: not currently using    Medication Adherence/Access: no issues reported    Type 2 Diabetes:    Ozempic 2 mg once weekly    Patient receiving Ozempic from patient assistance. Has been on current dose of Ozempic for 1 month. Having some nausea after injection - only lasts a few hours - tolerable. Having some trouble with constipation - using a stool softener alternating with Miralax - which is working well.     Morning blood sugars are ; 2 hours after supper readings are 112-115. Not going low. Has lost 11 lbs total. Due to get A1c lab in September.     Hemoglobin A1C   Date Value Ref Range Status   03/20/2024 7.8 (H) 0.0 - 5.6 % Final     Comment:     Normal <5.7%   Prediabetes 5.7-6.4%    Diabetes 6.5% or higher     Note: Adopted from ADA consensus guidelines.     Current diabetes symptoms: none  Eye exam: up to date  Foot exam: up to date  ----------------      I spent 15 minutes with this patient today. All changes were made via collaborative practice agreement with Saul Zamora DO. A copy of the visit note was provided to the patient's provider(s).    A summary of these recommendations was sent via [employer].    Amie Hobbs, PharmD  Medication Therapy Management     Telemedicine Visit  Details  Type of service:  Telephone visit  Start Time:  10:00 AM  End Time: 10:15 AM     Medication Therapy Recommendations  No medication therapy recommendations to display

## 2024-08-07 NOTE — PATIENT INSTRUCTIONS
"Recommendations from today's MTM visit:                                                         Massiel,    It was a pleasure talking with you! We discussed the followin. Continue current drug therapy - follow up with your doctor and labs in September.     Follow-up: 6 months - sooner if A1c not at goal.     It was great speaking with you today.  I value your experience and would be very thankful for your time in providing feedback in our clinic survey. In the next few days, you may receive an email or text message from [employer] with a link to a survey related to your  clinical pharmacist.\"     To schedule another MTM appointment, please call the clinic directly or you may call the MTM scheduling line at [phone] or toll-free at [phone].     My Clinical Pharmacist's contact information:                                                      Please feel free to contact me with any questions or concerns you have.      Keep up the good work!!    Amie Hobbs, PharmD  [phone] in clinic on Tuesday and Wednesday        "

## 2024-08-13 DIAGNOSIS — M54.41 CHRONIC BILATERAL LOW BACK PAIN WITH BILATERAL SCIATICA: ICD-10-CM

## 2024-08-13 DIAGNOSIS — M54.42 CHRONIC BILATERAL LOW BACK PAIN WITH BILATERAL SCIATICA: ICD-10-CM

## 2024-08-13 DIAGNOSIS — G89.29 CHRONIC BILATERAL LOW BACK PAIN WITH BILATERAL SCIATICA: ICD-10-CM

## 2024-08-13 RX ORDER — CYCLOBENZAPRINE HCL 10 MG
TABLET ORAL
Qty: 90 TABLET | Refills: 3 | Status: SHIPPED | OUTPATIENT
Start: 2024-08-13

## 2024-08-13 RX ORDER — GABAPENTIN 300 MG/1
300 CAPSULE ORAL AT BEDTIME
Qty: 90 CAPSULE | Refills: 3 | Status: SHIPPED | OUTPATIENT
Start: 2024-08-13

## 2024-09-10 ENCOUNTER — TRANSFERRED RECORDS (OUTPATIENT)
Dept: HEALTH INFORMATION MANAGEMENT | Facility: CLINIC | Age: 79
End: 2024-09-10
Payer: COMMERCIAL

## 2024-09-12 NOTE — NURSING NOTE
Patient is here for a 6 month post op visit. She states that she is doing well. No pain.  Dalia Peguero MA,CMA,11:12 AM     YVETTE 0% No pain   
all other ROS negative except as per HPI

## 2024-09-17 ENCOUNTER — OFFICE VISIT (OUTPATIENT)
Dept: FAMILY MEDICINE | Facility: CLINIC | Age: 79
End: 2024-09-17
Payer: COMMERCIAL

## 2024-09-17 VITALS
HEART RATE: 69 BPM | SYSTOLIC BLOOD PRESSURE: 123 MMHG | RESPIRATION RATE: 12 BRPM | OXYGEN SATURATION: 98 % | BODY MASS INDEX: 36.04 KG/M2 | DIASTOLIC BLOOD PRESSURE: 74 MMHG | WEIGHT: 211.1 LBS | HEIGHT: 64 IN | TEMPERATURE: 97.8 F

## 2024-09-17 DIAGNOSIS — E11.42 TYPE 2 DIABETES MELLITUS WITH DIABETIC POLYNEUROPATHY, WITHOUT LONG-TERM CURRENT USE OF INSULIN (H): Primary | ICD-10-CM

## 2024-09-17 DIAGNOSIS — I10 BENIGN ESSENTIAL HYPERTENSION: ICD-10-CM

## 2024-09-17 DIAGNOSIS — Z85.038 HISTORY OF MALIGNANT NEOPLASM OF COLON: ICD-10-CM

## 2024-09-17 LAB
CEA SERPL-MCNC: 1.6 NG/ML
HBA1C MFR BLD: 6.3 % (ref 0–5.6)

## 2024-09-17 PROCEDURE — 83036 HEMOGLOBIN GLYCOSYLATED A1C: CPT | Performed by: FAMILY MEDICINE

## 2024-09-17 PROCEDURE — G2211 COMPLEX E/M VISIT ADD ON: HCPCS | Performed by: FAMILY MEDICINE

## 2024-09-17 PROCEDURE — 82378 CARCINOEMBRYONIC ANTIGEN: CPT | Performed by: FAMILY MEDICINE

## 2024-09-17 PROCEDURE — 36415 COLL VENOUS BLD VENIPUNCTURE: CPT | Performed by: FAMILY MEDICINE

## 2024-09-17 PROCEDURE — 99214 OFFICE O/P EST MOD 30 MIN: CPT | Performed by: FAMILY MEDICINE

## 2024-09-17 NOTE — ASSESSMENT & PLAN NOTE
Excellent improvement with the addition of semaglutide.  A1c down to 6.3.  Continue with current diet, exercise and medication regimen.  Patient congratulated on achieving this level of control.

## 2024-09-17 NOTE — PROGRESS NOTES
"  Assessment & Plan   Problem List Items Addressed This Visit       Benign essential hypertension    Type 2 diabetes mellitus with diabetic polyneuropathy, without long-term current use of insulin (H) - Primary     Excellent improvement with the addition of semaglutide.  A1c down to 6.3.  Continue with current diet, exercise and medication regimen.  Patient congratulated on achieving this level of control.         Relevant Orders    Hemoglobin A1c (Completed)    History of malignant neoplasm of colon     As per plan will check CEA.         Relevant Orders    CEA             BMI  Estimated body mass index is 36.24 kg/m  as calculated from the following:    Height as of this encounter: 1.626 m (5' 4\").    Weight as of this encounter: 95.8 kg (211 lb 1.6 oz).   Weight management plan: Discussed healthy diet and exercise guidelines    Regular exercise  See Patient Instructions      Astrid Rankin is a 79 year old, presenting for the following health issues:  Follow Up (Diabetes)      9/17/2024    10:13 AM   Additional Questions   Roomed by ANEL Chin   Accompanied by Self         9/17/2024    10:13 AM   Patient Reported Additional Medications   Patient reports taking the following new medications N/A     Presents for diabetes follow-up and colon cancer follow-up.  Overall doing well.  She has made some life changes at home that she is able to stick with.  Also has started semaglutide and titrated up to 2 mg.  With that she is actually had a 10 pound weight loss.  Her energy is improved.  She is going to the gym with her  3-4 times a week and finding that she enjoys the time and exercise.  She did complete her eye exam and that is on file.    History of Present Illness       Diabetes:   She presents for follow up of diabetes.  She is checking home blood glucose one time daily.   She checks blood glucose before meals.  Blood glucose is never over 200 and never under 70. She is aware of hypoglycemia symptoms " "including shakiness.    She has no concerns regarding her diabetes at this time.  She is having numbness in feet and burning in feet.  The patient has had a diabetic eye exam in the last 12 months. Eye exam performed on 9/10/24. Location of last eye exam [facility].        She eats 2-3 servings of fruits and vegetables daily.She consumes 0 sweetened beverage(s) daily.She exercises with enough effort to increase her heart rate 10 to 19 minutes per day.  She exercises with enough effort to increase her heart rate 3 or less days per week.   She is taking medications regularly.         Objective    /74 (BP Location: Left arm, Patient Position: Sitting, Cuff Size: Adult Large)   Pulse 69   Temp 97.8  F (36.6  C) (Oral)   Resp 12   Ht 1.626 m (5' 4\")   Wt 95.8 kg (211 lb 1.6 oz)   LMP  (LMP Unknown)   SpO2 98%   Breastfeeding No   BMI 36.24 kg/m    Body mass index is 36.24 kg/m .  Physical Exam  Vitals and nursing note reviewed.   Constitutional:       General: She is not in acute distress.     Appearance: Normal appearance. She is not ill-appearing.   HENT:      Head: Normocephalic and atraumatic.   Eyes:      Extraocular Movements: Extraocular movements intact.      Conjunctiva/sclera: Conjunctivae normal.   Pulmonary:      Effort: Pulmonary effort is normal.   Neurological:      Mental Status: She is alert and oriented to person, place, and time.   Psychiatric:         Attention and Perception: Attention normal.         Mood and Affect: Mood normal.         Speech: Speech normal.         Thought Content: Thought content normal.        Hemoglobin A1C   Date Value Ref Range Status   09/17/2024 6.3 (H) 0.0 - 5.6 % Final     Comment:     Normal <5.7%   Prediabetes 5.7-6.4%    Diabetes 6.5% or higher     Note: Adopted from ADA consensus guidelines.     The longitudinal plan of care for the diagnosis(es)/condition(s) as documented were addressed during this visit. Due to the added complexity in care, I " will continue to support Massiel in the subsequent management and with ongoing continuity of care.        Signed Electronically by: Saul Zamora, DO

## 2024-09-25 ENCOUNTER — TELEPHONE (OUTPATIENT)
Dept: FAMILY MEDICINE | Facility: CLINIC | Age: 79
End: 2024-09-25
Payer: COMMERCIAL

## 2024-09-25 DIAGNOSIS — J01.10 ACUTE NON-RECURRENT FRONTAL SINUSITIS: Primary | ICD-10-CM

## 2024-09-25 RX ORDER — DOXYCYCLINE HYCLATE 100 MG
100 TABLET ORAL 2 TIMES DAILY
Qty: 20 TABLET | Refills: 0 | Status: SHIPPED | OUTPATIENT
Start: 2024-09-25 | End: 2024-10-05

## 2024-09-25 NOTE — TELEPHONE ENCOUNTER
Called Massiel and her  Javed. Massiel stated tht she and Javed have the same illness.  Javed was previously prescribed antibiotics.  Massiel requesting that  Restaamy prescribe her the same medication.

## 2024-09-25 NOTE — TELEPHONE ENCOUNTER
Symptoms    Describe your symptoms: Cough, headache, neck ache, congestion    Neg Covid test    Any pain: mild ache in head and neck    How long have you been having symptoms: 2  days    Have you been seen for this:  No    Appointment offered?: Yes, patient declined at time of call    Triage offered?: No, clinic RN unavailable at time of call    Home remedies tried: cough drops, over the counter medication    Preferred Pharmacy:   [facility]/pharmacy #[id number] [address] [address]  Phone: [phone] Fax: [phone]      Could we send this information to you in [employer] or would you prefer to receive a phone call?:   Patient would prefer a phone call   Okay to leave a detailed message?: Yes at Home number on file [phone] (home)

## 2024-09-25 NOTE — TELEPHONE ENCOUNTER
Spoke with patient. Provider message relayed and patient verbalized understanding. No additional questions.

## 2024-09-25 NOTE — TELEPHONE ENCOUNTER
Unfortunately, she is allergic to the category that her  was treated with. As such, I sent over an alternative that is safe in someone who has a penicillin allergy.

## 2024-09-28 ENCOUNTER — VIRTUAL VISIT (OUTPATIENT)
Dept: URGENT CARE | Facility: CLINIC | Age: 79
End: 2024-09-28
Payer: COMMERCIAL

## 2024-09-28 ENCOUNTER — NURSE TRIAGE (OUTPATIENT)
Dept: NURSING | Facility: CLINIC | Age: 79
End: 2024-09-28

## 2024-09-28 DIAGNOSIS — E11.42 TYPE 2 DIABETES MELLITUS WITH DIABETIC POLYNEUROPATHY, WITHOUT LONG-TERM CURRENT USE OF INSULIN (H): ICD-10-CM

## 2024-09-28 DIAGNOSIS — I10 BENIGN ESSENTIAL HYPERTENSION: ICD-10-CM

## 2024-09-28 DIAGNOSIS — E66.812 CLASS 2 SEVERE OBESITY DUE TO EXCESS CALORIES WITH SERIOUS COMORBIDITY AND BODY MASS INDEX (BMI) OF 38.0 TO 38.9 IN ADULT (H): ICD-10-CM

## 2024-09-28 DIAGNOSIS — U07.1 INFECTION DUE TO 2019 NOVEL CORONAVIRUS: Primary | ICD-10-CM

## 2024-09-28 DIAGNOSIS — E66.01 CLASS 2 SEVERE OBESITY DUE TO EXCESS CALORIES WITH SERIOUS COMORBIDITY AND BODY MASS INDEX (BMI) OF 38.0 TO 38.9 IN ADULT (H): ICD-10-CM

## 2024-09-28 PROCEDURE — 99214 OFFICE O/P EST MOD 30 MIN: CPT | Mod: 95

## 2024-09-28 NOTE — PROGRESS NOTES
"  Karol Mujica is a 79 year old female who is being evaluated via a billable video visit.      The patient has been notified of following at the time of scheduling video visit:     \"This video visit will be conducted via a video call between you and your physician/provider. We have found that certain health care needs can be provided without the need for a physical exam.  This service lets us provide the care you need with a video conversation.  If a prescription is necessary we can send it directly to your pharmacy.  If lab work is needed we can place an order for that and you can then stop by our lab to have the test done at a later time.\"   Patient has given consent for video visit?  YES    SUBJECTIVE:  Karol uMjica is an 79 year old female who presents for covid.  Sxs started five days ago with some sinus congestion and headache.  Was put on doxycycline for sinuses a couple days ago.  Two days ago 's sxs worsened and went to ER and was hospitalized with pneumonia and covid.  Last night pt's cough worsened.  Temp to 99.  No n/v/d.  Home covid test today was positive.  No recent travel.  Hasn't been vaccinated for covid.  No shortness of breath or trouble breathing.      PMH:   has a past medical history of Anemia, Anxiety (7/22/2014), Arthritis, Cataract, Cholelithiasis, Colon cancer (H) (2014), Colon cancer, with resection 2/10/14 (7/22/2014), Diabetes mellitus (H), Diverticular disease, GI bleed (2/15/2014), History of anesthesia complications, History of transfusion, HTN (hypertension) (7/22/2014), Hyperlipidemia, Hypertension, Osteoporosis, Peripheral neuropathy, Red blood cell antibody positive, and Type II or unspecified type diabetes mellitus without mention of complication, not stated as uncontrolled (7/22/2014).  Patient Active Problem List   Diagnosis    Anxiety    Colon cancer, with resection 2/10/14    S/P total hip arthroplasty    Benign essential hypertension    Type 2 diabetes " mellitus with diabetic polyneuropathy, without long-term current use of insulin (H)    Chronic bilateral low back pain with bilateral sciatica    History of malignant neoplasm of colon    Peripheral neuropathy    Vitamin D deficiency    Osteopenia of multiple sites    Class 2 severe obesity due to excess calories with serious comorbidity in adult (H)     Social History     Socioeconomic History    Marital status:      Spouse name: Vinay    Number of children: 2    Years of education: 16    Highest education level: Some college, no degree   Occupational History    Occupation: Retired   Tobacco Use    Smoking status: Never     Passive exposure: Never    Smokeless tobacco: Never   Vaping Use    Vaping status: Never Used   Substance and Sexual Activity    Alcohol use: No    Drug use: No   Other Topics Concern    Parent/sibling w/ CABG, MI or angioplasty before 65F 55M? No     Social Determinants of Health     Financial Resource Strain: High Risk (3/18/2024)    Financial Resource Strain     Within the past 12 months, have you or your family members you live with been unable to get utilities (heat, electricity) when it was really needed?: Yes   Food Insecurity: Low Risk  (3/18/2024)    Food Insecurity     Within the past 12 months, did you worry that your food would run out before you got money to buy more?: No     Within the past 12 months, did the food you bought just not last and you didn t have money to get more?: No   Transportation Needs: Low Risk  (3/18/2024)    Transportation Needs     Within the past 12 months, has lack of transportation kept you from medical appointments, getting your medicines, non-medical meetings or appointments, work, or from getting things that you need?: No   Physical Activity: Insufficiently Active (3/18/2024)    Exercise Vital Sign     Days of Exercise per Week: 2 days     Minutes of Exercise per Session: 20 min   Stress: Stress Concern Present (3/18/2024)    [employer] [place] [employer]  Occupational Health - Occupational Stress Questionnaire     Feeling of Stress : Very much   Social Connections: Unknown (3/18/2024)    Social Connection and Isolation Panel [NHANES]     Frequency of Social Gatherings with Friends and Family: Twice a week   Interpersonal Safety: Low Risk  (9/17/2024)    Interpersonal Safety     Do you feel physically and emotionally safe where you currently live?: Yes     Within the past 12 months, have you been hit, slapped, kicked or otherwise physically hurt by someone?: No     Within the past 12 months, have you been humiliated or emotionally abused in other ways by your partner or ex-partner?: No   Housing Stability: Low Risk  (3/18/2024)    Housing Stability     Do you have housing? : Yes     Are you worried about losing your housing?: No     Family History   Problem Relation Age of Onset    Coronary Artery Disease Father     Breast Cancer Mother 81.00    Heart Disease Sister     Heart Disease Brother     Kidney Cancer Brother     Lung Cancer Brother     Osteoporosis Sister     Heart Disease Sister     Cancer Sister         Bladder    Kidney Disease Sister     Diabetes Sister     Heart Disease Sister     No Known Problems Maternal Grandmother     No Known Problems Maternal Grandfather     No Known Problems Paternal Grandmother     No Known Problems Paternal Grandfather        ALLERGIES:  Bee venom, Blood-group specific substance, Nitrofurantoin, and Penicillins    Current Outpatient Medications   Medication Sig Dispense Refill    acetaminophen (TYLENOL) 500 MG tablet Take 1,000 mg by mouth as needed.      blood glucose (ACCU-CHEK FASTCLIX) lancing device Lancing device to be used with lancets. 1 each 0    blood glucose monitoring (ACCU-CHEK FASTCLIX) lancets USE TO TEST BLOOD SUGAR 2 TIMES DAILY. FASTCLIX PLEASE 102 each 11    Calcium-Magnesium-Zinc 333-133-5 MG TABS per tablet Take 1 tablet by mouth daily      cholecalciferol 50 MCG (2000 UT) CAPS Take 4,000 Units by mouth  daily      Cranberry 450 MG TABS Take 450 mg by mouth daily      cyclobenzaprine (FLEXERIL) 10 MG tablet TAKE 1 TABLET BY MOUTH EVERY DAY AS NEEDED 90 tablet 3    doxycycline hyclate (VIBRA-TABS) 100 MG tablet Take 1 tablet (100 mg) by mouth 2 times daily for 10 days. 20 tablet 0    doxycycline hyclate (VIBRAMYCIN) 100 MG capsule Take 1 capsule (100 mg) by mouth 2 times daily 20 capsule 0    gabapentin (NEURONTIN) 300 MG capsule TAKE 1 CAPSULE BY MOUTH EVERYDAY AT BEDTIME 90 capsule 3    losartan (COZAAR) 50 MG tablet Take 1 tablet (50 mg) by mouth daily 90 tablet 2    melatonin 1 MG TABS tablet Take 1 mg by mouth nightly as needed for sleep      Multiple Vitamins-Minerals (MULTIVITAL-M) TABS Take 1 tablet by mouth daily      Multiple Vitamins-Minerals (PRESERVISION AREDS) TABS Take 1 Dose by mouth daily      ONETOUCH VERIO IQ test strip USE 1 EACH AS DIRECTED 2 (TWO) TIMES A DAY. 200 strip 0    Semaglutide, 1 MG/DOSE, (OZEMPIC) 4 MG/3ML pen Inject 1 mg Subcutaneous every 7 days (Patient taking differently: Inject 2 mg subcutaneously every 7 days.) 3 mL 1    simvastatin (ZOCOR) 40 MG tablet Take 1 tablet (40 mg) by mouth at bedtime 90 tablet 2     No current facility-administered medications for this visit.         ROS:  ROS is done and is negative for general/constitutional, eye, ENT, Respiratory, cardiovascular, GI, , Skin, musculoskeletal except as noted elsewhere.  All other review of systems negative except as noted elsewhere.      OBJECTIVE:    No vital signs obtained as is virtual visit    GENERAL: alert and no distress  EYES: Eyes grossly normal to inspection.  No discharge or erythema, or obvious scleral/conjunctival abnormalities.  NOSE: sounds congested  RESP: occasional cough. No audible wheeze or visible cyanosis.    SKIN: Visible skin clear. No significant rash, abnormal pigmentation or lesions.  NEURO: Cranial nerves grossly intact.  Mentation and speech appropriate for age.  PSYCH: Appropriate  affect, tone, and pace of words         ASSESSMENT/PLAN:    ASSESSMENT / PLAN:  (U07.1) Infection due to 2019 novel coronavirus  (primary encounter diagnosis)  Comment: has risk factors for complications.  Is at end of period for paxlovid tx, so advised to start medication asap  Plan: nirmatrelvir and ritonavir (PAXLOVID) 300         mg/100 mg therapy pack        Stop simvastatin while on paxlovid. Reviewed medication instructions and side effects. Follow up if experiences side effects. I reviewed supportive care, otc meds to use if needed, expected course, and signs of concern.  Follow up as needed or if does not improve within 5 day(s) or if worsens in any way.  Reviewed red flag symptoms and is to go to the ER if experiences any of these.    (I10) Benign essential hypertension  Comment: hx of  Plan: treat covid as above    (E11.42) Type 2 diabetes mellitus with diabetic polyneuropathy, without long-term current use of insulin (H)  Comment: hx of  Plan:  stop simvastatin while on paxlovid; restart it 4-5 days after paxlovid completed.  treat covid as above.  Monitor sugars closely and notify pcp if too high or too low.    (E66.01,  Z68.38) Class 2 severe obesity due to excess calories with serious comorbidity and body mass index (BMI) of 38.0 to 38.9 in adult (H)  Comment: risk factor for covid complications  Plan: treat covid as above.        See [facility] for orders, medications, letters, patient instructions    Karin Meyer MD  9/28/2024, 2:54 PM    Video-Visit Details    Video Start Time:  2:57    Type of service:  Video Visit    Video End Time: 3:13    Originating Location (pt. Location): Home    Distant Location (provider location):  [facility]     Platform used for Video Visit: [employer]  Time spent: 32 minutes spent during visit, reviewing test results, chart review, and charting on day of encounter

## 2024-09-28 NOTE — PATIENT INSTRUCTIONS
Do NOT take your simvastatin while you are on Paxlovid.  Restart the simvastatin 4-5 days after you finish the Paxlovid.

## 2024-09-28 NOTE — TELEPHONE ENCOUNTER
Nurse Triage SBAR    Is this a 2nd Level Triage? NO    Situation:   Covid positive    Background:   Symptoms started 9/23 but tested negative for covid that time.  On 9/25, she was prescribed doxycycline for a sinus infection, tested negative for covid.  Pt's  was sent to the ED on 9/26 for symptoms and he tested positive for covid and has pneumonia.  Pt was advised to test today and she tested positive.    Assessment:   She has a cough, headache, muscle aches, and nasal congestion.  She denies difficulty breathing or chest pains.  She is on day 5.    Protocol Recommended Disposition:   Call PCP Within 24 Hours    Recommendation:   Informed patient that we have staff working at 5pm on covid.  She would like to get paxlovid earlier than that due to pharmacy closing.  Advised pt to do a VVUR for covid.  Care advice given.  Pt verbalized understanding.    Radha Gomez, RN, BSN Nurse Triage Advisor 9/28/2024 9:35 AM        Reason for Disposition   [1] HIGH RISK patient (e.g., weak immune system, age > 64 years, obesity with BMI 30 or higher, pregnant, chronic lung disease) AND [2] COVID symptoms (e.g., cough, fever)  (Exceptions: Already seen by PCP and no new or worsening symptoms.)    Additional Information   Negative: SEVERE difficulty breathing (e.g., struggling for each breath, speaks in single words)   Negative: Difficult to awaken or acting confused (e.g., disoriented, slurred speech)   Negative: Bluish (or gray) lips or face now   Negative: Shock suspected (e.g., cold/pale/clammy skin, too weak to stand, low BP, rapid pulse)   Negative: Sounds like a life-threatening emergency to the triager   Negative: [1] Diagnosed or suspected COVID-19 AND [2] symptoms lasting 3 or more weeks   Negative: [1] COVID-19 exposure AND [2] no symptoms   Negative: COVID-19 vaccine reaction suspected (e.g., fever, headache, muscle aches) occurring 1 to 3 days after getting vaccine   Negative: COVID-19 vaccine, questions about    Negative: [1] Exposure to someone known to have influenza (flu test positive) AND [2] flu-like symptoms (e.g., cough, runny nose, sore throat, SOB; with or without fever)   Negative: [1] Possible COVID-19 symptoms AND [2] triager concerned about severity of symptoms or other causes   Negative: COVID-19 and breastfeeding, questions about   Negative: SEVERE or constant chest pain or pressure  (Exception: Mild central chest pain, present only when coughing.)   Negative: MODERATE difficulty breathing (e.g., speaks in phrases, SOB even at rest, pulse 100-120)   Negative: [1] Headache AND [2] stiff neck (can't touch chin to chest)   Negative: Oxygen level (e.g., pulse oximetry) 90% or lower   Negative: Chest pain or pressure  (Exception: MILD central chest pain, present only when coughing.)   Negative: [1] Drinking very little AND [2] dehydration suspected (e.g., no urine > 12 hours, very dry mouth, very lightheaded)   Negative: Patient sounds very sick or weak to the triager   Negative: MILD difficulty breathing (e.g., minimal/no SOB at rest, SOB with walking, pulse <100)   Negative: Fever > 103 F (39.4 C)   Negative: [1] Fever > 101 F (38.3 C) AND [2] age > 60 years   Negative: [1] Fever > 100 F (37.8 C) AND [2] bedridden (e.g., CVA, chronic illness, recovering from surgery)   Negative: Oxygen level (e.g., pulse oximetry) 91 to 94%    Protocols used: COVID-19 - Diagnosed or [id number]

## 2024-10-07 ENCOUNTER — TELEPHONE (OUTPATIENT)
Dept: FAMILY MEDICINE | Facility: CLINIC | Age: 79
End: 2024-10-07
Payer: COMMERCIAL

## 2024-10-07 NOTE — TELEPHONE ENCOUNTER
Oct 7, 2024 I have interoffice mailed to Dr. Zamora the [employer] refill form for Esa Enrique.    Please complete, sign and return to me in the enclosed addressed interoffice envelope provided.    Thanks so much for your help!    Kathy Rojas  Prescription Assistance Supervisor  Pharmacy Assistance

## 2024-10-14 DIAGNOSIS — E11.42 TYPE 2 DIABETES MELLITUS WITH DIABETIC POLYNEUROPATHY, WITHOUT LONG-TERM CURRENT USE OF INSULIN (H): ICD-10-CM

## 2024-10-14 RX ORDER — BLOOD SUGAR DIAGNOSTIC
STRIP MISCELLANEOUS
Qty: 200 STRIP | Refills: 2 | Status: SHIPPED | OUTPATIENT
Start: 2024-10-14

## 2024-10-14 NOTE — TELEPHONE ENCOUNTER
"OV on 9/17/24 noted:  \"Presents for diabetes follow-up and colon cancer follow-up. Overall doing well. She has made some life changes at home that she is able to stick with. Also has started semaglutide and titrated up to 2 mg. \"  "

## 2024-10-21 ENCOUNTER — HOSPITAL ENCOUNTER (EMERGENCY)
Facility: CLINIC | Age: 79
Discharge: HOME | End: 2024-10-21
Attending: EMERGENCY MEDICINE | Admitting: EMERGENCY MEDICINE
Payer: COMMERCIAL

## 2024-10-21 ENCOUNTER — TELEPHONE (OUTPATIENT)
Dept: FAMILY MEDICINE | Facility: CLINIC | Age: 79
End: 2024-10-21
Payer: COMMERCIAL

## 2024-10-21 ENCOUNTER — APPOINTMENT (OUTPATIENT)
Dept: GENERAL RADIOLOGY | Facility: CLINIC | Age: 79
End: 2024-10-21
Attending: EMERGENCY MEDICINE
Payer: COMMERCIAL

## 2024-10-21 ENCOUNTER — APPOINTMENT (OUTPATIENT)
Dept: CT IMAGING | Facility: CLINIC | Age: 79
End: 2024-10-21
Attending: EMERGENCY MEDICINE
Payer: COMMERCIAL

## 2024-10-21 ENCOUNTER — TELEPHONE (OUTPATIENT)
Dept: FAMILY MEDICINE | Facility: CLINIC | Age: 79
End: 2024-10-21

## 2024-10-21 VITALS
RESPIRATION RATE: 20 BRPM | TEMPERATURE: 98.3 F | WEIGHT: 210 LBS | OXYGEN SATURATION: 100 % | HEART RATE: 83 BPM | BODY MASS INDEX: 35.85 KG/M2 | HEIGHT: 64 IN | SYSTOLIC BLOOD PRESSURE: 164 MMHG | DIASTOLIC BLOOD PRESSURE: 107 MMHG

## 2024-10-21 DIAGNOSIS — M25.531 RIGHT WRIST PAIN: ICD-10-CM

## 2024-10-21 DIAGNOSIS — R93.89 ABNORMAL X-RAY: ICD-10-CM

## 2024-10-21 DIAGNOSIS — S29.8XXA BLUNT CHEST TRAUMA, INITIAL ENCOUNTER: ICD-10-CM

## 2024-10-21 LAB
ALBUMIN SERPL BCG-MCNC: 4.1 G/DL (ref 3.5–5.2)
ALP SERPL-CCNC: 90 U/L (ref 40–150)
ALT SERPL W P-5'-P-CCNC: 22 U/L (ref 0–50)
ANION GAP SERPL CALCULATED.3IONS-SCNC: 11 MMOL/L (ref 7–15)
AST SERPL W P-5'-P-CCNC: 25 U/L (ref 0–45)
BASOPHILS # BLD AUTO: 0 10E3/UL (ref 0–0.2)
BASOPHILS NFR BLD AUTO: 1 %
BILIRUB SERPL-MCNC: 0.5 MG/DL
BUN SERPL-MCNC: 12.9 MG/DL (ref 8–23)
CALCIUM SERPL-MCNC: 10 MG/DL (ref 8.8–10.4)
CHLORIDE SERPL-SCNC: 103 MMOL/L (ref 98–107)
CREAT SERPL-MCNC: 0.69 MG/DL (ref 0.51–0.95)
EGFRCR SERPLBLD CKD-EPI 2021: 88 ML/MIN/1.73M2
EOSINOPHIL # BLD AUTO: 0.1 10E3/UL (ref 0–0.7)
EOSINOPHIL NFR BLD AUTO: 1 %
ERYTHROCYTE [DISTWIDTH] IN BLOOD BY AUTOMATED COUNT: 14.1 % (ref 10–15)
GLUCOSE SERPL-MCNC: 100 MG/DL (ref 70–99)
HCO3 SERPL-SCNC: 26 MMOL/L (ref 22–29)
HCT VFR BLD AUTO: 40.8 % (ref 35–47)
HGB BLD-MCNC: 14 G/DL (ref 11.7–15.7)
IMM GRANULOCYTES # BLD: 0 10E3/UL
IMM GRANULOCYTES NFR BLD: 0 %
LYMPHOCYTES # BLD AUTO: 1.9 10E3/UL (ref 0.8–5.3)
LYMPHOCYTES NFR BLD AUTO: 28 %
MCH RBC QN AUTO: 30.2 PG (ref 26.5–33)
MCHC RBC AUTO-ENTMCNC: 34.3 G/DL (ref 31.5–36.5)
MCV RBC AUTO: 88 FL (ref 78–100)
MONOCYTES # BLD AUTO: 0.4 10E3/UL (ref 0–1.3)
MONOCYTES NFR BLD AUTO: 5 %
NEUTROPHILS # BLD AUTO: 4.6 10E3/UL (ref 1.6–8.3)
NEUTROPHILS NFR BLD AUTO: 65 %
NRBC # BLD AUTO: 0 10E3/UL
NRBC BLD AUTO-RTO: 0 /100
PLATELET # BLD AUTO: 196 10E3/UL (ref 150–450)
POTASSIUM SERPL-SCNC: 4.3 MMOL/L (ref 3.4–5.3)
PROT SERPL-MCNC: 7.3 G/DL (ref 6.4–8.3)
RBC # BLD AUTO: 4.63 10E6/UL (ref 3.8–5.2)
SODIUM SERPL-SCNC: 140 MMOL/L (ref 135–145)
WBC # BLD AUTO: 7.1 10E3/UL (ref 4–11)

## 2024-10-21 PROCEDURE — 250N000011 HC RX IP 250 OP 636: Performed by: EMERGENCY MEDICINE

## 2024-10-21 PROCEDURE — 99285 EMERGENCY DEPT VISIT HI MDM: CPT | Mod: 25

## 2024-10-21 PROCEDURE — 80053 COMPREHEN METABOLIC PANEL: CPT | Performed by: EMERGENCY MEDICINE

## 2024-10-21 PROCEDURE — 73110 X-RAY EXAM OF WRIST: CPT | Mod: RT

## 2024-10-21 PROCEDURE — 99284 EMERGENCY DEPT VISIT MOD MDM: CPT | Performed by: EMERGENCY MEDICINE

## 2024-10-21 PROCEDURE — 70450 CT HEAD/BRAIN W/O DYE: CPT

## 2024-10-21 PROCEDURE — 72125 CT NECK SPINE W/O DYE: CPT

## 2024-10-21 PROCEDURE — 85025 COMPLETE CBC W/AUTO DIFF WBC: CPT | Performed by: EMERGENCY MEDICINE

## 2024-10-21 PROCEDURE — 36415 COLL VENOUS BLD VENIPUNCTURE: CPT | Performed by: EMERGENCY MEDICINE

## 2024-10-21 PROCEDURE — 74177 CT ABD & PELVIS W/CONTRAST: CPT

## 2024-10-21 PROCEDURE — 250N000013 HC RX MED GY IP 250 OP 250 PS 637: Performed by: EMERGENCY MEDICINE

## 2024-10-21 PROCEDURE — 250N000009 HC RX 250: Performed by: EMERGENCY MEDICINE

## 2024-10-21 RX ORDER — IOPAMIDOL 755 MG/ML
103 INJECTION, SOLUTION INTRAVASCULAR ONCE
Status: COMPLETED | OUTPATIENT
Start: 2024-10-21 | End: 2024-10-21

## 2024-10-21 RX ORDER — ACETAMINOPHEN 325 MG/1
650 TABLET ORAL EVERY 4 HOURS PRN
Status: DISCONTINUED | OUTPATIENT
Start: 2024-10-21 | End: 2024-10-21 | Stop reason: HOSPADM

## 2024-10-21 RX ADMIN — OXYCODONE HYDROCHLORIDE 2.5 MG: 5 TABLET ORAL at 15:23

## 2024-10-21 RX ADMIN — IOPAMIDOL 103 ML: 755 INJECTION, SOLUTION INTRAVENOUS at 16:28

## 2024-10-21 RX ADMIN — ACETAMINOPHEN 650 MG: 325 TABLET ORAL at 15:23

## 2024-10-21 RX ADMIN — SODIUM CHLORIDE 66 ML: 9 INJECTION, SOLUTION INTRAVENOUS at 16:28

## 2024-10-21 ASSESSMENT — ACTIVITIES OF DAILY LIVING (ADL)
ADLS_ACUITY_SCORE: 35

## 2024-10-21 ASSESSMENT — ENCOUNTER SYMPTOMS
HEMATOLOGIC/LYMPHATIC NEGATIVE: 1
MUSCULOSKELETAL NEGATIVE: 1
GASTROINTESTINAL NEGATIVE: 1
EYES NEGATIVE: 1
NEUROLOGICAL NEGATIVE: 1
PSYCHIATRIC NEGATIVE: 1
RESPIRATORY NEGATIVE: 1
ENDOCRINE NEGATIVE: 1
ALLERGIC/IMMUNOLOGIC NEGATIVE: 1
CARDIOVASCULAR NEGATIVE: 1

## 2024-10-21 ASSESSMENT — COLUMBIA-SUICIDE SEVERITY RATING SCALE - C-SSRS
2. HAVE YOU ACTUALLY HAD ANY THOUGHTS OF KILLING YOURSELF IN THE PAST MONTH?: NO
6. HAVE YOU EVER DONE ANYTHING, STARTED TO DO ANYTHING, OR PREPARED TO DO ANYTHING TO END YOUR LIFE?: NO
1. IN THE PAST MONTH, HAVE YOU WISHED YOU WERE DEAD OR WISHED YOU COULD GO TO SLEEP AND NOT WAKE UP?: NO

## 2024-10-21 NOTE — DISCHARGE INSTRUCTIONS
1) Your evaluation today did not reveal any evidence of fractures in the ribs on imaging.  We have discussed that you likely have soft tissue injury with blunt chest trauma as result of your fall.  X-ray did not show any evidence of fracture in your wrist but you are offered a splint for comfort and for support especially if you continue to have pain or discomfort where repeat x-ray imaging may be helpful in the next 2 to 4 weeks or CT of the wrist.    2) Imaging of the head and neck did not reveal any injuries resulted in a fall.  For home was discussed using heat therapy, cool compresses, and taking Tylenol 1000 mg every 8 hours to manage pain and discomfort.  Although you appear stable for discharge for home if there are no concerns you should return to be reexamined.  We have discussed and reviewed the importance of incentive spirometer given your rib and chest injury, no rib fractures are appreciated to reduce the risk of pneumonia

## 2024-10-21 NOTE — ED PROVIDER NOTES
History     Chief Complaint   Patient presents with    Fall     Fell onto right side in garage (tripped over cord).     KVNG Mujica is a 79 year old female who presents for evaluation with report of trauma after trip and fall reported in garage 9 active concrete side.  On arrival patient reports right wrist pain, right shoulder pain, right chest wall pain and right hip pain.  Patient reports she did not hit her head and that she is not on anticoagulation.  Reviewed the medical record.  Reviewed visit on 9/28/24.   Patient has a history of anxiety, hypertension, type 2 diabetes, peripheral neuropathy, vitamin D deficiency, osteopenia and a history of colon cancer postresection in February 2014.      On examination patient reports she tripped on a cord in her garage at home around noon today.  Her fall was not witnessed that she fell onto her right side.  Thankfully her son came home at the same time and was able to help off the floor.  She typically does not ambulate off the floor from a seated position due to prior history of knee replacement bilaterally by report.  She does not think she hit her head but reports she has neck pain right wrist pain and pain over the right lateral and lower chest wall and upper abdomen.       Allergies:  Allergies   Allergen Reactions    Bee Venom     Blood-Group Specific Substance      Anti-C, Anti-K, and Warm autoantibodies present. Expect up to 24 hour delay in blood for transfusion. Draw 2 lavender and 1 red tube for type and screen orders.    Nitrofurantoin Hives     Nausea    Penicillins        Problem List:    Patient Active Problem List    Diagnosis Date Noted    Class 2 severe obesity due to excess calories with serious comorbidity in adult (H) 03/20/2024     Priority: Medium    Osteopenia of multiple sites 02/03/2022     Priority: Medium    Peripheral neuropathy 01/12/2022     Priority: Medium    Type 2 diabetes mellitus with diabetic polyneuropathy, without  long-term current use of insulin (H) 2021     Priority: Medium    Chronic bilateral low back pain with bilateral sciatica 2021     Priority: Medium    History of malignant neoplasm of colon 2018     Priority: Medium    Vitamin D deficiency 2016     Priority: Medium     Last Assessment & Plan:   Formatting of this note might be different from the original.  With underlying bowel resection, osteoporosis, will recheck vitamin D level and adjust supplementation as needed.      Benign essential hypertension 2016     Priority: Medium    S/P total hip arthroplasty 2014     Priority: Medium    Anxiety 2014     Priority: Medium    Colon cancer, with resection 2/10/14 2014     Priority: Medium        Past Medical History:    Past Medical History:   Diagnosis Date    Anemia     Anxiety 2014    Arthritis     Cataract     Cholelithiasis     Colon cancer (H)     Colon cancer, with resection 2/10/14 2014    Diabetes mellitus (H)     Diverticular disease     GI bleed 2/15/2014    History of anesthesia complications     History of transfusion     HTN (hypertension) 2014    Hyperlipidemia     Hypertension     Osteoporosis     Peripheral neuropathy     Red blood cell antibody positive     Type II or unspecified type diabetes mellitus without mention of complication, not stated as uncontrolled 2014       Past Surgical History:    Past Surgical History:   Procedure Laterality Date    APPENDECTOMY      BIOPSY BREAST Left     Years ago    BREAST EXCISIONAL BIOPSY Left     Years ago     SECTION  1981     SECTION  1981    COLON SURGERY      COLONOSCOPY N/A 3/26/2019    Procedure: COLONOSCOPY WITH BIOPSY;  Surgeon: Barry Del Castillo MD;  Location: [facility];  Service: General    COLONOSCOPY W/ BIOPSIES  2014    3 polyps removed    EYE SURGERY      FEMUR SURGERY Left     for fracture    FRACTURE SURGERY      HC REMOVE  TONSILS/ADENOIDS,<14 Y/O  age 22    Description: Tonsillectomy With Adenoidectomy;  Recorded: 08/07/2013;    HYSTERECTOMY  2000    JOINT REPLACEMENT      LAPAROSCOPIC CHOLECYSTECTOMY N/A 10/10/2018    Procedure: CHOLECYSTECTOMY, LAPAROSCOPIC;  Surgeon: Demarcus Barriga MD;  Location: [facility] [place];  Service:     MA STEREOTACTIC BREAST BIOPSY VACUUM L Left 2/12/2021    OOPHORECTOMY Bilateral 2000    TONSILLECTOMY      TUBAL LIGATION  1981    [facility] PART REMOVAL COLON W ANASTOMOSIS      Description: Right Hemicolectomy;  Proc Date: 02/16/2014;    [facility] THORAX SPINE FUSN,POST TECH N/A 3/10/2021    Procedure: BILATERAL LUMBAR 4-LUMBAR 5 LAMINECTOMIES, MEDIAL FACETECTOMY, FORAMINOTOMIES; LUMBAR 4-LUMBAR 5 POSTERIOR INSTRUMENTED FUSION AND ARTHRODESIS, USE OF ALLOGRAFT, USE OF AUTOGRAFT, USE OF STEALTH NAVIGATION;  Surgeon: Jia Chen MD;  Location: [facility] [place];  Service: Spine    [facility] TOTAL KNEE ARTHROPLASTY Bilateral     Description: Total Knee Arthroplasty;  Recorded: 08/22/2014;  Comments: Bilateral       Family History:    Family History   Problem Relation Age of Onset    Coronary Artery Disease Father     Breast Cancer Mother 81.00    Heart Disease Sister     Heart Disease Brother     Kidney Cancer Brother     Lung Cancer Brother     Osteoporosis Sister     Heart Disease Sister     Cancer Sister         Bladder    Kidney Disease Sister     Diabetes Sister     Heart Disease Sister     No Known Problems Maternal Grandmother     No Known Problems Maternal Grandfather     No Known Problems Paternal Grandmother     No Known Problems Paternal Grandfather        Social History:  Marital Status:   [2]  Social History     Tobacco Use    Smoking status: Never     Passive exposure: Never    Smokeless tobacco: Never   Vaping Use    Vaping status: Never Used   Substance Use Topics    Alcohol use: No    Drug use: No        Medications:    acetaminophen (TYLENOL) 500 MG tablet  blood glucose (ACCU-CHEK  "FASTCLIX) lancing device  blood glucose monitoring (ACCU-CHEK FASTCLIX) lancets  Calcium-Magnesium-Zinc 333-133-5 MG TABS per tablet  cholecalciferol 50 MCG (2000 UT) CAPS  Cranberry 450 MG TABS  cyclobenzaprine (FLEXERIL) 10 MG tablet  doxycycline hyclate (VIBRAMYCIN) 100 MG capsule  gabapentin (NEURONTIN) 300 MG capsule  losartan (COZAAR) 50 MG tablet  melatonin 1 MG TABS tablet  Multiple Vitamins-Minerals (MULTIVITAL-M) TABS  Multiple Vitamins-Minerals (PRESERVISION AREDS) TABS  ONETOUCH VERIO IQ test strip  Semaglutide, 2 MG/DOSE, (OZEMPIC) 8 MG/3ML pen  simvastatin (ZOCOR) 40 MG tablet          Review of Systems   Constitutional:         Tripped on a cord in the garage with fall onto right side and outstretched arm   HENT: Negative.     Eyes: Negative.    Respiratory: Negative.     Cardiovascular: Negative.    Gastrointestinal: Negative.    Endocrine: Negative.    Genitourinary: Negative.    Musculoskeletal: Negative.    Skin: Negative.    Allergic/Immunologic: Negative.    Neurological: Negative.    Hematological: Negative.    Psychiatric/Behavioral: Negative.     All other systems reviewed and are negative.      Physical Exam   BP: (!) 166/95  Pulse: 83  Temp: 98.3  F (36.8  C)  Resp: 20  Height: 162.6 cm (5' 4\")  Weight: 95.3 kg (210 lb)  SpO2: 100 %      Physical Exam  Constitutional:       General: She is not in acute distress.     Appearance: Normal appearance. She is not ill-appearing, toxic-appearing or diaphoretic.   HENT:      Head: Normocephalic and atraumatic.        Nose: Nose normal.      Mouth/Throat:      Mouth: Mucous membranes are moist.   Eyes:      Extraocular Movements: Extraocular movements intact.      Pupils: Pupils are equal, round, and reactive to light.   Cardiovascular:      Rate and Rhythm: Normal rate and regular rhythm.      Pulses: Normal pulses.      Heart sounds: Normal heart sounds.   Chest:       Abdominal:      General: Abdomen is flat.   Musculoskeletal:         General: " Tenderness and signs of injury present.      Right wrist: Swelling, tenderness and bony tenderness present.        Arms:       Cervical back: Normal range of motion and neck supple.   Skin:     Coloration: Skin is not jaundiced or pale.      Findings: No bruising, erythema, lesion or rash.   Neurological:      General: No focal deficit present.      Mental Status: She is alert and oriented to person, place, and time.      Cranial Nerves: No cranial nerve deficit.      Sensory: No sensory deficit.      Motor: No weakness.      Coordination: Coordination normal.      Gait: Gait normal.      Deep Tendon Reflexes: Reflexes normal.   Psychiatric:         Mood and Affect: Mood normal.         Behavior: Behavior normal.         Thought Content: Thought content normal.         Judgment: Judgment normal.         ED Course        Procedures              Critical Care time:  none            ED medications:  Medications   acetaminophen (TYLENOL) tablet 650 mg (650 mg Oral $Given 10/21/24 1523)   oxyCODONE IR (ROXICODONE) half-tab 2.5 mg (2.5 mg Oral $Given 10/21/24 1523)   iopamidol (ISOVUE-370) solution 103 mL (103 mLs Intravenous $Given 10/21/24 1628)   sodium chloride 0.9 % bag 500mL for CT scan flush use (66 mLs As instructed $Given 10/21/24 1628)        ED Vitals:  Vitals:    10/21/24 1420 10/21/24 1430 10/21/24 1445 10/21/24 1500   BP:       Pulse:       Resp:       Temp:       SpO2: 99% 99% 98% 100%   Weight:       Height:          ED labs and imaging:  Results for orders placed or performed during the hospital encounter of 10/21/24   CT Chest/Abdomen/Pelvis w Contrast     Status: None    Narrative    EXAM: CT CHEST/ABDOMEN/PELVIS W CONTRAST  LOCATION: [facility]  DATE: 10/21/2024    INDICATION: Advanced age. Trip on cord in garage with fall onto right sided lower chest wall and right upper quadrant abdominal pain. Evaluate for rib fracture vs solid organ injury after blunt trauma  COMPARISON:  CT abdomen pelvis 4/21/2023, CT chest 8/20/2014  TECHNIQUE: CT scan of the chest, abdomen, and pelvis was performed following injection of IV contrast. Multiplanar reformats were obtained. Dose reduction techniques were used.   CONTRAST: 103 mL Isovue 370    FINDINGS:   LUNGS AND PLEURA: Benign calcified right upper lobe granuloma.    MEDIASTINUM/AXILLAE: No lymphadenopathy. Granulomatous calcification in the mediastinum and right hilum. No thoracic aortic aneurysms.    CORONARY ARTERY CALCIFICATION: Moderate.    HEPATOBILIARY: No significant mass or bile duct dilatation. Cholecystectomy.     PANCREAS: Normal.    SPLEEN: Normal.    ADRENAL GLANDS: Normal.    KIDNEYS/BLADDER: No significant mass, stones, or hydronephrosis. There are simple or benign cysts. No follow up is needed. The bladder is unremarkable.    BOWEL: Diverticulosis of the colon. No acute inflammatory change. No obstruction. Small hiatal hernia. Postsurgical changes in the right colon.    LYMPH NODES: Normal.    VASCULATURE: Mild atherosclerotic calcification of the abdominal aorta, iliofemoral arteries, and visceral branches. No aortic aneurysm.    PELVIC ORGANS: Absent.    MUSCULOSKELETAL: No acute fracture. Mild degenerative changes in the spine. Fusion hardware in the lumbar spine. Left hip surgical hardware.      Impression    IMPRESSION:  1.  No acute or traumatic finding in the chest, abdomen or pelvis.  2.  Colonic diverticulosis.  3.  Small hiatal hernia.     XR Wrist Right G/E 3 Views     Status: None    Narrative    EXAM: XR WRIST RIGHT G/E 3 VIEWS  LOCATION: [facility]  DATE: 10/21/2024    INDICATION: Trip on cord and fall in garage onto outstretched arm. Right wrist pain. Evaluate for acute bony process after blunt trauma  COMPARISON: None.      Impression    IMPRESSION:   1. There is no evidence of fracture.  2. 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  3. There is also multifocal  osteoarthrosis, particularly severe at the STT joint and first CMC joint.   CT Cervical Spine w/o Contrast     Status: None    Narrative    EXAM: CT CERVICAL SPINE W/O CONTRAST, CT HEAD W/O CONTRAST  LOCATION: [facility]  DATE: 10/21/2024    INDICATION: Advanced age, trip and fall in the garage with head injury and neck pain. Evaluate for acute bony process after blunt trauma.  COMPARISON: None.  TECHNIQUE:  1) Routine CT Head without IV contrast. Multiplanar reformats. Dose reduction techniques were used.  2) Routine CT Cervical Spine without IV contrast. Multiplanar reformats. Dose reduction techniques were used.    FINDINGS:   HEAD CT:   INTRACRANIAL CONTENTS: No acute intracranial hemorrhage, extraaxial fluid collection, or mass effect. No evidence of an acute transcortical confluent infarct. Normal parenchymal attenuation as well as ventricles and sulci for age.     VISUALIZED ORBITS/SINUSES/MASTOIDS: No acute intraorbital finding. No significant paranasal sinus or mastoid mucosal disease.    BONES/SOFT TISSUES: No acute calvarial injury or significant scalp hematoma. Hyperostosis frontoparietalis interna. Moderate to severe right temporomandibular arthrosis.    CERVICAL SPINE CT:   VERTEBRA: Diffuse osseous demineralization. No acute displaced fracture, traumatic listhesis, or compression deformity. Straightening of the normal cervical lordosis with 2-3 mm anterolisthesis at C4-C5 and 1-2 mm anterolisthesis at C7-T1, likely   degenerative. Multilevel facet arthrosis, worst and advanced at C2-C3 on the left with ankylosis of the bilateral C3-C4 and left C4-C5 facet joints. Multilevel interbody degenerative change, worst at C5-C7 where there is moderate-severe intervertebral   disc height loss, prominent endplate osteophytes, and prominent posterior disc-osteophyte complexes. Multilevel uncovertebral arthropathy, worst and moderate-severe at C5-C6 (greater on the  left).    CANAL/FORAMINA: While assessment of the spinal canal is limited due to streak artifact, there is likely moderate spinal canal stenosis at C5-C7. Multilevel foraminal narrowing, worst and moderate-severe at C3-C4 on the left and C5-C6 on the right.    EXTRASPINAL: No prevertebral edema. Clear visualized lungs. Mild calcified atherosclerosis most notably involving the carotid bifurcations. Posterior lumbosacral spine and instrumented fusion (likely at L4-L5) as well as instrumented left femoral   fixation on the topograms. Findings of the visualized thoracic and abdominopelvic cavities are dictated separately.      Impression    IMPRESSION:  HEAD CT:  1.  No acute intracranial abnormality.    CERVICAL SPINE CT:  1.  No CT evidence for acute fracture or post traumatic subluxation.  2.  Diffuse osseous demineralization and multilevel spondylosis, as described.   Head CT w/o contrast     Status: None    Narrative    EXAM: CT CERVICAL SPINE W/O CONTRAST, CT HEAD W/O CONTRAST  LOCATION: [facility]  DATE: 10/21/2024    INDICATION: Advanced age, trip and fall in the garage with head injury and neck pain. Evaluate for acute bony process after blunt trauma.  COMPARISON: None.  TECHNIQUE:  1) Routine CT Head without IV contrast. Multiplanar reformats. Dose reduction techniques were used.  2) Routine CT Cervical Spine without IV contrast. Multiplanar reformats. Dose reduction techniques were used.    FINDINGS:   HEAD CT:   INTRACRANIAL CONTENTS: No acute intracranial hemorrhage, extraaxial fluid collection, or mass effect. No evidence of an acute transcortical confluent infarct. Normal parenchymal attenuation as well as ventricles and sulci for age.     VISUALIZED ORBITS/SINUSES/MASTOIDS: No acute intraorbital finding. No significant paranasal sinus or mastoid mucosal disease.    BONES/SOFT TISSUES: No acute calvarial injury or significant scalp hematoma. Hyperostosis frontoparietalis  interna. Moderate to severe right temporomandibular arthrosis.    CERVICAL SPINE CT:   VERTEBRA: Diffuse osseous demineralization. No acute displaced fracture, traumatic listhesis, or compression deformity. Straightening of the normal cervical lordosis with 2-3 mm anterolisthesis at C4-C5 and 1-2 mm anterolisthesis at C7-T1, likely   degenerative. Multilevel facet arthrosis, worst and advanced at C2-C3 on the left with ankylosis of the bilateral C3-C4 and left C4-C5 facet joints. Multilevel interbody degenerative change, worst at C5-C7 where there is moderate-severe intervertebral   disc height loss, prominent endplate osteophytes, and prominent posterior disc-osteophyte complexes. Multilevel uncovertebral arthropathy, worst and moderate-severe at C5-C6 (greater on the left).    CANAL/FORAMINA: While assessment of the spinal canal is limited due to streak artifact, there is likely moderate spinal canal stenosis at C5-C7. Multilevel foraminal narrowing, worst and moderate-severe at C3-C4 on the left and C5-C6 on the right.    EXTRASPINAL: No prevertebral edema. Clear visualized lungs. Mild calcified atherosclerosis most notably involving the carotid bifurcations. Posterior lumbosacral spine and instrumented fusion (likely at L4-L5) as well as instrumented left femoral   fixation on the topograms. Findings of the visualized thoracic and abdominopelvic cavities are dictated separately.      Impression    IMPRESSION:  HEAD CT:  1.  No acute intracranial abnormality.    CERVICAL SPINE CT:  1.  No CT evidence for acute fracture or post traumatic subluxation.  2.  Diffuse osseous demineralization and multilevel spondylosis, as described.   Comprehensive metabolic panel     Status: Abnormal   Result Value Ref Range    Sodium 140 135 - 145 mmol/L    Potassium 4.3 3.4 - 5.3 mmol/L    Carbon Dioxide (CO2) 26 22 - 29 mmol/L    Anion Gap 11 7 - 15 mmol/L    Urea Nitrogen 12.9 8.0 - 23.0 mg/dL    Creatinine 0.69 0.51 - 0.95 mg/dL     GFR Estimate 88 >60 mL/min/1.73m2    Calcium 10.0 8.8 - 10.4 mg/dL    Chloride 103 98 - 107 mmol/L    Glucose 100 (H) 70 - 99 mg/dL    Alkaline Phosphatase 90 40 - 150 U/L    AST 25 0 - 45 U/L    ALT 22 0 - 50 U/L    Protein Total 7.3 6.4 - 8.3 g/dL    Albumin 4.1 3.5 - 5.2 g/dL    Bilirubin Total 0.5 <=1.2 mg/dL   CBC with platelets and differential     Status: None   Result Value Ref Range    WBC Count 7.1 4.0 - 11.0 10e3/uL    RBC Count 4.63 3.80 - 5.20 10e6/uL    Hemoglobin 14.0 11.7 - 15.7 g/dL    Hematocrit 40.8 35.0 - 47.0 %    MCV 88 78 - 100 fL    MCH 30.2 26.5 - 33.0 pg    MCHC 34.3 31.5 - 36.5 g/dL    RDW 14.1 10.0 - 15.0 %    Platelet Count 196 150 - 450 10e3/uL    % Neutrophils 65 %    % Lymphocytes 28 %    % Monocytes 5 %    % Eosinophils 1 %    % Basophils 1 %    % Immature Granulocytes 0 %    NRBCs per 100 WBC 0 <1 /100    Absolute Neutrophils 4.6 1.6 - 8.3 10e3/uL    Absolute Lymphocytes 1.9 0.8 - 5.3 10e3/uL    Absolute Monocytes 0.4 0.0 - 1.3 10e3/uL    Absolute Eosinophils 0.1 0.0 - 0.7 10e3/uL    Absolute Basophils 0.0 0.0 - 0.2 10e3/uL    Absolute Immature Granulocytes 0.0 <=0.4 10e3/uL    Absolute NRBCs 0.0 10e3/uL   CBC with platelets differential     Status: None    Narrative    The following orders were created for panel order CBC with platelets differential.  Procedure                               Abnormality         Status                     ---------                               -----------         ------                     CBC with platelets and d...[[id number]]                      Final result                 Please view results for these tests on the individual orders.          Assessments & Plan (with Medical Decision Making)   Assessment Summary and clinical Impression: 79-year-old female right hand dominant who presented with blunt trauma after reporting that she tripped on a cord in her garage fell onto her right side.  On intake she complained of right wrist pain, right  shoulder pain, right chest wall pain and right hip pain.  Patient reported prior history of bilateral knee replacement and that she was not able to get up on her own at that her son thankfully arrived after her fall who helped her up from the ground.  She reports midline cervical neck tenderness but no reduction range of motion.  She had no scalp hematoma or contusion.  Given her advanced age blunt trauma from obstruction and advanced imaging of the head, neck, chest abdomen pelvis with contrast in the right wrist was obtained revealing no acute traumatic injury involving the chest abdomen pelvis.  Head and cervical imaging was also reassuring.  X-ray of the wrist revealed no acute bony fracture however given wrist pain she was offered a wrist splint.  Patient was discharged with supportive care measures for soft tissue injury and blunt chest trauma.  She was offered incentive spirometer      ED course and plan:  Reviewed the medical record.  Reviewed visit on 9/20/24 office visit on 9/17/24.  With blunt trauma and advanced age imaging obtained of the right wrist, right shoulder, chest abdomen pelvis to assess for any sort of an injury as a result of blunt trauma with fall onto concrete surface.  She was offered oral medications for pain and discomfort.  Normal hemogram.  CT of the head and cervical spine revealed no acute traumatic injuries.  X-ray of the right wrist was reviewed independently and the radiology report was also reviewed.  No evidence of fracture.  Radiology did note There is also multifocal osteoarthrosis, particularly severe at the STT joint and first CMC join   CT chest abdomen and pelvis with contrast revealed no acute traumatic injury.  Small hiatal hernia colonic diverticulosis was noted see additional details online radiology report.   Given reassuring advanced imaging patient was offered a wrist splint and she was discharged with blunt chest trauma after mechanical fall with supportive care  measures.        Disclaimer: This note consists of symbols derived from keyboarding, dictation and/or voice recognition software. As a result, there may be errors in the script that have gone undetected. Please consider this when interpreting information found in this chart.   I have reviewed the nursing notes.    I have reviewed the findings, diagnosis, plan and need for follow up with the patient.           Medical Decision Making  The patient's presentation was of high complexity (advanced age, blunt trauma,).    The patient's evaluation involved:  ordering and/or review of 2 test(s) in this encounter (laboratory studies, head CT, CT cervical spine, CT chest abdomen pelvis, x-ray)    The patient's management necessitated high risk (disposition planning, follow-up care).        New Prescriptions    No medications on file       Final diagnoses:   Blunt chest trauma, initial encounter - After trip on a cord in the garage with fall in the garage onto right side   Abnormal x-ray - IMPRESSION:  1. There is no evidence of fracture. 2. 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 3. There is also multifocal osteoarthrosis, particularly severe at the STT joint and first CMC joint.       10/21/2024   [facility] EMERGENCY DEPT       Beto Becerra MD  10/21/24 [id number]

## 2024-10-21 NOTE — TELEPHONE ENCOUNTER
Medication Question or Refill    Contacts       Contact Date/Time Type Contact Phone/Fax    10/21/2024 10:10 AM CDT Phone (Incoming) Sil Massiel ATUL (Self) [phone] (H)            What medication are you calling about (include dose and sig)?: Ozempic Semaglutide, 2 MG/DOSE, (OZEMPIC) 8 MG/3ML pen     Preferred Pharmacy:   [facility]/pharmacy #[id number] - [address] [address]  Phone: [phone] Fax: [phone]      Controlled Substance Agreement on file:   CSA -- Patient Level:    CSA: None found at the patient level.       Who prescribed the medication?: Dr Zamora    Do you need a refill? Yes    When did you use the medication last? Only has one more injection left    Patient offered an appointment? No    Do you have any questions or concerns?  Yes: Massiel is a part of the [place] [employer] assistance Program.  A form needed to be completed by Dr Zamroa and sent to the Assistance Program but was instead completed and sent to her insurance for a prior authorization.  Massiel is requesting that the form be sent to [place] [facility] Attn: Kathy Rojas Fax number [phone]  Kathy Rojas number is [phone] if any questions.    Massiel would like to be updated that this form was sent to correct location of The [place] [facility].      Could we send this information to you in [facility] or would you prefer to receive a phone call?:   Patient would prefer a phone call   Okay to leave a detailed message?: Yes at Home number on file [phone] (home)

## 2024-10-21 NOTE — TELEPHONE ENCOUNTER
See 10/7/24 telephone encounter. Rx assistance program has sent [facility] Nord[employer] form via interoffice mail.

## 2024-10-21 NOTE — TELEPHONE ENCOUNTER
A refill request has been made to the [employer] assistance program for Ozempic.    This will arrive to the [facility] within 10-14 BUSINESS days. Please CONTACT Karol to  medication once arrived.    Thank you!    Jackie Malone   Prescription Assistance Assistant

## 2024-10-21 NOTE — ED TRIAGE NOTES
"Presents today after fall. Patient tripped over cord in garage and landed on concrete floor onto right side.  Pain \"6\"/10 in right wrist, shoulder, ribcage and possibly hip. Patient did NOT hit head, patient is NOT on blood thinners.  CMS intact right wrist and arm pulses present and palpable.     Triage Assessment (Adult)       Row Name 10/21/24 9402          Triage Assessment    Airway WDL WDL        Respiratory WDL    Respiratory WDL WDL        Skin Circulation/Temperature WDL    Skin Circulation/Temperature WDL WDL        Cardiac WDL    Cardiac WDL WDL        Peripheral/Neurovascular WDL    Peripheral Neurovascular WDL WDL        Cognitive/Neuro/Behavioral WDL    Cognitive/Neuro/Behavioral WDL WDL                     "

## 2024-10-22 NOTE — TELEPHONE ENCOUNTER
FYI...Form was received last week and sent back as requested via InterOffice Mail to [facility].

## 2024-10-22 NOTE — TELEPHONE ENCOUNTER
See separate encounter. Patient's medication has been shipped and will arrive at [facility] within 10-14 business days.

## 2024-11-26 ENCOUNTER — TELEPHONE (OUTPATIENT)
Dept: FAMILY MEDICINE | Facility: CLINIC | Age: 79
End: 2024-11-26
Payer: COMMERCIAL

## 2024-11-26 NOTE — TELEPHONE ENCOUNTER
Ozempic application has been interofficed to Saul Zamora and mailed to Karol to review, sign, and send back to me.     We will note EPIC when ready to send to the .     Thank you!    Jackie Malone   Prescription

## 2024-12-30 ENCOUNTER — OFFICE VISIT (OUTPATIENT)
Dept: FAMILY MEDICINE | Facility: CLINIC | Age: 79
End: 2024-12-30
Payer: COMMERCIAL

## 2024-12-30 VITALS
BODY MASS INDEX: 36.38 KG/M2 | RESPIRATION RATE: 12 BRPM | TEMPERATURE: 98.2 F | HEART RATE: 74 BPM | DIASTOLIC BLOOD PRESSURE: 74 MMHG | SYSTOLIC BLOOD PRESSURE: 134 MMHG | WEIGHT: 213.1 LBS | OXYGEN SATURATION: 100 % | HEIGHT: 64 IN

## 2024-12-30 DIAGNOSIS — R35.0 URINARY FREQUENCY: ICD-10-CM

## 2024-12-30 DIAGNOSIS — R35.0 FREQUENT URINATION: Primary | ICD-10-CM

## 2024-12-30 PROCEDURE — 99213 OFFICE O/P EST LOW 20 MIN: CPT | Performed by: FAMILY MEDICINE

## 2024-12-30 PROCEDURE — 81003 URINALYSIS AUTO W/O SCOPE: CPT | Performed by: FAMILY MEDICINE

## 2024-12-30 RX ORDER — SULFAMETHOXAZOLE AND TRIMETHOPRIM 800; 160 MG/1; MG/1
1 TABLET ORAL 2 TIMES DAILY
Qty: 10 TABLET | Refills: 0 | Status: SHIPPED | OUTPATIENT
Start: 2024-12-30 | End: 2025-01-04

## 2024-12-30 NOTE — PROGRESS NOTES
Assessment & Plan   Assessment & Plan  Frequent urination  Given the urinary frequency, her symptoms, and the fact that she did have urination within 30 minutes of the sample for the lab there is a definite washout.  Based on symptoms alone will move to treatment as per orders.  Orders:    UA Macroscopic with reflex to Microscopic and Culture - Lab Collect; Future    Urinary frequency    Orders:    sulfamethoxazole-trimethoprim (BACTRIM DS) 800-160 MG tablet; Take 1 tablet by mouth 2 times daily for 5 days.           See Patient Instructions      Astrid Rankin is a 79 year old, presenting for the following health issues:  Urinary Frequency (x2 days w/Right Sided Flank/Back Pain)        12/30/2024     2:28 PM   Additional Questions   Roomed by ANEL Chin   Accompanied by Self         12/30/2024     2:28 PM   Patient Reported Additional Medications   Patient reports taking the following new medications N/A     Increasing urinary frequency for the past 24 hours along with burning with urination.  No blood in the urine.  Some slight right sided flank pain.  No skin changes.  No fevers.  No nausea.  Has had a history of UTIs previously and this is very similar especially given the frequency.    History of Present Illness       Reason for visit:  L think l have an UTI  Symptom onset:  1-3 days ago  Symptoms include:  Frequent urination&back ache  Symptom intensity:  Moderate  Symptom progression:  Worsening  Had these symptoms before:  Yes  Has tried/received treatment for these symptoms:  Yes  Previous treatment was successful:  Yes  Prior treatment description:  Antibotics  What makes it worse:  No  What makes it better:  No   She is taking medications regularly.       Pre-Provider Visit Orders- Urinalysis UA/UC  Patient reports the following symptoms:  frequent urination   Does the patient report any of the following symptoms: vaginal discharge, vaginal itching, possible yeast infection, has a urinary catheter in  "place, or unable to void in a specimen cup?  No              Objective    /74   Pulse 74   Temp 98.2  F (36.8  C) (Oral)   Resp 12   Ht 1.626 m (5' 4\")   Wt 96.7 kg (213 lb 1.6 oz)   LMP  (LMP Unknown)   SpO2 100%   Breastfeeding No   BMI 36.58 kg/m    Body mass index is 36.58 kg/m .  Physical Exam  Vitals and nursing note reviewed.   Constitutional:       General: She is not in acute distress.     Appearance: Normal appearance. She is not ill-appearing.   HENT:      Head: Normocephalic and atraumatic.   Eyes:      Extraocular Movements: Extraocular movements intact.      Conjunctiva/sclera: Conjunctivae normal.   Pulmonary:      Effort: Pulmonary effort is normal.   Abdominal:      Tenderness: There is no right CVA tenderness or left CVA tenderness.   Neurological:      Mental Status: She is alert and oriented to person, place, and time.   Psychiatric:         Attention and Perception: Attention normal.         Mood and Affect: Mood normal.         Speech: Speech normal.         Thought Content: Thought content normal.              Signed Electronically by: Saul Zamora,     "

## 2024-12-31 ENCOUNTER — TELEPHONE (OUTPATIENT)
Dept: FAMILY MEDICINE | Facility: CLINIC | Age: 79
End: 2024-12-31
Payer: COMMERCIAL

## 2024-12-31 NOTE — TELEPHONE ENCOUNTER
Massiel's Ozempic application has been submitted to the [employer] prescription assistance program.     We will note EPIC when [facility] has made their decision.     Thank you!    Jackie Malone   Prescription

## 2025-01-09 SDOH — HEALTH STABILITY: PHYSICAL HEALTH: ON AVERAGE, HOW MANY DAYS PER WEEK DO YOU ENGAGE IN MODERATE TO STRENUOUS EXERCISE (LIKE A BRISK WALK)?: 2 DAYS

## 2025-01-09 SDOH — HEALTH STABILITY: PHYSICAL HEALTH: ON AVERAGE, HOW MANY MINUTES DO YOU ENGAGE IN EXERCISE AT THIS LEVEL?: 30 MIN

## 2025-01-09 ASSESSMENT — SOCIAL DETERMINANTS OF HEALTH (SDOH): HOW OFTEN DO YOU GET TOGETHER WITH FRIENDS OR RELATIVES?: TWICE A WEEK

## 2025-01-13 ENCOUNTER — OFFICE VISIT (OUTPATIENT)
Dept: FAMILY MEDICINE | Facility: CLINIC | Age: 80
End: 2025-01-13
Payer: COMMERCIAL

## 2025-01-13 VITALS
RESPIRATION RATE: 12 BRPM | DIASTOLIC BLOOD PRESSURE: 80 MMHG | HEIGHT: 64 IN | OXYGEN SATURATION: 98 % | TEMPERATURE: 97.9 F | BODY MASS INDEX: 36.06 KG/M2 | HEART RATE: 80 BPM | WEIGHT: 211.2 LBS | SYSTOLIC BLOOD PRESSURE: 127 MMHG

## 2025-01-13 DIAGNOSIS — Z00.00 ENCOUNTER FOR MEDICARE ANNUAL WELLNESS EXAM: Primary | ICD-10-CM

## 2025-01-13 DIAGNOSIS — Z85.038 HISTORY OF MALIGNANT NEOPLASM OF COLON: ICD-10-CM

## 2025-01-13 DIAGNOSIS — E66.812 CLASS 2 SEVERE OBESITY DUE TO EXCESS CALORIES WITH SERIOUS COMORBIDITY AND BODY MASS INDEX (BMI) OF 38.0 TO 38.9 IN ADULT (H): ICD-10-CM

## 2025-01-13 DIAGNOSIS — E66.01 CLASS 2 SEVERE OBESITY DUE TO EXCESS CALORIES WITH SERIOUS COMORBIDITY AND BODY MASS INDEX (BMI) OF 38.0 TO 38.9 IN ADULT (H): ICD-10-CM

## 2025-01-13 DIAGNOSIS — E11.42 TYPE 2 DIABETES MELLITUS WITH DIABETIC POLYNEUROPATHY, WITHOUT LONG-TERM CURRENT USE OF INSULIN (H): ICD-10-CM

## 2025-01-13 DIAGNOSIS — I10 BENIGN ESSENTIAL HYPERTENSION: ICD-10-CM

## 2025-01-13 LAB
ALBUMIN SERPL BCG-MCNC: 4.2 G/DL (ref 3.5–5.2)
ALP SERPL-CCNC: 87 U/L (ref 40–150)
ALT SERPL W P-5'-P-CCNC: 18 U/L (ref 0–50)
ANION GAP SERPL CALCULATED.3IONS-SCNC: 11 MMOL/L (ref 7–15)
AST SERPL W P-5'-P-CCNC: 23 U/L (ref 0–45)
BILIRUB SERPL-MCNC: 0.4 MG/DL
BUN SERPL-MCNC: 15.2 MG/DL (ref 8–23)
CALCIUM SERPL-MCNC: 10 MG/DL (ref 8.8–10.4)
CEA SERPL-MCNC: 1.8 NG/ML
CHLORIDE SERPL-SCNC: 102 MMOL/L (ref 98–107)
CHOLEST SERPL-MCNC: 166 MG/DL
CREAT SERPL-MCNC: 0.72 MG/DL (ref 0.51–0.95)
EGFRCR SERPLBLD CKD-EPI 2021: 85 ML/MIN/1.73M2
ERYTHROCYTE [DISTWIDTH] IN BLOOD BY AUTOMATED COUNT: 13.6 % (ref 10–15)
FASTING STATUS PATIENT QL REPORTED: YES
FASTING STATUS PATIENT QL REPORTED: YES
GLUCOSE SERPL-MCNC: 114 MG/DL (ref 70–99)
HCO3 SERPL-SCNC: 25 MMOL/L (ref 22–29)
HCT VFR BLD AUTO: 41.6 % (ref 35–47)
HDLC SERPL-MCNC: 76 MG/DL
HGB BLD-MCNC: 14 G/DL (ref 11.7–15.7)
HOLD SPECIMEN: NORMAL
LDLC SERPL CALC-MCNC: 71 MG/DL
MCH RBC QN AUTO: 29.8 PG (ref 26.5–33)
MCHC RBC AUTO-ENTMCNC: 33.7 G/DL (ref 31.5–36.5)
MCV RBC AUTO: 89 FL (ref 78–100)
NONHDLC SERPL-MCNC: 90 MG/DL
PLATELET # BLD AUTO: 181 10E3/UL (ref 150–450)
POTASSIUM SERPL-SCNC: 4.4 MMOL/L (ref 3.4–5.3)
PROT SERPL-MCNC: 7.3 G/DL (ref 6.4–8.3)
RBC # BLD AUTO: 4.7 10E6/UL (ref 3.8–5.2)
SODIUM SERPL-SCNC: 138 MMOL/L (ref 135–145)
TRIGL SERPL-MCNC: 94 MG/DL
WBC # BLD AUTO: 5.2 10E3/UL (ref 4–11)

## 2025-01-13 PROCEDURE — G2211 COMPLEX E/M VISIT ADD ON: HCPCS | Performed by: FAMILY MEDICINE

## 2025-01-13 PROCEDURE — 82378 CARCINOEMBRYONIC ANTIGEN: CPT | Performed by: FAMILY MEDICINE

## 2025-01-13 PROCEDURE — 85027 COMPLETE CBC AUTOMATED: CPT | Performed by: FAMILY MEDICINE

## 2025-01-13 PROCEDURE — 80061 LIPID PANEL: CPT | Performed by: FAMILY MEDICINE

## 2025-01-13 PROCEDURE — G0439 PPPS, SUBSEQ VISIT: HCPCS | Mod: 25 | Performed by: FAMILY MEDICINE

## 2025-01-13 PROCEDURE — 36415 COLL VENOUS BLD VENIPUNCTURE: CPT | Performed by: FAMILY MEDICINE

## 2025-01-13 PROCEDURE — 83036 HEMOGLOBIN GLYCOSYLATED A1C: CPT | Performed by: FAMILY MEDICINE

## 2025-01-13 PROCEDURE — 99213 OFFICE O/P EST LOW 20 MIN: CPT | Performed by: FAMILY MEDICINE

## 2025-01-13 PROCEDURE — 80053 COMPREHEN METABOLIC PANEL: CPT | Performed by: FAMILY MEDICINE

## 2025-01-13 NOTE — PATIENT INSTRUCTIONS
Patient Education   Preventive Care Advice   This is general advice given by our system to help you stay healthy. However, your care team may have specific advice just for you. Please talk to your care team about your preventive care needs.  Nutrition  Eat 5 or more servings of fruits and vegetables each day.  Try wheat bread, brown rice and whole grain pasta (instead of white bread, rice, and pasta).  Get enough calcium and vitamin D. Check the label on foods and aim for 100% of the RDA (recommended daily allowance).  Lifestyle  Exercise at least 150 minutes each week  (30 minutes a day, 5 days a week).  Do muscle strengthening activities 2 days a week. These help control your weight and prevent disease.  No smoking.  Wear sunscreen to prevent skin cancer.  Have a dental exam and cleaning every 6 months.  Yearly exams  See your health care team every year to talk about:  Any changes in your health.  Any medicines your care team has prescribed.  Preventive care, family planning, and ways to prevent chronic diseases.  Shots (vaccines)   HPV shots (up to age 26), if you've never had them before.  Hepatitis B shots (up to age 59), if you've never had them before.  COVID-19 shot: Get this shot when it's due.  Flu shot: Get a flu shot every year.  Tetanus shot: Get a tetanus shot every 10 years.  Pneumococcal, hepatitis A, and RSV shots: Ask your care team if you need these based on your risk.  Shingles shot (for age 50 and up)  General health tests  Diabetes screening:  Starting at age 35, Get screened for diabetes at least every 3 years.  If you are younger than age 35, ask your care team if you should be screened for diabetes.  Cholesterol test: At age 39, start having a cholesterol test every 5 years, or more often if advised.  Bone density scan (DEXA): At age 50, ask your care team if you should have this scan for osteoporosis (brittle bones).  Hepatitis C: Get tested at least once in your life.  STIs (sexually  transmitted infections)  Before age 24: Ask your care team if you should be screened for STIs.  After age 24: Get screened for STIs if you're at risk. You are at risk for STIs (including HIV) if:  You are sexually active with more than one person.  You don't use condoms every time.  You or a partner was diagnosed with a sexually transmitted infection.  If you are at risk for HIV, ask about PrEP medicine to prevent HIV.  Get tested for HIV at least once in your life, whether you are at risk for HIV or not.  Cancer screening tests  Cervical cancer screening: If you have a cervix, begin getting regular cervical cancer screening tests starting at age 21.  Breast cancer scan (mammogram): If you've ever had breasts, begin having regular mammograms starting at age 40. This is a scan to check for breast cancer.  Colon cancer screening: It is important to start screening for colon cancer at age 45.  Have a colonoscopy test every 10 years (or more often if you're at risk) Or, ask your provider about stool tests like a FIT test every year or Cologuard test every 3 years.  To learn more about your testing options, visit:   .  For help making a decision, visit:   https://bit.ly/bt82226.  Prostate cancer screening test: If you have a prostate, ask your care team if a prostate cancer screening test (PSA) at age 55 is right for you.  Lung cancer screening: If you are a current or former smoker ages 50 to 80, ask your care team if ongoing lung cancer screenings are right for you.  For informational purposes only. Not to replace the advice of your health care provider. Copyright   2023 [facility]. All rights reserved. Clinically reviewed by the [facility] Transitions Program. [employer] 187057 - REV 01/24.  Preventing Falls: Care Instructions  Injuries and health problems such as trouble walking or poor eyesight can increase your risk of falling. So can some medicines. But there are things you can do to help  "prevent falls. You can exercise to get stronger. You can also arrange your home to make it safer.    Talk to your doctor about the medicines you take. Ask if any of them increase the risk of falls and whether they can be changed or stopped.   Try to exercise regularly. It can help improve your strength and balance. This can help lower your risk of falling.         Practice fall safety and prevention.   Wear low-heeled shoes that fit well and give your feet good support. Talk to your doctor if you have foot problems that make this hard.  Carry a cellphone or wear a medical alert device that you can use to call for help.  Use stepladders instead of chairs to reach high objects. Don't climb if you're at risk for falls. Ask for help, if needed.  Wear the correct eyeglasses, if you need them.        Make your home safer.   Remove rugs, cords, clutter, and furniture from walkways.  Keep your house well lit. Use night-lights in hallways and bathrooms.  Install and use sturdy handrails on stairways.  Wear nonskid footwear, even inside. Don't walk barefoot or in socks without shoes.        Be safe outside.   Use handrails, curb cuts, and ramps whenever possible.  Keep your hands free by using a shoulder bag or backpack.  Try to walk in well-lit areas. Watch out for uneven ground, changes in pavement, and debris.  Be careful in the winter. Walk on the grass or gravel when sidewalks are slippery. Use de-icer on steps and walkways. Add non-slip devices to shoes.    Put grab bars and nonskid mats in your shower or tub and near the toilet. Try to use a shower chair or bath bench when bathing.   Get into a tub or shower by putting in your weaker leg first. Get out with your strong side first. Have a phone or medical alert device in the bathroom with you.   Where can you learn more?  Go to https://www.[employer].net/patiented  Enter G117 in the search box to learn more about \"Preventing Falls: Care Instructions.\"  Current as of: " July 31, 2024  Content Version: 14.3    2024 [employer].   Care instructions adapted under license by your healthcare professional. If you have questions about a medical condition or this instruction, always ask your healthcare professional. [employer] disclaims any warranty or liability for your use of this information.

## 2025-01-13 NOTE — ASSESSMENT & PLAN NOTE
Goal of 140/90 with lifestyle changes as well as losartan 50 mg daily.  Will continue current plan of care.    Orders:    CBC with Platelets and Reflex to Iron Studies; Future    Comprehensive metabolic panel; Future

## 2025-01-13 NOTE — ASSESSMENT & PLAN NOTE
Very well-controlled and below goal of 7 with current treatment modality which does include semaglutide.  Continue current plan of care.    Orders:    Lipid panel reflex to direct LDL Fasting; Future    CBC with Platelets and Reflex to Iron Studies; Future    Comprehensive metabolic panel; Future

## 2025-01-13 NOTE — ASSESSMENT & PLAN NOTE
Continue to follow CEA every 6 months with A1c.    Orders:    CBC with Platelets and Reflex to Iron Studies; Future    Comprehensive metabolic panel; Future    CEA; Future

## 2025-01-13 NOTE — PROGRESS NOTES
Preventive Care Visit  [facility] DO, Family Medicine  Jan 13, 2025      Assessment & Plan   Assessment & Plan  Encounter for Medicare annual wellness exam    Orders:    REVIEW OF HEALTH MAINTENANCE PROTOCOL ORDERS    PRIMARY CARE FOLLOW-UP SCHEDULING; Future    Benign essential hypertension  Goal of 140/90 with lifestyle changes as well as losartan 50 mg daily.  Will continue current plan of care.    Orders:    CBC with Platelets and Reflex to Iron Studies; Future    Comprehensive metabolic panel; Future    Class 2 severe obesity due to excess calories with serious comorbidity and body mass index (BMI) of 38.0 to 38.9 in adult (H)  Weight is stable.  Will continue current plan of care.         Type 2 diabetes mellitus with diabetic polyneuropathy, without long-term current use of insulin (H)  Very well-controlled and below goal of 7 with current treatment modality which does include semaglutide.  Continue current plan of care.    Orders:    Lipid panel reflex to direct LDL Fasting; Future    CBC with Platelets and Reflex to Iron Studies; Future    Comprehensive metabolic panel; Future    History of malignant neoplasm of colon  Continue to follow CEA every 6 months with A1c.    Orders:    CBC with Platelets and Reflex to Iron Studies; Future    Comprehensive metabolic panel; Future    CEA; Future      Patient has been advised of split billing requirements and indicates understanding: Yes       Counseling  Appropriate preventive services were addressed with this patient via screening, questionnaire, or discussion as appropriate for fall prevention, nutrition, physical activity, Tobacco-use cessation, social engagement, weight loss and cognition.  Checklist reviewing preventive services available has been given to the patient.  Reviewed patient's diet, addressing concerns and/or questions.   She is at risk for lack of exercise and has been provided with information to  increase physical activity for the benefit of her well-being.     See Patient Instructions      Subjective   Massiel is a 79 year old, presenting for the following:  Wellness Visit        1/13/2025     8:53 AM   Additional Questions   Roomed by ANEL Chin   Accompanied by Spouse         1/13/2025     8:53 AM   Patient Reported Additional Medications   Patient reports taking the following new medications N/A       Denies any chest pain, shortness of breath, dyspnea exertion, palpitations, nausea or vomiting.  Denies any changes in vision or hearing, or urinary or bowel habits.    He is having little stress as abnormalities were found on her 's recent CT scan that are now being further investigated to include biopsied.  She is taking some time for self and doing very good with self-care.  She and her  have been  for 52 years coming up on 53.      Health Care Directive  Patient has a Health Care Directive on file  Advance care planning document is on file and is current.      1/9/2025   General Health   How would you rate your overall physical health? Good   Feel stress (tense, anxious, or unable to sleep) Only a little   (!) STRESS CONCERN      1/9/2025   Nutrition   Diet: Low fat/cholesterol    Diabetic       Multiple values from one day are sorted in reverse-chronological order         1/9/2025   Exercise   Days per week of moderate/strenous exercise 2 days   Average minutes spent exercising at this level 30 min   (!) EXERCISE CONCERN      1/9/2025   Social Factors   Frequency of gathering with friends or relatives Twice a week   Worry food won't last until get money to buy more No   Food not last or not have enough money for food? No   Do you have housing? (Housing is defined as stable permanent housing and does not include staying ouside in a car, in a tent, in an abandoned building, in an overnight shelter, or couch-surfing.) Yes   Are you worried about losing your housing? No   Lack of  transportation? No   Unable to get utilities (heat,electricity)? No         1/13/2025   Fall Risk   Gait Speed Test (Document in seconds) 4.7   Gait Speed Test Interpretation Less than or equal to 5.00 seconds - PASS          1/9/2025   Activities of Daily Living- Home Safety   Needs help with the following daily activites None of the above   Safety concerns in the home None of the above         1/9/2025   Dental   Dentist two times every year? Yes         1/9/2025   Hearing Screening   Hearing concerns? None of the above         1/9/2025   Driving Risk Screening   Patient/family members have concerns about driving No         1/9/2025   General Alertness/Fatigue Screening   Have you been more tired than usual lately? No         1/9/2025   Urinary Incontinence Screening   Bothered by leaking urine in past 6 months No         1/9/2025   TB Screening   Were you born outside of the [place]? No         Today's PHQ-2 Score:       1/12/2025     6:36 PM   PHQ-2 ( 1999 Pfizer)   Q1: Little interest or pleasure in doing things 0   Q2: Feeling down, depressed or hopeless 0   PHQ-2 Score 0    Q1: Little interest or pleasure in doing things Not at all   Q2: Feeling down, depressed or hopeless Not at all   PHQ-2 Score 0       Patient-reported           1/9/2025   Substance Use   Alcohol more than 3/day or more than 7/wk No   Do you have a current opioid prescription? No   How severe/bad is pain from 1 to 10? 0/10 (No Pain)   Do you use any other substances recreationally? No     Social History     Tobacco Use    Smoking status: Never     Passive exposure: Never    Smokeless tobacco: Never   Vaping Use    Vaping status: Never Used   Substance Use Topics    Alcohol use: No    Drug use: No           4/22/2024   LAST FHS-7 RESULTS   1st degree relative breast or ovarian cancer Yes   Any relative bilateral breast cancer No   Any male have breast cancer No   Any ONE woman have BOTH breast AND ovarian cancer No   Any woman with breast cancer  before 50yrs No   2 or more relatives with breast AND/OR ovarian cancer No   2 or more relatives with breast AND/OR bowel cancer No        Mammogram Screening - After age 74- determine frequency with patient based on health status, life expectancy and patient goals    ASCVD Risk   The 10-year ASCVD risk score (Stephany KILGORE, et al., 2019) is: 48.2%    Values used to calculate the score:      Age: 79 years      Sex: Female      Is Non- : No      Diabetic: Yes      Tobacco smoker: No      Systolic Blood Pressure: 127 mmHg      Is BP treated: Yes      HDL Cholesterol: 69 mg/dL      Total Cholesterol: 169 mg/dL          Reviewed and updated as needed this visit by Provider   Tobacco  Allergies  Meds  Problems  Med Hx  Surg Hx  Fam Hx              Current providers sharing in care for this patient include:  Patient Care Team:  Saul Zamora DO as PCP - General (Family Medicine)  Saul Zamora DO as Assigned PCP  Torrie Thompson MD as Physician (Internal Medicine)  Carline Lizarraga RPH as Pharmacist (Pharmacist)  Amie Hobbs RPH as Pharmacist (Pharmacist)  Amie Hobbs RPH as Assigned [facility] Pharmacist    The following health maintenance items are reviewed in Epic and correct as of today:  Health Maintenance   Topic Date Due    INFLUENZA VACCINE (1) 01/30/2025 (Originally 9/1/2024)    Pneumococcal Vaccine: 50+ Years (2 of 2 - PPSV23) 01/30/2025 (Originally 5/3/2021)    ZOSTER IMMUNIZATION (1 of 2) 01/30/2025 (Originally 3/9/1996)    RSV VACCINE (1 - 1-dose 75+ series) 01/30/2025 (Originally 3/9/2021)    COVID-19 Vaccine (1 - 2024-25 season) 01/30/2025 (Originally 9/1/2024)    A1C  03/17/2025    LIPID  03/20/2025    MICROALBUMIN  03/20/2025    DTAP/TDAP/TD IMMUNIZATION (2 - Td or Tdap) 06/22/2025    EYE EXAM  09/10/2025    DIABETIC FOOT EXAM  09/17/2025    BMP  10/21/2025    MEDICARE ANNUAL WELLNESS VISIT  01/13/2026    ANNUAL REVIEW OF HM ORDERS   "01/13/2026    FALL RISK ASSESSMENT  01/13/2026    COLORECTAL CANCER SCREENING  06/11/2027    ADVANCE CARE PLANNING  03/20/2029    DEXA  01/19/2037    HEPATITIS C SCREENING  Completed    PHQ-2 (once per calendar year)  Completed    HPV IMMUNIZATION  Aged Out    MENINGITIS IMMUNIZATION  Aged Out    RSV MONOCLONAL ANTIBODY  Aged Out    TSH W/FREE T4 REFLEX  Discontinued    MAMMO SCREENING  Discontinued            Objective    Exam  /80 (BP Location: Left arm, Patient Position: Sitting, Cuff Size: Adult Large)   Pulse 80   Temp 97.9  F (36.6  C) (Oral)   Resp 12   Ht 1.626 m (5' 4\")   Wt 95.8 kg (211 lb 3.2 oz)   LMP  (LMP Unknown)   SpO2 98%   BMI 36.25 kg/m     Estimated body mass index is 36.25 kg/m  as calculated from the following:    Height as of this encounter: 1.626 m (5' 4\").    Weight as of this encounter: 95.8 kg (211 lb 3.2 oz).    Physical Exam  Vitals and nursing note reviewed.   Constitutional:       General: She is not in acute distress.     Appearance: Normal appearance.   HENT:      Head: Normocephalic and atraumatic.      Right Ear: Tympanic membrane, ear canal and external ear normal.      Left Ear: Tympanic membrane, ear canal and external ear normal.      Nose: Nose normal.      Mouth/Throat:      Mouth: Mucous membranes are moist.      Pharynx: Oropharynx is clear. No oropharyngeal exudate.   Eyes:      General: No scleral icterus.     Extraocular Movements: Extraocular movements intact.      Conjunctiva/sclera: Conjunctivae normal.   Neck:      Vascular: No carotid bruit.   Cardiovascular:      Rate and Rhythm: Normal rate and regular rhythm.      Pulses: Normal pulses.      Heart sounds: Normal heart sounds. No murmur heard.     No friction rub. No gallop.   Pulmonary:      Effort: Pulmonary effort is normal.      Breath sounds: Normal breath sounds. No wheezing, rhonchi or rales.   Musculoskeletal:         General: No swelling. Normal range of motion.      Cervical back: Normal " range of motion.      Right lower leg: No edema.      Left lower leg: No edema.   Skin:     General: Skin is warm and dry.      Capillary Refill: Capillary refill takes less than 2 seconds.      Findings: No rash.   Neurological:      General: No focal deficit present.      Mental Status: She is alert and oriented to person, place, and time.      Cranial Nerves: No cranial nerve deficit.      Gait: Gait is intact. Gait normal.      Deep Tendon Reflexes: Reflexes normal.      Reflex Scores:       Bicep reflexes are 2+ on the right side and 2+ on the left side.       Patellar reflexes are 2+ on the right side and 2+ on the left side.  Psychiatric:         Mood and Affect: Mood normal.         Thought Content: Thought content normal.               1/13/2025   Mini Cog   Clock Draw Score 2 Normal   3 Item Recall 3 objects recalled   Mini Cog Total Score 5              Signed Electronically by: Saul Zamora,

## 2025-01-14 ENCOUNTER — TELEPHONE (OUTPATIENT)
Dept: FAMILY MEDICINE | Facility: CLINIC | Age: 80
End: 2025-01-14
Payer: COMMERCIAL

## 2025-01-14 DIAGNOSIS — E11.42 TYPE 2 DIABETES MELLITUS WITH DIABETIC POLYNEUROPATHY, WITHOUT LONG-TERM CURRENT USE OF INSULIN (H): Primary | ICD-10-CM

## 2025-01-14 NOTE — TELEPHONE ENCOUNTER
Spoke with patient - A1C was not drawn at [facility] 01/13/2025. Patient is requesting A1C lab orders if provider recommends.

## 2025-01-15 LAB
EST. AVERAGE GLUCOSE BLD GHB EST-MCNC: 128 MG/DL
HBA1C MFR BLD: 6.1 % (ref 0–5.6)

## 2025-01-15 NOTE — TELEPHONE ENCOUNTER
I have ordered and asked for existing specimen to be used. Please check with lab to make sure they know the add on order. Thank you.

## 2025-01-28 ENCOUNTER — TELEPHONE (OUTPATIENT)
Dept: FAMILY MEDICINE | Facility: CLINIC | Age: 80
End: 2025-01-28
Payer: COMMERCIAL

## 2025-01-28 NOTE — TELEPHONE ENCOUNTER
I have approved Karol for up to $500 of the Pharmacy Assistance Fund to be filled at the [facility].    Karol and the [facility] have been informed.    This kevin is for prescription medications only.    This kevin must be used within 30 days of this approval notice.    This kevin must be used all in one transaction.    Kathy Rojas  Prescription Assistance Supervisor  Pharmacy Assistance   Pool: [facility]

## 2025-01-28 NOTE — TELEPHONE ENCOUNTER
I have rescinded this approval for the Pharmacy Assistance Fund kevin of $500.00    Massiel does not need this at this time.     She is eligible to use this at a later time.    Kathy Rojas  Prescription Assistance Supervisor  Pharmacy Assistance   Pool: [facility]

## 2025-02-25 ENCOUNTER — PATIENT OUTREACH (OUTPATIENT)
Dept: CARE COORDINATION | Facility: CLINIC | Age: 80
End: 2025-02-25
Payer: COMMERCIAL

## 2025-04-10 ENCOUNTER — TRANSFERRED RECORDS (OUTPATIENT)
Dept: HEALTH INFORMATION MANAGEMENT | Facility: CLINIC | Age: 80
End: 2025-04-10
Payer: COMMERCIAL

## 2025-04-18 ENCOUNTER — HOSPITAL ENCOUNTER (OUTPATIENT)
Dept: MRI IMAGING | Facility: CLINIC | Age: 80
Discharge: HOME | End: 2025-04-18
Attending: PHYSICIAN ASSISTANT | Admitting: PHYSICIAN ASSISTANT
Payer: COMMERCIAL

## 2025-04-18 DIAGNOSIS — M54.50 ACUTE LOW BACK PAIN: ICD-10-CM

## 2025-04-18 PROCEDURE — 72148 MRI LUMBAR SPINE W/O DYE: CPT

## 2025-04-23 ENCOUNTER — TELEPHONE (OUTPATIENT)
Dept: FAMILY MEDICINE | Facility: CLINIC | Age: 80
End: 2025-04-23
Payer: COMMERCIAL

## 2025-04-23 NOTE — TELEPHONE ENCOUNTER
April 23, 2025, I interoffice mailed to Dr. Ding, the [facility] refill/change form to refil Massiel's Ozempic.    Please allow a few days for this to reach the [facility].    Please review, complete, sign and return to us in the enclosed, addressed interoffice envelope.    Thanks so much for your help!    Kathy Rojas  Prescription Assistance Supervisor  Pharmacy Assistance   Pool: [facility]

## 2025-04-24 NOTE — TELEPHONE ENCOUNTER
Left message for patient to call back. Please relay message from PCP and assist in scheduling early appointment      Rae Mcnulty RN

## 2025-04-24 NOTE — TELEPHONE ENCOUNTER
Please call patient. Dr. Zamora asked me to become her primary when he left [place]. I need to see her if I am refilling her medication. She has an appointment is July but please move her appointment sooner. I can see her on Monday, April 28 at 10:00 (9:40 check in) if that works. If not, I will find another time. It would be a 40 minutes establish care.

## 2025-04-28 ENCOUNTER — OFFICE VISIT (OUTPATIENT)
Dept: FAMILY MEDICINE | Facility: CLINIC | Age: 80
End: 2025-04-28
Payer: COMMERCIAL

## 2025-04-28 ENCOUNTER — TRANSFERRED RECORDS (OUTPATIENT)
Dept: HEALTH INFORMATION MANAGEMENT | Facility: CLINIC | Age: 80
End: 2025-04-28

## 2025-04-28 VITALS
TEMPERATURE: 98.2 F | DIASTOLIC BLOOD PRESSURE: 69 MMHG | SYSTOLIC BLOOD PRESSURE: 143 MMHG | OXYGEN SATURATION: 97 % | BODY MASS INDEX: 36.98 KG/M2 | HEART RATE: 81 BPM | RESPIRATION RATE: 14 BRPM | WEIGHT: 216.6 LBS | HEIGHT: 64 IN

## 2025-04-28 DIAGNOSIS — M54.42 CHRONIC BILATERAL LOW BACK PAIN WITH BILATERAL SCIATICA: ICD-10-CM

## 2025-04-28 DIAGNOSIS — E11.42 TYPE 2 DIABETES MELLITUS WITH DIABETIC POLYNEUROPATHY, WITHOUT LONG-TERM CURRENT USE OF INSULIN (H): Primary | ICD-10-CM

## 2025-04-28 DIAGNOSIS — M54.41 CHRONIC BILATERAL LOW BACK PAIN WITH BILATERAL SCIATICA: ICD-10-CM

## 2025-04-28 DIAGNOSIS — I10 BENIGN ESSENTIAL HYPERTENSION: ICD-10-CM

## 2025-04-28 DIAGNOSIS — G89.29 CHRONIC BILATERAL LOW BACK PAIN WITH BILATERAL SCIATICA: ICD-10-CM

## 2025-04-28 DIAGNOSIS — M48.062 SPINAL STENOSIS OF LUMBAR REGION WITH NEUROGENIC CLAUDICATION: ICD-10-CM

## 2025-04-28 PROCEDURE — 3078F DIAST BP <80 MM HG: CPT | Performed by: FAMILY MEDICINE

## 2025-04-28 PROCEDURE — 3077F SYST BP >= 140 MM HG: CPT | Performed by: FAMILY MEDICINE

## 2025-04-28 PROCEDURE — 99214 OFFICE O/P EST MOD 30 MIN: CPT | Performed by: FAMILY MEDICINE

## 2025-04-28 PROCEDURE — G2211 COMPLEX E/M VISIT ADD ON: HCPCS | Performed by: FAMILY MEDICINE

## 2025-04-28 RX ORDER — METHYLPREDNISOLONE 4 MG/1
TABLET ORAL
COMMUNITY
Start: 2025-04-10

## 2025-04-28 RX ORDER — METHOCARBAMOL 500 MG/1
TABLET, FILM COATED ORAL
COMMUNITY
Start: 2025-04-21

## 2025-04-28 NOTE — PROGRESS NOTES
"  Assessment & Plan     Type 2 diabetes mellitus with diabetic polyneuropathy, without long-term current use of insulin (H)    Currently stable  She is treated with Ozempic  Please note that paperwork was completed so that she may receive this medication  Most recent hemoglobin A1c was 6.1%  Recommended eye examination  Have reviewed foot care      Spinal stenosis of lumbar region with neurogenic claudication  Chronic bilateral low back pain with bilateral sciatica    Recommend ongoing follow-up with her spine specialist  She has been treated with prednisone and is taking methocarbamol as needed  She may continue gabapentin    Benign essential hypertension    Currently stable  Continue losartan  Home blood pressure readings were reviewed    Spent 30 minutes including time for chart preparation and review as well as time with patient in reviewing the treatment plan.  This included time to complete paperwork      The longitudinal plan of care for the diagnosis(es)/condition(s) as documented were addressed during this visit. Due to the added complexity in care, I will continue to support Massiel in the subsequent management and with ongoing continuity of care.      BMI  Estimated body mass index is 37.18 kg/m  as calculated from the following:    Height as of this encounter: 1.626 m (5' 4\").    Weight as of this encounter: 98.2 kg (216 lb 9.6 oz).             Subjective   Massiel is a 80 year old, presenting for the following health issues:  Establish Care        4/28/2025     9:45 AM   Additional Questions   Roomed by Joie MORGAN CMA     History of Present Illness       Reason for visit:  Meet and greet and back problems   She is taking medications regularly.        Karol is a pleasant 80-year-old female who presents to the clinic to establish care following the departure of Dr. Lyons to another health system.    Medical history is notable for type 2 diabetes mellitus, peripheral neuropathy, lumbar spinal stenosis, chronic " "low back pain, hypertension, and previous colon cancer.  She has had a previous hip arthroplasty.    Regarding diabetes her most recent hemoglobin A1c was 6.1%.  She notes that her blood sugars have generally been stable and she continues to be treated with Ozempic.  Paperwork needs to be completed for this.    She has had low back pain and has been following up with a spine specialist.  She did require prednisone when she experienced a flare with radicular symptoms.  There has been some improvement.    She has a remote history of colon cancer as noted.    For hypertension she is treated with losartan.                    Objective    BP (!) 143/69 (BP Location: Left arm, Patient Position: Sitting, Cuff Size: Adult Regular)   Pulse 81   Temp 98.2  F (36.8  C) (Oral)   Resp 14   Ht 1.626 m (5' 4\")   Wt 98.2 kg (216 lb 9.6 oz)   LMP  (LMP Unknown)   SpO2 97%   BMI 37.18 kg/m    Body mass index is 37.18 kg/m .  Physical Exam   GENERAL: alert and no distress  EYES: Eyes grossly normal to inspection, PERRL and conjunctivae and sclerae normal  RESP: lungs clear to auscultation - no rales, rhonchi or wheezes  CV: regular rate and rhythm, normal S1 S2, no S3 or S4, no murmur, click or rub, no peripheral edema  PSYCH: mentation appears normal, affect normal/bright            Signed Electronically by: Jamal Price MD    "

## 2025-05-02 ENCOUNTER — ANCILLARY PROCEDURE (OUTPATIENT)
Dept: MAMMOGRAPHY | Facility: CLINIC | Age: 80
End: 2025-05-02
Attending: FAMILY MEDICINE
Payer: COMMERCIAL

## 2025-05-02 DIAGNOSIS — Z12.31 VISIT FOR SCREENING MAMMOGRAM: ICD-10-CM

## 2025-05-02 PROCEDURE — 77063 BREAST TOMOSYNTHESIS BI: CPT

## 2025-05-06 ENCOUNTER — TELEPHONE (OUTPATIENT)
Dept: FAMILY MEDICINE | Facility: CLINIC | Age: 80
End: 2025-05-06
Payer: COMMERCIAL

## 2025-05-06 NOTE — TELEPHONE ENCOUNTER
May 6, 2025, I interoffice mailed to Dr Ding, the [facility] Ozempic refill/change form.    [facility] will not accept the refill/change form we received from the Clinic. A new form is required.    Please allow a few days for this to reach the Clinic.    Please review, complete, sign and return to us in the enclosed, addressed interoffice envelope.    Thanks so much for your help!    Kathy Rojas  Prescription Assistance Supervisor  Pharmacy Assistance   Pool: [facility]

## 2025-05-13 ENCOUNTER — TRANSFERRED RECORDS (OUTPATIENT)
Dept: HEALTH INFORMATION MANAGEMENT | Facility: CLINIC | Age: 80
End: 2025-05-13
Payer: COMMERCIAL

## 2025-05-15 ENCOUNTER — TELEPHONE (OUTPATIENT)
Dept: FAMILY MEDICINE | Facility: CLINIC | Age: 80
End: 2025-05-15
Payer: COMMERCIAL

## 2025-05-15 NOTE — TELEPHONE ENCOUNTER
Refill request submitted to [facility] for Ozempic. HCP was also changed to Dr Price.   Medication will ship to the [facility] within 10-14 business days.   Please contact Massiel upon arrival.     Thank you!  Jackie Malone   Prescription

## 2025-05-21 ENCOUNTER — TELEPHONE (OUTPATIENT)
Dept: FAMILY MEDICINE | Facility: CLINIC | Age: 80
End: 2025-05-21
Payer: COMMERCIAL

## 2025-05-21 NOTE — TELEPHONE ENCOUNTER
Patient Quality Outreach    Patient is due for the following:   Hypertension -  BP check    Action(s) Taken:   Patient has upcoming appointment, these items will be addressed at that time.    Type of outreach:    Chart review performed, no outreach needed.    Questions for provider review:    None         Lizeth Lubin  Chart routed to None.

## 2025-07-09 ENCOUNTER — OFFICE VISIT (OUTPATIENT)
Dept: FAMILY MEDICINE | Facility: CLINIC | Age: 80
End: 2025-07-09
Payer: COMMERCIAL

## 2025-07-09 VITALS
SYSTOLIC BLOOD PRESSURE: 114 MMHG | TEMPERATURE: 97.4 F | WEIGHT: 212.2 LBS | BODY MASS INDEX: 36.23 KG/M2 | HEART RATE: 88 BPM | DIASTOLIC BLOOD PRESSURE: 65 MMHG | RESPIRATION RATE: 18 BRPM | OXYGEN SATURATION: 98 % | HEIGHT: 64 IN

## 2025-07-09 DIAGNOSIS — E11.42 TYPE 2 DIABETES MELLITUS WITH DIABETIC POLYNEUROPATHY, WITHOUT LONG-TERM CURRENT USE OF INSULIN (H): ICD-10-CM

## 2025-07-09 DIAGNOSIS — Z85.038 HISTORY OF MALIGNANT NEOPLASM OF COLON: ICD-10-CM

## 2025-07-09 DIAGNOSIS — I10 BENIGN ESSENTIAL HYPERTENSION: Primary | ICD-10-CM

## 2025-07-09 DIAGNOSIS — Z23 NEED FOR VACCINATION: ICD-10-CM

## 2025-07-09 LAB
CREAT UR-MCNC: 178 MG/DL
EST. AVERAGE GLUCOSE BLD GHB EST-MCNC: 146 MG/DL
HBA1C MFR BLD: 6.7 % (ref 0–5.6)
HOLD SPECIMEN: NORMAL
HOLD SPECIMEN: NORMAL
MICROALBUMIN UR-MCNC: 19.4 MG/L
MICROALBUMIN/CREAT UR: 10.9 MG/G CR (ref 0–25)

## 2025-07-09 PROCEDURE — 82570 ASSAY OF URINE CREATININE: CPT | Performed by: FAMILY MEDICINE

## 2025-07-09 PROCEDURE — 3074F SYST BP LT 130 MM HG: CPT | Performed by: FAMILY MEDICINE

## 2025-07-09 PROCEDURE — 99214 OFFICE O/P EST MOD 30 MIN: CPT | Performed by: FAMILY MEDICINE

## 2025-07-09 PROCEDURE — 82043 UR ALBUMIN QUANTITATIVE: CPT | Performed by: FAMILY MEDICINE

## 2025-07-09 PROCEDURE — 80048 BASIC METABOLIC PNL TOTAL CA: CPT | Performed by: FAMILY MEDICINE

## 2025-07-09 PROCEDURE — 82378 CARCINOEMBRYONIC ANTIGEN: CPT | Performed by: FAMILY MEDICINE

## 2025-07-09 PROCEDURE — G2211 COMPLEX E/M VISIT ADD ON: HCPCS | Performed by: FAMILY MEDICINE

## 2025-07-09 PROCEDURE — 36415 COLL VENOUS BLD VENIPUNCTURE: CPT | Performed by: FAMILY MEDICINE

## 2025-07-09 PROCEDURE — 83036 HEMOGLOBIN GLYCOSYLATED A1C: CPT | Performed by: FAMILY MEDICINE

## 2025-07-09 PROCEDURE — 3078F DIAST BP <80 MM HG: CPT | Performed by: FAMILY MEDICINE

## 2025-07-09 PROCEDURE — 3044F HG A1C LEVEL LT 7.0%: CPT | Performed by: FAMILY MEDICINE

## 2025-07-09 RX ORDER — BLOOD SUGAR DIAGNOSTIC
STRIP MISCELLANEOUS
Qty: 200 STRIP | Refills: 2 | Status: SHIPPED | OUTPATIENT
Start: 2025-07-09

## 2025-07-09 NOTE — PROGRESS NOTES
"  {PROVIDER CHARTING PREFERENCE:460876}    Astrid Rankin is a 80 year old, presenting for the following health issues:  Diabetes (DM check- no concerns)        7/9/2025    10:38 AM   Additional Questions   Roomed by Dalia GONZÁLES CMA   Accompanied by Spouse: Vinay     History of Present Illness       Diabetes:   She presents for follow up of diabetes.  She is checking home blood glucose one time daily.   She checks blood glucose before meals.  Blood glucose is never over 200 and never under 70. She is aware of hypoglycemia symptoms including shakiness and dizziness.    She has no concerns regarding her diabetes at this time.  She is having numbness in feet and burning in feet.            She eats 2-3 servings of fruits and vegetables daily.She consumes 0 sweetened beverage(s) daily.She exercises with enough effort to increase her heart rate 10 to 19 minutes per day.  She exercises with enough effort to increase her heart rate 3 or less days per week.   She is taking medications regularly.        {MA/LPN/RN Pre-Provider Visit Orders- hCG/UA/Strep (Optional):589360}  {SUPERLIST (Optional):401649}  {additonal problems for provider to add (Optional):085022}    {ROS Picklists (Optional):222821}      Objective    Temp 97.4  F (36.3  C) (Oral)   Resp 18   Ht 1.626 m (5' 4\")   Wt 96.3 kg (212 lb 3.2 oz)   LMP  (LMP Unknown)   SpO2 98%   BMI 36.42 kg/m    Body mass index is 36.42 kg/m .  Physical Exam   {Exam List (Optional):938350}    {Diagnostic Test Results (Optional):774290}        Signed Electronically by: Jamal Price MD  {Email feedback regarding this note to [email or web address]   :712731}  " "beverage(s) daily.She exercises with enough effort to increase her heart rate 10 to 19 minutes per day.  She exercises with enough effort to increase her heart rate 3 or less days per week.   She is taking medications regularly.        Massiel is a pleasant 80-year-old female who presents to clinic for diabetes check.  She needs laboratory testing today.    Medical history is notable for type 2 diabetes mellitus, peripheral neuropathy, lumbar spinal stenosis, chronic low back pain, hypertension, and previous colon cancer.  She has had a previous hip arthroplasty.     Regarding diabetes her most recent hemoglobin A1c was 6.1%.  She notes that her blood sugars have generally been stable and she continues to be treated with Ozempic.  Paperwork was completed for this.    Her hemoglobin A1c is 6.7% today.    She has a history of colon cancer as noted.  She is in need of a CEA check.                  Objective    Temp 97.4  F (36.3  C) (Oral)   Resp 18   Ht 1.626 m (5' 4\")   Wt 96.3 kg (212 lb 3.2 oz)   LMP  (LMP Unknown)   SpO2 98%   BMI 36.42 kg/m    Body mass index is 36.42 kg/m .  Physical Exam   GENERAL: alert and no distress  PSYCH: mentation appears normal, affect normal/bright            Signed Electronically by: Jamal Price MD    " Patient refused

## 2025-07-10 LAB
ANION GAP SERPL CALCULATED.3IONS-SCNC: 11 MMOL/L (ref 7–15)
BUN SERPL-MCNC: 18.1 MG/DL (ref 8–23)
CALCIUM SERPL-MCNC: 10.1 MG/DL (ref 8.8–10.4)
CEA SERPL-MCNC: 1.9 NG/ML
CHLORIDE SERPL-SCNC: 101 MMOL/L (ref 98–107)
CREAT SERPL-MCNC: 0.77 MG/DL (ref 0.51–0.95)
EGFRCR SERPLBLD CKD-EPI 2021: 78 ML/MIN/1.73M2
GLUCOSE SERPL-MCNC: 190 MG/DL (ref 70–99)
HCO3 SERPL-SCNC: 26 MMOL/L (ref 22–29)
POTASSIUM SERPL-SCNC: 4.5 MMOL/L (ref 3.4–5.3)
SODIUM SERPL-SCNC: 138 MMOL/L (ref 135–145)